# Patient Record
Sex: FEMALE | Race: WHITE | NOT HISPANIC OR LATINO | Employment: OTHER | URBAN - METROPOLITAN AREA
[De-identification: names, ages, dates, MRNs, and addresses within clinical notes are randomized per-mention and may not be internally consistent; named-entity substitution may affect disease eponyms.]

---

## 2017-06-01 ENCOUNTER — ALLSCRIPTS OFFICE VISIT (OUTPATIENT)
Dept: OTHER | Facility: OTHER | Age: 57
End: 2017-06-01

## 2017-07-17 ENCOUNTER — TRANSCRIBE ORDERS (OUTPATIENT)
Dept: ADMINISTRATIVE | Facility: HOSPITAL | Age: 57
End: 2017-07-17

## 2017-07-17 DIAGNOSIS — K11.5 SIALOLITHIASIS: Primary | ICD-10-CM

## 2017-07-21 ENCOUNTER — HOSPITAL ENCOUNTER (OUTPATIENT)
Dept: CT IMAGING | Facility: HOSPITAL | Age: 57
Discharge: HOME | End: 2017-07-21
Attending: DENTIST
Payer: COMMERCIAL

## 2017-07-21 DIAGNOSIS — K11.5 SIALOLITHIASIS: ICD-10-CM

## 2017-07-21 PROCEDURE — 70486 CT MAXILLOFACIAL W/O DYE: CPT

## 2017-08-04 ENCOUNTER — GENERIC CONVERSION - ENCOUNTER (OUTPATIENT)
Dept: FAMILY MEDICINE CLINIC | Facility: CLINIC | Age: 57
End: 2017-08-04

## 2017-08-04 ENCOUNTER — APPOINTMENT (OUTPATIENT)
Dept: LAB | Facility: CLINIC | Age: 57
End: 2017-08-04
Payer: COMMERCIAL

## 2017-08-04 ENCOUNTER — TRANSCRIBE ORDERS (OUTPATIENT)
Dept: LAB | Facility: CLINIC | Age: 57
End: 2017-08-04

## 2017-08-04 ENCOUNTER — GENERIC CONVERSION - ENCOUNTER (OUTPATIENT)
Dept: OTHER | Facility: OTHER | Age: 57
End: 2017-08-04

## 2017-08-04 DIAGNOSIS — R74.8 ABNORMAL LEVELS OF OTHER SERUM ENZYMES: ICD-10-CM

## 2017-08-04 DIAGNOSIS — N95.1 FEMALE CLIMACTERIC STATE: ICD-10-CM

## 2017-08-04 DIAGNOSIS — M25.551 PAIN IN RIGHT HIP: ICD-10-CM

## 2017-08-04 DIAGNOSIS — E55.9 VITAMIN D DEFICIENCY: ICD-10-CM

## 2017-08-04 LAB
ALBUMIN SERPL BCP-MCNC: 3.5 G/DL (ref 3.5–5)
ALP SERPL-CCNC: 169 U/L (ref 46–116)
ALT SERPL W P-5'-P-CCNC: 102 U/L (ref 12–78)
ANION GAP SERPL CALCULATED.3IONS-SCNC: 7 MMOL/L (ref 4–13)
AST SERPL W P-5'-P-CCNC: 55 U/L (ref 5–45)
BILIRUB SERPL-MCNC: 0.39 MG/DL (ref 0.2–1)
BUN SERPL-MCNC: 11 MG/DL (ref 5–25)
CALCIUM SERPL-MCNC: 9 MG/DL (ref 8.3–10.1)
CHLORIDE SERPL-SCNC: 104 MMOL/L (ref 100–108)
CO2 SERPL-SCNC: 27 MMOL/L (ref 21–32)
CREAT SERPL-MCNC: 0.63 MG/DL (ref 0.6–1.3)
FSH SERPL-ACNC: 28.1 MIU/ML
GFR SERPL CREATININE-BSD FRML MDRD: 101 ML/MIN/1.73SQ M
GLUCOSE P FAST SERPL-MCNC: 90 MG/DL (ref 65–99)
POTASSIUM SERPL-SCNC: 4.4 MMOL/L (ref 3.5–5.3)
PROT SERPL-MCNC: 7.5 G/DL (ref 6.4–8.2)
SODIUM SERPL-SCNC: 138 MMOL/L (ref 136–145)
TSH SERPL DL<=0.05 MIU/L-ACNC: 1.92 UIU/ML (ref 0.36–3.74)

## 2017-08-04 PROCEDURE — 80053 COMPREHEN METABOLIC PANEL: CPT

## 2017-08-04 PROCEDURE — 36415 COLL VENOUS BLD VENIPUNCTURE: CPT

## 2017-08-04 PROCEDURE — 84443 ASSAY THYROID STIM HORMONE: CPT

## 2017-08-04 PROCEDURE — 83001 ASSAY OF GONADOTROPIN (FSH): CPT

## 2017-08-09 ENCOUNTER — APPOINTMENT (OUTPATIENT)
Dept: LAB | Facility: CLINIC | Age: 57
End: 2017-08-09
Payer: COMMERCIAL

## 2017-08-09 DIAGNOSIS — R74.8 ABNORMAL LEVELS OF OTHER SERUM ENZYMES: ICD-10-CM

## 2017-08-09 DIAGNOSIS — E55.9 VITAMIN D DEFICIENCY: ICD-10-CM

## 2017-08-09 LAB — 25(OH)D3 SERPL-MCNC: 15 NG/ML (ref 30–100)

## 2017-08-09 PROCEDURE — 82306 VITAMIN D 25 HYDROXY: CPT

## 2017-08-09 PROCEDURE — 36415 COLL VENOUS BLD VENIPUNCTURE: CPT

## 2017-08-09 PROCEDURE — 80074 ACUTE HEPATITIS PANEL: CPT

## 2017-08-10 LAB
HAV IGM SER QL: NORMAL
HBV CORE IGM SER QL: NORMAL
HBV SURFACE AG SER QL: NORMAL
HCV AB SER QL: NORMAL

## 2017-08-16 ENCOUNTER — HOSPITAL ENCOUNTER (OUTPATIENT)
Dept: ULTRASOUND IMAGING | Facility: HOSPITAL | Age: 57
Discharge: HOME | End: 2017-08-16
Payer: COMMERCIAL

## 2017-08-16 DIAGNOSIS — R74.8 ABNORMAL LEVELS OF OTHER SERUM ENZYMES: ICD-10-CM

## 2017-08-16 PROCEDURE — 76700 US EXAM ABDOM COMPLETE: CPT

## 2017-08-21 ENCOUNTER — APPOINTMENT (OUTPATIENT)
Dept: PHYSICAL THERAPY | Facility: MEDICAL CENTER | Age: 57
End: 2017-08-21
Payer: COMMERCIAL

## 2017-08-21 ENCOUNTER — GENERIC CONVERSION - ENCOUNTER (OUTPATIENT)
Dept: OTHER | Facility: OTHER | Age: 57
End: 2017-08-21

## 2017-08-21 DIAGNOSIS — M25.551 PAIN IN RIGHT HIP: ICD-10-CM

## 2017-08-21 PROCEDURE — G8991 OTHER PT/OT GOAL STATUS: HCPCS | Performed by: PHYSICAL THERAPIST

## 2017-08-21 PROCEDURE — 97161 PT EVAL LOW COMPLEX 20 MIN: CPT

## 2017-08-21 PROCEDURE — 97140 MANUAL THERAPY 1/> REGIONS: CPT

## 2017-08-21 PROCEDURE — G8990 OTHER PT/OT CURRENT STATUS: HCPCS | Performed by: PHYSICAL THERAPIST

## 2017-08-24 ENCOUNTER — APPOINTMENT (OUTPATIENT)
Dept: LAB | Facility: CLINIC | Age: 57
End: 2017-08-24
Payer: COMMERCIAL

## 2017-08-24 DIAGNOSIS — R74.8 ABNORMAL LEVELS OF OTHER SERUM ENZYMES: ICD-10-CM

## 2017-08-24 LAB
ALBUMIN SERPL BCP-MCNC: 3.6 G/DL (ref 3.5–5)
ALP SERPL-CCNC: 176 U/L (ref 46–116)
ALT SERPL W P-5'-P-CCNC: 91 U/L (ref 12–78)
AST SERPL W P-5'-P-CCNC: 45 U/L (ref 5–45)
BILIRUB DIRECT SERPL-MCNC: 0.15 MG/DL (ref 0–0.2)
BILIRUB SERPL-MCNC: 0.54 MG/DL (ref 0.2–1)
PROT SERPL-MCNC: 7.7 G/DL (ref 6.4–8.2)

## 2017-08-24 PROCEDURE — 80076 HEPATIC FUNCTION PANEL: CPT

## 2017-08-24 PROCEDURE — 36415 COLL VENOUS BLD VENIPUNCTURE: CPT

## 2017-09-28 ENCOUNTER — TRANSCRIBE ORDERS (OUTPATIENT)
Dept: ADMINISTRATIVE | Facility: HOSPITAL | Age: 57
End: 2017-09-28

## 2017-09-28 ENCOUNTER — APPOINTMENT (OUTPATIENT)
Dept: LAB | Facility: MEDICAL CENTER | Age: 57
End: 2017-09-28
Attending: INTERNAL MEDICINE
Payer: COMMERCIAL

## 2017-09-28 ENCOUNTER — ALLSCRIPTS OFFICE VISIT (OUTPATIENT)
Dept: OTHER | Facility: OTHER | Age: 57
End: 2017-09-28

## 2017-09-28 DIAGNOSIS — R74.8 ABNORMAL LEVELS OF OTHER SERUM ENZYMES: ICD-10-CM

## 2017-09-28 DIAGNOSIS — R74.8 OTHER NONSPECIFIC ABNORMAL SERUM ENZYME LEVELS: Primary | ICD-10-CM

## 2017-09-28 DIAGNOSIS — R19.4 CHANGE IN BOWEL HABITS: ICD-10-CM

## 2017-09-28 DIAGNOSIS — R74.8 OTHER NONSPECIFIC ABNORMAL SERUM ENZYME LEVELS: ICD-10-CM

## 2017-09-28 LAB
ALBUMIN SERPL BCP-MCNC: 3.6 G/DL (ref 3.5–5)
ALP SERPL-CCNC: 177 U/L (ref 46–116)
ALT SERPL W P-5'-P-CCNC: 93 U/L (ref 12–78)
AST SERPL W P-5'-P-CCNC: 55 U/L (ref 5–45)
BILIRUB DIRECT SERPL-MCNC: 0.09 MG/DL (ref 0–0.2)
BILIRUB SERPL-MCNC: 0.39 MG/DL (ref 0.2–1)
CRP SERPL QL: 6.9 MG/L
ERYTHROCYTE [SEDIMENTATION RATE] IN BLOOD: 25 MM/HOUR (ref 0–20)
HAV AB SER QL IA: NORMAL
HBV SURFACE AB SER-ACNC: <3.1 MIU/ML
IGA SERPL-MCNC: 199 MG/DL (ref 70–400)
IGG SERPL-MCNC: 1390 MG/DL (ref 700–1600)
IGM SERPL-MCNC: 95 MG/DL (ref 40–230)
PROT SERPL-MCNC: 8 G/DL (ref 6.4–8.2)

## 2017-09-28 PROCEDURE — 86235 NUCLEAR ANTIGEN ANTIBODY: CPT

## 2017-09-28 PROCEDURE — 83993 ASSAY FOR CALPROTECTIN FECAL: CPT

## 2017-09-28 PROCEDURE — 86038 ANTINUCLEAR ANTIBODIES: CPT

## 2017-09-28 PROCEDURE — 82103 ALPHA-1-ANTITRYPSIN TOTAL: CPT

## 2017-09-28 PROCEDURE — 80076 HEPATIC FUNCTION PANEL: CPT

## 2017-09-28 PROCEDURE — 86140 C-REACTIVE PROTEIN: CPT

## 2017-09-28 PROCEDURE — 82784 ASSAY IGA/IGD/IGG/IGM EACH: CPT

## 2017-09-28 PROCEDURE — 86255 FLUORESCENT ANTIBODY SCREEN: CPT

## 2017-09-28 PROCEDURE — 36415 COLL VENOUS BLD VENIPUNCTURE: CPT

## 2017-09-28 PROCEDURE — 82390 ASSAY OF CERULOPLASMIN: CPT

## 2017-09-28 PROCEDURE — 86706 HEP B SURFACE ANTIBODY: CPT

## 2017-09-28 PROCEDURE — 85652 RBC SED RATE AUTOMATED: CPT

## 2017-09-28 PROCEDURE — 86256 FLUORESCENT ANTIBODY TITER: CPT

## 2017-09-28 PROCEDURE — 83516 IMMUNOASSAY NONANTIBODY: CPT

## 2017-09-28 PROCEDURE — 86708 HEPATITIS A ANTIBODY: CPT

## 2017-09-29 LAB
A1AT SERPL-MCNC: 146 MG/DL (ref 90–200)
ACTIN IGG SERPL-ACNC: 8 UNITS (ref 0–19)
CERULOPLASMIN SERPL-MCNC: 38.2 MG/DL (ref 19–39)
ENDOMYSIUM IGA SER QL: NEGATIVE
GLIADIN PEPTIDE IGA SER-ACNC: 5 UNITS (ref 0–19)
GLIADIN PEPTIDE IGG SER-ACNC: 2 UNITS (ref 0–19)
IGA SERPL-MCNC: 190 MG/DL (ref 87–352)
MITOCHONDRIA M2 IGG SER-ACNC: 51.7 UNITS (ref 0–20)
RYE IGE QN: NEGATIVE
TTG IGA SER-ACNC: <2 U/ML (ref 0–3)
TTG IGG SER-ACNC: <2 U/ML (ref 0–5)

## 2017-10-01 ENCOUNTER — GENERIC CONVERSION - ENCOUNTER (OUTPATIENT)
Dept: OTHER | Facility: OTHER | Age: 57
End: 2017-10-01

## 2017-10-09 ENCOUNTER — GENERIC CONVERSION - ENCOUNTER (OUTPATIENT)
Dept: OTHER | Facility: OTHER | Age: 57
End: 2017-10-09

## 2017-10-09 ENCOUNTER — ANESTHESIA EVENT (OUTPATIENT)
Dept: GASTROENTEROLOGY | Facility: HOSPITAL | Age: 57
End: 2017-10-09
Payer: COMMERCIAL

## 2017-10-09 ENCOUNTER — ANESTHESIA (OUTPATIENT)
Dept: GASTROENTEROLOGY | Facility: HOSPITAL | Age: 57
End: 2017-10-09
Payer: COMMERCIAL

## 2017-10-09 ENCOUNTER — HOSPITAL ENCOUNTER (OUTPATIENT)
Facility: HOSPITAL | Age: 57
Setting detail: OUTPATIENT SURGERY
Discharge: HOME | End: 2017-10-09
Attending: INTERNAL MEDICINE | Admitting: INTERNAL MEDICINE
Payer: COMMERCIAL

## 2017-10-09 VITALS
OXYGEN SATURATION: 100 % | DIASTOLIC BLOOD PRESSURE: 64 MMHG | HEIGHT: 62 IN | HEART RATE: 80 BPM | BODY MASS INDEX: 30.91 KG/M2 | WEIGHT: 168 LBS | TEMPERATURE: 97.5 F | RESPIRATION RATE: 14 BRPM | SYSTOLIC BLOOD PRESSURE: 123 MMHG

## 2017-10-09 DIAGNOSIS — Z12.11 ENCOUNTER FOR SCREENING FOR MALIGNANT NEOPLASM OF COLON: ICD-10-CM

## 2017-10-09 LAB — CALPROTECTIN STL-MCNT: 138 UG/G (ref 0–120)

## 2017-10-09 PROCEDURE — 88305 TISSUE EXAM BY PATHOLOGIST: CPT | Performed by: INTERNAL MEDICINE

## 2017-10-09 RX ORDER — PROPOFOL 10 MG/ML
INJECTION, EMULSION INTRAVENOUS AS NEEDED
Status: DISCONTINUED | OUTPATIENT
Start: 2017-10-09 | End: 2017-10-09 | Stop reason: SURG

## 2017-10-09 RX ORDER — FEXOFENADINE HCL 180 MG/1
180 TABLET ORAL AS NEEDED
Status: ON HOLD | COMMUNITY
End: 2019-10-04 | Stop reason: ALTCHOICE

## 2017-10-09 RX ORDER — SODIUM CHLORIDE 9 MG/ML
50 INJECTION, SOLUTION INTRAVENOUS CONTINUOUS
Status: DISCONTINUED | OUTPATIENT
Start: 2017-10-09 | End: 2017-10-10 | Stop reason: HOSPADM

## 2017-10-09 RX ORDER — LABETALOL HYDROCHLORIDE 5 MG/ML
INJECTION, SOLUTION INTRAVENOUS AS NEEDED
Status: DISCONTINUED | OUTPATIENT
Start: 2017-10-09 | End: 2017-10-09 | Stop reason: SURG

## 2017-10-09 RX ORDER — PROPOFOL 10 MG/ML
INJECTION, EMULSION INTRAVENOUS CONTINUOUS PRN
Status: DISCONTINUED | OUTPATIENT
Start: 2017-10-09 | End: 2017-10-09 | Stop reason: SURG

## 2017-10-09 RX ADMIN — PROPOFOL 100 MCG/KG/MIN: 10 INJECTION, EMULSION INTRAVENOUS at 10:42

## 2017-10-09 RX ADMIN — SODIUM CHLORIDE: 0.9 INJECTION, SOLUTION INTRAVENOUS at 10:42

## 2017-10-09 RX ADMIN — SODIUM CHLORIDE: 0.9 INJECTION, SOLUTION INTRAVENOUS at 11:16

## 2017-10-09 RX ADMIN — LABETALOL HYDROCHLORIDE 5 MG: 5 INJECTION, SOLUTION INTRAVENOUS at 11:01

## 2017-10-09 RX ADMIN — PROPOFOL 50 MG: 10 INJECTION, EMULSION INTRAVENOUS at 10:47

## 2017-10-09 RX ADMIN — PROPOFOL 50 MG: 10 INJECTION, EMULSION INTRAVENOUS at 10:41

## 2017-10-09 RX ADMIN — PROPOFOL 50 MG: 10 INJECTION, EMULSION INTRAVENOUS at 10:42

## 2017-10-09 NOTE — ANESTHESIA POSTPROCEDURE EVALUATION
Post-Op Assessment Note      CV Status:  Stable    Mental Status:  Awake    Hydration Status:  Stable    PONV Controlled:  None    Airway Patency:  Patent    Post Op Vitals Reviewed: Yes          Staff: CRNA, Anesthesiologist           BP      Temp      Pulse    Resp      SpO2

## 2017-10-09 NOTE — DISCHARGE INSTRUCTIONS
Colonoscopy   WHAT YOU NEED TO KNOW:   A colonoscopy is a procedure to examine the inside of your colon (intestine) with a scope  Polyps or tissue growths may have been removed during your colonoscopy  It is normal to feel bloated and to have some abdominal discomfort  You should be passing gas  If you have hemorrhoids or you had polyps removed, you may have a small amount of bleeding  DISCHARGE INSTRUCTIONS:   Seek care immediately if:   · You have a large amount of bright red blood in your bowel movements  · Your abdomen is hard and firm and you have severe pain  · You have sudden trouble breathing  Contact your healthcare provider if:   · You develop a rash or hives  · You have a fever within 24 hours of your procedure       · You have not had a bowel movement for 3 days after your procedure  · You have questions or concerns about your condition or care  Activity:   · Do not lift, strain, or run  for 3 days after your procedure  · Rest after your procedure  You have been given medicine to relax you  Do not  drive or make important decisions until the day after your procedure  Return to your normal activity as directed  · Relieve gas and discomfort from bloating  by lying on your right side with a heating pad on your abdomen  You may need to take short walks to help the gas move out  Eat small meals until bloating is relieved  If you had polyps removed: For 7 days after your procedure:  · Do not  take aspirin  · Do not  go on long car rides  Follow up with your healthcare provider as directed:  Write down your questions so you remember to ask them during your visits  © 2017 [employer] is for End User's use only and may not be sold, redistributed or otherwise used for commercial purposes  All illustrations and images included in CareNotes® are the copyrighted property of [employer]  or Gerard Hanson    The above information is an  only  It is not intended as medical advice for individual conditions or treatments  Talk to your doctor, nurse or pharmacist before following any medical regimen to see if it is safe and effective for you

## 2017-10-09 NOTE — ANESTHESIA PREPROCEDURE EVALUATION
Review of Systems/Medical History  Patient summary reviewed  Chart reviewed  No history of anesthetic complications     Cardiovascular  Exercise tolerance: good,     Pulmonary       GI/Hepatic    No hepatitis ,             Endo/Other     GYN       Hematology   Musculoskeletal       Neurology   Psychology   Anxiety,            Physical Exam    Airway    Mallampati score: III  TM Distance: >3 FB  Neck ROM: full     Dental   No notable dental hx     Cardiovascular      Pulmonary      Other Findings        Anesthesia Plan  ASA Score- 2       Anesthesia Type- IV sedation with anesthesia        Induction- intravenous      Informed Consent  Anesthetic plan and risks discussed with patient

## 2017-10-09 NOTE — OP NOTE
**** GI/ENDOSCOPY REPORT ****     PATIENT NAME: Herb Hand ------ VISIT ID:  Patient ID: [id number]   YOB: 1960     INTRODUCTION: Colonoscopy - A 56 female patient presents for an outpatient   Colonoscopy at [facility]  PREVIOUS COLONOSCOPY: None     INDICATIONS: Change in bowel habits  She has elevated LFTs  Her daughter   has UC and PSC  CONSENT:  The benefits, risks, and alternatives to the procedure were   discussed and informed consent was obtained from the patient  PREPARATION: EKG, pulse, pulse oximetry and blood pressure were monitored   throughout the procedure  The patient was identified by myself both   verbally and by visual inspection of ID band  Airway Assessment   Classification: Airway class 2 - Visualization of the soft palate, fauces   and uvula  ASA Classification: Class 2 - Patient has mild to moderate   systemic disturbance that may or may not be related to the disorder   requiring surgery  MEDICATIONS: Anesthesia-check records     PROCEDURE:  The endoscope was passed without difficulty through the anus   under direct visualization and advanced to the cecum, confirmed by   appendiceal orifice and ileocecal valve  The scope was withdrawn and the   mucosa was carefully examined  The quality of the preparation was good  RECTAL EXAM: Normal rectal exam      FINDINGS:  There was evidence of moderately severe colitis in the cecum,   ascending colon, hepatic flexure, transverse colon, splenic flexure,   descending colon, sigmoid colon, and rectum  The mucosa appeared atrophic,   erosive, and erythematous  There was some loss of mucosal folds  Multiple   biopsies was taken from the ascending colon, hepatic flexure, transverse   colon, sigmoid colon, and rectum  A single flat polyp, measuring less than   5 mm in size, was found in the distal rectum  The polyp was completely   removed by cold snare polypectomy   The polyp was retrieved  Four biopsies   were taken from the area around the polyp     COMPLICATIONS: There were no complications  IMPRESSIONS: Moderately severe pancolitis likely secondary to ulcerative   colitis  Multiple biopsies taken  Normal terminal ileum  A single flat   polyp found in the distal rectum; removed by cold snare polypectomy  Biopsy   taken from area around the polyp  RECOMMENDATIONS: Discharge home when standard parameters are met  Resume   regular diet as tolerated  Follow-up on the results of the biopsy   specimens  Follow up in office  ESTIMATED BLOOD LOSS:     PATHOLOGY SPECIMENS: Multiple biopsies taken from ascending colon, hepatic   flexure, transverse colon, sigmoid colon, and rectum  Associated finding:   Colitis  Completely removed by cold snare polypectomy  PROCEDURE CODES:     ICD-9 Codes: 787 99 Other symptoms involving digestive system 558 9 Other   and unspecified noninfectious gastroenteritides and colitis     ICD-10 Codes: R19 4 Change in bowel habit K52 9 Noninfective   gastroenteritis and colitis, unspecified K63 5 Polyp of colon     PERFORMED BY: LOI Stokes  on 10/09/2017  Version 1, electronically signed by LOI Paige  on 10/09/2017   at 11:31

## 2017-10-22 ENCOUNTER — GENERIC CONVERSION - ENCOUNTER (OUTPATIENT)
Dept: OTHER | Facility: OTHER | Age: 57
End: 2017-10-22

## 2017-10-26 ENCOUNTER — GENERIC CONVERSION - ENCOUNTER (OUTPATIENT)
Dept: OTHER | Facility: OTHER | Age: 57
End: 2017-10-26

## 2017-10-27 NOTE — CONSULTS
Assessment  1  Elevated liver enzymes (790 5) (R74 8)   2  Colon cancer screening (V76 51) (Z12 11)   3  Change in bowel habits (787 99) (R19 4)    Plan  Change in bowel habits    · (1) CALPROTECTIN, FECAL; Status:Active; Requested VLE:86UUG3272;    Perform:[facility]; VSF:48AWG0197; Ordered; For:Change in bowel habits; Ordered By:Cristian Unger;   · (1) C-REACTIVE PROTEIN; Status:Active; Requested BKI:73HZK2257;    Perform:[facility]; ZXF:62PRR3503; Ordered; For:Change in bowel habits; Ordered By:Cristian Unger;   · (1) SED RATE; Status:Active; Requested BWW:[id number];    Perform:[facility]; GIN:81UWL5232; Ordered; For:Change in bowel habits; Ordered By:Cristian Unger;   · COLONOSCOPY (GI, SURG); Status:Active; Requested ZXM:[id number];    Perform:[facility]; Order Comments:shahnaz; HUMA:46GHR2453; Last Updated [facility]; 9/28/2017 9:10:07 AM;Ordered; For:Change in bowel habits; Ordered By:Cristian Unger;  Colon cancer screening    · Suprep Bowel Prep Kit 17 5-3 13-1 6 GM/180ML Oral Solution; DILUTE CONTENTS  AND USE AS DIRECTED FOR BOWEL PREP   Rx By: Zahraa Matthew; Dispense: 0 Days ; #:1 X 177 ML Bottle (2 Bottles); Refill: 0;For: Colon cancer screening; KOURTNEY = N; [facility]; Last Updated By: System, [employer]; 9/28/2017 8:56:35 AM   · Follow-up visit in 2 months Evaluation and Treatment  Follow-up  Status: Complete   Done: 43SSP4986   Ordered; For: Colon cancer screening; Ordered By: Zahraa Matthew Performed:  Due: 49MSV4918; Last Updated By: Aimee Powell; 9/28/2017 9:10:07 AM  Elevated liver enzymes    · (1) ALPHA 1 ANTITRYPSIN; Status:Active; Requested HXR:[id number];    Perform:[facility]; XEX:87ZXJ1575; Ordered;For:Elevated liver enzymes; Ordered By:Cristian Unger;   · (1) TIFFANIE SCREEN W/REFLEX TO TITER/PATTERN; Status:Active; Requested  :88SBD8149;    Perform:[facility]; ZCN:[id number]; Ordered;For:Elevated liver enzymes; Ordered By:Cristian Unger;   · (1) CELIAC DISEASE AB PROFILE; Status:Active; Requested ARSLAN:70KJL2344;    Perform:[facility]; KGJ:79DPM6331; Ordered;For:Elevated liver enzymes; Ordered By:Cristian Unger;   · (1) CERULOPLASMIN; Status:Active; Requested HMX:07LNT1933;    Perform:[facility]; GAP:17SCK3311; Ordered;For:Elevated liver enzymes; Ordered By:Cristian Unger;   · (1) HEPATIC FUNCTION PANEL; Status:Active; Requested YPZ:[id number];    Perform:[facility]; BERNA:10XNV8701; Ordered;For:Elevated liver enzymes; Ordered By:Cristian Unger;   · (1) IgG,IgA,IgM QUANTITATIVE, BLOOD; Status:Active; Requested CBY:[id number];    Perform:[facility]; VMY:71YFL2628; Ordered;For:Elevated liver enzymes; Ordered By:Cristian Unger;   · (1) MITOCHONDRIAL ANTIBODY; Status:Active; Requested BFU:31UQA3907;    Perform:[facility]; XNT:23VZP9332; Ordered;For:Elevated liver enzymes; Ordered By:Cristian Unger;   · (1) SMOOTH MUSCLE ANTIBODY; Status:Active; Requested CBS:22KCX8819;    Perform:[facility]; WAC:95NJB0193; Ordered;For:Elevated liver enzymes; Ordered By:Cristian Unger;   · (Q) HEPATITIS A AB, TOTAL; Status:Active; Requested JYU:[id number];    Perform:[facility]; VJS:[id number]; Ordered;For:Elevated liver enzymes; Ordered By:Cristian Unger;   · (Q) HEPATITIS B SURFACE ANTIBODY QL; Status:Active; Requested YCV:[id number];    Perform:[facility]; KJX:78WJC3413; Ordered;For:Elevated liver enzymes; Ordered By:Cristian Unger;    Discussion/Summary  Discussion Summary:   1  Elevated liver enzymes - She has elevated alkaline phosphatase and transaminases  Her bilirubin and albumin are normal  The pattern is not typical for alcoholic hepatitis with AST greater than ALT but alcoholic hepatitis is a possibility as her numbers are improving with decreasing alcohol intake  Acute hepatitis panel is negative   Other differential diagnosis include primary biliary cholangitis, PSC and other underlying metabolic and autoimmune disorders  We will do complete workup including TIFFANIE, smooth muscle antibody, mitochondrial antibody, quantitative immunoglobulin level, repeat liver function test, ceruloplasmin, celiac and alpha-1 antitrypsin deficiency serology  will check hepatitis B surface antibody and hepatitis a total antibody to confirm immunity  strongly advised to completely abstain from drinking alcohol  to evaluate for ulcerative colitis given her family history of ulcerative colitis with PSC     2  Diarrhea - chronic  Less likely to be infectious  Colonoscopy to assess for ulcerative colitis  Random biopsy will be taken to rule out microscopic colitis  3  Colon cancer screening - she never had colonoscopy before   Will schedule her for colonoscopy  We reviewed risks benefits and alternates of colonoscopy  Risks include but not limited to infection, bleeding, perforation, missed lesion  Bowel prep instructions given  Counseling Documentation With Imm: The patient was counseled regarding prognosis,-- patient and family education,-- impressions  Goals and Barriers: The patient has the current Goals: Abstain from drinking alcohol, colon cancer screening, workup for liver enzymes  The patent has the current Barriers: None  Patient's Capacity to Self-Care: Patient is able to Self-Care  Chief Complaint  Chief Complaint Free Text Note Form: consult for elevated liver enzymes and gallstones ref by Dr Edgar Youngblood      History of Present Illness  HPI: 44-year-old female here for evaluation of abnormal liver function test   On routine labs on August 4, 2017 she was found to have elevated alkaline phosphatase to 169,  and AST 55  Her repeat labs on August 24, 2017 showed alkaline phosphatase of 176, ALT 91, AST 45  Her bilirubin and albumin are completely normal  She had normal liver function test in August 2016  she denies taking any supplements or hepatotoxic medication  drinks 2-3 glasses of wine 4-5 days a week  Since she found out her liver enzymes are elevated she stopped drinking wine but continued to drink light beer few days a week  patient also reports chronic watery diarrhea  She is having bowel movement 4 to 5 times a day  She reports crampy abdominal pain but denies melena, hematochezia, joint pain or weight loss  daughter has ulcerative colitis and primary sclerosing cholangitis  she never had colonoscopy before  Review of Systems  Complete-Female GI Adult:   Constitutional: No fever, no chills, feels well, no tiredness, no recent weight gain or weight loss  Eyes: No complaints of eye pain, no red eyes, no eyesight problems, no discharge, no dry eyes, no itching of eyes  ENT: no complaints of earache, no loss of hearing, no nose bleeds, no nasal discharge, no sore throat, no hoarseness  Cardiovascular: No complaints of slow heart rate, no fast heart rate, no chest pain, no palpitations, no leg claudication, no lower extremity edema  Respiratory: No complaints of shortness of breath, no wheezing, no cough, no SOB on exertion, no orthopnea, no PND  Gastrointestinal: abdominal pain-- and-- loose stool, but-- No complaints of abdominal pain, no constipation, no nausea or vomiting, no diarrhea, no bloody stools  Genitourinary: No complaints of dysuria, no incontinence, no pelvic pain, no dysmenorrhea, no vaginal discharge or bleeding  Musculoskeletal: No complaints of arthralgias, no myalgias, no joint swelling or stiffness, no limb pain or swelling  Integumentary: No complaints of skin rash or lesions, no itching, no skin wounds, no breast pain or lump  Neurological: No complaints of headache, no confusion, no convulsions, no numbness, no dizziness or fainting, no tingling, no limb weakness, no difficulty walking  Psychiatric: Not suicidal, no sleep disturbance, no anxiety or depression, no change in personality, no emotional problems     Endocrine: No complaints of proptosis, no hot flashes, no muscle weakness, no deepening of the voice, no feelings of weakness  Hematologic/Lymphatic: No complaints of swollen glands, no swollen glands in the neck, does not bleed easily, does not bruise easily  ROS Reviewed:   ROS reviewed  Active Problems  1  Abnormal blood chemistry (790 6) (R79 9)   2  Change in bowel habits (787 99) (R19 4)   3  Claustrophobia (300 29) (F40 240)   4  Colon cancer screening (V76 51) (Z12 11)   5  Daytime somnolence (780 54) (R40 0)   6  Elevated liver enzymes (790 5) (R74 8)   7  Encounter for screening mammogram for malignant neoplasm of breast (V76 12)   (Z12 31)   8  Hearing Loss (389 9)   9  Hip pain, right (719 45) (M25 551)   10  Menopausal symptoms (627 2) (N95 1)   11  Obesity (278 00) (E66 9)   12  Pap smear for cervical cancer screening (V76 2) (Z12 4)   13  Snoring (786 09) (R06 83)   14  Vitamin B12 deficiency (266 2) (E53 8)   15  Vitamin D deficiency (268 9) (E55 9)    Past Medical History  1  History of fatigue (V13 89) (Z87 898)   2  History of Palpitations (785 1) (R00 2)   3  History of Tinnitus, unspecified laterality (388 30) (H93 19)  Active Problems And Past Medical History Reviewed: The active problems and past medical history were reviewed and updated today  Surgical History  1  History of Iridectomy Optical  Surgical History Reviewed: The surgical history was reviewed and updated today  Family History  Mother    1  Family history of Breast Cancer (V16 3)   2  Family history of Lung Cancer (V16 1)  Father    3  Family history of atrial fibrillation (V17 49) (Z82 49)   4  Family history of cerebrovascular accident (V17 1) (Z82 3)   5  Family history of diabetes mellitus (V18 0) (Z83 3)   6  Family history of Heart Disease (V17 49)   7  Family history of Stroke Syndrome (V17 1)  Daughter    6  Family history of colitis (V18 59) (Z83 79)  Sister    5   Family history of borderline diabetes mellitus (V18 0) (Z84 89)  Family History    10  Family history of Breast Cancer (V16 3)  Family History Reviewed: The family history was reviewed and updated today  Social History   · Being A Social Drinker   · Never A Smoker   · Pets in the home   · Single   · Three children  Social History Reviewed: The social history was reviewed and updated today  The social history was reviewed and is unchanged  Current Meds   1  Align Oral Capsule; Therapy: 88ASC2884 to Recorded   2  Allegra Allergy 180 MG Oral Tablet; Therapy: (Recorded:01Jun2017) to Recorded   3  ALPRAZolam 0 25 MG Oral Tablet; TAKE 1 TABLET 3 TIMES DAILY AS NEEDED; Therapy: 45Ced0161 to (Evaluate:24Mar2017); Last Rx:17Mar2017 Ordered   4  Vitamin B-12 1000 MCG/15ML Oral Liquid; one P o daily; Therapy: 05XPI4575 to (Last Rx:90Pdi9547) Ordered   5  Vitamin D 2000 UNIT Oral Capsule; take 1 capsule by mouth daily; Therapy: 23DHD1282 to (Last Rx:05Rvf9184) Ordered  Medication List Reviewed: The medication list was reviewed and updated today  Allergies  1  Amoxicillin TABS    Vitals  Vital Signs    Recorded: 87HDD5891 08:20AM   Temperature 98 F, Oral   Heart Rate 86   Systolic 454, LUE, Sitting   Diastolic 80, LUE, Sitting   Height 5 ft 2 in   Weight 173 lb    BMI Calculated 31 64   BSA Calculated 1 8   O2 Saturation 98     Physical Exam    Constitutional   General appearance: No acute distress, well appearing and well nourished  Eyes   Pupils and irises: Equal, round and reactive to light  Ears, Nose, Mouth, and Throat   Oropharynx: Normal with no erythema, edema, exudate or lesions  Pulmonary   Respiratory effort: No increased work of breathing or signs of respiratory distress  Auscultation of lungs: Clear to auscultation  Cardiovascular   Auscultation of heart: Normal rate and rhythm, normal S1 and S2, without murmurs  Abdomen   Abdomen: Non-tender, no masses  Liver and spleen: No hepatomegaly or splenomegaly      Lymphatic Palpation of lymph nodes in neck: No lymphadenopathy  Musculoskeletal   Inspection/palpation of joints, bones, and muscles: Normal     Skin   Skin and subcutaneous tissue: Normal without rashes or lesions      Psychiatric   Orientation to person, place, and time: Normal          Future Appointments    Date/Time Provider Specialty Site   11/20/2017 01:00 PM Twyla Baum MD Gastroenterology Adult ST [address] [place] [address] ENDOSCOPY   12/04/2017 01:20 PM Twyla Baum MD [facility] Adult  [facility]   49/54/8658 76:38 AM Adrian Martin DO Family Medicine [facility]     Signatures   Electronically signed by : Nicole Edwards MD; Sep 28 2017 11:54AM EST                       (Author)

## 2017-11-24 ENCOUNTER — TRANSCRIBE ORDERS (OUTPATIENT)
Dept: ADMINISTRATIVE | Facility: HOSPITAL | Age: 57
End: 2017-11-24

## 2017-11-24 ENCOUNTER — APPOINTMENT (OUTPATIENT)
Dept: LAB | Facility: MEDICAL CENTER | Age: 57
End: 2017-11-24
Attending: INTERNAL MEDICINE
Payer: COMMERCIAL

## 2017-11-24 DIAGNOSIS — K76.9 PLEURAL EFFUSION ASSOCIATED WITH HEPATIC DISORDER: Primary | ICD-10-CM

## 2017-11-24 DIAGNOSIS — R94.5 NONSPECIFIC ABNORMAL RESULTS OF LIVER FUNCTION STUDY: ICD-10-CM

## 2017-11-24 DIAGNOSIS — K51.919 MILD CHRONIC ULCERATIVE COLITIS WITH COMPLICATION (HCC): ICD-10-CM

## 2017-11-24 DIAGNOSIS — J91.8 PLEURAL EFFUSION ASSOCIATED WITH HEPATIC DISORDER: ICD-10-CM

## 2017-11-24 DIAGNOSIS — K76.9 PLEURAL EFFUSION ASSOCIATED WITH HEPATIC DISORDER: ICD-10-CM

## 2017-11-24 DIAGNOSIS — J91.8 PLEURAL EFFUSION ASSOCIATED WITH HEPATIC DISORDER: Primary | ICD-10-CM

## 2017-11-24 LAB
ALBUMIN SERPL BCP-MCNC: 3.4 G/DL (ref 3.5–5)
ALP SERPL-CCNC: 161 U/L (ref 46–116)
ALT SERPL W P-5'-P-CCNC: 80 U/L (ref 12–78)
AST SERPL W P-5'-P-CCNC: 41 U/L (ref 5–45)
BILIRUB DIRECT SERPL-MCNC: 0.12 MG/DL (ref 0–0.2)
BILIRUB SERPL-MCNC: 0.37 MG/DL (ref 0.2–1)
PROT SERPL-MCNC: 7.6 G/DL (ref 6.4–8.2)

## 2017-11-24 PROCEDURE — 83993 ASSAY FOR CALPROTECTIN FECAL: CPT

## 2017-11-24 PROCEDURE — 80076 HEPATIC FUNCTION PANEL: CPT

## 2017-11-24 PROCEDURE — 36415 COLL VENOUS BLD VENIPUNCTURE: CPT

## 2017-11-29 LAB — CALPROTECTIN STL-MCNT: 309 UG/G (ref 0–120)

## 2017-12-01 ENCOUNTER — GENERIC CONVERSION - ENCOUNTER (OUTPATIENT)
Dept: OTHER | Facility: OTHER | Age: 57
End: 2017-12-01

## 2017-12-01 ENCOUNTER — GENERIC CONVERSION - ENCOUNTER (OUTPATIENT)
Dept: FAMILY MEDICINE CLINIC | Facility: CLINIC | Age: 57
End: 2017-12-01

## 2017-12-18 ENCOUNTER — ALLSCRIPTS OFFICE VISIT (OUTPATIENT)
Dept: OTHER | Facility: OTHER | Age: 57
End: 2017-12-18

## 2017-12-19 NOTE — PROGRESS NOTES
Assessment  1  Sinusitis, acute (461 9) (J01 90)   2  Cough (786 2) (R05)    Plan  Cough, Sinusitis, acute    · Follow-up PRN Evaluation and Treatment  Follow-up  Status: Complete  Done:95Qwe2774 11:08AM  Sinusitis, acute    · Azithromycin 250 MG Oral Tablet; TAKE 2 TABLETS ON DAY 1 THEN TAKE 1TABLET A DAY FOR 4 DAYS    Discussion/Summary    Rest and fluids, call if worse  Start abx if not better, try Mucinex D prn cough and sinus congestion  The patient was counseled regarding  Chief Complaint  Pt complains of sinus congestion and now a cough  History of Present Illness  HPI: Pt complains of sinus congestion and now a cough  No other complaints  Review of Systems   Constitutional: No fever, no chills, feels well, no tiredness, no recent weight gain or loss  ENT: as noted in HPI  Cardiovascular: no complaints of slow or fast heart rate, no chest pain, no palpitations, no leg claudication or lower extremity edema  Respiratory: as noted in HPI  Breasts: no complaints of breast pain, breast lump or nipple discharge  Gastrointestinal: no complaints of abdominal pain, no constipation, no nausea or diarrhea, no vomiting, no bloody stools  Genitourinary: no complaints of dysuria, no incontinence, no pelvic pain, no dysmenorrhea, no vaginal discharge or abnormal vaginal bleeding  Musculoskeletal: no complaints of arthralgia, no myalgia, no joint swelling or stiffness, no limb pain or swelling  Integumentary: no complaints of skin rash or lesion, no itching or dry skin, no skin wounds  Neurological: no complaints of headache, no confusion, no numbness or tingling, no dizziness or fainting  Active Problems  1  Abnormal blood chemistry (790 6) (R79 9)   2  Change in bowel habits (787 99) (R19 4)   3  Claustrophobia (300 29) (F40 240)   4  Colon cancer screening (V76 51) (Z12 11)   5  Daytime somnolence (780 54) (R40 0)   6  Elevated liver enzymes (790 5) (R74 8)   7   Encounter for screening mammogram for malignant neoplasm of breast (V76 12) (Z12 31)   8  Hearing Loss (389 9)   9  Hip pain, right (719 45) (M25 551)   10  Menopausal symptoms (627 2) (N95 1)   11  Obesity (278 00) (E66 9)   12  Pap smear for cervical cancer screening (V76 2) (Z12 4)   13  Snoring (786 09) (R06 83)   14  Vitamin B12 deficiency (266 2) (E53 8)   15  Vitamin D deficiency (268 9) (E55 9)    Past Medical History  1  History of fatigue (V13 89) (Z87 898)   2  History of Palpitations (785 1) (R00 2)   3  History of Tinnitus, unspecified laterality (388 30) (H93 19)    Family History  Mother    1  Family history of Breast Cancer (V16 3)   2  Family history of Lung Cancer (V16 1)  Father    3  Family history of atrial fibrillation (V17 49) (Z82 49)   4  Family history of cerebrovascular accident (V17 1) (Z82 3)   5  Family history of diabetes mellitus (V18 0) (Z83 3)   6  Family history of Heart Disease (V17 49)   7  Family history of Stroke Syndrome (V17 1)  Daughter    6  Family history of colitis (V18 59) (Z83 79)  Sister    5  Family history of borderline diabetes mellitus (V18 0) (Z84 89)  Family History    10  Family history of Breast Cancer (V16 3)    Social History   · Being A Social Drinker   · Never A Smoker   · Pets in the home   · has a dog and a cat in the home   · Single   · Three children    Surgical History  1  History of Iridectomy Optical    Current Meds   1  Allegra Allergy 180 MG Oral Tablet; Therapy: (Recorded:01Jun2017) to Recorded   2  ALPRAZolam 0 25 MG Oral Tablet; TAKE 1 TABLET 3 TIMES DAILY AS NEEDED; Therapy: 78Izx0643 to (Evaluate:13Nov2017); Last Rx:06Nov2017 Ordered   3  Lialda TBEC; TAKE 2 TABLETS ONCE DAILY WITH THE EVENING MEAL; Therapy: (Recorded:86Fkh9566) to Recorded   4  Suprep Bowel Prep Kit 17 5-3 13-1 6 GM/180ML Oral Solution; DILUTE CONTENTS AND USE AS DIRECTED FOR BOWEL PREP; Therapy: 85CFA4470 to (Last Jordan Mare)  Requested for: 00PGY4873 Ordered   5   Vitamin B-12 1000 MCG/15ML Oral Liquid; one P o daily; Therapy: 90DPZ8248 to (Last Rx:29Plb9226) Ordered   6  Vitamin D 2000 UNIT Oral Capsule; take 1 capsule by mouth daily; Therapy: 11PVG8094 to (Last Rx:19Ylr7706) Ordered    Allergies  1  Amoxicillin TABS    Vitals   Recorded: 60ERL4061 82:22OW   Systolic 470    Diastolic 84    Patient Refused Height Yes Yes   Patient Refused Weight Yes Yes     Physical Exam   Constitutional  General appearance: No acute distress, well appearing and well nourished  Eyes  Conjunctiva and lids: No swelling, erythema or discharge  Pupils and irises: Equal, round and reactive to light  Ears, Nose, Mouth, and Throat  External inspection of ears and nose: Normal    Otoscopic examination: Tympanic membranes translucent with normal light reflex  Canals patent without erythema  Nasal mucosa, septum, and turbinates: Abnormal  -- sinusitis  Oropharynx: Abnormal  -- pnd  Pulmonary  Respiratory effort: No increased work of breathing or signs of respiratory distress  Auscultation of lungs: Abnormal  -- cough  Cardiovascular  Palpation of heart: Normal PMI, no thrills  Auscultation of heart: Normal rate and rhythm, normal S1 and S2, without murmurs  Examination of extremities for edema and/or varicosities: Normal    Carotid pulses: Normal    Lymphatic  Palpation of lymph nodes in neck: No lymphadenopathy  Musculoskeletal  Gait and station: Normal    Digits and nails: Normal without clubbing or cyanosis  Inspection/palpation of joints, bones, and muscles: Normal    Skin  Skin and subcutaneous tissue: Normal without rashes or lesions  Neurologic  Cranial nerves: Cranial nerves 2-12 intact  Reflexes: 2+ and symmetric  Sensation: No sensory loss     Psychiatric  Orientation to person, place, and time: Normal    Mood and affect: Normal          Future Appointments    Date/Time Provider Specialty Site   87/16/7457 14:14 AM Jag Tidwell DO Family [facility]     Signatures Electronically signed by : Chloé Palmer DO; Dec 18 2017 11:15AM EST                       (Author)

## 2018-01-10 NOTE — RESULT NOTES
Verified Results  (1) HEP B SURFACE ANTIBODY 37GPT4019 11:18AM [facility]     Test Name Result Flag Reference   HEPATITIS B SURFACE ANTIBODY <3 10 mIU/mL     Protective Immunity: Hep B Surface Antibody >= 10 mIu/ml (Traceable to [facility] Reference Preparation)     (1) HEPATIC FUNCTION PANEL 57LEJ3271 10:10AM Yesi Butts Order Number: [id number]     Test Name Result Flag Reference   ALBUMIN 3 6 g/dL  3 5-5 0   ALK PHOSPHATAS 177 U/L H    ALT (SGPT) 93 U/L H 12-78   Specimen collection should occur prior to Sulfasalazine and/or Sulfapyridine administration due to the potential for falsely depressed results  AST(SGOT) 55 U/L H 5-45   Specimen collection should occur prior to Sulfasalazine and/or Sulfapyridine administration due to the potential for falsely depressed results  BILI, DIRECT 0 09 mg/dL  0 00-0 20   BILI, TOTAL 0 39 mg/dL  0 20-1 00   TOTAL PROTEIN 8 0 g/dL  6 4-8 2     (1) TIFFANIE SCREEN W/REFLEX TO TITER/PATTERN 50Jec0503 10:10AM Yesi Butts Order Number: [id number]     Test Name Result Flag Reference   TIFFANIE SCREEN  Negative  Negative     (1) SMOOTH MUSCLE ANTIBODY 98Pqs4551 10:10AM Yesi Butts Order Number: [id number]     Test Name Result Flag Reference   SMOOTH MUS AB 8 Units  0 - 19   Negative                     0 - 19                   Weak positive               20 - 30                   Moderate to strong positive     >30   Actin Antibodies are found in 52-85% of patients with   autoimmune hepatitis or chronic active hepatitis and   in 22% of patients with primary biliary cirrhosis      Performed at:  [address] [place] [address]  [id number]  : Rea Loyd MD, Phone:  [phone]     (1) MITOCHONDRIAL ANTIBODY 61MVR3516 10:10AM Yesi Butts Order Number: [id number]     Test Name Result Flag Reference   MITOCHONDR AB 51 7 Units H 0 0 - 20 0   Negative    0 0 - 20 0 Equivocal  20 1 - 24 9                                  Positive         >24 9  Mitochondrial (M2) Antibodies are found in 90-96% of  patients with primary biliary cirrhosis  Performed at:  [facility] [employer] [address]  [id number]  : Court Guy MD, Phone:  [phone]     (1) IgG,IgA,IgM QUANTITATIVE, BLOOD 10GRG1980 10:10AM Exinda Order Number: [id number]     Test Name Result Flag Reference    0 mg/dL  70 0-400 0   GAMMAGLOBULIN; IGG 1390 0 mg/dL  700 0-1600 0   IGM 95 0 mg/dL  40 0-230 0     (1) CELIAC DISEASE AB PROFILE 55Rml3896 10:10AM Exinda Order Number: [id number]     Test Name Result Flag Reference   tTG IGG <2 U/mL  0 - 5   Negative        0 - 5                                Weak Positive   6 - 9                                Positive           >9   tTG IGA <2 U/mL  0 - 3   Negative        0 -  3                                Weak Positive   4 - 10                                Positive           >10   Tissue Transglutaminase (tTG) has been identified   as the endomysial antigen  Studies have demonstr-   ated that endomysial IgA antibodies have over 99%   specificity for gluten sensitive enteropathy     GLIADA 5 units  0 - 19   Negative                   0 - 19                     Weak Positive             20 - 30                     Moderate to Strong Positive   >30   GLIADG 2 units  0 - 19   Negative                   0 - 19                     Weak Positive             20 - 30                     Moderate to Strong Positive   >30   ENDOMYSIAL AB IGA Negative  Negative   Performed at:  [facility] [employer] [address]  [id number]  : Court Guy MD, Phone:  [phone]    mg/dL  87 - 352     (1) ALPHA 1 ANTITRYPSIN 02MMZ6655 10:10AM Exinda Order Number: [id number]     Test Name Result Flag Reference   ALPHA 1 ANTITRY 146 mg/dL  90 - 200   Performed at:  [address] [place] [address] [employer] [address]  [id number]  : Mikhail Minaya MD, Phone:  [phone]     (63 358 969 10:10AM Aminata Ruiz Order Number: [id number]     Test Name Result Flag Reference   CERULOPLASMIN 38 2 mg/dL  19 0 - 39 0   Performed at:  [address] [place] [address]  [id number]  : Mikhail Minaya MD, Phone:  [phone]     (1) SED RATE 07BNC8522 10:10AM Aminata Ruiz Order Number: [id number]     Test Name Result Flag Reference   SED RATE 25 mm/hour H 0-20     (1) C-REACTIVE PROTEIN 76QPF2306 10:10AM Aminata Ruiz Order Number: [id number]     Test Name Result Flag Reference   C-REACT PROTEIN 6 9 mg/L H <3 0     (1) HEP A AB,TOTAL 64CJK0045 10:10AM Javier Ast     Test Name Result Flag Reference   Hep A Ab,Total Non-reactive  Non-reactive

## 2018-01-12 NOTE — RESULT NOTES
Verified Results  (1) TISSUE EXAM 13OOX0794 10:59AM Migdalia Holden     Test Name Result Flag Reference   LAB AP CASE REPORT (Report)     Surgical Pathology Report             Case: [id number]                   Authorizing Provider: Christos Tay MD      Collected:      10/09/2017 1059        Ordering Location:   [address] [place] [address]   Received:      10/10/2017 [address] End[facility]                               Pathologist:      Aurelia Oates MD                             Specimens:  A) - Large Intestine, Right/Ascending Colon, Cold bx                          B) - Large Intestine, Hepatic Flexure, Cold bx                             C) - Large Intestine, Transverse Colon, Cold bx                            D) - Large Intestine, Sigmoid Colon, Cold bx                              E) - Rectum, Cold bx                                          F) - Rectum, Cold snare, polyp                                     G) - Rectum, Cold bx, rectum area around polyp   LAB AP FINAL DIAGNOSIS (Report)     A-C  Ascending colon, hepatic flexure and transverse colon, biopsies:    - Moderate chronic active colitis  - Negative for granulomas, dysplasia and malignancy  D, E  Sigmoid colon and rectum, biopsies:    - No significant histologic abnormality  F  Rectal polyp:    - Hyperplastic polyp     - Negative for dysplasia and malignancy  G  Rectal biopsy around polyp:    - Colonic mucosa with mild hyperplastic changes  - Negative for dysplasia and malignancy  Electronically signed by Aurelia Oates MD on 10/16/2017 at 11:47 AM   LAB AP NOTE      Interpretation performed at [address] [place] [address]   LAB [address] [place] [address] (Report)     All controls performed with the immunohistochemical stains reported above   reacted appropriately   These tests were developed and their performance   characteristics determined by [facility] or [facility]  They may not be cleared or approved by the [place]  Food and Drug Administration  The FDA has determined that such clearance   or approval is not necessary  These tests are used for clinical purposes  They should not be regarded as investigational or for research  This   laboratory has been approved by [place] 88, designated as a high-complexity   laboratory and is qualified to perform these tests  LAB AP GROSS DESCRIPTION (Report)     A  The specimen is received in formalin, labeled with the patient's name   and hospital number, and is designated ascending colon biopsy  The   specimen consists of 3 tan soft tissue fragments ranging in greatest   dimension from 0 1 to 0 3 centimeters  Due to the size and consistency of   the specimen, it is questionable whether tissue will survive histologic   processing  Entirely submitted  One cassette  B  The specimen is received in formalin, labeled with the patient's name   and hospital number, and is designated Hepatic flexure biopsy  The   specimen consists of 2 tan soft tissue fragments measuring 0 3 and 0 4   centimeters in greatest dimension  Entirely submitted  One cassette  C  The specimen is received in formalin, labeled with the patient's name   and hospital number, and is designated Transverse colon biopsy  The   specimen consists of an aggregate of white to tan soft tissue fragments   measuring 0 8 x 0 6 x 0 1 cm  Due to size and consistency of the specimen,   it is questionable whether tissue will survive histologic processing  Entirely submitted  One cassette  D  The specimen is received in formalin, labeled with the patient's name   and hospital number, and is designated sigmoid colon biopsy  The   specimen consists of 2 white to tan soft tissue fragments measuring 0 4   and 0 6 centimeters in greatest dimension  Entirely submitted  One   cassette      E  The specimen is received in formalin, labeled with the patient's name and hospital number, and is designated Rectum biopsy  The specimen   consists of 2 tan to pink soft tissue fragments measuring 0 3 and 0 4   centimeters in greatest dimension  Entirely submitted  One cassette  F  The specimen is received in formalin, labeled with the patient's name   and hospital number, and is designated Rectum polyp  The specimen   consists of a single white to tan soft tissue fragment measuring 0 6 x 0 2   x 0 2 cm  Entirely submitted  One cassette  G  The specimen is received in formalin, labeled with the patient's name   and hospital number, and is designated Biopsy, rectum area around   polyp  The specimen consists of 5 white to tan soft tissue fragments   ranging in greatest dimension from 0 1 to 0 4 centimeters  Due to the size   and consistency of the specimen, it is questionable whether tissue will   survive histologic processing  Entirely submitted  One cassette  Note: The estimated total formalin fixation time based upon information   provided by the submitting clinician and the standard processing schedule   is less than 72 hours      [facility]

## 2018-01-13 VITALS
TEMPERATURE: 98 F | BODY MASS INDEX: 31.83 KG/M2 | DIASTOLIC BLOOD PRESSURE: 80 MMHG | WEIGHT: 173 LBS | SYSTOLIC BLOOD PRESSURE: 118 MMHG | OXYGEN SATURATION: 98 % | HEIGHT: 62 IN | HEART RATE: 86 BPM

## 2018-01-13 VITALS
WEIGHT: 175.25 LBS | DIASTOLIC BLOOD PRESSURE: 82 MMHG | HEIGHT: 62 IN | BODY MASS INDEX: 32.25 KG/M2 | SYSTOLIC BLOOD PRESSURE: 132 MMHG

## 2018-01-22 VITALS — HEIGHT: 62 IN | BODY MASS INDEX: 32.2 KG/M2 | WEIGHT: 175 LBS

## 2018-01-22 VITALS — DIASTOLIC BLOOD PRESSURE: 78 MMHG | SYSTOLIC BLOOD PRESSURE: 118 MMHG

## 2018-01-23 VITALS — SYSTOLIC BLOOD PRESSURE: 122 MMHG | DIASTOLIC BLOOD PRESSURE: 84 MMHG

## 2018-01-24 ENCOUNTER — LAB (OUTPATIENT)
Dept: LAB | Facility: MEDICAL CENTER | Age: 58
End: 2018-01-24
Payer: COMMERCIAL

## 2018-01-24 ENCOUNTER — TRANSCRIBE ORDERS (OUTPATIENT)
Dept: ADMINISTRATIVE | Facility: HOSPITAL | Age: 58
End: 2018-01-24

## 2018-01-24 DIAGNOSIS — K51.90 MILD CHRONIC ULCERATIVE COLITIS WITHOUT COMPLICATION (HCC): Primary | ICD-10-CM

## 2018-01-24 DIAGNOSIS — K51.90 MILD CHRONIC ULCERATIVE COLITIS WITHOUT COMPLICATION (HCC): ICD-10-CM

## 2018-01-24 LAB
ALBUMIN SERPL BCP-MCNC: 3.6 G/DL (ref 3.5–5)
ALP SERPL-CCNC: 155 U/L (ref 46–116)
ALT SERPL W P-5'-P-CCNC: 62 U/L (ref 12–78)
AST SERPL W P-5'-P-CCNC: 32 U/L (ref 5–45)
BILIRUB DIRECT SERPL-MCNC: 0.09 MG/DL (ref 0–0.2)
BILIRUB SERPL-MCNC: 0.43 MG/DL (ref 0.2–1)
PROT SERPL-MCNC: 8 G/DL (ref 6.4–8.2)

## 2018-01-24 PROCEDURE — 80076 HEPATIC FUNCTION PANEL: CPT

## 2018-01-24 PROCEDURE — 36415 COLL VENOUS BLD VENIPUNCTURE: CPT

## 2018-03-01 DIAGNOSIS — E55.9 VITAMIN D DEFICIENCY: ICD-10-CM

## 2018-03-12 DIAGNOSIS — F40.240 CLAUSTROPHOBIA: Primary | ICD-10-CM

## 2018-03-12 RX ORDER — ALPRAZOLAM 0.25 MG/1
1 TABLET ORAL 3 TIMES DAILY PRN
COMMUNITY
Start: 2013-08-02 | End: 2018-03-12 | Stop reason: SDUPTHER

## 2018-03-12 RX ORDER — ALPRAZOLAM 0.25 MG/1
0.25 TABLET ORAL 3 TIMES DAILY PRN
Qty: 30 TABLET | Refills: 0 | OUTPATIENT
Start: 2018-03-12 | End: 2019-03-05 | Stop reason: SDUPTHER

## 2018-05-03 ENCOUNTER — TELEPHONE (OUTPATIENT)
Dept: FAMILY MEDICINE CLINIC | Facility: CLINIC | Age: 58
End: 2018-05-03

## 2018-05-03 ENCOUNTER — APPOINTMENT (OUTPATIENT)
Dept: LAB | Facility: MEDICAL CENTER | Age: 58
End: 2018-05-03
Payer: COMMERCIAL

## 2018-05-03 DIAGNOSIS — Z20.828 EXPOSURE TO EPSTEIN-BARR VIRUS: ICD-10-CM

## 2018-05-03 DIAGNOSIS — Z20.828 EXPOSURE TO EPSTEIN-BARR VIRUS: Primary | ICD-10-CM

## 2018-05-03 LAB
BASOPHILS # BLD AUTO: 0.03 THOUSANDS/ΜL (ref 0–0.1)
BASOPHILS NFR BLD AUTO: 1 % (ref 0–1)
EOSINOPHIL # BLD AUTO: 0.35 THOUSAND/ΜL (ref 0–0.61)
EOSINOPHIL NFR BLD AUTO: 5 % (ref 0–6)
ERYTHROCYTE [DISTWIDTH] IN BLOOD BY AUTOMATED COUNT: 13 % (ref 11.6–15.1)
HCT VFR BLD AUTO: 39.3 % (ref 34.8–46.1)
HGB BLD-MCNC: 13.5 G/DL (ref 11.5–15.4)
LYMPHOCYTES # BLD AUTO: 1.37 THOUSANDS/ΜL (ref 0.6–4.47)
LYMPHOCYTES NFR BLD AUTO: 21 % (ref 14–44)
MCH RBC QN AUTO: 30.6 PG (ref 26.8–34.3)
MCHC RBC AUTO-ENTMCNC: 34.4 G/DL (ref 31.4–37.4)
MCV RBC AUTO: 89 FL (ref 82–98)
MONOCYTES # BLD AUTO: 0.71 THOUSAND/ΜL (ref 0.17–1.22)
MONOCYTES NFR BLD AUTO: 11 % (ref 4–12)
NEUTROPHILS # BLD AUTO: 4.1 THOUSANDS/ΜL (ref 1.85–7.62)
NEUTS SEG NFR BLD AUTO: 62 % (ref 43–75)
NRBC BLD AUTO-RTO: 0 /100 WBCS
PLATELET # BLD AUTO: 200 THOUSANDS/UL (ref 149–390)
PMV BLD AUTO: 8.9 FL (ref 8.9–12.7)
RBC # BLD AUTO: 4.41 MILLION/UL (ref 3.81–5.12)
WBC # BLD AUTO: 6.57 THOUSAND/UL (ref 4.31–10.16)

## 2018-05-03 PROCEDURE — 86664 EPSTEIN-BARR NUCLEAR ANTIGEN: CPT

## 2018-05-03 PROCEDURE — 36415 COLL VENOUS BLD VENIPUNCTURE: CPT

## 2018-05-03 PROCEDURE — 86663 EPSTEIN-BARR ANTIBODY: CPT

## 2018-05-03 PROCEDURE — 86665 EPSTEIN-BARR CAPSID VCA: CPT

## 2018-05-03 PROCEDURE — 85025 COMPLETE CBC W/AUTO DIFF WBC: CPT

## 2018-05-03 PROCEDURE — 86308 HETEROPHILE ANTIBODY SCREEN: CPT

## 2018-05-03 NOTE — TELEPHONE ENCOUNTER
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

## 2018-05-03 NOTE — TELEPHONE ENCOUNTER
I can order lab work to get completed   We can either mail to her or she can come pick it up or just go to a [facility]

## 2018-05-04 LAB
EBV EA IGG SER-ACNC: >150 U/ML (ref 0–8.9)
EBV NA IGG SER IA-ACNC: 494 U/ML (ref 0–17.9)
EBV PATRN SPEC IB-IMP: ABNORMAL
EBV VCA IGG SER IA-ACNC: 421 U/ML (ref 0–17.9)
EBV VCA IGM SER IA-ACNC: <36 U/ML (ref 0–35.9)
HETEROPH AB SER QL: NEGATIVE

## 2018-06-16 ENCOUNTER — OFFICE VISIT (OUTPATIENT)
Dept: URGENT CARE | Facility: MEDICAL CENTER | Age: 58
End: 2018-06-16
Payer: COMMERCIAL

## 2018-06-16 VITALS
WEIGHT: 170 LBS | BODY MASS INDEX: 31.28 KG/M2 | OXYGEN SATURATION: 98 % | SYSTOLIC BLOOD PRESSURE: 112 MMHG | HEART RATE: 96 BPM | HEIGHT: 62 IN | DIASTOLIC BLOOD PRESSURE: 62 MMHG | RESPIRATION RATE: 16 BRPM | TEMPERATURE: 96.2 F

## 2018-06-16 DIAGNOSIS — J01.90 ACUTE SINUSITIS, RECURRENCE NOT SPECIFIED, UNSPECIFIED LOCATION: ICD-10-CM

## 2018-06-16 DIAGNOSIS — J02.9 SORE THROAT: Primary | ICD-10-CM

## 2018-06-16 LAB — S PYO AG THROAT QL: NEGATIVE

## 2018-06-16 PROCEDURE — 87430 STREP A AG IA: CPT | Performed by: PHYSICIAN ASSISTANT

## 2018-06-16 PROCEDURE — 99203 OFFICE O/P NEW LOW 30 MIN: CPT | Performed by: PHYSICIAN ASSISTANT

## 2018-06-16 RX ORDER — MESALAMINE 1.2 G/1
2 TABLET, DELAYED RELEASE ORAL DAILY
COMMUNITY
End: 2020-03-09 | Stop reason: SDUPTHER

## 2018-06-16 RX ORDER — AZITHROMYCIN 250 MG/1
TABLET, FILM COATED ORAL
Qty: 6 TABLET | Refills: 0 | Status: SHIPPED | OUTPATIENT
Start: 2018-06-16 | End: 2018-06-21

## 2018-06-16 NOTE — PATIENT INSTRUCTIONS
Sinusitis   WHAT YOU NEED TO KNOW:   What is sinusitis? Sinusitis is inflammation or infection of your sinuses  It is most often caused by a virus  Acute sinusitis may last up to 12 weeks  Chronic sinusitis lasts longer than 12 weeks  Recurrent sinusitis means you have 4 or more times in 1 year  What increases my risk for sinusitis? · Medical conditions, such as an upper respiratory infection, allergies, asthma, or cystic fibrosis    · Dental infections or procedures, such as gum infections, tooth decay, or a root canal    · Smoking    · Abnormal sinus structure, such as nasal growths, swollen tonsils, or a deviated septum    · A weak immune system, from diseases such as diabetes or HIV  What are the signs and symptoms of sinusitis? · Fever    · Pain, pressure, redness, or swelling around the forehead, cheeks, or eyes    · Thick yellow or green discharge from your nose    · Tenderness when you touch your face over your sinuses    · Dry cough that happens mostly at night or when you lie down    · Headache and face pain that is worse when you lean forward    · Tooth pain, or pain when you chew  How is sinusitis diagnosed? Your healthcare provider will examine you and ask about your symptoms  He or she will check inside your nose using a nasal speculum  This is a small tool used to open your nostrils  A sample of the mucus from your nose may show what germ is causing your infection  How is sinusitis treated? Your symptoms may go away on their own  Your healthcare provider may recommend watchful waiting for up to 10 days before starting antibiotics  You may  need any of the following:  · Acetaminophen  decreases pain and fever  It is available without a doctor's order  Ask how much to take and how often to take it  Follow directions   Read the labels of all other medicines you are using to see if they also contain acetaminophen, or ask your doctor or pharmacist  Acetaminophen can cause liver damage if not taken correctly  Do not use more than 4 grams (4,000 milligrams) total of acetaminophen in one day  · NSAIDs , such as ibuprofen, help decrease swelling, pain, and fever  This medicine is available with or without a doctor's order  NSAIDs can cause stomach bleeding or kidney problems in certain people  If you take blood thinner medicine, always ask your healthcare provider if NSAIDs are safe for you  Always read the medicine label and follow directions  · Nasal steroid sprays  may help decrease inflammation in your nose and sinuses  · Decongestants  help reduce swelling and drain mucus in the nose and sinuses  They may help you breathe easier  · Antihistamines  help dry mucus in the nose and relieve sneezing  · Antibiotics  help treat or prevent a bacterial infection  How can I manage my symptoms? · Rinse your sinuses  Use a sinus rinse device to rinse your nasal passages with a saline (salt water) solution or distilled water  Do not use tap water  This will help thin the mucus in your nose and rinse away pollen and dirt  It will also help reduce swelling so you can breathe normally  Ask your healthcare provider how often to do this  · Breathe in steam   Heat a bowl of water until you see steam  Lean over the bowl and make a tent over your head with a large towel  Breathe deeply for about 20 minutes  Be careful not to get too close to the steam or burn yourself  Do this 3 times a day  You can also breathe deeply when you take a hot shower  · Sleep with your head elevated  Place an extra pillow under your head before you go to sleep to help your sinuses drain  · Drink liquids as directed  Ask your healthcare provider how much liquid to drink each day and which liquids are best for you  Liquids will thin the mucus in your nose and help it drain  Avoid drinks that contain alcohol or caffeine  · Do not smoke, and avoid secondhand smoke    Nicotine and other chemicals in cigarettes and cigars can make your symptoms worse  Ask your healthcare provider for information if you currently smoke and need help to quit  E-cigarettes or smokeless tobacco still contain nicotine  Talk to your healthcare provider before you use these products  How can I help prevent the spread of germs that cause sinusitis? Wash your hands often with soap and water  Wash your hands after you use the bathroom, change a child's diaper, or sneeze  Wash your hands before you prepare or eat food  When should I seek immediate care? · Your eye and eyelid are red, swollen, and painful  · You cannot open your eye  · You have vision changes, such as double vision  · Your eyeball bulges out or you cannot move your eye  · You are more sleepy than normal, or you notice changes in your ability to think, move, or talk  · You have a stiff neck, a fever, or a bad headache  · You have swelling of your forehead or scalp  When should I contact my healthcare provider? · Your symptoms do not improve after 3 days  · Your symptoms do not go away after 10 days  · You have nausea and are vomiting  · Your nose is bleeding  · You have questions or concerns about your condition or care  CARE AGREEMENT:   You have the right to help plan your care  Learn about your health condition and how it may be treated  Discuss treatment options with your caregivers to decide what care you want to receive  You always have the right to refuse treatment  The above information is an  only  It is not intended as medical advice for individual conditions or treatments  Talk to your doctor, nurse or pharmacist before following any medical regimen to see if it is safe and effective for you  © 2017 [employer] Information is for End User's use only and may not be sold, redistributed or otherwise used for commercial purposes   All illustrations and images included in CareNotes® are the copyrighted property of A [employer] M [employer] Inc  or Gerard Hanson

## 2018-06-16 NOTE — PROGRESS NOTES
[facility]        NAME: Nilda Melo is a 56 y o  female  : 1960    MRN: [id number]  DATE: 2018  TIME: 8:23 AM    Assessment and Plan   Sore throat [J02 9]  1  Sore throat  POCT rapid strepA   2  Acute sinusitis, recurrence not specified, unspecified location  azithromycin (ZITHROMAX) 250 mg tablet         Patient Instructions     Today your symptoms are consistent with sinusitis  -Take the Azithromycin as directed with food  - try taking antihistamine such as Zyrtec or Claritin for symptom support  - Use Nasal spray Flonase as directed  - Increase clear fluids at home and you can alternate Tylenol/Ibuprofen as needed for symptom control  - Warm salt H20 gargles/lozenges as needed for sore throat  - Try using a humidifier in your bedroom    -Follow-up with your primary care physician for re-evaluation within 5-7 days  -If you have worsening symptoms or any signs of distress please go to the nearest ER      Chief Complaint     Chief Complaint   Patient presents with    Fatigue     PT with cough , cough, fatigue, green nasal discharge for 1 week  States concerned she could have strep because co workers recently diagnosed  Denies sore throat   Cough         History of Present Illness   Nilda Melo presents to the clinic c/o    61-year-old female presents for evaluation of productive cough, rhinorrhea, feelings of fatigue over the last week  She states over the last 3 days symptoms worsen, but today she feels better than she did yesterday  She denies any respiratory distress, chest pain, shortness breath or difficulty breathing  She denies any sore throat or any active fevers  Review of Systems   Review of Systems   Constitutional: Negative for fever  HENT: Positive for sinus pressure  Respiratory: Positive for cough  Cardiovascular: Negative            Current Medications     Long-Term Prescriptions   Medication Sig Dispense Refill    ALPRAZolam (XANAX) 0 25 mg tablet Take 1 tablet (0 25 mg total) by mouth 3 (three) times a day as needed (claustrophobia) 30 tablet 0    fexofenadine (ALLEGRA) 180 MG tablet Take 180 mg by mouth as needed      mesalamine (LIALDA) 1 2 g EC tablet Take 2 tablets by mouth Daily         Current Allergies     Allergies as of 06/16/2018 - Reviewed 06/16/2018   Allergen Reaction Noted    Amoxicillin GI Intolerance             The following portions of the patient's history were reviewed and updated as appropriate: allergies, current medications, past family history, past medical history, past social history, past surgical history and problem list     Objective   /62 (BP Location: Left arm, Patient Position: Sitting, Cuff Size: Standard)   Pulse 96   Temp (!) 96 2 °F (35 7 °C) (Tympanic)   Resp 16   Ht 5' 2" (1 575 m)   Wt 77 1 kg (170 lb)   SpO2 98%   BMI 31 09 kg/m²        Physical Exam     Physical Exam   Constitutional: She appears well-developed and well-nourished  No distress  Neck: Normal range of motion  Neck supple  No tracheal deviation present  No thyromegaly present  Cardiovascular: Normal rate, regular rhythm and normal heart sounds  Exam reveals no gallop and no friction rub  No murmur heard  Pulmonary/Chest: Breath sounds normal  No respiratory distress  She has no wheezes  She has no rales  Lymphadenopathy:     She has no cervical adenopathy  Skin: She is not diaphoretic  Nursing note and vitals reviewed

## 2018-07-25 ENCOUNTER — APPOINTMENT (OUTPATIENT)
Dept: LAB | Facility: MEDICAL CENTER | Age: 58
End: 2018-07-25
Attending: INTERNAL MEDICINE
Payer: COMMERCIAL

## 2018-07-25 ENCOUNTER — TRANSCRIBE ORDERS (OUTPATIENT)
Dept: ADMINISTRATIVE | Facility: HOSPITAL | Age: 58
End: 2018-07-25

## 2018-07-25 DIAGNOSIS — R79.89 ABNORMALITY IN OTHER LIVER FUNCTION TEST: Primary | ICD-10-CM

## 2018-07-25 DIAGNOSIS — R79.89 ABNORMALITY IN OTHER LIVER FUNCTION TEST: ICD-10-CM

## 2018-07-25 DIAGNOSIS — K51.919 MILD CHRONIC ULCERATIVE COLITIS WITH COMPLICATION (HCC): ICD-10-CM

## 2018-07-25 LAB
ALBUMIN SERPL BCP-MCNC: 3.5 G/DL (ref 3.5–5)
ALP SERPL-CCNC: 112 U/L (ref 46–116)
ALT SERPL W P-5'-P-CCNC: 39 U/L (ref 12–78)
ANION GAP SERPL CALCULATED.3IONS-SCNC: 4 MMOL/L (ref 4–13)
AST SERPL W P-5'-P-CCNC: 23 U/L (ref 5–45)
BILIRUB DIRECT SERPL-MCNC: 0.14 MG/DL (ref 0–0.2)
BILIRUB SERPL-MCNC: 0.51 MG/DL (ref 0.2–1)
BUN SERPL-MCNC: 14 MG/DL (ref 5–25)
CALCIUM SERPL-MCNC: 9 MG/DL (ref 8.3–10.1)
CHLORIDE SERPL-SCNC: 105 MMOL/L (ref 100–108)
CO2 SERPL-SCNC: 27 MMOL/L (ref 21–32)
CREAT SERPL-MCNC: 0.71 MG/DL (ref 0.6–1.3)
GFR SERPL CREATININE-BSD FRML MDRD: 96 ML/MIN/1.73SQ M
GLUCOSE SERPL-MCNC: 102 MG/DL (ref 65–140)
POTASSIUM SERPL-SCNC: 3.7 MMOL/L (ref 3.5–5.3)
PROT SERPL-MCNC: 7.6 G/DL (ref 6.4–8.2)
SODIUM SERPL-SCNC: 136 MMOL/L (ref 136–145)

## 2018-07-25 PROCEDURE — 36415 COLL VENOUS BLD VENIPUNCTURE: CPT

## 2018-07-25 PROCEDURE — 80053 COMPREHEN METABOLIC PANEL: CPT

## 2018-07-25 PROCEDURE — 82248 BILIRUBIN DIRECT: CPT

## 2018-08-08 ENCOUNTER — OFFICE VISIT (OUTPATIENT)
Dept: FAMILY MEDICINE CLINIC | Facility: CLINIC | Age: 58
End: 2018-08-08
Payer: COMMERCIAL

## 2018-08-08 VITALS
HEIGHT: 62 IN | SYSTOLIC BLOOD PRESSURE: 122 MMHG | WEIGHT: 168.8 LBS | DIASTOLIC BLOOD PRESSURE: 82 MMHG | BODY MASS INDEX: 31.06 KG/M2

## 2018-08-08 DIAGNOSIS — K51.90 ULCERATIVE COLITIS WITHOUT COMPLICATIONS, UNSPECIFIED LOCATION (HCC): ICD-10-CM

## 2018-08-08 DIAGNOSIS — Z00.00 WELL ADULT HEALTH CHECK: Primary | ICD-10-CM

## 2018-08-08 DIAGNOSIS — E55.9 VITAMIN D DEFICIENCY: ICD-10-CM

## 2018-08-08 DIAGNOSIS — G47.31 PRIMARY CENTRAL SLEEP APNEA: ICD-10-CM

## 2018-08-08 PROCEDURE — 99396 PREV VISIT EST AGE 40-64: CPT | Performed by: FAMILY MEDICINE

## 2018-08-08 NOTE — PROGRESS NOTES
Assessment/Plan:     Diagnoses and all orders for this visit:    Well adult health check  -     Comprehensive metabolic panel; Future  -     TSH, 3rd generation with T4 reflex; Future  -     Lipid Panel with Direct LDL reflex; Future    Vitamin D deficiency  -     Vitamin D 25 hydroxy; Future    Ulcerative colitis without complications, unspecified location [facility])  -     Comprehensive metabolic panel; Future    Primary central sleep apnea  -     Home Study; Future        Updated blood work is recommended  Patient is referred for home sleep study  Otherwise follow-up yearly or p r n  time spent greater than 25 minutes with greater than 50 percent counseling provided  Subjective:   Chief Complaint   Patient presents with    Physical Exam     Here for yearly physical, has a few questions she would like addressed  Patient ID: Leslye Christensen is a 66 y o  female  Pleasant 82-year-old female who is here for well adult checkup  Her last year has been fairly uneventful  She is somewhat noncompliant with dietary as well as physical activity  She is up-to-date with GI She has not been doing vitamins on a regular basis  She has a diagnosis of ulcerative colitis and is on meds for same  She is fairly stable with that symptom  She has a younger daughter who also has been diagnosed  She has had quite an extensive lab workup with regards to the colitis  She does do skin checks  She is interested in repeat a home sleep study  We talked about this at her last visit and she had not scheduled  But she has family members that keep on bugging her about getting this done  Mammogram is up-to-date  She is overdue for gyn check  She sees [facility]          The following portions of the patient's history were reviewed and updated as appropriate: allergies, current medications, past family history, past medical history, past social history, past surgical history and problem list       Review of Systems   Constitutional: Positive for unexpected weight change (Due to calories)  Negative for appetite change and fever  HENT: Negative for hearing loss and postnasal drip  Eyes: Negative for visual disturbance  Respiratory: Positive for apnea ( questionable)  Negative for cough  Cardiovascular: Positive for leg swelling ( some ankle puffiness intermittently)  Negative for chest pain  Gastrointestinal:        As noted in HPI   Genitourinary: Negative  Musculoskeletal: Negative for arthralgias  Psychiatric/Behavioral: Negative  Objective:  /82   Ht 5' 2" (1 575 m)   Wt 76 6 kg (168 lb 12 8 oz)   BMI 30 87 kg/m²          Sleep Apnea  She presents for a sleep evaluation  She complains of snoring, falling asleep while watching television, awakening in the middle of the night , increased in weight  , difficulty falling asleep once awakened  Symptoms began 3 years ago, gradually worsening since that time  She goes to sleep at 10pm weekdays and 1100pm  weekends  She awakens 600am  weekdays and 800am weekends  She falls asleep in 60 minutes  Collar size normal  She denies choking, kicking, decreased memory  Previous evaluation and treatment has included none  Physical Exam   Constitutional: She is oriented to person, place, and time  She appears well-developed and well-nourished  Pleasant 57-year-old female who appears her stated age with a BMI of 30 percent   HENT:   Head: Normocephalic and atraumatic  Mouth/Throat: No oropharyngeal exudate  Eyes: EOM are normal  Pupils are equal, round, and reactive to light  Neck: Normal range of motion  No thyromegaly present  Cardiovascular: Normal rate, regular rhythm and intact distal pulses  No abdominal femoral or carotid bruit   Pulmonary/Chest: Effort normal and breath sounds normal    Abdominal: Soft  Bowel sounds are normal  She exhibits no mass  Musculoskeletal: Normal range of motion  Neurological: She is alert and oriented to person, place, and time  Skin:   No suspicious lesions   Psychiatric: She has a normal mood and affect   Her behavior is normal  Judgment and thought content normal

## 2018-08-09 NOTE — PATIENT INSTRUCTIONS

## 2018-08-31 ENCOUNTER — OFFICE VISIT (OUTPATIENT)
Dept: FAMILY MEDICINE CLINIC | Facility: CLINIC | Age: 58
End: 2018-08-31
Payer: COMMERCIAL

## 2018-08-31 VITALS
SYSTOLIC BLOOD PRESSURE: 112 MMHG | HEIGHT: 62 IN | DIASTOLIC BLOOD PRESSURE: 70 MMHG | WEIGHT: 168.8 LBS | BODY MASS INDEX: 31.06 KG/M2

## 2018-08-31 DIAGNOSIS — Z48.02 ENCOUNTER FOR REMOVAL OF SUTURES: Primary | ICD-10-CM

## 2018-08-31 DIAGNOSIS — S69.92XD INJURY OF FINGER OF LEFT HAND, SUBSEQUENT ENCOUNTER: ICD-10-CM

## 2018-08-31 DIAGNOSIS — R20.0 FINGER NUMBNESS: ICD-10-CM

## 2018-08-31 PROCEDURE — 3008F BODY MASS INDEX DOCD: CPT | Performed by: NURSE PRACTITIONER

## 2018-08-31 PROCEDURE — 99213 OFFICE O/P EST LOW 20 MIN: CPT | Performed by: NURSE PRACTITIONER

## 2018-08-31 PROCEDURE — 1036F TOBACCO NON-USER: CPT | Performed by: NURSE PRACTITIONER

## 2018-08-31 NOTE — PATIENT INSTRUCTIONS
Keep scab clean and dry  Cover when out to avoid infection  Call if any signs of infection  Follow up with hand specialist    Call us if you experience any worsening symptoms or no improvement  Acute Wound Care   AMBULATORY CARE:   An acute wound  is an injury that causes a break in the skin  An acute wound can happen suddenly, last a short time, and may heal on its own  Common signs and symptoms of an acute wound:   · A cut, tear, or gash in your skin    · Bleeding, swelling, pain, or trouble moving the affected area    · Dirt or foreign objects inside the wound     · Milky, yellow, green, or brown pus in the wound     · Red, tender, or warm area around the pus    · Fever  Seek care immediately if:   · You have pus or a foul odor coming from the wound  · You have sudden trouble breathing or chest pain  · Blood soaks through your bandage  Contact your healthcare provider if:   · You have muscle, joint, or body aches, sweating, or a fever  · You have more swelling, redness, or bleeding in your wound  · Your skin is itchy, swollen, or you have a rash  · You have questions or concerns about your condition or care  Treatment for an acute wound  may include any of the following:  · Cleansing  is done with soap and water to wash away germs and decrease the risk of infection  Sterile water further cleans the wound  The cleaning is done under high pressure with a catheter tip and large syringe  A solution that kills germs may also be used  · Debridement  is done to clean and remove objects, dirt, or dead tissues from the open wound  Healthcare providers may also drain the wound to clean out pus  · Closure of the wound  is done with stitches, staples, skin adhesive, or other treatments  This may be done if the wound is wide or deep  Stitches may be needed if the wound is in an area that moves a lot, such as the hands, feet, and joints   Stitches may help to keep the wound from getting infected  They may also decrease the amount of scarring you have  Some wounds may heal better without stitches  Wound care:   · If your wound was closed with thin strips of medical tape, keep them clean and dry  The strips of medical tape will fall off on their own  Do not pull them off  · Keep the bandage clean and dry  Do not remove the bandage over your wound unless your healthcare provider says it is okay  · Wash your hands before and after you take care of your wound to prevent infection  · Clean the wound as directed  If you cannot reach the wound, have someone help you  · If you have packing, make sure all the gauze used to pack the wound is taken out and replaced as directed  Keep track of how many gauze dressings are placed inside the wound  Follow up with your healthcare provider as directed:  Write down your questions so you remember to ask them during your visits  © 2016 [employer] is for End User's use only and may not be sold, redistributed or otherwise used for commercial purposes  All illustrations and images included in CareNotes® are the copyrighted property of [employer]  or Gerard Hanson  The above information is an  only  It is not intended as medical advice for individual conditions or treatments  Talk to your doctor, nurse or pharmacist before following any medical regimen to see if it is safe and effective for you

## 2018-08-31 NOTE — PROGRESS NOTES
Assessment/Plan:    Suture Removal   Patient seen for suture removal  4 sutures removed from left index finger without any complications, patient tolerated well  Good wound healing  No signs of infection  Patient educated on wound care  Neosporin and bandaid placed on wound  Call us if you experience any worsening symptoms or no improvement  Diagnoses and all orders for this visit:    Encounter for removal of sutures  -     Suture removal    Finger numbness  -     Ambulatory referral to Orthopedic Surgery; Future    Injury of finger of left hand, subsequent encounter  -     Ambulatory referral to Orthopedic Surgery; Future      Patient verbalizes understand and agrees with treatment plan  Subjective:        Patient ID: Lupillo Velasco is a 66 y o  female  Chief Complaint   Patient presents with    Suture / Staple Removal     pt states the her finger is numb above the sutures       Patient presents to office today for suture removal  She was seen in ER after cutting her left index finger on   She was given 4 sutures  Tdap was updated as well  Stitches were placed 8/23  Patient has no complaints  The following portions of the patient's history were reviewed and updated as appropriate: allergies, current medications, past family history, past social history and problem list     Review of Systems   Constitutional: Negative for chills and fever  HENT: Negative for congestion  Eyes: Negative for pain and visual disturbance  Respiratory: Negative for cough and shortness of breath  Cardiovascular: Negative for chest pain, palpitations and leg swelling  Gastrointestinal: Negative for abdominal pain, diarrhea, nausea and vomiting  Genitourinary: Negative for difficulty urinating and dysuria  Musculoskeletal: Negative for arthralgias and myalgias  Skin: Positive for wound  Negative for color change and rash     Neurological: Negative for dizziness, syncope, numbness and headaches  Hematological: Negative for adenopathy  Psychiatric/Behavioral: Negative for agitation and behavioral problems  The patient is not nervous/anxious  Objective:  /70 (BP Location: Left arm, Patient Position: Sitting, Cuff Size: Standard)   Ht 5' 2" (1 575 m)   Wt 76 6 kg (168 lb 12 8 oz)   BMI 30 87 kg/m²   Suture removal  Date/Time: 8/31/2018 3:21 PM  Performed by: Edu Trujillo  Authorized by: Edu Trujillo     Patient location:  Clinic  Other Assisting Provider: No    Consent:     Consent obtained:  Verbal    Consent given by:  Patient    Risks discussed:  Bleeding, pain and wound separation  Universal protocol:     Procedure explained and questions answered to patient or proxy's satisfaction: yes      Relevant documents present and verified: yes      Patient identity confirmed:  Verbally with patient  Location:     Laterality:  Left    Location:  Upper extremity    Upper extremity location:  Hand    Hand location:  L index finger  Procedure details: Tools used:  Scissors and tweezers    Wound appearance:  No sign(s) of infection, good wound healing and clean    Number of sutures removed:  4  Post-procedure details:     Post-removal:  Antibiotic ointment applied and Band-Aid applied    Patient tolerance of procedure: Tolerated well, no immediate complications           Physical Exam   Constitutional: She is oriented to person, place, and time  She appears well-developed  No distress  obese   HENT:   Head: Normocephalic and atraumatic  Right Ear: External ear normal    Left Ear: External ear normal    Nose: Nose normal    Eyes: Conjunctivae and lids are normal  Right eye exhibits no discharge  Left eye exhibits no discharge  Neck: Normal range of motion  Neck supple  No tracheal deviation present  Cardiovascular: Normal rate and regular rhythm  No murmur heard  Pulmonary/Chest: Effort normal and breath sounds normal  No respiratory distress   She has no wheezes  Abdominal: Soft  Bowel sounds are normal  She exhibits no distension  There is no tenderness  There is no guarding  Musculoskeletal: Normal range of motion  She exhibits no edema, tenderness or deformity  Lymphadenopathy:     She has no cervical adenopathy  Neurological: She is alert and oriented to person, place, and time  Coordination normal    Skin: Skin is warm and dry  No rash noted  She is not diaphoretic  No erythema  Psychiatric: She has a normal mood and affect  Her speech is normal and behavior is normal  Judgment and thought content normal  Cognition and memory are normal    Nursing note and vitals reviewed

## 2018-10-03 ENCOUNTER — HOSPITAL ENCOUNTER (OUTPATIENT)
Dept: SLEEP CENTER | Facility: CLINIC | Age: 58
Discharge: HOME | End: 2018-10-03

## 2018-10-03 DIAGNOSIS — G47.31 PRIMARY CENTRAL SLEEP APNEA: ICD-10-CM

## 2018-10-05 ENCOUNTER — TELEPHONE (OUTPATIENT)
Dept: SLEEP CENTER | Facility: CLINIC | Age: 58
End: 2018-10-05

## 2018-10-05 NOTE — TELEPHONE ENCOUNTER
Pt could not do a Home Sleep Study  She wore it for approximately 5 minutes  Declined to try again  Order canceled

## 2018-12-21 ENCOUNTER — OFFICE VISIT (OUTPATIENT)
Dept: URGENT CARE | Facility: MEDICAL CENTER | Age: 58
End: 2018-12-21
Payer: COMMERCIAL

## 2018-12-21 VITALS
TEMPERATURE: 98.1 F | OXYGEN SATURATION: 98 % | RESPIRATION RATE: 16 BRPM | BODY MASS INDEX: 30.36 KG/M2 | DIASTOLIC BLOOD PRESSURE: 75 MMHG | HEART RATE: 93 BPM | WEIGHT: 165 LBS | SYSTOLIC BLOOD PRESSURE: 125 MMHG | HEIGHT: 62 IN

## 2018-12-21 DIAGNOSIS — J01.90 ACUTE BACTERIAL SINUSITIS: Primary | ICD-10-CM

## 2018-12-21 DIAGNOSIS — B96.89 ACUTE BACTERIAL SINUSITIS: Primary | ICD-10-CM

## 2018-12-21 PROCEDURE — 99214 OFFICE O/P EST MOD 30 MIN: CPT | Performed by: FAMILY MEDICINE

## 2018-12-21 RX ORDER — AZITHROMYCIN 250 MG/1
TABLET, FILM COATED ORAL
Qty: 6 TABLET | Refills: 0 | Status: SHIPPED | OUTPATIENT
Start: 2018-12-21 | End: 2018-12-26

## 2018-12-21 RX ORDER — FLUTICASONE PROPIONATE 50 MCG
1 SPRAY, SUSPENSION (ML) NASAL DAILY
Qty: 1 BOTTLE | Refills: 0 | Status: SHIPPED | OUTPATIENT
Start: 2018-12-21 | End: 2020-09-11

## 2018-12-21 NOTE — PROGRESS NOTES
[facility]        NAME: Izzy Oneal is a 66 y o  female  : 1960    MRN: [id number]  DATE: 2018  TIME: 5:57 PM    Assessment and Plan   Acute bacterial sinusitis [J01 90, B96 89]  1  Acute bacterial sinusitis  fluticasone (FLONASE) 50 mcg/act nasal spray    azithromycin (ZITHROMAX) 250 mg tablet     May alternate Tylenol and Ibuprofen as needed  Encourage fluids and rest    Saline nasal spray as needed  Humidify bedroom  Salt water gargles  Chloraseptic spray and lozenges as needed  Consider adding anti-histamines daily, ie  Claritin or Zyrtec  F/U with PCP if symptoms persist/worsen or go to nearest emergency department if any signs of distress  Patient Instructions       Follow up with PCP in 3-5 days  Proceed to  ER if symptoms worsen  Chief Complaint     Chief Complaint   Patient presents with    Cold Like Symptoms     sinus pressure and congestion mostly on left side         History of Present Illness       68-year-old female complains of upper respiratory infection for the past 2 and half weeks  Her symptoms started off with sore throat  However for the past week she has had sinus congestion  She denies any fevers or chills  Does complain of bilateral ear pain  Denies any nausea vomiting or diarrhea          Review of Systems   Review of Systems  As above    Current Medications       Current Outpatient Prescriptions:     mesalamine (LIALDA) 1 2 g EC tablet, Take 2 tablets by mouth Daily, Disp: , Rfl:     ALPRAZolam (XANAX) 0 25 mg tablet, Take 1 tablet (0 25 mg total) by mouth 3 (three) times a day as needed (claustrophobia) (Patient not taking: Reported on 2018 ), Disp: 30 tablet, Rfl: 0    azithromycin (ZITHROMAX) 250 mg tablet, Take 2 tablets today then 1 tablet daily x 4 days, Disp: 6 tablet, Rfl: 0    fexofenadine (ALLEGRA) 180 MG tablet, Take 180 mg by mouth as needed, Disp: , Rfl:     fluticasone (FLONASE) 50 mcg/act nasal spray, 1 spray into each nostril daily, Disp: 1 Bottle, Rfl: 0    Current Allergies     Allergies as of 12/21/2018 - Reviewed 12/21/2018   Allergen Reaction Noted    Amoxicillin GI Intolerance             The following portions of the patient's history were reviewed and updated as appropriate: allergies, current medications, past family history, past medical history, past social history, past surgical history and problem list      Past Medical History:   Diagnosis Date    Anxiety     Change in bowel habits     Elevated liver enzymes     Gall stones     Ulcerative colitis ([address] )        Past Surgical History:   Procedure Laterality Date    EAR SURGERY      cyst removed from ear drum    EAR SURGERY      IRIDECTOMY      Optical    AL COLONOSCOPY FLX DX W/COLLJ SPEC WHEN PFRMD N/A 10/9/2017    Procedure: COLONOSCOPY;  Surgeon: Sacha Mclaughlin MD;  Location: [facility]; Service: Gastroenterology       Family History   Problem Relation Age of Onset   Phyllis Lambert Breast cancer Mother     Lung cancer Mother     Atrial fibrillation Father     Stroke Father         CVA    Diabetes Father         Mellitus    Heart disease Father     Diabetes Sister         Borderline Mellitus    Other Daughter         Colitis    Breast cancer Family          Medications have been verified  Objective   /75   Pulse 93   Temp 98 1 °F (36 7 °C) (Tympanic)   Resp 16   Ht 5' 2" (1 575 m)   Wt 74 8 kg (165 lb)   SpO2 98%   BMI 30 18 kg/m²        Physical Exam     Physical Exam   Constitutional: She is oriented to person, place, and time  She appears well-developed and well-nourished  HENT:   Head: Normocephalic and atraumatic  Mouth/Throat: Oropharynx is clear and moist    Tympanic membranes without erythema however they are retracted bilaterally   Eyes: Conjunctivae are normal    Neck: Neck supple  Cardiovascular: Normal rate, regular rhythm and normal heart sounds      Pulmonary/Chest: Effort normal and breath sounds normal  No respiratory distress  She has no wheezes  She has no rales  Abdominal: Soft  Musculoskeletal: Normal range of motion  Neurological: She is alert and oriented to person, place, and time  Skin: Skin is warm and dry  Psychiatric: She has a normal mood and affect  Her behavior is normal    Nursing note and vitals reviewed

## 2018-12-26 ENCOUNTER — OFFICE VISIT (OUTPATIENT)
Dept: FAMILY MEDICINE CLINIC | Facility: CLINIC | Age: 58
End: 2018-12-26
Payer: COMMERCIAL

## 2018-12-26 VITALS
WEIGHT: 166.8 LBS | HEIGHT: 63 IN | TEMPERATURE: 98.4 F | DIASTOLIC BLOOD PRESSURE: 70 MMHG | SYSTOLIC BLOOD PRESSURE: 100 MMHG | BODY MASS INDEX: 29.55 KG/M2

## 2018-12-26 DIAGNOSIS — J02.9 SORE THROAT: Primary | ICD-10-CM

## 2018-12-26 LAB — S PYO AG THROAT QL: NEGATIVE

## 2018-12-26 PROCEDURE — 1036F TOBACCO NON-USER: CPT | Performed by: FAMILY MEDICINE

## 2018-12-26 PROCEDURE — 87880 STREP A ASSAY W/OPTIC: CPT | Performed by: FAMILY MEDICINE

## 2018-12-26 PROCEDURE — 99213 OFFICE O/P EST LOW 20 MIN: CPT | Performed by: FAMILY MEDICINE

## 2018-12-27 NOTE — PROGRESS NOTES
Assessment/Plan:    Rapid strep was negative  Recommend supportive therapy and just observe  Patient could have had a mild case of the flu but it is over 48 hr since the symptoms have started therefore Tamiflu would probably not be effective  Diagnoses and all orders for this visit:    Sore throat  -     POCT rapid strepA        1  Sore throat  POCT rapid strepA       Subjective:        Patient ID: Brandan Yarbrough is a 66 y o  female  Chief Complaint   Patient presents with    Fever     fever, chills; body ache on the 22  pt states that she did start to feel better but now having same sx  pt thinks that she may still have a sinus infection    Mass     little lump on R side of neck, pt states it has been there for a couple of weeks  no pain       Patient sore throat symptoms  Had some chills  Questionable flu like symptoms  Fever   Associated symptoms include fatigue and a sore throat  Pertinent negatives include no chills, congestion, coughing or headaches  The following portions of the patient's history were reviewed and updated as appropriate: past medical history, past surgical history and problem list       Review of Systems   Constitutional: Positive for fatigue  Negative for chills  HENT: Positive for sore throat  Negative for congestion, ear pain and sinus pressure  Respiratory: Negative for cough  Neurological: Negative for headaches  Objective:  /70 (BP Location: Left arm, Patient Position: Sitting, Cuff Size: Standard)   Temp 98 4 °F (36 9 °C)   Ht 5' 2 5" (1 588 m)   Wt 75 7 kg (166 lb 12 8 oz)   BMI 30 02 kg/m²        Physical Exam   Constitutional: No distress  HENT:   Right Ear: External ear normal    Left Ear: External ear normal    Nose: Nose normal    Mouth/Throat: Oropharynx is clear and moist  No oropharyngeal exudate  Cardiovascular: Normal rate and regular rhythm  No murmur heard    Pulmonary/Chest: Breath sounds normal    Lymphadenopathy: She has no cervical adenopathy  Neurological: She is alert  Nursing note and vitals reviewed

## 2019-01-04 ENCOUNTER — LAB REQUISITION (OUTPATIENT)
Dept: LAB | Facility: HOSPITAL | Age: 59
End: 2019-01-04
Payer: COMMERCIAL

## 2019-01-04 DIAGNOSIS — J01.00 ACUTE MAXILLARY SINUSITIS: ICD-10-CM

## 2019-01-04 PROCEDURE — 87205 SMEAR GRAM STAIN: CPT | Performed by: OTOLARYNGOLOGY

## 2019-01-04 PROCEDURE — 87070 CULTURE OTHR SPECIMN AEROBIC: CPT | Performed by: OTOLARYNGOLOGY

## 2019-01-07 LAB
BACTERIA WND AEROBE CULT: NO GROWTH
GRAM STN SPEC: NORMAL
GRAM STN SPEC: NORMAL

## 2019-03-05 DIAGNOSIS — F40.240 CLAUSTROPHOBIA: ICD-10-CM

## 2019-03-05 RX ORDER — ALPRAZOLAM 0.25 MG/1
0.25 TABLET ORAL 3 TIMES DAILY PRN
Qty: 30 TABLET | Refills: 0 | Status: SHIPPED | OUTPATIENT
Start: 2019-03-05 | End: 2019-08-14 | Stop reason: SDUPTHER

## 2019-03-25 ENCOUNTER — OFFICE VISIT (OUTPATIENT)
Dept: FAMILY MEDICINE CLINIC | Facility: CLINIC | Age: 59
End: 2019-03-25
Payer: COMMERCIAL

## 2019-03-25 VITALS — OXYGEN SATURATION: 97 % | HEART RATE: 102 BPM | DIASTOLIC BLOOD PRESSURE: 82 MMHG | SYSTOLIC BLOOD PRESSURE: 130 MMHG

## 2019-03-25 DIAGNOSIS — J02.9 PHARYNGITIS, UNSPECIFIED ETIOLOGY: Primary | ICD-10-CM

## 2019-03-25 PROCEDURE — 99213 OFFICE O/P EST LOW 20 MIN: CPT | Performed by: NURSE PRACTITIONER

## 2019-03-25 RX ORDER — PREDNISONE 20 MG/1
40 TABLET ORAL DAILY
Qty: 6 TABLET | Refills: 0 | Status: SHIPPED | OUTPATIENT
Start: 2019-03-25 | End: 2019-03-28

## 2019-03-25 NOTE — PATIENT INSTRUCTIONS
Start prednisone, this is the steroid  40mg daily for 3 days  Take with food  It's better to take it earlier in the day than later as it could keep you up at night  Do not mix with NSAIDs such as aleve, ibuprofen, advil, motrin  Increase fluids  Please call the office if you are experiencing any worsening of symptoms or no symptom improvement  Upper Respiratory Infection   AMBULATORY CARE:   An upper respiratory infection  is also called a common cold  It can affect your nose, throat, ears, and sinuses  Common signs and symptoms include the following:  Cold symptoms are usually worst for the first 3 to 5 days  You may have any of the following:  · Runny or stuffy nose    · Sneezing and coughing    · Sore throat or hoarseness    · Red, watery, and sore eyes    · Fatigue     · Chills and fever    · Headache, body aches, or sore muscles  Seek care immediately if:   · You have chest pain or trouble breathing  Contact your healthcare provider if:   · You have a fever over 102ºF (39°C)  · Your sore throat gets worse or you see white or yellow spots in your throat  · Your symptoms get worse after 3 to 5 days or your cold is not better in 14 days  · You have a rash anywhere on your skin  · You have large, tender lumps in your neck  · You have thick, green or yellow drainage from your nose  · You cough up thick yellow, green, or bloody mucus  · You have vomiting for more than 24 hours and cannot keep fluids down  · You have a bad earache  · You have questions or concerns about your condition or care  Treatment for a cold: There is no cure for the common cold  Colds are caused by viruses and do not get better with antibiotics  Most people get better in 7 to 14 days  You may continue to cough for 2 to 3 weeks  The following may help decrease your symptoms:  · Decongestants  help reduce nasal congestion and help you breathe more easily   If you take decongestant pills, they may make you feel restless or not able to sleep  Do not use decongestant sprays for more than a few days  · Cough suppressants  help reduce coughing  Ask your healthcare provider which type of cough medicine is best for you  · NSAIDs , such as ibuprofen, help decrease swelling, pain, and fever  NSAIDs can cause stomach bleeding or kidney problems in certain people  If you take blood thinner medicine, always ask your healthcare provider if NSAIDs are safe for you  Always read the medicine label and follow directions  · Acetaminophen  decreases pain and fever  It is available without a doctor's order  Ask how much to take and how often to take it  Follow directions  Read the labels of all other medicines you are using to see if they also contain acetaminophen, or ask your doctor or pharmacist  Acetaminophen can cause liver damage if not taken correctly  Do not use more than 4 grams (4,000 milligrams) total of acetaminophen in one day  Manage your cold:   · Rest as much as possible  Slowly start to do more each day  · Drink more liquids as directed  Liquids will help thin and loosen mucus so you can cough it up  Liquids will also help prevent dehydration  Liquids that help prevent dehydration include water, fruit juice, and broth  Do not drink liquids that contain caffeine  Caffeine can increase your risk for dehydration  Ask your healthcare provider how much liquid to drink each day  · Soothe a sore throat  Gargle with warm salt water  This helps your sore throat feel better  Make salt water by dissolving ¼ teaspoon salt in 1 cup warm water  You may also suck on hard candy or throat lozenges  You may use a sore throat spray  · Use a humidifier or vaporizer  Use a cool mist humidifier or a vaporizer to increase air moisture in your home  This may make it easier for you to breathe and help decrease your cough  · Use saline nasal drops as directed  These help relieve congestion       · Apply petroleum-based jelly around the outside of your nostrils  This can decrease irritation from blowing your nose  · Do not smoke  Nicotine and other chemicals in cigarettes and cigars can make your symptoms worse  They can also cause infections such as bronchitis or pneumonia  Ask your healthcare provider for information if you currently smoke and need help to quit  E-cigarettes or smokeless tobacco still contain nicotine  Talk to your healthcare provider before you use these products  Prevent spreading your cold to others:   · Try to stay away from other people during the first 2 to 3 days of your cold when it is more easily spread  · Do not share food or drinks  · Do not share hand towels with household members  · Wash your hands often, especially after you blow your nose  Turn away from other people and cover your mouth and nose with a tissue when you sneeze or cough  Follow up with your healthcare provider as directed:  Write down your questions so you remember to ask them during your visits  © 2017 [employer] [employer]  Information is for End User's use only and may not be sold, redistributed or otherwise used for commercial purposes  All illustrations and images included in CareNotes® are the copyrighted property of [employer] , [employer]  or [employer]on  The above information is an  only  It is not intended as medical advice for individual conditions or treatments  Talk to your doctor, nurse or pharmacist before following any medical regimen to see if it is safe and effective for you

## 2019-03-26 NOTE — PROGRESS NOTES
Assessment/Plan:    Pharyngitis  Rapid strep negative  Will continue with conservative measures for this time as she's feeling better, if no improvement prednisone burst sent to pharmacy  Please call the office if you are experiencing any worsening of symptoms or no symptom improvement  Handout provided  Diagnoses and all orders for this visit:    Pharyngitis, unspecified etiology  -     predniSONE 20 mg tablet; Take 2 tablets (40 mg total) by mouth daily for 3 days      Patient verbalizes understand and agrees with treatment plan  Subjective:        Patient ID: Karin Cardenas is a 66 y o  female  Chief Complaint   Patient presents with    Sore Throat     with nasal congestion and pressure, fatigue; symptoms started Wednesday       Patient presents to the office today for evaluation of cold symptoms  Had fatigue, cough, congestion and sore throat since last Wednesday  State since then she's feeling a lot better but sore throat still lingering and is worried about strep throat  The following portions of the patient's history were reviewed and updated as appropriate: allergies, current medications, past family history, past social history and problem list     Review of Systems   Constitutional: Negative for chills and fever  HENT: Positive for congestion and sore throat  Negative for sinus pressure and sinus pain  Eyes: Negative for pain and visual disturbance  Respiratory: Positive for cough  Negative for shortness of breath  Cardiovascular: Negative for chest pain, palpitations and leg swelling  Gastrointestinal: Negative for abdominal pain, diarrhea, nausea and vomiting  Genitourinary: Negative for difficulty urinating and dysuria  Musculoskeletal: Negative for arthralgias and myalgias  Skin: Negative for color change and rash  Neurological: Negative for dizziness, syncope, numbness and headaches  Hematological: Negative for adenopathy     Psychiatric/Behavioral: Negative for agitation and behavioral problems  The patient is not nervous/anxious  Objective:  /82 (BP Location: Left arm, Patient Position: Sitting, Cuff Size: Standard)   Pulse 102   SpO2 97%      Physical Exam   Constitutional: She is oriented to person, place, and time  She appears well-developed  No distress  HENT:   Head: Normocephalic and atraumatic  Right Ear: External ear normal  No foreign bodies  Tympanic membrane is not injected, not perforated and not erythematous  A middle ear effusion is present  Left Ear: External ear normal  No foreign bodies  Tympanic membrane is not injected, not perforated and not erythematous  No middle ear effusion  Nose: Nose normal    Mouth/Throat: Uvula is midline  Posterior oropharyngeal edema and posterior oropharyngeal erythema present  No oropharyngeal exudate  Eyes: Conjunctivae and lids are normal  Right eye exhibits no discharge  Left eye exhibits no discharge  Neck: Neck supple  No tracheal deviation present  Cardiovascular: Normal rate and regular rhythm  No murmur heard  Pulmonary/Chest: Effort normal and breath sounds normal  No respiratory distress  She has no wheezes  Abdominal: Soft  Bowel sounds are normal  She exhibits no distension  There is no tenderness  There is no guarding  Musculoskeletal: She exhibits no edema or deformity  Lymphadenopathy:     She has no cervical adenopathy  Neurological: She is alert and oriented to person, place, and time  Skin: Skin is warm and dry  No rash noted  She is not diaphoretic  No erythema  Psychiatric: She has a normal mood and affect  Her speech is normal and behavior is normal  Judgment and thought content normal  Cognition and memory are normal    Nursing note and vitals reviewed

## 2019-08-14 ENCOUNTER — OFFICE VISIT (OUTPATIENT)
Dept: FAMILY MEDICINE CLINIC | Facility: CLINIC | Age: 59
End: 2019-08-14
Payer: COMMERCIAL

## 2019-08-14 VITALS
OXYGEN SATURATION: 99 % | BODY MASS INDEX: 29.88 KG/M2 | SYSTOLIC BLOOD PRESSURE: 102 MMHG | HEIGHT: 62 IN | TEMPERATURE: 98.1 F | HEART RATE: 82 BPM | DIASTOLIC BLOOD PRESSURE: 78 MMHG | WEIGHT: 162.4 LBS

## 2019-08-14 DIAGNOSIS — E78.01 FAMILIAL HYPERCHOLESTEROLEMIA: ICD-10-CM

## 2019-08-14 DIAGNOSIS — K51.90 ULCERATIVE COLITIS WITHOUT COMPLICATIONS, UNSPECIFIED LOCATION (HCC): ICD-10-CM

## 2019-08-14 DIAGNOSIS — E55.9 VITAMIN D DEFICIENCY: ICD-10-CM

## 2019-08-14 DIAGNOSIS — E53.8 VITAMIN B12 DEFICIENCY: ICD-10-CM

## 2019-08-14 DIAGNOSIS — F40.240 CLAUSTROPHOBIA: ICD-10-CM

## 2019-08-14 DIAGNOSIS — Z00.00 WELL ADULT HEALTH CHECK: Primary | ICD-10-CM

## 2019-08-14 DIAGNOSIS — D89.89 AUTOIMMUNE DISORDER (HCC): ICD-10-CM

## 2019-08-14 PROBLEM — C44.92 SQUAMOUS CELL SKIN CANCER: Status: ACTIVE | Noted: 2019-08-14

## 2019-08-14 PROCEDURE — 99396 PREV VISIT EST AGE 40-64: CPT | Performed by: FAMILY MEDICINE

## 2019-08-14 RX ORDER — ALPRAZOLAM 0.25 MG/1
0.25 TABLET ORAL 3 TIMES DAILY PRN
Qty: 30 TABLET | Refills: 0 | Status: SHIPPED | OUTPATIENT
Start: 2019-08-14 | End: 2020-03-30 | Stop reason: SDUPTHER

## 2019-08-14 RX ORDER — ALPRAZOLAM 0.25 MG/1
0.25 TABLET ORAL 3 TIMES DAILY PRN
Qty: 30 TABLET | Refills: 0 | Status: CANCELLED | OUTPATIENT
Start: 2019-08-14

## 2019-08-14 NOTE — PROGRESS NOTES
Assessment/Plan:     Diagnoses and all orders for this visit:    Well adult health check    Claustrophobia  -     ALPRAZolam (XANAX) 0 25 mg tablet; Take 1 tablet (0 25 mg total) by mouth 3 (three) times a day as needed (claustrophobia)    Vitamin B12 deficiency  -     Vitamin B12; Future    Vitamin D deficiency  -     Vitamin D 25 hydroxy; Future    Ulcerative colitis without complications, unspecified location ([facility] )    Autoimmune disorder ([place] [address] )  -     Comprehensive metabolic panel; Future  -     T3, free; Future  -     T4, free; Future  -     Thyroid Antibodies Panel; Future  -     TSH, 3rd generation; Future  -     Rheumatoid Arthritis Profile; Future    Familial hypercholesterolemia  -     Lipid Panel with Direct LDL reflex; Future    Other orders  -     Cholecalciferol (VITAMIN D PO); Take by mouth daily  -     Cyanocobalamin (VITAMIN B-12 PO); Take by mouth daily        Update blood work is recommended  Autoimmune titers will be drawn for thyroid and rheumatoid panel due to colitis diagnosis  Also with regard to questionable cognitive issues vitamin levels also will be included  We will follow up pending results  Patient will continue vitamin-D and B12  Work on continued fitness and dietary compliance  Subjective:   Chief Complaint   Patient presents with    Physical Exam     pt would like to discuss memory issues, pt is due for pap and would to discuss      Patient ID: Cynthia Mishra is a 64 y o  female  Patient is a pleasant 60-year-old who is here for yearly checkup  She has history of ulcerative colitis and follows up with GI  She is going to be due for Pap  She is postmenopausal   She is up-to-date on mammogram   She feels that she is having more difficulty with multitasking  Feels that there is some memory decline     Mammogram is up-to-date    The following portions of the patient's history were reviewed and updated as appropriate: allergies, current medications, past family history, past medical history, past social history, past surgical history and problem list     Review of Systems   Constitutional: Negative for fatigue and unexpected weight change  HENT: Negative for hearing loss and tinnitus  Dental problem:  dental visits up-to-date  Eyes: Negative for visual disturbance (eye checkup up-to-date)  Respiratory: Negative for cough and shortness of breath  Cardiovascular: Negative for chest pain, palpitations and leg swelling  Gastrointestinal:        Colitis history as discussed  GI follow-up up-to-date   Genitourinary: Negative  Musculoskeletal: Positive for arthralgias  Neurological: Negative for light-headedness and headaches  Questionable cognitive   Psychiatric/Behavioral: Negative for dysphoric mood  Sleep disturbance:  intermittent  The patient is not nervous/anxious  Objective:  MMSE brief performed  Patient had good recall but knew you're Yogi Rivero ask me that       /78   Pulse 82   Temp 98 1 °F (36 7 °C)   Ht 5' 2" (1 575 m)   Wt 73 7 kg (162 lb 6 4 oz)   SpO2 99%   BMI 29 70 kg/m²     BMI Counseling: Body mass index is 29 7 kg/m²  Discussed the patient's BMI with her  The BMI is above average  BMI counseling and education was provided to the patient  Nutrition recommendations include reducing portion sizes, decreasing overall calorie intake, 3-5 servings of fruits/vegetables daily, consuming healthier snacks, moderation in carbohydrate intake and reducing intake of cholesterol  Exercise recommendations include exercising 3-5 times per week  Physical Exam   Constitutional: She is oriented to person, place, and time  Pleasant 61-year-old female who appears her stated age with a elevated BMI of 29%   HENT:   Head: Normocephalic  Right Ear: External ear normal    Left Ear: External ear normal    Nose: Nose normal    Mouth/Throat: Oropharynx is clear and moist    Eyes: Pupils are equal, round, and reactive to light   EOM are normal  Neck: Normal range of motion  No thyromegaly present  Cardiovascular: Normal rate, regular rhythm, normal heart sounds and intact distal pulses  No carotid, abdominal or femoral bruit   Pulmonary/Chest: Effort normal and breath sounds normal    Abdominal: Soft  Neurological: She is alert and oriented to person, place, and time  Coordination normal    Psychiatric: She has a normal mood and affect  Her behavior is normal  Judgment and thought content normal    Vitals reviewed

## 2019-10-02 NOTE — PRE-PROCEDURE INSTRUCTIONS
Pre-Surgery Instructions:   Medication Instructions    ALPRAZolam (XANAX) 0 25 mg tablet Instructed patient per Anesthesia Guidelines   fexofenadine (ALLEGRA) 180 MG tablet Instructed patient per Anesthesia Guidelines   fluticasone (FLONASE) 50 mcg/act nasal spray Instructed patient per Anesthesia Guidelines  Pre-op Showering Instructions for Surgery Patients    Before your operation, you play an important role in decreasing your risk for infection by washing with special antiseptic soap  This is an effective way to reduce bacteria on the skin which may help to prevent infections at the surgical site  Please read the following directions in advance  1  In the week before your operation, purchase a 4 ounce bottle of antiseptic soap containing chlorhexidine gluconate (CHG)  4%  Some brand names include: Aplicare®, Endure, and Hibiclens®  The cost is usually less than $5 00   For your convenience, the [employer] carries the soap   It may also be available at your doctors office or pre-admission testing center, and at most retail pharmacies   If you are allergic or sensitive to soaps containing CHG, please let your doctor know so another antiseptic can be suggested   CHG antiseptic soap is for external use only  2   The day before your operation, follow these instructions carefully to get ready   Please clean linens (sheets) on your bed; you should sleep on clean sheets after your evening shower   Get clean towels and washcloth ready - you need enough for 2 showers   Set aside clean underwear, pajamas, and clothing to wear after the showers     Reminders:   DO NOT use any other soap or body rinse on your skin during or after the antiseptic showers   DO NOT use lotion, powder, deodorant, or perfume/aftershave of any kind on your skin after your antiseptic shower   DO NOT shave any body parts in the 24 hours/day before your operation   DO NOT get the antiseptic soap in your eyes, ears, nose, mouth, or vaginal area    3  You will need to shower the night before AND the morning of your surgery  Shower 1:   The first evening before the operation, take the first shower   First, shampoo your hair with regular shampoo and rinse it completely before you use the antiseptic soap  After washing and rinsing your hair, rinse your body   Next, use a clean washcloth to apply the antiseptic soap and wash your body from the neck down to your toes using ½ bottle of the antiseptic soap   You will use the other ½ bottle for the second shower   Clean the area where your incision will be; lather this area well for about 2 minutes   If you are having head or neck surgery, wash areas with the antiseptic soap   Rinse yourself completely with running water   Use a clean towel to dry off   Wear clean underwear and clothing/pajamas  Shower 2   The morning of your operation, take the second shower following the same steps as Shower 1 using the second ½ of the bottle of antiseptic soap   Use clean cloths and towels to wash and dry yourself   Wear clean underwear and clothing

## 2019-10-03 RX ORDER — SODIUM CHLORIDE, SODIUM LACTATE, POTASSIUM CHLORIDE, CALCIUM CHLORIDE 600; 310; 30; 20 MG/100ML; MG/100ML; MG/100ML; MG/100ML
50 INJECTION, SOLUTION INTRAVENOUS CONTINUOUS
Status: CANCELLED | OUTPATIENT
Start: 2019-10-03

## 2019-10-03 RX ORDER — CEFAZOLIN SODIUM 1 G/50ML
1000 SOLUTION INTRAVENOUS ONCE
Status: CANCELLED | OUTPATIENT
Start: 2019-10-04

## 2019-10-03 NOTE — PRE-PROCEDURE INSTRUCTIONS
Pre-Surgery Instructions:   Medication Instructions    ALPRAZolam (XANAX) 0 25 mg tablet Instructed patient per Anesthesia Guidelines   fluticasone (FLONASE) 50 mcg/act nasal spray Instructed patient per Anesthesia Guidelines  Pre-op Showering Instructions for Surgery Patients    Before your operation, you play an important role in decreasing your risk for infection by washing with special antiseptic soap  This is an effective way to reduce bacteria on the skin which may help to prevent infections at the surgical site  Please read the following directions in advance  1  In the week before your operation, purchase a 4 ounce bottle of antiseptic soap containing chlorhexidine gluconate (CHG)  4%  Some brand names include: Aplicare®, Endure, and Hibiclens®  The cost is usually less than $5 00   For your convenience, the [employer] carries the soap   It may also be available at your doctors office or pre-admission testing center, and at most retail pharmacies   If you are allergic or sensitive to soaps containing CHG, please let your doctor know so another antiseptic can be suggested   CHG antiseptic soap is for external use only  2  The day before your operation, follow these instructions carefully to get ready   Please clean linens (sheets) on your bed; you should sleep on clean sheets after your evening shower   Get clean towels and washcloth ready - you need enough for 2 showers   Set aside clean underwear, pajamas, and clothing to wear after the showers     Reminders:   DO NOT use any other soap or body rinse on your skin during or after the antiseptic showers   DO NOT use lotion, powder, deodorant, or perfume/aftershave of any kind on your skin after your antiseptic shower   DO NOT shave any body parts in the 24 hours/day before your operation   DO NOT get the antiseptic soap in your eyes, ears, nose, mouth, or vaginal area    3   You will need to shower the night before AND the morning of your surgery  Shower 1:   The first evening before the operation, take the first shower   First, shampoo your hair with regular shampoo and rinse it completely before you use the antiseptic soap  After washing and rinsing your hair, rinse your body   Next, use a clean washcloth to apply the antiseptic soap and wash your body from the neck down to your toes using ½ bottle of the antiseptic soap   You will use the other ½ bottle for the second shower   Clean the area where your incision will be; lather this area well for about 2 minutes   If you are having head or neck surgery, wash areas with the antiseptic soap   Rinse yourself completely with running water   Use a clean towel to dry off   Wear clean underwear and clothing/pajamas  Shower 2   The morning of your operation, take the second shower following the same steps as Shower 1 using the second ½ of the bottle of antiseptic soap   Use clean cloths and towels to wash and dry yourself   Wear clean underwear and clothing

## 2019-10-04 ENCOUNTER — HOSPITAL ENCOUNTER (OUTPATIENT)
Facility: HOSPITAL | Age: 59
Setting detail: OUTPATIENT SURGERY
Discharge: HOME | End: 2019-10-04
Attending: PLASTIC SURGERY | Admitting: PLASTIC SURGERY
Payer: COMMERCIAL

## 2019-10-04 VITALS
TEMPERATURE: 98.1 F | SYSTOLIC BLOOD PRESSURE: 115 MMHG | OXYGEN SATURATION: 100 % | RESPIRATION RATE: 16 BRPM | DIASTOLIC BLOOD PRESSURE: 70 MMHG | HEART RATE: 78 BPM

## 2019-10-04 DIAGNOSIS — C44.529 SQUAMOUS CELL CARCINOMA OF SKIN OF OTHER PART OF TRUNK: ICD-10-CM

## 2019-10-04 PROCEDURE — 88305 TISSUE EXAM BY PATHOLOGIST: CPT | Performed by: PATHOLOGY

## 2019-10-04 RX ORDER — HYDROCODONE BITARTRATE AND ACETAMINOPHEN 5; 325 MG/1; MG/1
2 TABLET ORAL EVERY 4 HOURS PRN
Status: DISCONTINUED | OUTPATIENT
Start: 2019-10-04 | End: 2019-10-04 | Stop reason: HOSPADM

## 2019-10-04 RX ORDER — LIDOCAINE HYDROCHLORIDE AND EPINEPHRINE 10; 10 MG/ML; UG/ML
INJECTION, SOLUTION INFILTRATION; PERINEURAL AS NEEDED
Status: DISCONTINUED | OUTPATIENT
Start: 2019-10-04 | End: 2019-10-04 | Stop reason: HOSPADM

## 2019-10-04 NOTE — OP NOTE
OPERATIVE REPORT  PATIENT NAME: Monica Mcclendon    :  1960  MRN: [id number]  Pt Location:  OR ROOM 12    SURGERY DATE: 10/4/2019    Surgeon(s) and Role:     Missy Cash MD - Primary    Preop Diagnosis:  Squamous cell carcinoma of skin of other part of trunk [C44 529]    Post-Op Diagnosis Codes:     * Squamous cell carcinoma of skin of other part of trunk [C44 529]    Procedure(s) (LRB):  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 (Right)    Specimen(s):  ID Type Source Tests Collected by Time Destination   1 : Right chest lesion short suture superior, long lateral, double inferior Tissue Soft Tissue, Other TISSUE EXAM Pegge Cogan, MD 10/4/2019 0909        Estimated Blood Loss:   Minimal    Drains:  * No LDAs found *    Anesthesia Type:   Local    Operative Indications:  Squamous cell carcinoma of skin of other part of trunk [C44 529]      Operative Findings:      Complications:   None    Procedure and Technique:  The patient was brought to the operating room and placed supine on the operating table  Time-out procedure was performed and the chest and neck were prepped and draped using standard surgical technique  Local field block 1% lidocaine with epinephrine was injected  5 mm margins were designed around the lesion  This pattern was amputated in a subcutaneous plane marked for orientation and sent for pathology  The lesion plus margins was 2 1 cm  A local flap was designed in order to avoid distortion of the neck and to limit the risk of keloid formation over the clavicle  Incision was carried inferiorly and laterally  Dissection was carried inferiorly and the inferiorly based skin was elevated as a rotation flap  This was advanced superiorly and medially for flap plus defect of 6 centimeters squared  The flap was inset using 4-0 PDS suture in interrupted deep dermal technique followed by 4-0 strata fix suture running subcuticular technique  Skin glue was applied       I was present for the entire procedure and A qualified resident physician was not available    Patient Disposition:  hemodynamically stable    SIGNATURE: Harish King MD  DATE: October 4, 2019  TIME: 9:30 AM

## 2019-10-04 NOTE — DISCHARGE INSTRUCTIONS
[address], [address] [facility], [address] [facility], [facility], [address] [facility] /V / [employer]  [facility]       Avoid direct sunlight    No ice or heating pack    Ok to shower, avoid direct water pressure on incision    No exercise or outdoor activities    No strenuous activity    Skin glue was applied     Call [phone] for an appointment in 7-10 days

## 2019-10-04 NOTE — DISCHARGE SUMMARY
Discharge Summary - Hamida Gillespie 64 y o  female MRN: [id number]    [address] [facility] Room / Bed: OR POOL/OR POOL Encounter: [id number]    BRIEF OVERVIEW  Admitting Provider: Elbert Quiroz MD  Discharge Provider: Elbert Quiroz MD  Primary Care Physician at Discharge: Silverio Wyatt    Discharge To: Home      Admission Date: 10/4/2019     Discharge Date: No discharge date for patient encounter  Code Status: No Order  Advance Directive and Living Will: <no information>  Power of :        Primary Discharge Diagnosis  Active Problems:    * No active hospital problems  *  Resolved Problems:    * No resolved hospital problems   *        Discharge Disposition: [address] [place] [address]    Presenting Problem/History of Present Illness  <principal problem not specified>      Discharge Condition: stable    Patient tolerated the procedure well, recovered in PACU and was discharged home in stable condition    Elbert Quiroz MD  10/4/2019  9:34 AM

## 2019-10-14 ENCOUNTER — APPOINTMENT (OUTPATIENT)
Dept: LAB | Facility: MEDICAL CENTER | Age: 59
End: 2019-10-14
Payer: COMMERCIAL

## 2019-10-14 DIAGNOSIS — D89.89 AUTOIMMUNE DISORDER (HCC): ICD-10-CM

## 2019-10-14 DIAGNOSIS — E53.8 VITAMIN B12 DEFICIENCY: ICD-10-CM

## 2019-10-14 DIAGNOSIS — E55.9 VITAMIN D DEFICIENCY: ICD-10-CM

## 2019-10-14 DIAGNOSIS — E78.01 FAMILIAL HYPERCHOLESTEROLEMIA: ICD-10-CM

## 2019-10-14 LAB
25(OH)D3 SERPL-MCNC: 27.6 NG/ML (ref 30–100)
ALBUMIN SERPL BCP-MCNC: 4.3 G/DL (ref 3.5–5)
ALP SERPL-CCNC: 107 U/L (ref 46–116)
ALT SERPL W P-5'-P-CCNC: 42 U/L (ref 12–78)
ANION GAP SERPL CALCULATED.3IONS-SCNC: 7 MMOL/L (ref 4–13)
AST SERPL W P-5'-P-CCNC: 23 U/L (ref 5–45)
BILIRUB SERPL-MCNC: 0.58 MG/DL (ref 0.2–1)
BUN SERPL-MCNC: 14 MG/DL (ref 5–25)
CALCIUM SERPL-MCNC: 9.1 MG/DL (ref 8.3–10.1)
CHLORIDE SERPL-SCNC: 109 MMOL/L (ref 100–108)
CHOLEST SERPL-MCNC: 198 MG/DL (ref 50–200)
CO2 SERPL-SCNC: 27 MMOL/L (ref 21–32)
CREAT SERPL-MCNC: 0.71 MG/DL (ref 0.6–1.3)
GFR SERPL CREATININE-BSD FRML MDRD: 95 ML/MIN/1.73SQ M
GLUCOSE P FAST SERPL-MCNC: 97 MG/DL (ref 65–99)
HDLC SERPL-MCNC: 64 MG/DL (ref 40–60)
LDLC SERPL CALC-MCNC: 120 MG/DL (ref 0–100)
POTASSIUM SERPL-SCNC: 4.2 MMOL/L (ref 3.5–5.3)
PROT SERPL-MCNC: 7.6 G/DL (ref 6.4–8.2)
SODIUM SERPL-SCNC: 143 MMOL/L (ref 136–145)
T3FREE SERPL-MCNC: 2.98 PG/ML (ref 2.3–4.2)
T4 FREE SERPL-MCNC: 1.03 NG/DL (ref 0.76–1.46)
TRIGL SERPL-MCNC: 69 MG/DL
TSH SERPL DL<=0.05 MIU/L-ACNC: 2.25 UIU/ML (ref 0.36–3.74)
VIT B12 SERPL-MCNC: 432 PG/ML (ref 100–900)

## 2019-10-14 PROCEDURE — 82306 VITAMIN D 25 HYDROXY: CPT

## 2019-10-14 PROCEDURE — 84443 ASSAY THYROID STIM HORMONE: CPT

## 2019-10-14 PROCEDURE — 86430 RHEUMATOID FACTOR TEST QUAL: CPT

## 2019-10-14 PROCEDURE — 80053 COMPREHEN METABOLIC PANEL: CPT

## 2019-10-14 PROCEDURE — 84481 FREE ASSAY (FT-3): CPT

## 2019-10-14 PROCEDURE — 80061 LIPID PANEL: CPT

## 2019-10-14 PROCEDURE — 84439 ASSAY OF FREE THYROXINE: CPT

## 2019-10-14 PROCEDURE — 86376 MICROSOMAL ANTIBODY EACH: CPT

## 2019-10-14 PROCEDURE — 86200 CCP ANTIBODY: CPT

## 2019-10-14 PROCEDURE — 86800 THYROGLOBULIN ANTIBODY: CPT

## 2019-10-14 PROCEDURE — 36415 COLL VENOUS BLD VENIPUNCTURE: CPT

## 2019-10-14 PROCEDURE — 82607 VITAMIN B-12: CPT

## 2019-10-15 LAB
RHEUMATOID FACT SER QL LA: NEGATIVE
THYROGLOB AB SERPL-ACNC: 65.9 IU/ML (ref 0–0.9)
THYROPEROXIDASE AB SERPL-ACNC: 122 IU/ML (ref 0–34)

## 2019-10-16 LAB — CCP IGA+IGG SERPL IA-ACNC: 48 UNITS (ref 0–19)

## 2019-10-21 ENCOUNTER — TELEPHONE (OUTPATIENT)
Dept: FAMILY MEDICINE CLINIC | Facility: CLINIC | Age: 59
End: 2019-10-21

## 2019-10-21 NOTE — TELEPHONE ENCOUNTER
Gordon Carpenter called the office requesting her lab results she had done  on 10/14/19 pt stated she saw them via My Chart pt saw a number of her labs were elevated ,Please review pt's number is [phone]

## 2019-10-22 NOTE — TELEPHONE ENCOUNTER
Please send this back to me and let the patient on that I will call her  Nothing to exciting but want to go over some specifics and then recommendations  Let her know I will try to reach her after hours  What is the best number in the evenings?

## 2019-10-22 NOTE — TELEPHONE ENCOUNTER
I called and informed Dominik Grant that you will be calling her after hours informed patient that nothing to exciting but want to go over some specifics and then recommendations  Please call patient at [phone]

## 2019-10-24 DIAGNOSIS — E55.9 VITAMIN D DEFICIENCY: ICD-10-CM

## 2019-10-24 DIAGNOSIS — E03.8 SUBCLINICAL HYPOTHYROIDISM: Primary | ICD-10-CM

## 2019-10-24 DIAGNOSIS — E53.8 VITAMIN B12 DEFICIENCY: ICD-10-CM

## 2019-11-12 ENCOUNTER — TELEPHONE (OUTPATIENT)
Dept: FAMILY MEDICINE CLINIC | Facility: CLINIC | Age: 59
End: 2019-11-12

## 2019-11-12 NOTE — TELEPHONE ENCOUNTER
Claudia Ricky called the office in regards to her most recent lab results  Pt was asking is her cholesterol high? Pt is asking if it is high do you have any recommendation for her  Pt's number is [phone]      Pt informed that you are out of the office for a family emergency and we will place a message out for you to address once you are back in the office pt understood

## 2019-11-12 NOTE — TELEPHONE ENCOUNTER
TOTAL choleterol is GOOD HDL is GREAT , triglycerides are great  LDL is 120 which is satisfactory  So keep fitness and healthy choices going!

## 2019-11-12 NOTE — TELEPHONE ENCOUNTER
I called and gave Ashley Salazar your message I informed pt that ref and range  and her cholesterol is 198 from 10/14/19 so I am not saying it is high since pt stated it is close to the 200  and per Dr Diallo she said pt's total cholesterol was good pt understood

## 2020-03-06 ENCOUNTER — TELEPHONE (OUTPATIENT)
Dept: GASTROENTEROLOGY | Facility: AMBULARY SURGERY CENTER | Age: 60
End: 2020-03-06

## 2020-03-06 NOTE — TELEPHONE ENCOUNTER
Pt would like to schedule an appt with dr Simi Barrios  Her phone number is [phone]  She is expecting your call this morning

## 2020-03-09 ENCOUNTER — OFFICE VISIT (OUTPATIENT)
Dept: GASTROENTEROLOGY | Facility: CLINIC | Age: 60
End: 2020-03-09
Payer: COMMERCIAL

## 2020-03-09 VITALS
BODY MASS INDEX: 30.69 KG/M2 | WEIGHT: 166.8 LBS | DIASTOLIC BLOOD PRESSURE: 91 MMHG | SYSTOLIC BLOOD PRESSURE: 140 MMHG | TEMPERATURE: 97.3 F | HEART RATE: 90 BPM | HEIGHT: 62 IN

## 2020-03-09 DIAGNOSIS — K51.00 ULCERATIVE PANCOLITIS WITHOUT COMPLICATION (HCC): Primary | ICD-10-CM

## 2020-03-09 DIAGNOSIS — M25.50 ARTHRALGIA, UNSPECIFIED JOINT: ICD-10-CM

## 2020-03-09 PROCEDURE — 99214 OFFICE O/P EST MOD 30 MIN: CPT | Performed by: INTERNAL MEDICINE

## 2020-03-09 PROCEDURE — 1036F TOBACCO NON-USER: CPT | Performed by: INTERNAL MEDICINE

## 2020-03-09 RX ORDER — MESALAMINE 1.2 G/1
4.8 TABLET, DELAYED RELEASE ORAL DAILY
Qty: 360 TABLET | Refills: 2 | Status: SHIPPED | OUTPATIENT
Start: 2020-03-09 | End: 2021-04-23 | Stop reason: SDUPTHER

## 2020-03-09 NOTE — PROGRESS NOTES
[facility] - Outpatient Follow-up Note  Ca Mead 64 y o  female MRN: [id number]  Encounter: [id number]          ASSESSMENT AND PLAN:  Ms Albina Shaikh was seen today for UC  Diagnoses and all orders for this visit:     1  Ulcerative colitis: Her UC symptoms were well managed with Lialda 2 4 g per day, but recently she has been having 7-8 bowel movements per day on this dose recently, resolved by taking a third tablet of Lialda (3 6 g total)  She is concerned about orange stool  I explained to her that this could be bile or it could be due to inflammation  Given her generalized joint pain and elevated cyclin citrul peptide antibody, IgG, and UC, I will refer her to Rheumatology  I will order fecal calprotectin and CRP to assess for inflammation  She will continue Lialda at 3-4 tablets daily  2  Elevated liver enzymes: These have resolved  She had been drinking 2-3 servings of alcohol per night when I last saw her in 2017, and her liver enzymes were elevated: AST 55, , alkaline phosphatase 169  She has reduced alcohol consumption to 1-2 times per week, and her liver enzymes are normal as of Oct 2019  3  Health Maintenance: I will send a note to Dr Cassy Herrera recommending hepatitis A and hepatitis B vaccine series  Follow-up in 3 months      ______________________________________________________________________    SUBJECTIVE:  Ca Mead is a 64 y o  female in the office today for management of ulcerative colitis  I performed colonoscopy 10/9/17 and noted moderately severe pancolitis likely secondary to ulcerative colitis  Terminal ileum appeared normal  One hyperplastic rectal polyp was removed  Biopsies of the ascending colon, hepatic flexure and transverse colon showed moderate chronic active colitis  I last saw Ms Albina Shaikh in the office 9/28/17  We started her on Lialda 2 4 g QD   She chose to try another practice due to frustration with the call center at the time  She returns today with symptoms of a flare  She reports frequent bowel movements and orange/red stool  She had been doing well with 2 tablets of Lialda (2 4 g total) but has been having 7-8 bowel movements per day on that dose recently, resolved by taking a third tablet of Lialda (3 6 g total)  She is concerned about orange stool  She only has abdominal pain just prior to a bowel movement  She has some generalized whole body joint pain  REVIEW OF SYSTEMS IS OTHERWISE NEGATIVE  Historical Information   Past Medical History:   Diagnosis Date    Anxiety     Cancer ([facility] [address] )     skin    Change in bowel habits     Elevated liver enzymes     Gall stones     Irritable bowel syndrome     Seasonal allergies     Ulcerative colitis ([facility] [address] )      Past Surgical History:   Procedure Laterality Date    COLONOSCOPY      EAR SURGERY      cyst removed from ear drum    EAR SURGERY      EAR SURGERY      IRIDECTOMY      Optical    SC COLONOSCOPY FLX DX W/COLLJ SPEC WHEN PFRMD N/A 10/9/2017    Procedure: COLONOSCOPY;  Surgeon: Sallye Olszewski, MD;  Location:  GI LAB;   Service: Gastroenterology    SC EXC SKIN MALIG 1 1-2 CM TRUNK,ARM,LEG Right 10/4/2019    Procedure: 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;  Surgeon: Harish King MD;  Location[facility] MAIN OR;  Service: Plastics     Social History   Social History     Substance and Sexual Activity   Alcohol Use Yes    Comment: Social     Social History     Substance and Sexual Activity   Drug Use No     Social History     Tobacco Use   Smoking Status Never Smoker   Smokeless Tobacco Never Used     Family History   Problem Relation Age of Onset    Breast cancer Mother     Lung cancer Mother     Atrial fibrillation Father     Stroke Father         CVA    Diabetes Father         Mellitus    Heart disease Father     Other Daughter         Colitis    Breast cancer Family        Meds/Allergies       Current Outpatient Medications:     ALPRAZolam Hellnevovelasqueze Ala) 0 25 mg tablet    Cholecalciferol (VITAMIN D PO)    Cyanocobalamin (VITAMIN B-12 PO)    fluticasone (FLONASE) 50 mcg/act nasal spray    mesalamine (LIALDA) 1 2 g EC tablet    Allergies   Allergen Reactions    Amoxicillin GI Intolerance           Objective     There were no vitals taken for this visit  There is no height or weight on file to calculate BMI  PHYSICAL EXAM:      General Appearance:   Alert, cooperative, no distress   HEENT:   Normocephalic, atraumatic, anicteric      Neck:  Supple, symmetrical, trachea midline   Lungs:   Clear to auscultation bilaterally; no rales, rhonchi or wheezing; respirations unlabored    Heart[de-identified]   Regular rate and rhythm; no murmur, rub, or gallop  Abdomen:   Soft, non-tender, non-distended; normal bowel sounds; no masses, no organomegaly    Genitalia:   Deferred    Rectal:   Deferred    Extremities:  No cyanosis, clubbing or edema    Pulses:  2+ and symmetric    Skin:  No jaundice, rashes, or lesions    Lymph nodes:  No palpable cervical lymphadenopathy        Lab Results:     Her cyclic citrul peptide antibody, IgG, was elevated at 48 (0-19) on 10/14/19  On 10/14/19, liver enzymes are normal: AST 23, ALT 42, alk phos 107  No visits with results within 1 Day(s) from this visit     Latest known visit with results is:   Appointment on 10/14/2019   Component Date Value    Sodium 10/14/2019 143     Potassium 10/14/2019 4 2     Chloride 10/14/2019 109*    CO2 10/14/2019 27     ANION GAP 10/14/2019 7     BUN 10/14/2019 14     Creatinine 10/14/2019 0 71     Glucose, Fasting 10/14/2019 97     Calcium 10/14/2019 9 1     AST 10/14/2019 23     ALT 10/14/2019 42     Alkaline Phosphatase 10/14/2019 107     Total Protein 10/14/2019 7 6     Albumin 10/14/2019 4 3     Total Bilirubin 10/14/2019 0 58     eGFR 10/14/2019 95     Vitamin B-12 10/14/2019 432     T3, Free 10/14/2019 2 98     Free T4 10/14/2019 1 03     TSH 3RD GENERATON 10/14/2019 2 250     Vit D, 25-Hydroxy 10/14/2019 27 6*    Cholesterol 10/14/2019 198     Triglycerides 10/14/2019 69     HDL, Direct 10/14/2019 64*    LDL Calculated 10/14/2019 120*    Rheumatoid Factor 10/14/2019 Negative     THYROID MICROSOMAL ANTIB* 10/14/2019 122*    Thyroglobulin Ab 85/78/6657 89 5*    Cyclic Citrullin Peptide* 10/14/2019 48*         Radiology Results:   No results found  Attestation:   By signing my name below, Michael Haydee, attest that this documentation has been prepared under the direction and in the presence of LOI Duffy  Electronically Signed: Timothy Goodman  3/9/2020        I, Omaira Tapia, personally performed the services described in this documentation  All medical record entries made by the edithibjaved were at my direction and in my presence  I have reviewed the chart and discharge instructions and agree that the record reflects my personal performance and is accurate and complete  LOI Duffy  3/9/2020

## 2020-03-10 ENCOUNTER — OFFICE VISIT (OUTPATIENT)
Dept: FAMILY MEDICINE CLINIC | Facility: CLINIC | Age: 60
End: 2020-03-10
Payer: COMMERCIAL

## 2020-03-10 VITALS
OXYGEN SATURATION: 98 % | DIASTOLIC BLOOD PRESSURE: 80 MMHG | WEIGHT: 166.4 LBS | TEMPERATURE: 98.7 F | HEIGHT: 62 IN | HEART RATE: 105 BPM | BODY MASS INDEX: 30.62 KG/M2 | SYSTOLIC BLOOD PRESSURE: 132 MMHG

## 2020-03-10 DIAGNOSIS — J06.9 ACUTE URI: Primary | ICD-10-CM

## 2020-03-10 PROCEDURE — 1036F TOBACCO NON-USER: CPT | Performed by: NURSE PRACTITIONER

## 2020-03-10 PROCEDURE — 99213 OFFICE O/P EST LOW 20 MIN: CPT | Performed by: NURSE PRACTITIONER

## 2020-03-10 PROCEDURE — 3008F BODY MASS INDEX DOCD: CPT | Performed by: NURSE PRACTITIONER

## 2020-03-10 RX ORDER — MULTIVIT WITH MINERALS/LUTEIN
250 TABLET ORAL DAILY
COMMUNITY
End: 2020-11-13

## 2020-03-10 NOTE — PROGRESS NOTES
Assessment/Plan:    Acute URI  No signs of acute infection on exam, symptoms improved today, likely viral in etiology  Discussed conservative management, handout provided  Start plain mucinex and drink lots of water with it  Stay well hydrated, try tea with honey/ warm liquids  Please call the office if you are experiencing any worsening of symptoms or no symptom improvement  Patient verbalizes understand and agrees with treatment plan  There are no diagnoses linked to this encounter  Subjective:        Patient ID: Monica Mcclendon is a 64 y o  female  Chief Complaint   Patient presents with    Cold Like Symptoms     cough with production of green mucus, blowing green mucus from her nose, voice hoarseness  She was achy for about 3 days and did have a fever  Symptoms started 3/6/2020  Here for evaluation of cold symptoms  Started as a cold, was achy for 3 days, then cough which is productive started  Has green drainage from nose  Voice loss as well  She does feel better today so far  Did take advil the first few days for aches  The following portions of the patient's history were reviewed and updated as appropriate: allergies, current medications, past family history, past social history and problem list     Review of Systems   Constitutional: Positive for chills and fever (per patient, did not check temperature)  HENT: Positive for congestion, rhinorrhea, sinus pressure and voice change  Negative for ear pain (ears clogged), sinus pain and sore throat  Eyes: Negative for discharge  Respiratory: Positive for cough  Negative for shortness of breath and wheezing  Cardiovascular: Negative for chest pain  Gastrointestinal: Negative for constipation and diarrhea  Genitourinary: Negative for difficulty urinating  Musculoskeletal: Negative for joint swelling  Skin: Negative for rash  Neurological: Negative for headaches  Hematological: Negative for adenopathy  Psychiatric/Behavioral: The patient is not nervous/anxious  Objective:  /80   Pulse 105   Temp 98 7 °F (37 1 °C) (Temporal)   Ht 5' 2" (1 575 m)   Wt 75 5 kg (166 lb 6 4 oz)   SpO2 98%   BMI 30 43 kg/m²      Physical Exam   Constitutional: She is oriented to person, place, and time  She appears well-developed  No distress  obese   HENT:   Head: Normocephalic and atraumatic  Right Ear: External ear normal  Tympanic membrane is not perforated, not erythematous, not retracted and not bulging  No middle ear effusion  Left Ear: External ear normal  Tympanic membrane is not perforated, not erythematous, not retracted and not bulging  No middle ear effusion  Nose: Mucosal edema and rhinorrhea present  Right sinus exhibits no maxillary sinus tenderness and no frontal sinus tenderness  Left sinus exhibits no maxillary sinus tenderness and no frontal sinus tenderness  Mouth/Throat: Uvula is midline and oropharynx is clear and moist  No posterior oropharyngeal edema or posterior oropharyngeal erythema  Eyes: Conjunctivae and lids are normal  Right eye exhibits no discharge  Left eye exhibits no discharge  Neck: Normal range of motion  Neck supple  Cardiovascular: Normal rate and regular rhythm  No murmur heard  Pulmonary/Chest: Effort normal and breath sounds normal  No respiratory distress  She has no wheezes  Musculoskeletal: She exhibits no deformity  Neurological: She is alert and oriented to person, place, and time  Coordination normal    Skin: Skin is warm and dry  She is not diaphoretic  Psychiatric: She has a normal mood and affect  Her speech is normal and behavior is normal  Judgment and thought content normal  Cognition and memory are normal    Nursing note and vitals reviewed               Current Outpatient Medications:     ALPRAZolam (XANAX) 0 25 mg tablet, Take 1 tablet (0 25 mg total) by mouth 3 (three) times a day as needed (claustrophobia), Disp: 30 tablet, Rfl: 0    ascorbic acid (VITAMIN C) 250 mg tablet, Take 250 mg by mouth daily, Disp: , Rfl:     Cholecalciferol (VITAMIN D PO), Take by mouth daily, Disp: , Rfl:     Cyanocobalamin (VITAMIN B-12 PO), Take by mouth daily, Disp: , Rfl:     mesalamine (Lialda) 1 2 g EC tablet, Take 4 tablets (4 8 g total) by mouth daily, Disp: 360 tablet, Rfl: 2    fluticasone (FLONASE) 50 mcg/act nasal spray, 1 spray into each nostril daily (Patient not taking: Reported on 3/10/2020), Disp: 1 Bottle, Rfl: 0  Allergies   Allergen Reactions    Amoxicillin GI Intolerance

## 2020-03-10 NOTE — PATIENT INSTRUCTIONS
Start plain mucinex and drink lots of water with it  Stay well hydrated, try tea with honey/ warm liquids  Please call the office if you are experiencing any worsening of symptoms or no symptom improvement  Cold Symptoms, Ambulatory Care   GENERAL INFORMATION:   Cold symptoms  include sneezing, dry throat, a stuffy nose, headache, watery eyes, and a cough  Your cough may be dry, or you may cough up mucus  You may also have muscle aches, joint pain, and tiredness  Rarely, you may have a fever  Cold symptoms occur from inflammation in your upper respiratory system caused by a virus  Most colds go away without treatment  Seek immediate care for the following symptoms:   · A heartbeat that is much faster than usual for you     · A swollen neck that is sore to the touch     · Increased tiredness and weakness    · Pinpoint or larger reddish-purple dots on your skin     · Poor or no appetite  Treatment for cold symptoms  may include NSAIDS to decrease muscle aches and fever  Do not give NSAID medicines to children under 10months of age without direction from your child's doctor  Cold medicines may also be given to decrease coughing, nasal stuffiness, sneezing, and a runny nose  Do not give cold medicines to children under 11years of age without direction from your child's doctor  Manage your cold symptoms with the following:   · Drink liquids  to help thin and loosen thick mucus so you can cough it up  Liquids will also keep you hydrated  Ask your healthcare provider which liquids are best for you and how much to drink each day  · Do not smoke  because it may worsen your symptoms and increase the length of time you feel sick  Talk with your healthcare provider if you need help to stop smoking  Prevent the spread of germs  by washing your hands often  You can spread your cold germs to others for at least 3 days after your symptoms start  Do not share items, such as eating utensils   Cover your nose and mouth when you cough or sneeze using the crook of your elbow instead of your hands  Throw used tissues in the garbage  Follow up with your healthcare provider as directed:  Write down your questions so you remember to ask them during your visits  CARE AGREEMENT:   You have the right to help plan your care  Learn about your health condition and how it may be treated  Discuss treatment options with your caregivers to decide what care you want to receive  You always have the right to refuse treatment  The above information is an  only  It is not intended as medical advice for individual conditions or treatments  Talk to your doctor, nurse or pharmacist before following any medical regimen to see if it is safe and effective for you  © 2014 [address] is for End User's use only and may not be sold, redistributed or otherwise used for commercial purposes  All illustrations and images included in CareNotes® are the copyrighted property of A D A M , Inc  or Gerard Hanson

## 2020-03-12 ENCOUNTER — TELEPHONE (OUTPATIENT)
Dept: GASTROENTEROLOGY | Facility: CLINIC | Age: 60
End: 2020-03-12

## 2020-03-12 ENCOUNTER — TELEPHONE (OUTPATIENT)
Dept: GASTROENTEROLOGY | Facility: AMBULARY SURGERY CENTER | Age: 60
End: 2020-03-12

## 2020-03-12 NOTE — TELEPHONE ENCOUNTER
Patients GI provider:  Dr Simi Barrios    Number to return call: [phone]    Reason for call: Pt calling because her insurance is charging her 1200  Dollars for her Lialda  Pt states the [facility] which the script was sent to did not have the medication so they sent it to the [facility] pharm store [facility] in [place]  the pharmacist said they needed script clarification    Prior auth may need to be started pt would like a call back at number above    Scheduled procedure/appointment date if applicable: Apt/procedure

## 2020-03-13 NOTE — TELEPHONE ENCOUNTER
Spoke to the pharmacist no prior authorization is needed for the medication Lialda  Patient she is aware

## 2020-03-20 ENCOUNTER — APPOINTMENT (OUTPATIENT)
Dept: LAB | Facility: MEDICAL CENTER | Age: 60
End: 2020-03-20
Attending: INTERNAL MEDICINE
Payer: COMMERCIAL

## 2020-03-20 DIAGNOSIS — E53.8 VITAMIN B12 DEFICIENCY: ICD-10-CM

## 2020-03-20 DIAGNOSIS — K51.00 ULCERATIVE PANCOLITIS WITHOUT COMPLICATION (HCC): ICD-10-CM

## 2020-03-20 DIAGNOSIS — E03.8 SUBCLINICAL HYPOTHYROIDISM: ICD-10-CM

## 2020-03-20 DIAGNOSIS — E55.9 VITAMIN D DEFICIENCY: ICD-10-CM

## 2020-03-20 LAB
25(OH)D3 SERPL-MCNC: 30.5 NG/ML (ref 30–100)
CRP SERPL QL: 3.3 MG/L
T3FREE SERPL-MCNC: 3.06 PG/ML (ref 2.3–4.2)
T4 FREE SERPL-MCNC: 1.37 NG/DL (ref 0.76–1.46)
TSH SERPL DL<=0.05 MIU/L-ACNC: 2.04 UIU/ML (ref 0.36–3.74)
VIT B12 SERPL-MCNC: 874 PG/ML (ref 100–900)

## 2020-03-20 PROCEDURE — 84439 ASSAY OF FREE THYROXINE: CPT

## 2020-03-20 PROCEDURE — 84443 ASSAY THYROID STIM HORMONE: CPT

## 2020-03-20 PROCEDURE — 83993 ASSAY FOR CALPROTECTIN FECAL: CPT

## 2020-03-20 PROCEDURE — 86140 C-REACTIVE PROTEIN: CPT

## 2020-03-20 PROCEDURE — 82607 VITAMIN B-12: CPT

## 2020-03-20 PROCEDURE — 84481 FREE ASSAY (FT-3): CPT

## 2020-03-20 PROCEDURE — 82306 VITAMIN D 25 HYDROXY: CPT

## 2020-03-20 PROCEDURE — 36415 COLL VENOUS BLD VENIPUNCTURE: CPT

## 2020-03-23 LAB — CALPROTECTIN STL-MCNT: 1219 UG/G (ref 0–120)

## 2020-03-24 ENCOUNTER — TELEPHONE (OUTPATIENT)
Dept: GASTROENTEROLOGY | Facility: CLINIC | Age: 60
End: 2020-03-24

## 2020-03-24 NOTE — TELEPHONE ENCOUNTER
I spoke with the patient who states she has itching all over her body  She does not notice a significant rash  She has no symptoms of shortness of breath, throat swelling, lip swelling, trouble swallowing  She thinks that this coincided with increasing the dose of her Lialda, currently taking 3 pills per day  She has not tried anything for her itching  She also notes that she has used new over-the-counter products including detergent  She has been taking hot showers  I told her that it could potentially be secondary to the [address] however given that she has also tried new products would recommend discontinuing them 1st, avoiding hot showers as this could be dehydrating  Also asked her to increase her water intake  She states since increasing the dose of her Lialda her bowels have improved, 4-5 bowel movements per day with less urgency and more formed  She does have a elevated fecal calprotectin greater than 1200, significantly increased compared to 300 in 2017  I did encourage her to continue using the Ziptronix for the time being as well as taking Benadryl and other antihistamines as needed as well as changing her detergent back to her original    She does state that the Ziptronix has worked well for her and does not want to discontinue unless necessary  If she develops have any worsening symptoms, discontinue the Lialda immediately and potentially go to the emergency room if necessary  Do any other recommendations?   Alejo White

## 2020-03-24 NOTE — TELEPHONE ENCOUNTER
Patient called stating she is itching all over  She notice her itching started 3 weeks after increasing the dose on the medication Lialda  She would like to know if we could send a script for the itching to her pharmacy or schedule a visit  She is concern about her liver

## 2020-03-24 NOTE — TELEPHONE ENCOUNTER
I agree  She elevated fecal calprotectin and needs treatment  I will follow up on her tomorrow  Thank you!

## 2020-03-30 DIAGNOSIS — F40.240 CLAUSTROPHOBIA: ICD-10-CM

## 2020-03-30 RX ORDER — ALPRAZOLAM 0.25 MG/1
0.25 TABLET ORAL 3 TIMES DAILY PRN
Qty: 30 TABLET | Refills: 0 | Status: SHIPPED | OUTPATIENT
Start: 2020-03-30 | End: 2020-08-17 | Stop reason: SDUPTHER

## 2020-03-30 NOTE — TELEPHONE ENCOUNTER
Pt left message requesting, ALPRAZolam (XANAX) 0 25 mg tablet  PMED not reviewed, no access      Last visit- 03/10/20  Next visit- 08/17/20  Last refill- 08/14/2019

## 2020-04-03 ENCOUNTER — TELEPHONE (OUTPATIENT)
Dept: GASTROENTEROLOGY | Facility: CLINIC | Age: 60
End: 2020-04-03

## 2020-05-27 ENCOUNTER — TRANSCRIBE ORDERS (OUTPATIENT)
Dept: LAB | Facility: MEDICAL CENTER | Age: 60
End: 2020-05-27

## 2020-05-27 ENCOUNTER — TELEPHONE (OUTPATIENT)
Dept: GASTROENTEROLOGY | Facility: AMBULARY SURGERY CENTER | Age: 60
End: 2020-05-27

## 2020-05-27 DIAGNOSIS — K51.00 ULCERATIVE PANCOLITIS WITHOUT COMPLICATION (HCC): Primary | ICD-10-CM

## 2020-05-30 ENCOUNTER — TRANSCRIBE ORDERS (OUTPATIENT)
Dept: ADMINISTRATIVE | Facility: HOSPITAL | Age: 60
End: 2020-05-30

## 2020-05-31 ENCOUNTER — APPOINTMENT (OUTPATIENT)
Dept: LAB | Facility: HOSPITAL | Age: 60
End: 2020-05-31
Payer: COMMERCIAL

## 2020-05-31 DIAGNOSIS — K51.00 ULCERATIVE PANCOLITIS WITHOUT COMPLICATION (HCC): ICD-10-CM

## 2020-05-31 PROCEDURE — 83993 ASSAY FOR CALPROTECTIN FECAL: CPT

## 2020-06-04 LAB — CALPROTECTIN STL-MCNT: 508 UG/G (ref 0–120)

## 2020-06-08 ENCOUNTER — TELEMEDICINE (OUTPATIENT)
Dept: GASTROENTEROLOGY | Facility: CLINIC | Age: 60
End: 2020-06-08
Payer: COMMERCIAL

## 2020-06-08 ENCOUNTER — TELEPHONE (OUTPATIENT)
Dept: GASTROENTEROLOGY | Facility: CLINIC | Age: 60
End: 2020-06-08

## 2020-06-08 DIAGNOSIS — E73.9 LACTOSE INTOLERANCE: ICD-10-CM

## 2020-06-08 DIAGNOSIS — K51.00 ULCERATIVE PANCOLITIS WITHOUT COMPLICATION (HCC): Primary | ICD-10-CM

## 2020-06-08 PROBLEM — M25.549 ARTHRALGIA OF HAND: Status: ACTIVE | Noted: 2020-06-08

## 2020-06-08 PROCEDURE — 1036F TOBACCO NON-USER: CPT | Performed by: INTERNAL MEDICINE

## 2020-06-08 PROCEDURE — 99214 OFFICE O/P EST MOD 30 MIN: CPT | Performed by: INTERNAL MEDICINE

## 2020-07-07 ENCOUNTER — APPOINTMENT (OUTPATIENT)
Dept: LAB | Facility: MEDICAL CENTER | Age: 60
End: 2020-07-07
Payer: COMMERCIAL

## 2020-07-07 ENCOUNTER — TRANSCRIBE ORDERS (OUTPATIENT)
Dept: ADMINISTRATIVE | Facility: HOSPITAL | Age: 60
End: 2020-07-07

## 2020-07-07 DIAGNOSIS — K51.919 MODERATE CHRONIC ULCERATIVE COLITIS WITH COMPLICATION (HCC): ICD-10-CM

## 2020-07-07 DIAGNOSIS — Z79.899 ENCOUNTER FOR LONG-TERM (CURRENT) USE OF OTHER MEDICATIONS: ICD-10-CM

## 2020-07-07 DIAGNOSIS — M17.0 PRIMARY OSTEOARTHRITIS OF BOTH KNEES: ICD-10-CM

## 2020-07-07 DIAGNOSIS — R53.83 OTHER FATIGUE: ICD-10-CM

## 2020-07-07 DIAGNOSIS — M45.0 ANKYLOSING SPONDYLITIS OF MULTIPLE SITES IN SPINE (HCC): ICD-10-CM

## 2020-07-07 DIAGNOSIS — M06.09 RHEUMATOID ARTHRITIS OF MULTIPLE SITES WITHOUT RHEUMATOID FACTOR (HCC): ICD-10-CM

## 2020-07-07 DIAGNOSIS — K51.919 MODERATE CHRONIC ULCERATIVE COLITIS WITH COMPLICATION (HCC): Primary | ICD-10-CM

## 2020-07-07 LAB
ALBUMIN SERPL BCP-MCNC: 4 G/DL (ref 3.5–5)
ALP SERPL-CCNC: 100 U/L (ref 46–116)
ALT SERPL W P-5'-P-CCNC: 27 U/L (ref 12–78)
ANION GAP SERPL CALCULATED.3IONS-SCNC: 6 MMOL/L (ref 4–13)
AST SERPL W P-5'-P-CCNC: 21 U/L (ref 5–45)
BACTERIA UR QL AUTO: ABNORMAL /HPF
BASOPHILS # BLD AUTO: 0.04 THOUSANDS/ΜL (ref 0–0.1)
BASOPHILS NFR BLD AUTO: 1 % (ref 0–1)
BILIRUB SERPL-MCNC: 0.55 MG/DL (ref 0.2–1)
BILIRUB UR QL STRIP: NEGATIVE
BUN SERPL-MCNC: 17 MG/DL (ref 5–25)
CALCIUM SERPL-MCNC: 9.9 MG/DL (ref 8.3–10.1)
CHLORIDE SERPL-SCNC: 105 MMOL/L (ref 100–108)
CLARITY UR: CLEAR
CO2 SERPL-SCNC: 26 MMOL/L (ref 21–32)
COLOR UR: YELLOW
CREAT SERPL-MCNC: 0.77 MG/DL (ref 0.6–1.3)
CRP SERPL QL: 5.5 MG/L
EOSINOPHIL # BLD AUTO: 0.35 THOUSAND/ΜL (ref 0–0.61)
EOSINOPHIL NFR BLD AUTO: 5 % (ref 0–6)
ERYTHROCYTE [DISTWIDTH] IN BLOOD BY AUTOMATED COUNT: 12.9 % (ref 11.6–15.1)
ERYTHROCYTE [SEDIMENTATION RATE] IN BLOOD: 31 MM/HOUR (ref 0–20)
GFR SERPL CREATININE-BSD FRML MDRD: 85 ML/MIN/1.73SQ M
GLUCOSE SERPL-MCNC: 80 MG/DL (ref 65–140)
GLUCOSE UR STRIP-MCNC: NEGATIVE MG/DL
HAV IGM SER QL: NORMAL
HBV CORE IGM SER QL: NORMAL
HBV SURFACE AG SER QL: NORMAL
HCT VFR BLD AUTO: 43.6 % (ref 34.8–46.1)
HCV AB SER QL: NORMAL
HGB BLD-MCNC: 13.9 G/DL (ref 11.5–15.4)
HGB UR QL STRIP.AUTO: NEGATIVE
HYALINE CASTS #/AREA URNS LPF: ABNORMAL /LPF
IMM GRANULOCYTES # BLD AUTO: 0.02 THOUSAND/UL (ref 0–0.2)
IMM GRANULOCYTES NFR BLD AUTO: 0 % (ref 0–2)
KETONES UR STRIP-MCNC: NEGATIVE MG/DL
LEUKOCYTE ESTERASE UR QL STRIP: ABNORMAL
LYMPHOCYTES # BLD AUTO: 1.06 THOUSANDS/ΜL (ref 0.6–4.47)
LYMPHOCYTES NFR BLD AUTO: 14 % (ref 14–44)
MCH RBC QN AUTO: 29.8 PG (ref 26.8–34.3)
MCHC RBC AUTO-ENTMCNC: 31.9 G/DL (ref 31.4–37.4)
MCV RBC AUTO: 94 FL (ref 82–98)
MONOCYTES # BLD AUTO: 0.66 THOUSAND/ΜL (ref 0.17–1.22)
MONOCYTES NFR BLD AUTO: 9 % (ref 4–12)
NEUTROPHILS # BLD AUTO: 5.31 THOUSANDS/ΜL (ref 1.85–7.62)
NEUTS SEG NFR BLD AUTO: 71 % (ref 43–75)
NITRITE UR QL STRIP: NEGATIVE
NON-SQ EPI CELLS URNS QL MICRO: ABNORMAL /HPF
NRBC BLD AUTO-RTO: 0 /100 WBCS
PH UR STRIP.AUTO: 6 [PH]
PLATELET # BLD AUTO: 225 THOUSANDS/UL (ref 149–390)
PMV BLD AUTO: 9.5 FL (ref 8.9–12.7)
POTASSIUM SERPL-SCNC: 4.4 MMOL/L (ref 3.5–5.3)
PROT SERPL-MCNC: 7.9 G/DL (ref 6.4–8.2)
PROT UR STRIP-MCNC: NEGATIVE MG/DL
RBC # BLD AUTO: 4.66 MILLION/UL (ref 3.81–5.12)
RBC #/AREA URNS AUTO: ABNORMAL /HPF
SODIUM SERPL-SCNC: 137 MMOL/L (ref 136–145)
SP GR UR STRIP.AUTO: 1.01 (ref 1–1.03)
TSH SERPL DL<=0.05 MIU/L-ACNC: 1.28 UIU/ML (ref 0.36–3.74)
UROBILINOGEN UR QL STRIP.AUTO: 0.2 E.U./DL
WBC # BLD AUTO: 7.44 THOUSAND/UL (ref 4.31–10.16)
WBC #/AREA URNS AUTO: ABNORMAL /HPF

## 2020-07-07 PROCEDURE — 86200 CCP ANTIBODY: CPT

## 2020-07-07 PROCEDURE — 86618 LYME DISEASE ANTIBODY: CPT

## 2020-07-07 PROCEDURE — 85652 RBC SED RATE AUTOMATED: CPT

## 2020-07-07 PROCEDURE — 85025 COMPLETE CBC W/AUTO DIFF WBC: CPT

## 2020-07-07 PROCEDURE — 36415 COLL VENOUS BLD VENIPUNCTURE: CPT

## 2020-07-07 PROCEDURE — 80074 ACUTE HEPATITIS PANEL: CPT

## 2020-07-07 PROCEDURE — 84165 PROTEIN E-PHORESIS SERUM: CPT

## 2020-07-07 PROCEDURE — 81001 URINALYSIS AUTO W/SCOPE: CPT | Performed by: INTERNAL MEDICINE

## 2020-07-07 PROCEDURE — 86039 ANTINUCLEAR ANTIBODIES (ANA): CPT

## 2020-07-07 PROCEDURE — 86480 TB TEST CELL IMMUN MEASURE: CPT

## 2020-07-07 PROCEDURE — 86430 RHEUMATOID FACTOR TEST QUAL: CPT

## 2020-07-07 PROCEDURE — 81374 HLA I TYPING 1 ANTIGEN LR: CPT

## 2020-07-07 PROCEDURE — 80053 COMPREHEN METABOLIC PANEL: CPT

## 2020-07-07 PROCEDURE — 84443 ASSAY THYROID STIM HORMONE: CPT

## 2020-07-07 PROCEDURE — 86140 C-REACTIVE PROTEIN: CPT

## 2020-07-07 PROCEDURE — 84165 PROTEIN E-PHORESIS SERUM: CPT | Performed by: PATHOLOGY

## 2020-07-07 PROCEDURE — 86038 ANTINUCLEAR ANTIBODIES: CPT

## 2020-07-08 LAB
ALBUMIN SERPL ELPH-MCNC: 4.14 G/DL (ref 3.5–5)
ALBUMIN SERPL ELPH-MCNC: 56 % (ref 52–65)
ALPHA1 GLOB SERPL ELPH-MCNC: 0.33 G/DL (ref 0.1–0.4)
ALPHA1 GLOB SERPL ELPH-MCNC: 4.5 % (ref 2.5–5)
ALPHA2 GLOB SERPL ELPH-MCNC: 0.73 G/DL (ref 0.4–1.2)
ALPHA2 GLOB SERPL ELPH-MCNC: 9.9 % (ref 7–13)
ANA HOMOGEN SER QL IF: NORMAL
ANA HOMOGEN TITR SER: NORMAL {TITER}
B BURGDOR IGG+IGM SER-ACNC: <0.91 ISR (ref 0–0.9)
BETA GLOB ABNORMAL SERPL ELPH-MCNC: 0.47 G/DL (ref 0.4–0.8)
BETA1 GLOB SERPL ELPH-MCNC: 6.4 % (ref 5–13)
BETA2 GLOB SERPL ELPH-MCNC: 5.2 % (ref 2–8)
BETA2+GAMMA GLOB SERPL ELPH-MCNC: 0.38 G/DL (ref 0.2–0.5)
GAMMA GLOB ABNORMAL SERPL ELPH-MCNC: 1.33 G/DL (ref 0.5–1.6)
GAMMA GLOB SERPL ELPH-MCNC: 18 % (ref 12–22)
GAMMA INTERFERON BACKGROUND BLD IA-ACNC: 0.02 IU/ML
IGG/ALB SER: 1.27 {RATIO} (ref 1.1–1.8)
M TB IFN-G BLD-IMP: NEGATIVE
M TB IFN-G CD4+ BCKGRND COR BLD-ACNC: 0 IU/ML
M TB IFN-G CD4+ BCKGRND COR BLD-ACNC: 0 IU/ML
MITOGEN IGNF BCKGRD COR BLD-ACNC: 9.2 IU/ML
PROT PATTERN SERPL ELPH-IMP: NORMAL
PROT SERPL-MCNC: 7.4 G/DL (ref 6.4–8.2)
RHEUMATOID FACT SER QL LA: NEGATIVE
RYE IGE QN: POSITIVE

## 2020-07-10 LAB — CCP IGA+IGG SERPL IA-ACNC: 11 UNITS (ref 0–19)

## 2020-07-14 LAB — HLA-B27 QL NAA+PROBE: NEGATIVE

## 2020-07-31 ENCOUNTER — TELEPHONE (OUTPATIENT)
Dept: GASTROENTEROLOGY | Facility: CLINIC | Age: 60
End: 2020-07-31

## 2020-07-31 NOTE — TELEPHONE ENCOUNTER
Patient called in wanting to know if she can drop down from taking 4 pills a day for her UC  She has changed her diet and symptoms are a lot better  Please call patient to discuss   Thanks

## 2020-08-03 NOTE — TELEPHONE ENCOUNTER
Her fecal calprotectin was significantly elevated  I would like to repeat fecal calprotectin level prior to decreasing the dose of her Lialda  The order is already placed during her previous visit      Thank you

## 2020-08-03 NOTE — TELEPHONE ENCOUNTER
Spoke with patient  She will complete Fecal calprotectin at a [facility]'s lab  She is aware we will call back with results

## 2020-08-03 NOTE — TELEPHONE ENCOUNTER
Dr Barrett Escobedo patient hx ulcerative pancolitis    Patient is taking four 1 2 g lialda tablets daily  She has been following an AIP (autoimmune protocol) diet  She is having 1-2, rarely 3, formed BMs daily without blood or mucus  Denies fever, chills, N/V, pain  States she is feeling great  She would like to know if she can decrease the lialda as she is feeling much better after changing her diet  Please advise

## 2020-08-06 ENCOUNTER — APPOINTMENT (OUTPATIENT)
Dept: LAB | Facility: MEDICAL CENTER | Age: 60
End: 2020-08-06
Attending: INTERNAL MEDICINE
Payer: COMMERCIAL

## 2020-08-06 DIAGNOSIS — K51.00 ULCERATIVE PANCOLITIS WITHOUT COMPLICATION (HCC): ICD-10-CM

## 2020-08-06 PROCEDURE — 83993 ASSAY FOR CALPROTECTIN FECAL: CPT

## 2020-08-16 LAB — CALPROTECTIN STL-MCNT: 95 UG/G (ref 0–120)

## 2020-08-17 ENCOUNTER — OFFICE VISIT (OUTPATIENT)
Dept: FAMILY MEDICINE CLINIC | Facility: CLINIC | Age: 60
End: 2020-08-17
Payer: COMMERCIAL

## 2020-08-17 VITALS
HEIGHT: 62 IN | RESPIRATION RATE: 18 BRPM | BODY MASS INDEX: 28.52 KG/M2 | WEIGHT: 155 LBS | TEMPERATURE: 96.8 F | SYSTOLIC BLOOD PRESSURE: 100 MMHG | HEART RATE: 82 BPM | DIASTOLIC BLOOD PRESSURE: 60 MMHG | OXYGEN SATURATION: 97 %

## 2020-08-17 DIAGNOSIS — Z13.820 SCREENING FOR OSTEOPOROSIS: ICD-10-CM

## 2020-08-17 DIAGNOSIS — F40.240 CLAUSTROPHOBIA: ICD-10-CM

## 2020-08-17 DIAGNOSIS — Z00.00 WELL ADULT HEALTH CHECK: Primary | ICD-10-CM

## 2020-08-17 PROCEDURE — 3008F BODY MASS INDEX DOCD: CPT | Performed by: FAMILY MEDICINE

## 2020-08-17 PROCEDURE — 99396 PREV VISIT EST AGE 40-64: CPT | Performed by: FAMILY MEDICINE

## 2020-08-17 PROCEDURE — 1036F TOBACCO NON-USER: CPT | Performed by: FAMILY MEDICINE

## 2020-08-17 RX ORDER — ALPRAZOLAM 0.25 MG/1
0.25 TABLET ORAL 3 TIMES DAILY PRN
Qty: 30 TABLET | Refills: 0 | Status: SHIPPED | OUTPATIENT
Start: 2020-08-17 | End: 2021-01-17

## 2020-08-17 NOTE — PROGRESS NOTES
Assessment/Plan:     Diagnoses and all orders for this visit:    Well adult health check    Claustrophobia  -     ALPRAZolam (XANAX) 0 25 mg tablet; Take 1 tablet (0 25 mg total) by mouth 3 (three) times a day as needed (claustrophobia)    Screening for osteoporosis  -     DXA bone density spine hip and pelvis; Future          Patient to initiate fitness regimen daily to help with anxiety and overall improved BMI  Follow-up here in October for gyn exam   Subjective:   Chief Complaint   Patient presents with    Physical Exam     Annual  Pt states she would like to discuss L/foot little numbness for the past weeks  Patient ID: Iron Flores is a 64 y o  female  64year old here for well check  Patient has a history of autoimmune colitis  She also has aches and pains and has seen rheumatology and they feel there is some association with autoimmune and her myalgias and arthralgias  Patient has positive TIFFANIE and elevated sed rate CRP  CCP was negative  She is following autoimmune diet has significant improvement  Also gluten free  She does admit she can not do much better with fitness  She has a lot of anxiety due to COVID  The following portions of the patient's history were reviewed and updated as appropriate: allergies, current medications, past family history, past medical history, past social history, past surgical history and problem list       Review of Systems   HENT: Positive for postnasal drip  Respiratory: Negative for cough and shortness of breath  Gastrointestinal:        Gut better   Musculoskeletal: Positive for arthralgias  Neurological: Negative for headaches  Psychiatric/Behavioral: Negative for sleep disturbance  The patient is nervous/anxious  Objective:      /60   Pulse 82   Temp (!) 96 8 °F (36 °C) (Temporal)   Resp 18   Ht 5' 2" (1 575 m)   Wt 70 3 kg (155 lb)   SpO2 97%   BMI 28 35 kg/m²          Physical Exam  Vitals signs reviewed  Constitutional:       General: She is not in acute distress  Appearance: She is well-developed  HENT:      Head: Normocephalic  Right Ear: Tympanic membrane and ear canal normal       Left Ear: Tympanic membrane and ear canal normal       Mouth/Throat:      Mouth: Mucous membranes are moist    Eyes:      Conjunctiva/sclera: Conjunctivae normal       Pupils: Pupils are equal, round, and reactive to light  Neck:      Musculoskeletal: Normal range of motion and neck supple  Cardiovascular:      Rate and Rhythm: Normal rate and regular rhythm  Pulses: Normal pulses  Heart sounds: Normal heart sounds  No murmur  Pulmonary:      Effort: Pulmonary effort is normal       Breath sounds: Normal breath sounds  Abdominal:      General: Bowel sounds are normal       Palpations: Abdomen is soft  There is no mass  Tenderness: There is no abdominal tenderness  Musculoskeletal: Normal range of motion  Skin:     General: Skin is warm and dry  Neurological:      Mental Status: She is alert and oriented to person, place, and time  Deep Tendon Reflexes: Reflexes are normal and symmetric  Comments: Examination of left toe is unremarkable patient positive monofilament  No tenderness in web  Psychiatric:         Mood and Affect: Mood normal          Behavior: Behavior normal          Thought Content: Thought content normal          Judgment: Judgment normal        BMI Counseling: Body mass index is 28 35 kg/m²  The BMI is above normal  Nutrition recommendations include reducing portion sizes, decreasing overall calorie intake, 3-5 servings of fruits/vegetables daily, reducing fast food intake, consuming healthier snacks, decreasing soda and/or juice intake, moderation in carbohydrate intake, increasing intake of lean protein, reducing intake of saturated fat and trans fat and reducing intake of cholesterol   Exercise recommendations include vigorous aerobic physical activity for 75 minutes/week

## 2020-08-17 NOTE — PROGRESS NOTES
BMI Counseling: Body mass index is 28 35 kg/m²  The BMI is above normal  Nutrition recommendations include reducing portion sizes, decreasing overall calorie intake, 3-5 servings of fruits/vegetables daily, reducing fast food intake, consuming healthier snacks, decreasing soda and/or juice intake, moderation in carbohydrate intake, increasing intake of lean protein, reducing intake of saturated fat and trans fat and reducing intake of cholesterol  Exercise recommendations include vigorous aerobic physical activity for 75 minutes/week

## 2020-08-28 ENCOUNTER — APPOINTMENT (OUTPATIENT)
Dept: RADIOLOGY | Facility: MEDICAL CENTER | Age: 60
End: 2020-08-28
Payer: COMMERCIAL

## 2020-08-28 ENCOUNTER — TRANSCRIBE ORDERS (OUTPATIENT)
Dept: RADIOLOGY | Facility: MEDICAL CENTER | Age: 60
End: 2020-08-28

## 2020-08-28 DIAGNOSIS — M19.90 ARTHRITIS: ICD-10-CM

## 2020-08-28 DIAGNOSIS — M19.90 ARTHRITIS: Primary | ICD-10-CM

## 2020-08-28 PROCEDURE — 73130 X-RAY EXAM OF HAND: CPT

## 2020-08-28 PROCEDURE — 72200 X-RAY EXAM SI JOINTS: CPT

## 2020-08-28 PROCEDURE — 73630 X-RAY EXAM OF FOOT: CPT

## 2020-08-28 PROCEDURE — 73562 X-RAY EXAM OF KNEE 3: CPT

## 2020-08-28 PROCEDURE — 72110 X-RAY EXAM L-2 SPINE 4/>VWS: CPT

## 2020-08-28 PROCEDURE — 73110 X-RAY EXAM OF WRIST: CPT

## 2020-09-11 ENCOUNTER — OFFICE VISIT (OUTPATIENT)
Dept: FAMILY MEDICINE CLINIC | Facility: CLINIC | Age: 60
End: 2020-09-11
Payer: COMMERCIAL

## 2020-09-11 VITALS
WEIGHT: 151.6 LBS | SYSTOLIC BLOOD PRESSURE: 96 MMHG | HEIGHT: 62 IN | BODY MASS INDEX: 27.9 KG/M2 | OXYGEN SATURATION: 96 % | DIASTOLIC BLOOD PRESSURE: 60 MMHG | TEMPERATURE: 97.1 F | HEART RATE: 95 BPM
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
## 2020-09-11 DIAGNOSIS — S10.96XA INSECT BITE OF NECK, INITIAL ENCOUNTER: Primary | ICD-10-CM

## 2020-09-11 PROCEDURE — 99213 OFFICE O/P EST LOW 20 MIN: CPT | Performed by: FAMILY MEDICINE

## 2020-09-11 PROCEDURE — 1036F TOBACCO NON-USER: CPT | Performed by: FAMILY MEDICINE

## 2020-09-11 NOTE — PROGRESS NOTES
Assessment/Plan:     Diagnoses and all orders for this visit:    Insect bite of neck, initial encounter        Patient can use topical Benadryl or steroids  Subjective:   Chief Complaint   Patient presents with    Mass     patient has a lump on the right side of the neck      Patient ID: Dima Winchester is a 64 y o  female  Patient is a 61-year-old female who presents today with what she felt was a lump on the right side of her neck  It now has started age  She is wondering if it possibly is just simply a bug bite  There is no difficulty swallowing or shortness of breath  Patient does not generally have any allergies to any type of bites  The following portions of the patient's history were reviewed and updated as appropriate: allergies, current medications, past family history, past medical history, past social history, past surgical history and problem list       Review of Systems   Skin:        Now if she small lump right-sided neck  Objective:      BP 96/60   Pulse 95   Temp (!) 97 1 °F (36 2 °C) (Temporal)   Ht 5' 1 5" (1 562 m)   Wt 68 8 kg (151 lb 9 6 oz)   SpO2 96%   BMI 28 18 kg/m²          Physical Exam  Constitutional:       Appearance: Normal appearance  Neck:      Musculoskeletal: Normal range of motion  Cardiovascular:      Rate and Rhythm: Normal rate  Pulmonary:      Effort: Pulmonary effort is normal       Breath sounds: Normal breath sounds  Skin:     Comments: Small superficial raise area which is somewhat mobile  Does not feel like lymph node  Too small to be same  To superficial   There is some redness of the area and does look like it could be a area where bug bite may have happened  Neurological:      Mental Status: She is alert

## 2020-11-02 ENCOUNTER — TELEPHONE (OUTPATIENT)
Dept: GASTROENTEROLOGY | Facility: AMBULARY SURGERY CENTER | Age: 60
End: 2020-11-02

## 2020-11-04 ENCOUNTER — IMMUNIZATIONS (OUTPATIENT)
Dept: FAMILY MEDICINE CLINIC | Facility: CLINIC | Age: 60
End: 2020-11-04
Payer: COMMERCIAL

## 2020-11-04 VITALS — TEMPERATURE: 97.1 F

## 2020-11-04 DIAGNOSIS — Z23 NEED FOR IMMUNIZATION AGAINST INFLUENZA: Primary | ICD-10-CM

## 2020-11-04 PROCEDURE — 90682 RIV4 VACC RECOMBINANT DNA IM: CPT

## 2020-11-04 PROCEDURE — 90471 IMMUNIZATION ADMIN: CPT

## 2020-11-13 ENCOUNTER — OFFICE VISIT (OUTPATIENT)
Dept: FAMILY MEDICINE CLINIC | Facility: CLINIC | Age: 60
End: 2020-11-13
Payer: COMMERCIAL

## 2020-11-13 VITALS
SYSTOLIC BLOOD PRESSURE: 112 MMHG | RESPIRATION RATE: 16 BRPM | DIASTOLIC BLOOD PRESSURE: 62 MMHG | WEIGHT: 148.6 LBS | TEMPERATURE: 97.5 F | HEART RATE: 90 BPM | OXYGEN SATURATION: 99 % | BODY MASS INDEX: 27.34 KG/M2 | HEIGHT: 62 IN

## 2020-11-13 DIAGNOSIS — S39.012A ACUTE MYOFASCIAL STRAIN OF LUMBAR REGION, INITIAL ENCOUNTER: Primary | ICD-10-CM

## 2020-11-13 PROCEDURE — 99214 OFFICE O/P EST MOD 30 MIN: CPT | Performed by: FAMILY MEDICINE

## 2020-11-13 PROCEDURE — 1036F TOBACCO NON-USER: CPT | Performed by: FAMILY MEDICINE

## 2020-11-13 PROCEDURE — 3008F BODY MASS INDEX DOCD: CPT | Performed by: FAMILY MEDICINE

## 2020-11-13 PROCEDURE — 3725F SCREEN DEPRESSION PERFORMED: CPT | Performed by: FAMILY MEDICINE

## 2020-11-13 RX ORDER — PREDNISONE 10 MG/1
TABLET ORAL
Qty: 21 EACH | Refills: 0 | Status: SHIPPED | OUTPATIENT
Start: 2020-11-13 | End: 2020-12-01

## 2020-11-13 RX ORDER — METHOCARBAMOL 500 MG/1
500 TABLET, FILM COATED ORAL 4 TIMES DAILY
Qty: 30 TABLET | Refills: 0 | Status: SHIPPED | OUTPATIENT
Start: 2020-11-13 | End: 2021-08-02

## 2020-12-01 ENCOUNTER — OFFICE VISIT (OUTPATIENT)
Dept: FAMILY MEDICINE CLINIC | Facility: CLINIC | Age: 60
End: 2020-12-01
Payer: COMMERCIAL

## 2020-12-01 VITALS
TEMPERATURE: 97.8 F | BODY MASS INDEX: 27.49 KG/M2 | OXYGEN SATURATION: 98 % | HEIGHT: 62 IN | WEIGHT: 149.4 LBS | HEART RATE: 88 BPM | RESPIRATION RATE: 16 BRPM | DIASTOLIC BLOOD PRESSURE: 70 MMHG | SYSTOLIC BLOOD PRESSURE: 104 MMHG

## 2020-12-01 DIAGNOSIS — R07.9 CHEST PAIN, UNSPECIFIED TYPE: Primary | ICD-10-CM

## 2020-12-01 PROCEDURE — 99214 OFFICE O/P EST MOD 30 MIN: CPT | Performed by: FAMILY MEDICINE

## 2020-12-02 ENCOUNTER — APPOINTMENT (OUTPATIENT)
Dept: LAB | Facility: MEDICAL CENTER | Age: 60
End: 2020-12-02
Attending: INTERNAL MEDICINE
Payer: COMMERCIAL

## 2020-12-02 DIAGNOSIS — K51.00 ULCERATIVE PANCOLITIS WITHOUT COMPLICATION (HCC): ICD-10-CM

## 2020-12-02 LAB
ALBUMIN SERPL BCP-MCNC: 3.6 G/DL (ref 3.5–5)
ALP SERPL-CCNC: 105 U/L (ref 46–116)
ALT SERPL W P-5'-P-CCNC: 30 U/L (ref 12–78)
ANION GAP SERPL CALCULATED.3IONS-SCNC: 4 MMOL/L (ref 4–13)
AST SERPL W P-5'-P-CCNC: 19 U/L (ref 5–45)
BILIRUB SERPL-MCNC: 0.39 MG/DL (ref 0.2–1)
BUN SERPL-MCNC: 17 MG/DL (ref 5–25)
CALCIUM SERPL-MCNC: 9.2 MG/DL (ref 8.3–10.1)
CHLORIDE SERPL-SCNC: 108 MMOL/L (ref 100–108)
CO2 SERPL-SCNC: 28 MMOL/L (ref 21–32)
CREAT SERPL-MCNC: 0.69 MG/DL (ref 0.6–1.3)
CRP SERPL QL: 6.4 MG/L
ERYTHROCYTE [DISTWIDTH] IN BLOOD BY AUTOMATED COUNT: 12.9 % (ref 11.6–15.1)
GFR SERPL CREATININE-BSD FRML MDRD: 96 ML/MIN/1.73SQ M
GLUCOSE P FAST SERPL-MCNC: 101 MG/DL (ref 65–99)
HCT VFR BLD AUTO: 42.4 % (ref 34.8–46.1)
HGB BLD-MCNC: 13.3 G/DL (ref 11.5–15.4)
MCH RBC QN AUTO: 29.4 PG (ref 26.8–34.3)
MCHC RBC AUTO-ENTMCNC: 31.4 G/DL (ref 31.4–37.4)
MCV RBC AUTO: 94 FL (ref 82–98)
PLATELET # BLD AUTO: 216 THOUSANDS/UL (ref 149–390)
PMV BLD AUTO: 9.1 FL (ref 8.9–12.7)
POTASSIUM SERPL-SCNC: 3.7 MMOL/L (ref 3.5–5.3)
PROT SERPL-MCNC: 7.6 G/DL (ref 6.4–8.2)
RBC # BLD AUTO: 4.52 MILLION/UL (ref 3.81–5.12)
SODIUM SERPL-SCNC: 140 MMOL/L (ref 136–145)
WBC # BLD AUTO: 7.49 THOUSAND/UL (ref 4.31–10.16)

## 2020-12-02 PROCEDURE — 86140 C-REACTIVE PROTEIN: CPT

## 2020-12-02 PROCEDURE — 80053 COMPREHEN METABOLIC PANEL: CPT

## 2020-12-02 PROCEDURE — 85027 COMPLETE CBC AUTOMATED: CPT

## 2020-12-02 PROCEDURE — 36415 COLL VENOUS BLD VENIPUNCTURE: CPT

## 2020-12-11 ENCOUNTER — OFFICE VISIT (OUTPATIENT)
Dept: GASTROENTEROLOGY | Facility: CLINIC | Age: 60
End: 2020-12-11
Payer: COMMERCIAL

## 2020-12-11 VITALS
WEIGHT: 147.4 LBS | HEIGHT: 62 IN | DIASTOLIC BLOOD PRESSURE: 70 MMHG | BODY MASS INDEX: 27.12 KG/M2 | HEART RATE: 100 BPM | TEMPERATURE: 97.8 F | SYSTOLIC BLOOD PRESSURE: 108 MMHG

## 2020-12-11 DIAGNOSIS — E55.9 VITAMIN D DEFICIENCY: ICD-10-CM

## 2020-12-11 DIAGNOSIS — E53.8 VITAMIN B12 DEFICIENCY: ICD-10-CM

## 2020-12-11 DIAGNOSIS — M25.541 ARTHRALGIA OF BOTH HANDS: ICD-10-CM

## 2020-12-11 DIAGNOSIS — K51.00 ULCERATIVE PANCOLITIS WITHOUT COMPLICATION (HCC): Primary | ICD-10-CM

## 2020-12-11 DIAGNOSIS — M25.542 ARTHRALGIA OF BOTH HANDS: ICD-10-CM

## 2020-12-11 PROCEDURE — 99214 OFFICE O/P EST MOD 30 MIN: CPT | Performed by: INTERNAL MEDICINE

## 2020-12-11 PROCEDURE — 3008F BODY MASS INDEX DOCD: CPT | Performed by: INTERNAL MEDICINE

## 2020-12-11 PROCEDURE — 1036F TOBACCO NON-USER: CPT | Performed by: INTERNAL MEDICINE

## 2020-12-23 ENCOUNTER — TELEPHONE (OUTPATIENT)
Dept: FAMILY MEDICINE CLINIC | Facility: CLINIC | Age: 60
End: 2020-12-23

## 2021-01-16 DIAGNOSIS — F40.240 CLAUSTROPHOBIA: ICD-10-CM

## 2021-01-17 RX ORDER — ALPRAZOLAM 0.25 MG/1
TABLET ORAL
Qty: 30 TABLET | Refills: 0 | Status: SHIPPED | OUTPATIENT
Start: 2021-01-17 | End: 2021-03-05 | Stop reason: SDUPTHER

## 2021-01-21 ENCOUNTER — OFFICE VISIT (OUTPATIENT)
Dept: FAMILY MEDICINE CLINIC | Facility: CLINIC | Age: 61
End: 2021-01-21
Payer: COMMERCIAL

## 2021-01-21 VITALS
TEMPERATURE: 97 F | HEART RATE: 94 BPM | DIASTOLIC BLOOD PRESSURE: 70 MMHG | RESPIRATION RATE: 15 BRPM | OXYGEN SATURATION: 98 % | SYSTOLIC BLOOD PRESSURE: 108 MMHG

## 2021-01-21 DIAGNOSIS — F41.9 ANXIETY: Primary | ICD-10-CM

## 2021-01-21 PROCEDURE — 99214 OFFICE O/P EST MOD 30 MIN: CPT | Performed by: NURSE PRACTITIONER

## 2021-01-21 PROCEDURE — 1036F TOBACCO NON-USER: CPT | Performed by: NURSE PRACTITIONER

## 2021-01-21 RX ORDER — ESCITALOPRAM OXALATE 5 MG/1
5 TABLET ORAL DAILY
Qty: 90 TABLET | Refills: 0 | Status: SHIPPED | OUTPATIENT
Start: 2021-01-21 | End: 2021-08-02

## 2021-01-21 NOTE — PROGRESS NOTES
Assessment/Plan:    Anxiety  Discussed therapy/ medications options with Marshall Thomason  At this time would like to proceed with 5 mg lexapro daily  She will notify me with update in about 1 month  Discussed side effects/ black box warnings  Encouraged therapy/ other coping measures  Handout provided to patient  If no improvement can consider gene sight testing which was discussed  Also discussed possibly changing xanax, she wishes to wait at this time and she will take it with food and call if no improvement, can then try ativan instead  Please call the office if you are experiencing any worsening of symptoms or no symptom improvement  Patient verbalizes understand and agrees with treatment plan  Diagnoses and all orders for this visit:    Anxiety  -     escitalopram (LEXAPRO) 5 mg tablet; Take 1 tablet (5 mg total) by mouth daily                Subjective:        Patient ID: Vane Link is a 64 y o  female  Chief Complaint   Patient presents with    Follow-up     Anxiety       Visit today to discuss anxiety which has been worsening over past 3 months  She has xanax to use PRN  She did have to use this 3 days in a row but it hurt her stomach which hasn't happened before  She states she takes about 1/3 pill at a time so at most 1 pill in a day (0 25mg)  She states she wakes up with anxiety and finds her self isolating due to the anxiety  Anxiety has been worsening  She took xanax three days in a row  This has been worsening over past 3 months  She is retired  She does seclude herself during those times  Was on paxil for a few weeks years ago but didn't have a good experience  It took away her appetite and then had panic attack on the medication  Her sister (who is half sister) is on lexapro and does well on it           The following portions of the patient's history were reviewed and updated as appropriate: allergies, current medications, past family history, past social history and problem list     Review of Systems   Constitutional: Negative for chills and fever  Eyes: Negative for discharge  Respiratory: Negative for shortness of breath  Cardiovascular: Negative for chest pain  Gastrointestinal: Negative for constipation and diarrhea  Genitourinary: Negative for difficulty urinating  Musculoskeletal: Negative for joint swelling  Skin: Negative for rash  Neurological: Negative for headaches  Hematological: Negative for adenopathy  Psychiatric/Behavioral: The patient is nervous/anxious  Objective:  /70 (BP Location: Left arm, Patient Position: Sitting, Cuff Size: Large)   Pulse 94   Temp (!) 97 °F (36 1 °C) (Temporal)   Resp 15   SpO2 98%      Physical Exam  Vitals signs and nursing note reviewed  Constitutional:       General: She is not in acute distress  Appearance: She is well-developed  She is not diaphoretic  HENT:      Head: Normocephalic and atraumatic  Right Ear: External ear normal       Left Ear: External ear normal    Eyes:      General: Lids are normal          Right eye: No discharge  Left eye: No discharge  Conjunctiva/sclera: Conjunctivae normal    Neck:      Musculoskeletal: Neck supple  Cardiovascular:      Rate and Rhythm: Normal rate and regular rhythm  Heart sounds: No murmur  Pulmonary:      Effort: Pulmonary effort is normal  No respiratory distress  Breath sounds: Normal breath sounds  No wheezing  Musculoskeletal:         General: No deformity  Skin:     General: Skin is warm and dry  Neurological:      Mental Status: She is alert and oriented to person, place, and time  Psychiatric:         Mood and Affect: Mood is anxious  Speech: Speech normal          Behavior: Behavior normal          Thought Content:  Thought content normal          Judgment: Judgment normal                 Current Outpatient Medications:     ALPRAZolam (XANAX) 0 25 mg tablet, take 1 tablet by mouth three times a day if needed for CLAUSTROPHOBIA, Disp: 30 tablet, Rfl: 0    Cholecalciferol (VITAMIN D PO), Take by mouth daily, Disp: , Rfl:     Cyanocobalamin (VITAMIN B-12 PO), Take by mouth daily, Disp: , Rfl:     mesalamine (Lialda) 1 2 g EC tablet, Take 4 tablets (4 8 g total) by mouth daily (Patient taking differently: Take 2 4 g by mouth daily ), Disp: 360 tablet, Rfl: 2    escitalopram (LEXAPRO) 5 mg tablet, Take 1 tablet (5 mg total) by mouth daily, Disp: 90 tablet, Rfl: 0    methocarbamol (ROBAXIN) 500 mg tablet, Take 1 tablet (500 mg total) by mouth 4 (four) times a day (Patient not taking: Reported on 12/11/2020), Disp: 30 tablet, Rfl: 0  Allergies   Allergen Reactions    Amoxicillin GI Intolerance

## 2021-01-21 NOTE — PATIENT INSTRUCTIONS
Start Lexapro, this is once a day  Continue with xanax as needed  Call with update in about 1 month  Consider gene sight testing  Please call the office if you are experiencing any worsening of symptoms or no symptom improvement  Escitalopram (By mouth)   Escitalopram ([id number]-CBD-oh-pram)  Treats depression and generalized anxiety disorder (RAHUL)  Brand Name(s): Lexapro   There may be other brand names for this medicine  When This Medicine Should Not Be Used: This medicine is not right for everyone  Do not use it if you had an allergic reaction to escitalopram or citalopram   How to Use This Medicine:   Liquid, Tablet  · Take this medicine as directed  You may need to take it for a month or more before you feel better  Your dose may need to be changed to find out what works best for you  · Measure the oral liquid medicine with a marked measuring spoon, oral syringe, or medicine cup  · This medicine should come with a Medication Guide  Ask your pharmacist for a copy if you do not have one  · Missed dose: Take a dose as soon as you remember  If it is almost time for your next dose, wait until then and take a regular dose  Do not take extra medicine to make up for a missed dose  · Store the medicine in a closed container at room temperature, away from heat, moisture, and direct light  Drugs and Foods to Avoid:   Ask your doctor or pharmacist before using any other medicine, including over-the-counter medicines, vitamins, and herbal products  · Do not use this medicine together with pimozide  Do not use this medicine and an MAO inhibitor (MAOI) within 14 days of each other  · Some medicines can affect how escitalopram works  Tell your doctor if you are using the following:   ? Buspirone, carbamazepine, fentanyl, lithium, Keren's wort, tramadol, or tryptophan supplements  ? Amphetamines  ? Blood thinner (including warfarin)  ? Diuretic (water pill)  ?  NSAID pain or arthritis medicine (including aspirin, celecoxib, diclofenac, ibuprofen, naproxen)  ? Triptan medicine to treat migraine headaches (including sumatriptan)  · Tell your doctor if you use anything else that makes you sleepy  Some examples are allergy medicine, narcotic pain medicine, and alcohol  · Do not drink alcohol while you are using this medicine  Warnings While Using This Medicine:   · Tell your doctor if you are pregnant or breastfeeding, or if you have kidney disease, liver disease, bleeding problems, glaucoma, heart disease, or a seizure disorder  · For some children, teenagers, and young adults, this medicine may increase mental or emotional problems  This may lead to thoughts of suicide and violence  Talk with your doctor right away if you have any thoughts or behavior changes that concern you  Tell your doctor if you or anyone in your family has a history of bipolar disorder or suicide attempts  · This medicine may cause the following problems:   ? Serotonin syndrome (more likely when taken with certain medicines)  ? Low sodium levels  ? Increased risk of bleeding problems  · This medicine may make you dizzy or drowsy  Do not drive or do anything that could be dangerous until you know how this medicine affects you  · Your doctor may want to monitor your child's weight and height, because this medicine may cause decreased appetite and weight loss in children  · Do not stop using this medicine suddenly  Your doctor will need to slowly decrease your dose before you stop it completely  · Your doctor will check your progress and the effects of this medicine at regular visits  Keep all appointments  · Keep all medicine out of the reach of children  Never share your medicine with anyone    Possible Side Effects While Using This Medicine:   Call your doctor right away if you notice any of these side effects:  · Allergic reaction: Itching or hives, swelling in your face or hands, swelling or tingling in your mouth or throat, chest tightness, trouble breathing  · Anxiety, restlessness, fever, sweating, muscle spasms, nausea, vomiting, diarrhea, seeing or hearing things that are not there  · Confusion, weakness, and muscle twitching  · Eye pain, vision changes, seeing halos around lights  · Fast, pounding, or uneven heartbeat  · Feeling more excited or energetic than usual, racing thoughts, trouble sleeping  · Seizures  · Thoughts of hurting yourself or others, unusual behavior  · Unusual bleeding or bruising  If you notice these less serious side effects, talk with your doctor:   · Dizziness, drowsiness, or sleepiness  · Dry mouth  · Headache  · Nausea, constipation, diarrhea  · Sexual problems  If you notice other side effects that you think are caused by this medicine, tell your doctor  Call your doctor for medical advice about side effects  You may report side effects to FDA at 4-381-FDA-4903  © Copyright [address] Information is for End User's use only and may not be sold, redistributed or otherwise used for commercial purposes  The above information is an  only  It is not intended as medical advice for individual conditions or treatments  Talk to your doctor, nurse or pharmacist before following any medical regimen to see if it is safe and effective for you  Anxiety   AMBULATORY CARE:   Anxiety  is a condition that causes you to feel extremely worried or nervous  The feelings are so strong that they can cause problems with your daily activities or sleep  Anxiety may be triggered by something you fear, or it may happen without a cause  Family or work stress, smoking, caffeine, and alcohol can increase your risk for anxiety  Certain medicines or health conditions can also increase your risk  Anxiety can become a long-term condition if it is not managed or treated    Common signs and symptoms that may occur with anxiety:   · Fatigue or muscle tightness    · Shaking, restlessness, or irritability    · Problems focusing    · Trouble sleeping    · Feeling jumpy, easily startled, or dizzy    · Rapid heartbeat or shortness of breath    Call your local emergency number (911 in the [address]) if:   · You have chest pain, tightness, or heaviness that may spread to your shoulders, arms, jaw, neck, or back  · You feel like hurting yourself or someone else  Call your doctor if:   · Your symptoms get worse or do not get better with treatment  · Your anxiety keeps you from doing your regular daily activities  · You have new symptoms since your last visit  · You have questions or concerns about your condition or care  Treatment for anxiety  may include medicines to help you feel calm and relaxed, and decrease your symptoms  Medicines are usually given together with therapy or other treatments  Manage anxiety:   · Talk to someone about your anxiety  Your healthcare provider may suggest counseling  Cognitive behavioral therapy can help you understand and change how you react to events that trigger your symptoms  You might feel more comfortable talking with a friend or family member about your anxiety  Choose someone you know will be supportive and encouraging  · Find ways to relax  Activities such as exercise, meditation, or listening to music can help you relax  Spend time with friends, or do things you enjoy  · Practice deep breathing  Deep breathing can help you relax when you feel anxious  Focus on taking slow, deep breaths several times a day, or during an anxiety attack  Breathe in through your nose and out through your mouth  · Create a regular sleep routine  Regular sleep can help you feel calmer during the day  Go to sleep and wake up at the same times every day  Do not watch television or use the computer right before bed  Your room should be comfortable, dark, and quiet  · Eat a variety of healthy foods    Healthy foods include fruits, vegetables, low-fat dairy products, lean meats, fish, whole-grain breads, and cooked beans  Healthy foods can help you feel less anxious and have more energy  · Exercise regularly  Exercise can increase your energy level  Exercise may also lift your mood and help you sleep better  Your healthcare provider can help you create an exercise plan  · Do not smoke  Nicotine and other chemicals in cigarettes and cigars can increase anxiety  Ask your healthcare provider for information if you currently smoke and need help to quit  E-cigarettes or smokeless tobacco still contain nicotine  Talk to your healthcare provider before you use these products  · Do not have caffeine  Caffeine can make your symptoms worse  Do not have foods or drinks that are meant to increase your energy level  · Limit or do not drink alcohol  Ask your healthcare provider if alcohol is safe for you  You may not be able to drink alcohol if you take certain anxiety or depression medicines  Limit alcohol to 1 drink per day if you are a woman  Limit alcohol to 2 drinks per day if you are a man  A drink of alcohol is 12 ounces of beer, 5 ounces of wine, or 1½ ounces of liquor  · Do not use drugs  Drugs can make your anxiety worse  It can also make anxiety hard to manage  Talk to your healthcare provider if you use drugs and want help to quit  Follow up with your doctor within 2 weeks or as directed:  Write down your questions so you remember to ask them during your visits  © Copyright [address] Information is for End User's use only and may not be sold, redistributed or otherwise used for commercial purposes  All illustrations and images included in CareNotes® are the copyrighted property of A D A M , Inc  or [address] [place], [place] [address]adonis   The above information is an  only  It is not intended as medical advice for individual conditions or treatments   Talk to your doctor, nurse or pharmacist before following any medical regimen to see if it is safe and effective for you

## 2021-01-21 NOTE — ASSESSMENT & PLAN NOTE
Discussed therapy/ medications options with Merna Gilbert  At this time would like to proceed with 5 mg lexapro daily  She will notify me with update in about 1 month  Discussed side effects/ black box warnings  Encouraged therapy/ other coping measures  Handout provided to patient  If no improvement can consider gene sight testing which was discussed  Also discussed possibly changing xanax, she wishes to wait at this time and she will take it with food and call if no improvement, can then try ativan instead  Please call the office if you are experiencing any worsening of symptoms or no symptom improvement

## 2021-03-05 DIAGNOSIS — F40.240 CLAUSTROPHOBIA: ICD-10-CM

## 2021-03-05 RX ORDER — ALPRAZOLAM 0.25 MG/1
0.25 TABLET ORAL
Qty: 30 TABLET | Refills: 0 | Status: SHIPPED | OUTPATIENT
Start: 2021-03-05 | End: 2021-03-24 | Stop reason: SDUPTHER

## 2021-03-24 DIAGNOSIS — F40.240 CLAUSTROPHOBIA: ICD-10-CM

## 2021-03-24 RX ORDER — ALPRAZOLAM 0.25 MG/1
0.25 TABLET ORAL
Qty: 90 TABLET | Refills: 0 | Status: SHIPPED | OUTPATIENT
Start: 2021-03-24 | End: 2021-08-23

## 2021-03-24 RX ORDER — ALPRAZOLAM 0.25 MG/1
0.25 TABLET ORAL
Qty: 30 TABLET | Refills: 0 | Status: CANCELLED | OUTPATIENT
Start: 2021-03-24

## 2021-03-24 NOTE — TELEPHONE ENCOUNTER
Pt called the office requesting a refill please of Alprazolam 0 25 mg [facility]  Pt has never used [facility] before  Pt is to please call them  and set up a pt account first then call us back    PDMP checked:

## 2021-03-27 ENCOUNTER — IMMUNIZATIONS (OUTPATIENT)
Dept: FAMILY MEDICINE CLINIC | Facility: HOSPITAL | Age: 61
End: 2021-03-27

## 2021-03-27 DIAGNOSIS — Z23 ENCOUNTER FOR IMMUNIZATION: Primary | ICD-10-CM

## 2021-03-27 PROCEDURE — 91300 SARS-COV-2 / COVID-19 MRNA VACCINE (PFIZER-BIONTECH) 30 MCG: CPT

## 2021-03-27 PROCEDURE — 0001A SARS-COV-2 / COVID-19 MRNA VACCINE (PFIZER-BIONTECH) 30 MCG: CPT

## 2021-04-01 ENCOUNTER — TELEPHONE (OUTPATIENT)
Dept: FAMILY MEDICINE CLINIC | Facility: CLINIC | Age: 61
End: 2021-04-01

## 2021-04-01 NOTE — TELEPHONE ENCOUNTER
Received first [employer] vaccine on 3/26/21, 8 hours later she started feeling nauseous, achy for a few days, denies fever  She is now terrified of the second shot and wants to know if this is normal, she is even considering not getting the second vaccine

## 2021-04-01 NOTE — TELEPHONE ENCOUNTER
Reactions to any other vaccines are very variable from aged age gender to gender and just person to person  It seems that if the 1st vaccine goes okay the 2nd 1 is symptomatic and if the 1st 1 is symptomatic it seems that the 2nd dose goes better  But I can not guarantee really anything  I would encourage her to consider getting the 2nd vaccine

## 2021-04-09 ENCOUNTER — TELEPHONE (OUTPATIENT)
Dept: FAMILY MEDICINE CLINIC | Facility: CLINIC | Age: 61
End: 2021-04-09
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
## 2021-04-09 RX ORDER — DOXYCYCLINE HYCLATE 100 MG/1
200 CAPSULE ORAL ONCE
Qty: 2 CAPSULE | Refills: 0 | Status: SHIPPED | OUTPATIENT
Start: 2021-04-09 | End: 2021-04-09

## 2021-04-09 NOTE — TELEPHONE ENCOUNTER
If there is any suspicion that it could be a tick bite them what we do is 1 dose of doxycycline 200 mg  That is the protocol now or "prophylaxis "  Do not know if she is on "my chart "and could send a picture of the "bite area "for me to look at

## 2021-04-09 NOTE — TELEPHONE ENCOUNTER
Spoke w/ pt and gave message  Pt states she does not want to take the Doxycycline because she is getting her Covid Shot and does not want to be on the medication   Pt states she will keep an eye on it and call if it worsens

## 2021-04-09 NOTE — TELEPHONE ENCOUNTER
I would still think that the doxycycline is a good idea because it would treat the inflammation of just a regular "bug bite"  Usually the tick bite leaves a "bull's-eye type of rash "  Locally patient can use heat on the bite marilu area as well as taking Benadryl for it is antihistamine effect    ( of course only in the evening or at night with the Benadryl as it will make sleepy)

## 2021-04-09 NOTE — TELEPHONE ENCOUNTER
Patient called today she has a bite right above her buttock  She said [place] and is worried about it being a tick bite  She said it is red, hard, and hurts to touch  She is wondering what she should be looking for if it is a tick bite and how to take care of it if it is or is not  Please advise on what I should tell patient  Thank you

## 2021-04-09 NOTE — TELEPHONE ENCOUNTER
Pt is unsure if it is a tick bite  Pt is unable to take a picture of the " bite area"  She doesn't know what to look for as to it being  A tick bite  Pt is unsure what to do  Please advise   Thanks

## 2021-04-18 ENCOUNTER — IMMUNIZATIONS (OUTPATIENT)
Dept: FAMILY MEDICINE CLINIC | Facility: HOSPITAL | Age: 61
End: 2021-04-18

## 2021-04-18 DIAGNOSIS — Z23 ENCOUNTER FOR IMMUNIZATION: Primary | ICD-10-CM

## 2021-04-18 PROCEDURE — 0002A SARS-COV-2 / COVID-19 MRNA VACCINE (PFIZER-BIONTECH) 30 MCG: CPT

## 2021-04-18 PROCEDURE — 91300 SARS-COV-2 / COVID-19 MRNA VACCINE (PFIZER-BIONTECH) 30 MCG: CPT

## 2021-04-23 DIAGNOSIS — K51.00 ULCERATIVE PANCOLITIS WITHOUT COMPLICATION (HCC): ICD-10-CM

## 2021-04-23 RX ORDER — MESALAMINE 1.2 G/1
2.4 TABLET, DELAYED RELEASE ORAL DAILY
Qty: 180 TABLET | Refills: 1 | Status: SHIPPED | OUTPATIENT
Start: 2021-04-23 | End: 2021-08-02

## 2021-04-23 NOTE — TELEPHONE ENCOUNTER
Patients GI provider:  Dr Luis Eduardo Holloway    Number to return call: (   [phone]    Reason for call: Pt calling to ask if [facility]ialda [facility]ill [facility] and it may require an auth    Scheduled procedure/appointment date if applicable: Apt/procedure 6-9-21

## 2021-05-19 ENCOUNTER — APPOINTMENT (OUTPATIENT)
Dept: LAB | Facility: MEDICAL CENTER | Age: 61
End: 2021-05-19
Attending: INTERNAL MEDICINE
Payer: COMMERCIAL

## 2021-05-19 ENCOUNTER — TELEPHONE (OUTPATIENT)
Dept: GASTROENTEROLOGY | Facility: CLINIC | Age: 61
End: 2021-05-19

## 2021-05-19 DIAGNOSIS — K51.00 ULCERATIVE PANCOLITIS WITHOUT COMPLICATION (HCC): Primary | ICD-10-CM

## 2021-05-19 DIAGNOSIS — K51.00 ULCERATIVE PANCOLITIS WITHOUT COMPLICATION (HCC): ICD-10-CM

## 2021-05-19 DIAGNOSIS — R19.7 DIARRHEA, UNSPECIFIED TYPE: ICD-10-CM

## 2021-05-19 LAB
ALBUMIN SERPL BCP-MCNC: 3.3 G/DL (ref 3.5–5)
ALP SERPL-CCNC: 109 U/L (ref 46–116)
ALT SERPL W P-5'-P-CCNC: 28 U/L (ref 12–78)
ANION GAP SERPL CALCULATED.3IONS-SCNC: 7 MMOL/L (ref 4–13)
AST SERPL W P-5'-P-CCNC: 16 U/L (ref 5–45)
BILIRUB SERPL-MCNC: 0.32 MG/DL (ref 0.2–1)
BUN SERPL-MCNC: 17 MG/DL (ref 5–25)
CALCIUM ALBUM COR SERPL-MCNC: 9.6 MG/DL (ref 8.3–10.1)
CALCIUM SERPL-MCNC: 9 MG/DL (ref 8.3–10.1)
CHLORIDE SERPL-SCNC: 108 MMOL/L (ref 100–108)
CO2 SERPL-SCNC: 26 MMOL/L (ref 21–32)
CREAT SERPL-MCNC: 0.76 MG/DL (ref 0.6–1.3)
ERYTHROCYTE [DISTWIDTH] IN BLOOD BY AUTOMATED COUNT: 12.8 % (ref 11.6–15.1)
GFR SERPL CREATININE-BSD FRML MDRD: 87 ML/MIN/1.73SQ M
GLUCOSE SERPL-MCNC: 107 MG/DL (ref 65–140)
HCT VFR BLD AUTO: 35.4 % (ref 34.8–46.1)
HGB BLD-MCNC: 11.3 G/DL (ref 11.5–15.4)
MCH RBC QN AUTO: 28.9 PG (ref 26.8–34.3)
MCHC RBC AUTO-ENTMCNC: 31.9 G/DL (ref 31.4–37.4)
MCV RBC AUTO: 91 FL (ref 82–98)
PLATELET # BLD AUTO: 230 THOUSANDS/UL (ref 149–390)
PMV BLD AUTO: 8.8 FL (ref 8.9–12.7)
POTASSIUM SERPL-SCNC: 3.8 MMOL/L (ref 3.5–5.3)
PROT SERPL-MCNC: 7 G/DL (ref 6.4–8.2)
RBC # BLD AUTO: 3.91 MILLION/UL (ref 3.81–5.12)
SODIUM SERPL-SCNC: 141 MMOL/L (ref 136–145)
WBC # BLD AUTO: 6.43 THOUSAND/UL (ref 4.31–10.16)

## 2021-05-19 PROCEDURE — 85027 COMPLETE CBC AUTOMATED: CPT

## 2021-05-19 PROCEDURE — 80053 COMPREHEN METABOLIC PANEL: CPT

## 2021-05-19 PROCEDURE — 36415 COLL VENOUS BLD VENIPUNCTURE: CPT

## 2021-05-19 NOTE — TELEPHONE ENCOUNTER
Called pt and reviewed provider recommendations  Pt stated she will get lab work done today  Will await results

## 2021-05-19 NOTE — TELEPHONE ENCOUNTER
Office visit 12/11/20 Dr Ruben Biswas future 6/8/21  Hx: Ulcerative pancolitis, arthralgia, epigastric pain, biliary colic, cholelithiasis   Colon: 2017    Pt called concerned about stool change  Over the last month and a half started having 4-5 episodes daily of red/orange diarrhea  Also notes deep red color mixed in stool  Reports minimal gas and cramping with BM  Pt stated "I feel fine otherwise it is just the diarrhea"  Denies N/V/fevers/dizziness/change in color of toilet water/appetite/weight loss  Since onset of symptoms pt is taking 4 liadala tablets daily but has not been helping  She was going to get ordered CBC, CMP, and fecal calprotectin done today  However probably should add stool studies to this? Pt also questioning if she can get liver panel done due to prior history of biliary/gallbladder issues  Advised to report to ER if blood in toilet, worsening of symptoms, dizziness, cannot keep food/drink down  Pt verbalized understanding   Please advise

## 2021-05-20 ENCOUNTER — LAB (OUTPATIENT)
Dept: LAB | Facility: MEDICAL CENTER | Age: 61
End: 2021-05-20
Attending: INTERNAL MEDICINE
Payer: COMMERCIAL

## 2021-05-20 DIAGNOSIS — K51.00 ULCERATIVE PANCOLITIS WITHOUT COMPLICATION (HCC): ICD-10-CM

## 2021-05-20 DIAGNOSIS — R19.7 DIARRHEA, UNSPECIFIED TYPE: ICD-10-CM

## 2021-05-20 PROCEDURE — 87505 NFCT AGENT DETECTION GI: CPT

## 2021-05-20 PROCEDURE — 87209 SMEAR COMPLEX STAIN: CPT

## 2021-05-20 PROCEDURE — 87329 GIARDIA AG IA: CPT

## 2021-05-20 PROCEDURE — 87177 OVA AND PARASITES SMEARS: CPT

## 2021-05-21 ENCOUNTER — APPOINTMENT (OUTPATIENT)
Dept: LAB | Facility: MEDICAL CENTER | Age: 61
End: 2021-05-21
Attending: INTERNAL MEDICINE
Payer: COMMERCIAL

## 2021-05-21 LAB
CAMPYLOBACTER DNA SPEC NAA+PROBE: NORMAL
G LAMBLIA AG STL QL IA: NEGATIVE
SALMONELLA DNA SPEC QL NAA+PROBE: NORMAL
SHIGA TOXIN STX GENE SPEC NAA+PROBE: NORMAL
SHIGELLA DNA SPEC QL NAA+PROBE: NORMAL

## 2021-05-21 PROCEDURE — 83993 ASSAY FOR CALPROTECTIN FECAL: CPT

## 2021-05-21 PROCEDURE — 87493 C DIFF AMPLIFIED PROBE: CPT

## 2021-05-22 LAB — C DIFF TOX B TCDB STL QL NAA+PROBE: NEGATIVE

## 2021-05-24 LAB — O+P STL CONC: NORMAL

## 2021-05-25 LAB — CALPROTECTIN STL-MCNT: 137 UG/G (ref 0–120)

## 2021-05-26 NOTE — RESULT ENCOUNTER NOTE
I discussed the result with Tarun Aguillon over the phone  She has mildly elevated calprotectin, anemia and mild hypoalbuminemia  She continues to have abdominal pain and diarrhea  She has mild-to-moderate flare  I recommend colonoscopy as soon as possible for endoscopic evaluation and decide on the treatment regimen including short course of steroid versus biologic  Tarun Aguillon is agreeable with the plan      Dr Matthew Pablo

## 2021-05-27 ENCOUNTER — PREP FOR PROCEDURE (OUTPATIENT)
Dept: GASTROENTEROLOGY | Facility: CLINIC | Age: 61
End: 2021-05-27

## 2021-05-27 ENCOUNTER — TELEPHONE (OUTPATIENT)
Dept: GASTROENTEROLOGY | Facility: CLINIC | Age: 61
End: 2021-05-27

## 2021-05-27 DIAGNOSIS — K51.00 ULCERATIVE PANCOLITIS WITHOUT COMPLICATION (HCC): Primary | ICD-10-CM

## 2021-05-27 NOTE — TELEPHONE ENCOUNTER
----- Message from Germaine Dolan MD sent at 5/26/2021  5:33 PM EDT -----  Regarding: Please schedule her for colonoscopy at [facility]  Please schedule her for colonoscopy as soon as possible  I can start at 7:30 a m  If necessary    Thank you  Melvina Capps

## 2021-05-27 NOTE — TELEPHONE ENCOUNTER
[facility]30 [facility]

## 2021-05-27 NOTE — TELEPHONE ENCOUNTER
LVM to contact the office regarding colonoscopy scheduled on 6/3/21 with Dr Ute Barbour ok'ed to schedule in RM 1 at 9:45am

## 2021-05-27 NOTE — TELEPHONE ENCOUNTER
Pt unable to keep appt on 6/3/21  Procedure rescheduled on 6/9/21 with Dr Unger at [place]  I gave pt verbal instructions/emailed to [email or web address]  Prep-Suprep

## 2021-06-03 ENCOUNTER — ANESTHESIA EVENT (OUTPATIENT)
Dept: ANESTHESIOLOGY | Facility: HOSPITAL | Age: 61
End: 2021-06-03

## 2021-06-03 ENCOUNTER — ANESTHESIA (OUTPATIENT)
Dept: ANESTHESIOLOGY | Facility: HOSPITAL | Age: 61
End: 2021-06-03

## 2021-06-04 ENCOUNTER — PREP FOR PROCEDURE (OUTPATIENT)
Dept: GASTROENTEROLOGY | Facility: CLINIC | Age: 61
End: 2021-06-04

## 2021-06-04 ENCOUNTER — TELEPHONE (OUTPATIENT)
Dept: GASTROENTEROLOGY | Facility: AMBULARY SURGERY CENTER | Age: 61
End: 2021-06-04

## 2021-06-04 DIAGNOSIS — R09.89 GLOBUS SENSATION: Primary | ICD-10-CM
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
## 2021-06-04 NOTE — TELEPHONE ENCOUNTER
Patient is aware EGD added to scheduled 6/10/21 colonoscopy  She will continue to use pepcid as needed  ** - patient requested information and directions to [facility] location for procedure

## 2021-06-04 NOTE — TELEPHONE ENCOUNTER
Hx ulcerative pancolitis    Patient is scheduled for colonoscopy 6/10  She is calling to ask if EGD should be added due to her symptoms  Reports "feeling on lump on tongue" since February  She was seen by ENT yesterday and had flixible laryngoscopy done  ENT referred to GI for globus sensation  Denies reflux symptoms on daily basis but reports 5 episodes in last 3 years of severe reflux that wakes her at night -pepcid is effective for relief  Add EGD?

## 2021-06-04 NOTE — TELEPHONE ENCOUNTER
If the patient is having globus sensation s/p a negative ENT workup we can add EGD, I will place orders and forward message to schedulers  She should continue Pepcid in the meantime

## 2021-06-08 NOTE — TELEPHONE ENCOUNTER
Patients GI provider:  Dr Filemon Bennett     Number to return call: [phone]     Reason for call: Pt calling asking if ok to take xanax night before procedure       Scheduled procedure/appointment date if applicable: Apt/procedure 6/10/21

## 2021-06-08 NOTE — TELEPHONE ENCOUNTER
Called pt and advised of provider recommendations with "okay to take xanax day before procedure"   No further questions at this time

## 2021-06-09 ENCOUNTER — TELEPHONE (OUTPATIENT)
Dept: GASTROENTEROLOGY | Facility: AMBULARY SURGERY CENTER | Age: 61
End: 2021-06-09

## 2021-06-10 ENCOUNTER — ANESTHESIA EVENT (OUTPATIENT)
Dept: GASTROENTEROLOGY | Facility: AMBULARY SURGERY CENTER | Age: 61
End: 2021-06-10

## 2021-06-10 ENCOUNTER — HOSPITAL ENCOUNTER (OUTPATIENT)
Dept: GASTROENTEROLOGY | Facility: AMBULARY SURGERY CENTER | Age: 61
Setting detail: OUTPATIENT SURGERY
Discharge: HOME | End: 2021-06-10
Attending: INTERNAL MEDICINE | Admitting: INTERNAL MEDICINE
Payer: COMMERCIAL

## 2021-06-10 ENCOUNTER — ANESTHESIA (OUTPATIENT)
Dept: GASTROENTEROLOGY | Facility: AMBULARY SURGERY CENTER | Age: 61
End: 2021-06-10

## 2021-06-10 VITALS
TEMPERATURE: 97 F | HEART RATE: 81 BPM | DIASTOLIC BLOOD PRESSURE: 77 MMHG | HEIGHT: 61 IN | SYSTOLIC BLOOD PRESSURE: 115 MMHG | WEIGHT: 148 LBS | OXYGEN SATURATION: 99 % | BODY MASS INDEX: 27.94 KG/M2 | RESPIRATION RATE: 18 BRPM

## 2021-06-10 DIAGNOSIS — D50.0 IRON DEFICIENCY ANEMIA DUE TO CHRONIC BLOOD LOSS: Primary | ICD-10-CM

## 2021-06-10 DIAGNOSIS — R09.89 GLOBUS SENSATION: ICD-10-CM

## 2021-06-10 DIAGNOSIS — K51.00 ULCERATIVE PANCOLITIS WITHOUT COMPLICATION (HCC): ICD-10-CM

## 2021-06-10 PROBLEM — J30.2 SEASONAL ALLERGIES: Status: ACTIVE | Noted: 2021-06-10

## 2021-06-10 PROCEDURE — 88342 IMHCHEM/IMCYTCHM 1ST ANTB: CPT | Performed by: PATHOLOGY

## 2021-06-10 PROCEDURE — 88305 TISSUE EXAM BY PATHOLOGIST: CPT | Performed by: PATHOLOGY

## 2021-06-10 PROCEDURE — 88313 SPECIAL STAINS GROUP 2: CPT | Performed by: PATHOLOGY

## 2021-06-10 PROCEDURE — 45380 COLONOSCOPY AND BIOPSY: CPT | Performed by: INTERNAL MEDICINE

## 2021-06-10 PROCEDURE — 43239 EGD BIOPSY SINGLE/MULTIPLE: CPT | Performed by: INTERNAL MEDICINE

## 2021-06-10 RX ORDER — SODIUM CHLORIDE, SODIUM LACTATE, POTASSIUM CHLORIDE, CALCIUM CHLORIDE 600; 310; 30; 20 MG/100ML; MG/100ML; MG/100ML; MG/100ML
125 INJECTION, SOLUTION INTRAVENOUS CONTINUOUS
Status: DISCONTINUED | OUTPATIENT
Start: 2021-06-10 | End: 2021-06-14 | Stop reason: HOSPADM

## 2021-06-10 RX ORDER — ONDANSETRON 2 MG/ML
4 INJECTION INTRAMUSCULAR; INTRAVENOUS ONCE AS NEEDED
Status: CANCELLED | OUTPATIENT
Start: 2021-06-10

## 2021-06-10 RX ORDER — SODIUM CHLORIDE, SODIUM LACTATE, POTASSIUM CHLORIDE, CALCIUM CHLORIDE 600; 310; 30; 20 MG/100ML; MG/100ML; MG/100ML; MG/100ML
20 INJECTION, SOLUTION INTRAVENOUS CONTINUOUS
Status: CANCELLED | OUTPATIENT
Start: 2021-06-10

## 2021-06-10 RX ORDER — PROPOFOL 10 MG/ML
INJECTION, EMULSION INTRAVENOUS AS NEEDED
Status: DISCONTINUED | OUTPATIENT
Start: 2021-06-10 | End: 2021-06-10

## 2021-06-10 RX ORDER — LIDOCAINE HYDROCHLORIDE 10 MG/ML
INJECTION, SOLUTION EPIDURAL; INFILTRATION; INTRACAUDAL; PERINEURAL AS NEEDED
Status: DISCONTINUED | OUTPATIENT
Start: 2021-06-10 | End: 2021-06-10

## 2021-06-10 RX ORDER — PROPOFOL 10 MG/ML
INJECTION, EMULSION INTRAVENOUS CONTINUOUS PRN
Status: DISCONTINUED | OUTPATIENT
Start: 2021-06-10 | End: 2021-06-10

## 2021-06-10 RX ADMIN — PROPOFOL 50 MG: 10 INJECTION, EMULSION INTRAVENOUS at 13:38

## 2021-06-10 RX ADMIN — PROPOFOL 50 MG: 10 INJECTION, EMULSION INTRAVENOUS at 13:45

## 2021-06-10 RX ADMIN — PROPOFOL 50 MG: 10 INJECTION, EMULSION INTRAVENOUS at 13:40

## 2021-06-10 RX ADMIN — PROPOFOL 50 MG: 10 INJECTION, EMULSION INTRAVENOUS at 13:42

## 2021-06-10 RX ADMIN — LIDOCAINE HYDROCHLORIDE 50 MG: 10 INJECTION, SOLUTION EPIDURAL; INFILTRATION; INTRACAUDAL at 13:36

## 2021-06-10 RX ADMIN — PROPOFOL 50 MG: 10 INJECTION, EMULSION INTRAVENOUS at 13:51

## 2021-06-10 RX ADMIN — PROPOFOL 130 MCG/KG/MIN: 10 INJECTION, EMULSION INTRAVENOUS at 13:48

## 2021-06-10 RX ADMIN — SODIUM CHLORIDE, SODIUM LACTATE, POTASSIUM CHLORIDE, AND CALCIUM CHLORIDE: .6; .31; .03; .02 INJECTION, SOLUTION INTRAVENOUS at 13:00

## 2021-06-10 RX ADMIN — PROPOFOL 100 MG: 10 INJECTION, EMULSION INTRAVENOUS at 13:36

## 2021-06-10 NOTE — ANESTHESIA POSTPROCEDURE EVALUATION
Post-Op Assessment Note    CV Status:  Stable    Pain management: adequate     Mental Status:  Alert and awake   Hydration Status:  Euvolemic   PONV Controlled:  Controlled   Airway Patency:  Patent      Post Op Vitals Reviewed: Yes      Staff: CRNA         No complications documented      BP   117/70   Temp   97 0   Pulse  88   Resp   16   SpO2   98%

## 2021-06-10 NOTE — ANESTHESIA PREPROCEDURE EVALUATION
Procedure:  COLONOSCOPY  EGD    Relevant Problems   NEURO/PSYCH   (+) Anxiety      Other   (+) Seasonal allergies   (+) Ulcerative colitis (HCC)        Physical Exam    Airway    Mallampati score: III  TM Distance: >3 FB  Neck ROM: full     Dental   No notable dental hx     Cardiovascular      Pulmonary      Other Findings        Anesthesia Plan  ASA Score- 2     Anesthesia Type- IV sedation with anesthesia with ASA Monitors  Additional Monitors:   Airway Plan:           Plan Factors-    Chart reviewed  Existing labs reviewed  Patient summary reviewed  Induction- intravenous  Postoperative Plan-     Informed Consent- Anesthetic plan and risks discussed with patient  I personally reviewed this patient with the CRNA  Discussed and agreed on the Anesthesia Plan with the CRNA  Arnold Katz

## 2021-06-10 NOTE — H&P
History and Physical -  [facility]  Chata Lopez 64 y o  female MRN: [id number]    HPI: Chata Lopez is a 64y o  year old female presents for evaluation of UC flare and evaluation of epigastric pain      Review of Systems    Historical Information   Past Medical History:   Diagnosis Date    Anxiety     [facility]     skin    Change in bowel habits     Elevated liver enzymes     Gall stones     Irritable bowel syndrome     Seasonal allergies     [facility]      Past Surgical History:   Procedure Laterality Date    COLONOSCOPY      EAR SURGERY      cyst removed from ear drum    EAR SURGERY      EAR SURGERY      IRIDECTOMY      Optical    CT COLONOSCOPY FLX DX W/COLLJ SPEC WHEN PFRMD N/A 10/9/2017    Procedure: COLONOSCOPY;  Surgeon: Marelyn Hammans, MD;  Location: [facility]; Service: Gastroenterology    CT EXC SKIN MALIG 1 1-2 CM TRUNK,ARM,LEG Right 10/4/2019    Procedure: 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;  Surgeon: Rhiannon Fernandez MD;  Location: [address] [place] [address];  Service: Plastics     Social History   Social History     Substance and Sexual Activity   Alcohol Use Yes    Comment: Social     Social History     Substance and Sexual Activity   Drug Use No     Social History     Tobacco Use   Smoking Status Never Smoker   Smokeless Tobacco Never Used     Family History   Problem Relation Age of Onset    Breast cancer Mother     Lung cancer Mother     Atrial fibrillation Father     Stroke Father         CVA    Diabetes Father         Mellitus    Heart disease Father     Other Daughter         Colitis    Breast cancer Family        Meds/Allergies     (Not in a hospital admission)      Allergies   Allergen Reactions    Amoxicillin GI Intolerance       Objective     There were no vitals taken for this visit        PHYSICAL EXAM    Gen: NAD  CV: RRR  CHEST: Clear  ABD: soft, NT/ND  EXT: no edema  Neuro: AAO      ASSESSMENT/PLAN:  This is a 64y o  year old female here for evaluation of ulcerative colitis flare and epigastric pain    PLAN:   Procedure:  EGD and colonoscopy with biopsy and possible polypectomy

## 2021-06-11 ENCOUNTER — TELEPHONE (OUTPATIENT)
Dept: GASTROENTEROLOGY | Facility: CLINIC | Age: 61
End: 2021-06-11

## 2021-06-11 ENCOUNTER — TELEPHONE (OUTPATIENT)
Dept: OTHER | Facility: OTHER | Age: 61
End: 2021-06-11

## 2021-06-11 NOTE — TELEPHONE ENCOUNTER
Patient calling because she just had a colonoscopy and she stated she just used the bathroom and the toilet was full of blood and she is in mild pain

## 2021-06-11 NOTE — TELEPHONE ENCOUNTER
GI On-Call Note:    Message received from call center  Patient requesting call from on-call GI physician for blood in the toilet bowl after using the bathroom  Patient recently had colonoscopy  Chart reviewed  Patient follows with Dr Barrett Escobedo in the office and recently underwent bidirectional endoscopy for evaluation of abdominal pain and history of ulcerative pan colitis  Last office visit note reviewed and patient noted to be in clinical remission on Lialda 2 4 g daily  Patient underwent EGD and colonoscopy yesterday  EGD showed LA grade a esophagitis and mild gastritis  Biopsies were obtained  Colonoscopy showed moderately severe colitis with rectal sparing  Segmental biopsies were obtained throughout the colon  Also noted to have few small ulcers in the ascending colon  The rectum was noted to appear normal     Called patient to discuss concerns  Patient reports that she had bowel movement this afternoon that was significantly bloody  Described as bright red blood in the toilet bowl and upon wiping  She does not recall seeing any visible blood clots  Only had a single episode  She reports otherwise feeling okay and denies any pain  She denies any symptoms of anemia including chest pain, shortness of breath, dizziness, lightheadedness, palpitations, fatigue  Possibly from residual blood from biopsies obtained during colonoscopy yesterday or related to her ulcerative colitis  Since the patient appears to be largely otherwise asymptomatic at this time, I recommended that she continue to monitor herself for now at home  She has blood work ordered by Dr Barrett Escobedo yesterday including a CBC and I advised the patient to get the blood work done early tomorrow morning when the lab opens and can follow-up on her blood count results    Advised her to continue to monitor her stool output and recommended that if bleeding gets worse or if she were to develop symptoms of anemia, to go directly to the nearest ED for evaluation  The patient verbalized understanding and agreement with my recommendations  All questions and concerns addressed to her satisfaction  ADDENDUM on 6/12/21:    Patient called in the morning to inform me that she did still have a little bit of blood with BM this morning but looked better compared to previously  And patient was still asymptomatic in regards to anemia  Advised patient to get the blood work completed which I reviewed this afternoon with her  Hb is improved compared to previous so do not suspect significant active bleeding  Was likely residual content which is clearing  Patient noted to have low ferritin on iron panel  Iron saturation is borderline normal at 20  Not with any significant microcytic anemia based on MCV and Hb  However, would benefit from iron supplementation  Patient reported having intolerance to oral iron in the past  Could consider IV Venofer on non-urgent basis  Advised patient to follow-up with Dr Dale Ortiz at scheduled appointment on 6/24  All questions and concerns otherwise addressed to patient's satisfaction  BELKIS Calabrese  Gastroenterology Fellow  [facility]  Available on [email or web address]

## 2021-06-12 ENCOUNTER — TELEPHONE (OUTPATIENT)
Dept: OTHER | Facility: OTHER | Age: 61
End: 2021-06-12

## 2021-06-12 ENCOUNTER — APPOINTMENT (OUTPATIENT)
Dept: LAB | Facility: MEDICAL CENTER | Age: 61
End: 2021-06-12
Attending: INTERNAL MEDICINE
Payer: COMMERCIAL

## 2021-06-12 DIAGNOSIS — D50.0 IRON DEFICIENCY ANEMIA DUE TO CHRONIC BLOOD LOSS: ICD-10-CM

## 2021-06-12 DIAGNOSIS — K51.00 ULCERATIVE PANCOLITIS WITHOUT COMPLICATION (HCC): ICD-10-CM

## 2021-06-12 LAB
BASOPHILS # BLD AUTO: 0.02 THOUSANDS/ΜL (ref 0–0.1)
BASOPHILS NFR BLD AUTO: 0 % (ref 0–1)
EOSINOPHIL # BLD AUTO: 0.37 THOUSAND/ΜL (ref 0–0.61)
EOSINOPHIL NFR BLD AUTO: 7 % (ref 0–6)
ERYTHROCYTE [DISTWIDTH] IN BLOOD BY AUTOMATED COUNT: 12.8 % (ref 11.6–15.1)
FERRITIN SERPL-MCNC: 6 NG/ML (ref 8–388)
HBV CORE AB SER QL: NORMAL
HBV CORE IGM SER QL: NORMAL
HBV SURFACE AG SER QL: NORMAL
HCT VFR BLD AUTO: 38.3 % (ref 34.8–46.1)
HCV AB SER QL: NORMAL
HGB BLD-MCNC: 12.1 G/DL (ref 11.5–15.4)
IMM GRANULOCYTES # BLD AUTO: 0.02 THOUSAND/UL (ref 0–0.2)
IMM GRANULOCYTES NFR BLD AUTO: 0 % (ref 0–2)
IRON SATN MFR SERPL: 20 %
IRON SERPL-MCNC: 81 UG/DL (ref 50–170)
LYMPHOCYTES # BLD AUTO: 0.93 THOUSANDS/ΜL (ref 0.6–4.47)
LYMPHOCYTES NFR BLD AUTO: 16 % (ref 14–44)
MCH RBC QN AUTO: 28.5 PG (ref 26.8–34.3)
MCHC RBC AUTO-ENTMCNC: 31.6 G/DL (ref 31.4–37.4)
MCV RBC AUTO: 90 FL (ref 82–98)
MONOCYTES # BLD AUTO: 0.77 THOUSAND/ΜL (ref 0.17–1.22)
MONOCYTES NFR BLD AUTO: 14 % (ref 4–12)
NEUTROPHILS # BLD AUTO: 3.57 THOUSANDS/ΜL (ref 1.85–7.62)
NEUTS SEG NFR BLD AUTO: 63 % (ref 43–75)
NRBC BLD AUTO-RTO: 0 /100 WBCS
PLATELET # BLD AUTO: 238 THOUSANDS/UL (ref 149–390)
PMV BLD AUTO: 9 FL (ref 8.9–12.7)
RBC # BLD AUTO: 4.24 MILLION/UL (ref 3.81–5.12)
TIBC SERPL-MCNC: 409 UG/DL (ref 250–450)
WBC # BLD AUTO: 5.68 THOUSAND/UL (ref 4.31–10.16)

## 2021-06-12 PROCEDURE — 36415 COLL VENOUS BLD VENIPUNCTURE: CPT

## 2021-06-12 PROCEDURE — 86704 HEP B CORE ANTIBODY TOTAL: CPT

## 2021-06-12 PROCEDURE — 86803 HEPATITIS C AB TEST: CPT

## 2021-06-12 PROCEDURE — 87340 HEPATITIS B SURFACE AG IA: CPT

## 2021-06-12 PROCEDURE — 83540 ASSAY OF IRON: CPT

## 2021-06-12 PROCEDURE — 83550 IRON BINDING TEST: CPT

## 2021-06-12 PROCEDURE — 86480 TB TEST CELL IMMUN MEASURE: CPT

## 2021-06-12 PROCEDURE — 82728 ASSAY OF FERRITIN: CPT

## 2021-06-12 PROCEDURE — 86705 HEP B CORE ANTIBODY IGM: CPT

## 2021-06-12 PROCEDURE — 85025 COMPLETE CBC W/AUTO DIFF WBC: CPT

## 2021-06-12 NOTE — TELEPHONE ENCOUNTER
[address] [facility] Sophie 1960/ Pt is still having issues with blood in bowels after colonoscopy

## 2021-06-17 LAB
GAMMA INTERFERON BACKGROUND BLD IA-ACNC: 0.02 IU/ML
M TB IFN-G BLD-IMP: NEGATIVE
M TB IFN-G CD4+ BCKGRND COR BLD-ACNC: 0.01 IU/ML
M TB IFN-G CD4+ BCKGRND COR BLD-ACNC: 0.02 IU/ML
MITOGEN IGNF BCKGRD COR BLD-ACNC: 6.49 IU/ML

## 2021-06-24 ENCOUNTER — OFFICE VISIT (OUTPATIENT)
Dept: GASTROENTEROLOGY | Facility: CLINIC | Age: 61
End: 2021-06-24
Payer: COMMERCIAL

## 2021-06-24 VITALS
WEIGHT: 150 LBS | HEIGHT: 61 IN | TEMPERATURE: 98.6 F | SYSTOLIC BLOOD PRESSURE: 112 MMHG | BODY MASS INDEX: 28.32 KG/M2 | DIASTOLIC BLOOD PRESSURE: 78 MMHG

## 2021-06-24 DIAGNOSIS — K21.00 GASTROESOPHAGEAL REFLUX DISEASE WITH ESOPHAGITIS WITHOUT HEMORRHAGE: ICD-10-CM

## 2021-06-24 DIAGNOSIS — K51.00 ULCERATIVE PANCOLITIS WITHOUT COMPLICATION (HCC): Primary | ICD-10-CM

## 2021-06-24 PROCEDURE — 3008F BODY MASS INDEX DOCD: CPT | Performed by: INTERNAL MEDICINE

## 2021-06-24 PROCEDURE — 99214 OFFICE O/P EST MOD 30 MIN: CPT | Performed by: INTERNAL MEDICINE

## 2021-06-24 PROCEDURE — 1036F TOBACCO NON-USER: CPT | Performed by: INTERNAL MEDICINE

## 2021-06-24 RX ORDER — SODIUM CHLORIDE 9 MG/ML
20 INJECTION, SOLUTION INTRAVENOUS ONCE
Status: CANCELLED | OUTPATIENT
Start: 2021-06-30

## 2021-06-24 RX ORDER — MESALAMINE 1.2 G/1
1200 TABLET, DELAYED RELEASE ORAL DAILY
Qty: 120 TABLET | Refills: 5 | Status: SHIPPED | OUTPATIENT
Start: 2021-06-24 | End: 2021-08-02

## 2021-06-24 RX ORDER — OMEPRAZOLE 20 MG/1
20 CAPSULE, DELAYED RELEASE ORAL DAILY
Qty: 30 CAPSULE | Refills: 0 | Status: SHIPPED | OUTPATIENT
Start: 2021-06-24 | End: 2021-08-02

## 2021-06-24 NOTE — PROGRESS NOTES
[facility] - Outpatient Follow-up Note  Iron Flores 63 y o  female MRN: [id number]  Encounter: [id number]          ASSESSMENT AND PLAN:      1  Ulcerative pancolitis without complication ([address] )  She has a flare up of her symptoms with diarrhea and blood in the stool  Mild hypoalbuminemia and iron deficiency anemia  Hb 12 1   Elevated calprotectin to 137  Recent colonoscopy showed moderately severe colitis with relative rectal sparing  Biopsy showed chronic active colitis throughout the colon  currently she is on Lialda 4 8 g daily  Over the last 5 days she reports some improvement in bowel movement  Given significant endoscopic and biopsy result I recommend escalating her treatment to biologics  We discussed options including Remicade, Humira, Stelara and Entyvio  Patient is very hesitant regarding long-term side effect and immune suppression  We discussed potential side effects including but not limited to infection, hepatic toxicity, hypersensitivity reaction, failure of treatment and malignancy  After extensive discussion patient is willing to try Entyvio infusion  2  Gastroesophageal reflux disease with esophagitis without hemorrhage  -Mild esophagitis and gastritis on EGD  Biopsy negative for H pylori infection and celiac disease   - omeprazole (PriLOSEC) 20 mg delayed release capsule; Take 1 capsule (20 mg total) by mouth daily  Dispense: 30 capsule; Refill: 0    ______________________________________________________________________    SUBJECTIVE:   65-year-old female with ulcerative pancolitis here for follow-up visit  She is here to discuss her recent endoscopy and colonoscopy results  Pt states having 2-3 BMs a day compared to 6-7 BMs she was having in the past   Denies any abdominal or joint pain  Voices concern about starting biologicals due to being immunocompromised   Wants to wait one month to see if things will get better on its own    She has mild iron deficiency anemia   fecal calprotectin is elevated   chronic hepatitis-B and QuantiFERON test negative      REVIEW OF SYSTEMS IS OTHERWISE NEGATIVE  Historical Information   Past Medical History:   Diagnosis Date    Anxiety     [facility]     skin    Change in bowel habits     Elevated liver enzymes     Gall stones     Irritable bowel syndrome     Seasonal allergies     Ulcerative colitis ([facility] )      Past Surgical History:   Procedure Laterality Date    COLONOSCOPY      EAR SURGERY      cyst removed from ear drum    EAR SURGERY      EAR SURGERY      IRIDECTOMY      Optical    CT COLONOSCOPY FLX DX W/COLLJ SPEC WHEN PFRMD N/A 10/9/2017    Procedure: COLONOSCOPY;  Surgeon: Salvatore Starr MD;  Location:  GI LAB;   Service: Gastroenterology    CT EXC SKIN MALIG 1 1-2 CM TRUNK,ARM,LEG Right 10/4/2019    Procedure: 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;  Surgeon: Addie Skaggs MD;  Location: [address] [place] [address];  Service: Plastics     Social History   Social History     Substance and Sexual Activity   Alcohol Use Yes    Comment: Social     Social History     Substance and Sexual Activity   Drug Use No     Social History     Tobacco Use   Smoking Status Never Smoker   Smokeless Tobacco Never Used     Family History   Problem Relation Age of Onset    Breast cancer Mother     Lung cancer Mother     Atrial fibrillation Father     Stroke Father         CVA    Diabetes Father         Mellitus    Heart disease Father     Other Daughter         Colitis    Breast cancer Family        Meds/Allergies       Current Outpatient Medications:     ALPRAZolam (XANAX) 0 25 mg tablet    Cholecalciferol (VITAMIN D PO)    Cyanocobalamin (VITAMIN B-12 PO)    escitalopram (LEXAPRO) 5 mg tablet    mesalamine (Lialda) 1 2 g EC tablet    methocarbamol (ROBAXIN) 500 mg tablet    Na Sulfate-K Sulfate-Mg Sulf 17 5-3 13-1 6 GM/177ML SOLN    Allergies   Allergen Reactions    Amoxicillin GI Intolerance Objective     Blood pressure 112/78, temperature 98 6 °F (37 °C), temperature source Tympanic, height 5' 1" (1 549 m), weight 68 kg (150 lb)  Body mass index is 28 34 kg/m²  PHYSICAL EXAM:      General Appearance:   Alert, cooperative, no distress   HEENT:   Normocephalic, atraumatic, anicteric      Neck:  Supple, symmetrical, trachea midline   Lungs:   Clear to auscultation bilaterally; no rales, rhonchi or wheezing; respirations unlabored    Heart[de-identified]   Regular rate and rhythm; no murmur, rub, or gallop  Abdomen:   Soft, non-tender, non-distended; normal bowel sounds; no masses, no organomegaly    Genitalia:   Deferred    Rectal:   Deferred    Extremities:  No cyanosis, clubbing or edema    Pulses:  2+ and symmetric    Skin:  No jaundice, rashes, or lesions    Lymph nodes:  No palpable cervical lymphadenopathy        Lab Results:   No visits with results within 1 Day(s) from this visit     Latest known visit with results is:   Appointment on 06/12/2021   Component Date Value    WBC 06/12/2021 5 68     RBC 06/12/2021 4 24     Hemoglobin 06/12/2021 12 1     Hematocrit 06/12/2021 38 3     MCV 06/12/2021 90     MCH 06/12/2021 28 5     MCHC 06/12/2021 31 6     RDW 06/12/2021 12 8     MPV 06/12/2021 9 0     Platelets 60/20/2860 238     nRBC 06/12/2021 0     Neutrophils Relative 06/12/2021 63     Immat GRANS % 06/12/2021 0     Lymphocytes Relative 06/12/2021 16     Monocytes Relative 06/12/2021 14*    Eosinophils Relative 06/12/2021 7*    Basophils Relative 06/12/2021 0     Neutrophils Absolute 06/12/2021 3 57     Immature Grans Absolute 06/12/2021 0 02     Lymphocytes Absolute 06/12/2021 0 93     Monocytes Absolute 06/12/2021 0 77     Eosinophils Absolute 06/12/2021 0 37     Basophils Absolute 06/12/2021 0 02     QFT Nil 06/12/2021 0 02     QFT TB1-NIL 06/12/2021 0 02     QFT TB2-NIL 06/12/2021 0 01     QFT Mitogen-NIL 06/12/2021 6 49     QFT Final Interpretation 06/12/2021 Negative     Hepatitis B Surface Ag 06/12/2021 Non-reactive     Hepatitis C Ab 06/12/2021 Non-reactive     Hep B C IgM 06/12/2021 Non-reactive     Hep B Core Total Ab 06/12/2021 Non-reactive     Iron Saturation 06/12/2021 20     TIBC 06/12/2021 409     Iron 06/12/2021 81     Ferritin 06/12/2021 6*         Radiology Results:   EGD    Result Date: 6/10/2021  Narrative: [facility] [id number] [phone] DATE OF SERVICE: 6/10/21 PHYSICIAN(S): Bry Cordova MD - Attending Physician INDICATION: Globus sensation POST-OP DIAGNOSIS: See the impression below  PREPROCEDURE: Informed consent was obtained for the procedure, including sedation  Risks of perforation, hemorrhage, adverse drug reaction and aspiration were discussed  The patient was placed in the left lateral decubitus position  Patient was explained about the risks and benefits of the procedure  Risks including but not limited to bleeding, infection, and perforation were explained in detail  Also explained about less than 100% sensitivity with the exam and other alternatives  DETAILS OF PROCEDURE: Patient was taken to the procedure room where a time out was performed to confirm correct patient and correct procedure  The patient underwent monitored anesthesia care, which was administered by an anesthesia professional  The patient's blood pressure, heart rate, level of consciousness, respirations and oxygen were monitored throughout the procedure  The scope was advanced to the second part of the duodenum  Retroflexion was performed in the fundus  The patient experienced no blood loss  The procedure was not difficult  The patient tolerated the procedure well  There were no apparent complications   ANESTHESIA INFORMATION: ASA: II Anesthesia Type: IV Sedation with Anesthesia FINDINGS: Mild erosion in the GE junction Mild erythematous mucosa in the stomach; performed 5 cold forceps biopsies The duodenal bulb and 2nd part of the duodenum appeared normal  Performed 4 random biopsies  Performed biopsies in the upper third of the esophagus and lower third of the esophagus SPECIMENS: ID Type Source Tests Collected by Time Destination 1 : duodenal biopsy r/o celiac cold forceps Tissue Duodenum TISSUE EXAM Bry Cordova MD 6/10/2021  1:40 PM  2 : gastric biopsy gastritis r/o h pylori cold forceps Tissue Stomach TISSUE EXAM Bry Cordova MD 6/10/2021  1:42 PM  3 : distal esophagus r/o EOE cold forceps Tissue Esophagus TISSUE EXAM Bry Cordova MD 6/10/2021  1:44 PM  4 : proximal esophagus r/o EOE cold forceps Tissue Esophagus TISSUE EXAM Bry Cordova MD 6/10/2021  1:45 PM       Impression: LA grade A esophagitis  Mild gastritis  Biopsy taken RECOMMENDATION: Follow up biopsy  Take omeprazole 20 mg daily for 2 weeks if your symptoms recur  Bry Cordova MD    Colonoscopy    Result Date: 6/10/2021  Narrative: [address] [address] [phone] DATE OF SERVICE: 6/10/21 PHYSICIAN(S): Bry Cordova MD - Attending Physician INDICATION: Ulcerative pancolitis without complication [facility] Colonoscopy performed for a diagnostic indication  POST-OP DIAGNOSIS: See the impression below  HISTORY: Prior colonoscopy: Less than 3 years ago  It is being repeated at an interval of less than 3 years because: This colonoscopy is being performed for a diagnostic indication BOWEL PREPARATION: Magnesium/Sodium Sulfate (Miralax, Suprep) PREPROCEDURE: Informed consent was obtained for the procedure, including sedation  Risks including but not limited to bleeding, infection, perforation, adverse drug reaction and aspiration were explained in detail  Also explained about less than 100% sensitivity with the exam and other alternatives  The patient was placed in the left lateral decubitus position   DETAILS OF PROCEDURE: Patient was taken to the procedure room where a time out was performed to confirm correct patient and correct procedure  The patient underwent monitored anesthesia care, which was administered by an anesthesia professional  The patient's blood pressure, heart rate, level of consciousness, oxygen and respirations were monitored throughout the procedure  A digital rectal exam was performed  The scope was introduced through the anus and advanced to the terminal ileum  Photodocumentation was obtained at the ileocecal valve, appendiceal orifice and retroflexed view of the rectum  Retroflexion was performed in the rectum  The quality of bowel preparation was evaluated using the [facility] Bowel Preparation Scale with scores of: right colon = 2, transverse colon = 2, left colon = 2  The total BBPS score was 6  Bowel prep was adequate  The patient experienced no blood loss  The procedure was not difficult  The patient tolerated the procedure well  There were no apparent complications  ANESTHESIA INFORMATION: ASA: II Anesthesia Type: IV Sedation with Anesthesia FINDINGS: Edematous, erythematous, friable and granular mucosa with erosion in the cecum, ascending colon, hepatic flexure, transverse colon, splenic flexure, descending colon, sigmoid colon and rectosigmoid  Few small ulcers in the ascending colon  Multiple cold biopsies were taken from cecum, ascending colon, transverse, sigmoid and rectum   The rectum appeared normal  EVENTS: Procedure Events Event Event Time ENDO CECUM REACHED 6/10/2021  1:52 PM ENDO SCOPE OUT TIME 6/10/2021  2:04 PM SPECIMENS: ID Type Source Tests Collected by Time Destination 1 : duodenal biopsy r/o celiac cold forceps Tissue Duodenum TISSUE EXAM Aggie Suresh MD 6/10/2021  1:40 PM  2 : gastric biopsy gastritis r/o h pylori cold forceps Tissue Stomach TISSUE EXAM Aggie Suresh MD 6/10/2021  1:42 PM  3 : distal esophagus r/o EOE cold forceps Tissue Esophagus TISSUE EXAM Aggie Suresh MD 6/10/2021  1:44 PM  4 : proximal esophagus r/o EOE cold forceps Tissue Esophagus TISSUE Kenzie Downs MD 6/10/2021  1:45 PM  5 : cecal biopsy UC cold forceps Tissue Appendiceal orifice TISSUE EXAM Mer Washburn MD 6/10/2021  1:55 PM  6 : ascending colon biopsy h/oUC r/o CMV cold forceps Tissue Colon TISSUE EXAM Mer Washburn MD 6/10/2021  1:57 PM  7 : transverse colon biopsy h/oUC r/o CMV cold forceps Tissue Colon TISSUE EXAM Mer Washburn MD 6/10/2021  1:58 PM  8 : sigmoid colon biopsy h/oUC r/o CMV cold forceps Tissue Colon TISSUE EXAM Mer Washburn MD 6/10/2021  2:00 PM  9 : rectal biopsy h/oUC r/o CMV cold forceps Tissue Colon TISSUE EXAM Mer Washburn MD 6/10/2021  2:02 PM  EQUIPMENT: [id number]     Impression: Moderately severe colitis with rectal sparing  Multiple biopsies taken  RECOMMENDATION: Repeat in 1 year due to active colitis  Check Hepatitis B and Quantiferon gold for TB   Initiation of biologics - Rashaad Gómez MD

## 2021-06-25 ENCOUNTER — TELEPHONE (OUTPATIENT)
Dept: GASTROENTEROLOGY | Facility: CLINIC | Age: 61
End: 2021-06-25

## 2021-06-25 NOTE — TELEPHONE ENCOUNTER
I called the patient lmom in regards to information needed to fill out the site where the patient is going to be getting her infusion for Entyvio and for the patient support program enrollment form to be sign

## 2021-07-08 ENCOUNTER — TELEPHONE (OUTPATIENT)
Dept: GASTROENTEROLOGY | Facility: CLINIC | Age: 61
End: 2021-07-08

## 2021-07-08 NOTE — TELEPHONE ENCOUNTER
Patient called she has questions in regards to the medication Lialda  She would like to know if she should continue taking the medication Lialda once she start Entyvio infusions? She would like to speak with someone

## 2021-07-09 ENCOUNTER — TELEPHONE (OUTPATIENT)
Dept: GASTROENTEROLOGY | Facility: CLINIC | Age: 61
End: 2021-07-09

## 2021-07-09 NOTE — TELEPHONE ENCOUNTER
Prior authorization for Remigio Urias is approved by her insurance  Approval letter is on Media  Patient initial loading dose is scheduled Monday July 26, 2021 at 11am  Patient is aware

## 2021-07-12 NOTE — TELEPHONE ENCOUNTER
Called and left pt msg stating to stop lialda once pt starts entyvio infusions  Gave number to call back with any further questions

## 2021-07-20 ENCOUNTER — TELEPHONE (OUTPATIENT)
Dept: GASTROENTEROLOGY | Facility: CLINIC | Age: 61
End: 2021-07-20

## 2021-07-20 NOTE — TELEPHONE ENCOUNTER
Pt called personal line to ask some questions regarding infusion  Had questions about lialda and entyvio being taken at same time and I explained that dr Franklyn Christy wants you to stop lialda  Pt stated other doctors admin both at same time and I explained each person is different as well as it is doctors discretion  Asked about insurance covering and I instructed to call clinical team  Asked if it was okay to schedule infusions almost 3 weeks apart due to lack of availability of infusion appts   I notified this was okay

## 2021-07-21 ENCOUNTER — TELEPHONE (OUTPATIENT)
Dept: GASTROENTEROLOGY | Facility: CLINIC | Age: 61
End: 2021-07-21

## 2021-07-29 ENCOUNTER — TELEPHONE (OUTPATIENT)
Dept: GASTROENTEROLOGY | Facility: CLINIC | Age: 61
End: 2021-07-29

## 2021-07-29 NOTE — TELEPHONE ENCOUNTER
Patients GI provider:  Dr Brianna Kumar    Number to return call: [phone]    Reason for call: Pt calling stating she has a couple of questions she wanted to ask before her infusion tomorrow  Pt also wanted to know if she can take her xanax before her infusion       Scheduled procedure/appointment date if applicable: Appt - 86/65/87

## 2021-07-29 NOTE — TELEPHONE ENCOUNTER
Okay to take xanxax     Pt called into office with multiple questions regarding infusion  Pt asked where/when she is to get Shingles/Pneumonia vaccines recommneded by Dr Diop Prom  Advised pt to talk to PCP about vaccines as that's where they are available and will talk with provider about recommended vaccines as lives vaccines are contraindicated  Pt also wanted to know if she can take xanaxax before or after infusion, advised pt to take medication if experiencing anxiety  Lastly, pt wanted to know how she will know if therapy is working  Explained to pt she will notice through symptom improvement and if she is not experiencing any change to call office  Pt verbalized understanding no further questions at this time

## 2021-07-29 NOTE — TELEPHONE ENCOUNTER
Jesse Brown PA-C  You 11 minutes ago (12:11 PM)   RH  I agree with your recommendations  She should have the flu, pneumonia, covid 19 shot  She should also have the hep B series if she has never had this done  All can be done at her pcp  She should also have yearly skin checks with dermatology and yearly evaluation by ophthalmologist while on biologic therapy  Message text      Called pt with provider recommendations   Pt verbalized understanding no further questions at this time and will follow up with PCP

## 2021-07-30 ENCOUNTER — HOSPITAL ENCOUNTER (OUTPATIENT)
Dept: INFUSION CENTER | Facility: CLINIC | Age: 61
Discharge: HOME | End: 2021-07-30
Payer: COMMERCIAL

## 2021-07-30 VITALS
TEMPERATURE: 98.5 F | BODY MASS INDEX: 28.74 KG/M2 | WEIGHT: 152.12 LBS | HEART RATE: 74 BPM | DIASTOLIC BLOOD PRESSURE: 75 MMHG | SYSTOLIC BLOOD PRESSURE: 124 MMHG | RESPIRATION RATE: 18 BRPM

## 2021-07-30 DIAGNOSIS — K51.00 ULCERATIVE PANCOLITIS WITHOUT COMPLICATION (HCC): Primary | ICD-10-CM

## 2021-07-30 PROCEDURE — 96365 THER/PROPH/DIAG IV INF INIT: CPT

## 2021-07-30 RX ORDER — SODIUM CHLORIDE 9 MG/ML
20 INJECTION, SOLUTION INTRAVENOUS ONCE
Status: CANCELLED | OUTPATIENT
Start: 2021-08-13

## 2021-07-30 RX ORDER — SODIUM CHLORIDE 9 MG/ML
20 INJECTION, SOLUTION INTRAVENOUS ONCE
Status: COMPLETED | OUTPATIENT
Start: 2021-07-30 | End: 2021-07-30

## 2021-07-30 RX ADMIN — VEDOLIZUMAB 300 MG: 300 INJECTION, POWDER, LYOPHILIZED, FOR SOLUTION INTRAVENOUS at 11:48

## 2021-07-30 RX ADMIN — SODIUM CHLORIDE 20 ML/HR: 0.9 INJECTION, SOLUTION INTRAVENOUS at 11:28

## 2021-07-30 NOTE — PROGRESS NOTES
Pt arrived to unit without complaint, here for first infusion Entyvio  Hepatitis panel negative, TB test negative  Pt denies recent or current infection  Entyvio administered as ordered and tolerated well  Pt left unit in stable condition without question or concern, AVS provided with upcoming appt

## 2021-08-02 ENCOUNTER — TELEPHONE (OUTPATIENT)
Dept: FAMILY MEDICINE CLINIC | Facility: CLINIC | Age: 61
End: 2021-08-02

## 2021-08-02 NOTE — TELEPHONE ENCOUNTER
[facility] called asking for a script to be faxed to them for a L Breast Diagnostic Mammogram w/ CAD ultrasound if needed  Diagnosis code R92    Please fax the script to [phone]

## 2021-08-13 ENCOUNTER — HOSPITAL ENCOUNTER (OUTPATIENT)
Dept: INFUSION CENTER | Facility: CLINIC | Age: 61
Discharge: HOME | End: 2021-08-13
Payer: COMMERCIAL

## 2021-08-13 VITALS
RESPIRATION RATE: 18 BRPM | SYSTOLIC BLOOD PRESSURE: 137 MMHG | DIASTOLIC BLOOD PRESSURE: 80 MMHG | HEART RATE: 83 BPM | TEMPERATURE: 98.4 F

## 2021-08-13 DIAGNOSIS — K51.00 ULCERATIVE PANCOLITIS WITHOUT COMPLICATION (HCC): Primary | ICD-10-CM

## 2021-08-13 PROCEDURE — 96365 THER/PROPH/DIAG IV INF INIT: CPT

## 2021-08-13 RX ORDER — SODIUM CHLORIDE 9 MG/ML
20 INJECTION, SOLUTION INTRAVENOUS ONCE
Status: CANCELLED | OUTPATIENT
Start: 2021-09-10

## 2021-08-13 RX ORDER — SODIUM CHLORIDE 9 MG/ML
20 INJECTION, SOLUTION INTRAVENOUS ONCE
Status: COMPLETED | OUTPATIENT
Start: 2021-08-13 | End: 2021-08-13

## 2021-08-13 RX ADMIN — VEDOLIZUMAB 300 MG: 300 INJECTION, POWDER, LYOPHILIZED, FOR SOLUTION INTRAVENOUS at 12:09

## 2021-08-13 RX ADMIN — SODIUM CHLORIDE 20 ML/HR: 0.9 INJECTION, SOLUTION INTRAVENOUS at 11:56

## 2021-08-13 NOTE — PROGRESS NOTES
Patient tolerated Entyvio infusion well with no complications  Pt states all future infusions will be done at home   Declined AVS

## 2021-09-01 ENCOUNTER — RA CDI HCC (OUTPATIENT)
Dept: OTHER | Facility: HOSPITAL | Age: 61
End: 2021-09-01

## 2021-09-02 NOTE — PROGRESS NOTES
[facility]  coding opportunities       Chart reviewed, no opportunity found: CHART REVIEWED, NO OPPORTUNITY FOUND                        Patients insurance company:  [employer] [facility] (Medicare Advantage and Commercial)

## 2021-09-10 ENCOUNTER — TELEMEDICINE (OUTPATIENT)
Dept: FAMILY MEDICINE CLINIC | Facility: CLINIC | Age: 61
End: 2021-09-10

## 2021-09-10 ENCOUNTER — TELEPHONE (OUTPATIENT)
Dept: GASTROENTEROLOGY | Facility: CLINIC | Age: 61
End: 2021-09-10

## 2021-09-10 DIAGNOSIS — U07.1 COVID-19: Primary | ICD-10-CM

## 2021-09-10 DIAGNOSIS — S39.012A ACUTE MYOFASCIAL STRAIN OF LUMBAR REGION, INITIAL ENCOUNTER: ICD-10-CM

## 2021-09-10 PROCEDURE — U0005 INFEC AGEN DETEC AMPLI PROBE: HCPCS | Performed by: FAMILY MEDICINE

## 2021-09-10 PROCEDURE — 1036F TOBACCO NON-USER: CPT | Performed by: FAMILY MEDICINE

## 2021-09-10 PROCEDURE — U0003 INFECTIOUS AGENT DETECTION BY NUCLEIC ACID (DNA OR RNA); SEVERE ACUTE RESPIRATORY SYNDROME CORONAVIRUS 2 (SARS-COV-2) (CORONAVIRUS DISEASE [COVID-19]), AMPLIFIED PROBE TECHNIQUE, MAKING USE OF HIGH THROUGHPUT TECHNOLOGIES AS DESCRIBED BY CMS-2020-01-R: HCPCS | Performed by: FAMILY MEDICINE

## 2021-09-10 PROCEDURE — 99213 OFFICE O/P EST LOW 20 MIN: CPT | Performed by: FAMILY MEDICINE

## 2021-09-10 RX ORDER — METHOCARBAMOL 500 MG/1
500 TABLET, FILM COATED ORAL 3 TIMES DAILY
Qty: 90 TABLET | Refills: 0 | Status: SHIPPED | OUTPATIENT
Start: 2021-09-10 | End: 2021-10-21

## 2021-09-10 NOTE — PROGRESS NOTES
Virtual Regular Visit    Verification of patient location:    Patient is located in the following state in which I hold an active license [place]    Return if symptoms worsen or fail to improve, for Per COVID screen  Assessment/Plan:    Problem List Items Addressed This Visit     None      Visit Diagnoses     COVID-19    -  Primary    Relevant Orders    Novel Coronavirus (Covid-19),PCR [facility] - Collected in Office (Completed)    Acute myofascial strain of lumbar region, initial encounter        Relevant Medications    methocarbamol (ROBAXIN) 500 mg tablet           Reason for visit is   Chief Complaint   Patient presents with    Virtual Regular Visit        Encounter provider Ashvin Quesada DO    Provider located at [address] [place] [address] [address]  [facility] [id number] [phone]      Recent Visits  No visits were found meeting these conditions  Showing recent visits within past 7 days and meeting all other requirements  Future Appointments  No visits were found meeting these conditions  Showing future appointments within next 150 days and meeting all other requirements       The patient was identified by name and date of birth  César Bliss was informed that this is a telemedicine visit and that the visit is being conducted through [address] [place] [address] Now and patient was informed that this is a secure, HIPAA-compliant platform  She agrees to proceed     My office door was closed  No one else was in the room  She acknowledged consent and understanding of privacy and security of the video platform  The patient has agreed to participate and understands they can discontinue the visit at any time  Patient is aware this is a billable service  Subjective  César Bliss is a 63 y o  female [facility]   Patient is a 33-year-old female who is presenting today virtually with upper respiratory tract symptoms         Past Medical History:   Diagnosis Date  Anxiety     Cancer ([facility] [address] [facility] )     skin    Change in bowel habits     Elevated liver enzymes     Gall stones     Hashimoto's thyroiditis     Irritable bowel syndrome     Lumbar spondylosis     Osteoarthritis     Seasonal allergies     Ulcerative colitis ([facility] [address] )        Past Surgical History:   Procedure Laterality Date    COLONOSCOPY      EAR SURGERY      cyst removed from ear drum    EAR SURGERY      EAR SURGERY      IRIDECTOMY      Optical    WI COLONOSCOPY FLX DX W/COLLJ SPEC WHEN PFRMD N/A 10/9/2017    Procedure: COLONOSCOPY;  Surgeon: Myra Hooker MD;  Location:  GI LAB; Service: Gastroenterology    WI EXC SKIN MALIG 1 1-2 CM TRUNK,ARM,LEG Right 10/4/2019    Procedure: 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;  Surgeon: Finn Potter MD;  Location: [address] [place] [address];  Service: Plastics       Current Outpatient Medications   Medication Sig Dispense Refill    ALPRAZolam (XANAX) 0 25 mg tablet TAKE 1 TABLET DAILY AT BEDTIME AS NEEDED FOR ANXIETY 90 tablet 0    ascorbic acid (VITAMIN C) 250 mg tablet Take 250 mg by mouth daily      Cholecalciferol (VITAMIN D PO) Take by mouth daily      Cyanocobalamin (VITAMIN B-12 PO) Take by mouth daily      ergocalciferol (ERGOCALCIFEROL) 1 25 MG (77658 UT) capsule Take 1 capsule weekly for 8 weeks  8 capsule 0    methocarbamol (ROBAXIN) 500 mg tablet Take 1 tablet (500 mg total) by mouth 3 (three) times a day (Patient not taking: Reported on 9/20/2021) 90 tablet 0    Vedolizumab (ENTYVIO IV) Infuse into a venous catheter every 56 days       No current facility-administered medications for this visit  Allergies   Allergen Reactions    Amoxicillin GI Intolerance       Review of Systems   Constitutional: Negative for chills and fever  HENT: Positive for sore throat  Respiratory: Positive for cough (dry)  Musculoskeletal: Positive for myalgias (Not related to her upper respiratory tract symptoms, but requesting refill on her Robaxin  )  Neurological: Positive for headaches  Patient is fully COVID vaccinated has no known exposure  Patient continues to follow with GI and Rheumatology  Video Exam    There were no vitals filed for this visit  Physical Exam  Constitutional:       General: She is not in acute distress  Appearance: Normal appearance  She is not ill-appearing  Pulmonary:      Effort: Pulmonary effort is normal    Neurological:      Mental Status: She is alert and oriented to person, place, and time  I spent 15 minutes with patient today in which greater than 50% of the time was spent in counseling/coordination of care regarding [address] verbally agrees to participate in [employer]  Pt is aware that [employer] could be limited without vital signs or the ability to perform a full hands-on physical exam  Leigh Ann Sarabia understands she or the provider may request at any time to terminate the video visit and request the patient to seek care or treatment in person

## 2021-09-10 NOTE — TELEPHONE ENCOUNTER
Spoke with pt who is feeling under the weather  Although she does not think COVID, she got tested in case  She inquired if she should reschedule infusion set to run 9/13/2021  I stated yes to reschedule  She asked until when can her infusion be rescheduled? I explained that after discussing with Dr Blade Carmona, no more than a week   She was agreeable and is calling to reschedule

## 2021-09-10 NOTE — TELEPHONE ENCOUNTER
Patients GI provider:  Dr Benjamin Prime    Number to return call: [phone]    Reason for call: Pt calling wanting to know if she can still have her infusion on Monday being that she is currently sick?     Scheduled procedure/appointment date if applicable: Appt 3/54/97

## 2021-09-11 LAB — SARS-COV-2 RNA RESP QL NAA+PROBE: NEGATIVE

## 2021-09-16 ENCOUNTER — DOCUMENTATION (OUTPATIENT)
Dept: SOCIAL WORK | Facility: HOSPITAL | Age: 61
End: 2021-09-16

## 2021-09-16 NOTE — PROGRESS NOTES
Admission Report at [facility]  [facility] has admitted your patient to [address] [place] [address] service with the following disciplines:      SN  This report is informational only, no responses is needed  Primary focus of home health care: GI   Patient stated goals of care: lose weight  Anticipated visit pattern: 1 every 8 weeks and next visit date: 11 11 21 Entyvio infusion  Significant clinical findings: Stopped lexapro mesalamine bowel prep and omeprazole  Thank you for allowing us to participate in the care of your patient        Clint Bliss RN

## 2021-09-20 PROBLEM — E06.3 THYROIDITIS, AUTOIMMUNE: Status: ACTIVE | Noted: 2021-09-20

## 2021-09-20 PROBLEM — M47.816 LUMBAR SPONDYLOSIS: Status: ACTIVE | Noted: 2021-09-20

## 2021-09-20 PROBLEM — T78.1XXA FOOD SENSITIVITY WITH GASTROINTESTINAL SYMPTOMS: Status: ACTIVE | Noted: 2021-09-20

## 2021-09-20 PROBLEM — M15.0 PRIMARY GENERALIZED (OSTEO)ARTHRITIS: Status: ACTIVE | Noted: 2021-09-20

## 2021-09-20 PROBLEM — M35.9 UNDIFFERENTIATED CONNECTIVE TISSUE DISEASE (HCC): Status: ACTIVE | Noted: 2021-09-20

## 2021-09-20 PROBLEM — M06.09 SERONEGATIVE ARTHROPATHY OF MULTIPLE SITES (HCC): Status: ACTIVE | Noted: 2021-09-20

## 2021-09-21 ENCOUNTER — APPOINTMENT (OUTPATIENT)
Dept: LAB | Facility: MEDICAL CENTER | Age: 61
End: 2021-09-21
Payer: COMMERCIAL

## 2021-09-21 DIAGNOSIS — E55.9 VITAMIN D DEFICIENCY: ICD-10-CM

## 2021-09-21 DIAGNOSIS — E06.3 THYROIDITIS, AUTOIMMUNE: ICD-10-CM

## 2021-09-21 LAB — TSH SERPL DL<=0.05 MIU/L-ACNC: 1.12 UIU/ML (ref 0.36–3.74)

## 2021-09-21 PROCEDURE — 82306 VITAMIN D 25 HYDROXY: CPT

## 2021-09-21 PROCEDURE — 36415 COLL VENOUS BLD VENIPUNCTURE: CPT

## 2021-09-21 PROCEDURE — 84443 ASSAY THYROID STIM HORMONE: CPT

## 2021-09-22 LAB — 25(OH)D3 SERPL-MCNC: 26.7 NG/ML (ref 30–100)

## 2021-09-28 ENCOUNTER — OFFICE VISIT (OUTPATIENT)
Dept: GASTROENTEROLOGY | Facility: CLINIC | Age: 61
End: 2021-09-28
Payer: COMMERCIAL

## 2021-09-28 VITALS
HEIGHT: 62 IN | SYSTOLIC BLOOD PRESSURE: 120 MMHG | TEMPERATURE: 97.8 F | OXYGEN SATURATION: 98 % | HEART RATE: 94 BPM | DIASTOLIC BLOOD PRESSURE: 80 MMHG | WEIGHT: 153 LBS | BODY MASS INDEX: 28.16 KG/M2

## 2021-09-28 DIAGNOSIS — K51.00 ULCERATIVE PANCOLITIS WITHOUT COMPLICATION (HCC): Primary | ICD-10-CM

## 2021-09-28 DIAGNOSIS — E53.8 VITAMIN B12 DEFICIENCY: ICD-10-CM

## 2021-09-28 PROCEDURE — 3008F BODY MASS INDEX DOCD: CPT | Performed by: FAMILY MEDICINE

## 2021-09-28 PROCEDURE — 99213 OFFICE O/P EST LOW 20 MIN: CPT | Performed by: INTERNAL MEDICINE

## 2021-09-28 NOTE — PROGRESS NOTES
[facility] - Outpatient Follow-up Note  Kiran Reina 63 y o  female MRN: [id number]  Encounter: [id number]          ASSESSMENT AND PLAN:      1  Ulcerative pancolitis without complication ([facility] )  She is overall doing well  Reports 2 episodes of blood streaking the stool otherwise no abdominal pain, nausea, vomiting or diarrhea  She is having 2-4 bowel movements a day  She completed 3 induction doses of Entyvio  She is getting infusion at home   -check CBC and CMP  -CRP  -fecal calprotectin  -avoid taking NSAIDs  -recommend COVID-19 vaccine booster in October-this will be 6 months from her last vaccine  -yearly flu vaccine  -shingles vaccine in the future      2  Vitamin B12 deficiency  Vitamin B12 is normalized    Follow up in 3 months  ______________________________________________________________________    SUBJECTIVE:   61-year-old female with ulcerative colitis here for follow-up visit  She failed treatment with mesalamine  Now on Entyvio  She completed 3 induction doses of Entyvio  No abdominal pain or diarrhea  2-4 formed bowel movement per day  She reports few episodes of bloody bowel movement but no diarrhea  She hurt her back and was taking NSAIDs for 2 weeks  After Entyvio infusion she noted nasal congestion and headache after infusion  REVIEW OF SYSTEMS IS OTHERWISE NEGATIVE        Historical Information   Past Medical History:   Diagnosis Date    Anxiety     Cancer ([facility] )     skin    Change in bowel habits     Elevated liver enzymes     Gall stones     Hashimoto's thyroiditis     Irritable bowel syndrome     Lumbar spondylosis     Osteoarthritis     Seasonal allergies     Ulcerative colitis ([facility] )      Past Surgical History:   Procedure Laterality Date    COLONOSCOPY      EAR SURGERY      cyst removed from ear drum    EAR SURGERY      EAR SURGERY      IRIDECTOMY      Optical    MS COLONOSCOPY FLX DX W/COLLJ SPEC WHEN PFRMD N/A 10/9/2017 Procedure: COLONOSCOPY;  Surgeon: Lida Valerio MD;  [facility]; Service: Gastroenterology    NC EXC SKIN MALIG 1 1-2 CM TRUNK,ARM,LEG Right 10/4/2019    Procedure: 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;  Surgeon: Beth Simmons MD;  Location: [address] [place] [address];  Service: Plastics     Social History   Social History     Substance and Sexual Activity   Alcohol Use Yes    Comment: Social     Social History     Substance and Sexual Activity   Drug Use No     Social History     Tobacco Use   Smoking Status Never Smoker   Smokeless Tobacco Never Used     Family History   Problem Relation Age of Onset    Breast cancer Mother     Lung cancer Mother     Atrial fibrillation Father     Stroke Father         CVA    Diabetes Father         Mellitus    Heart disease Father     Other Daughter         Colitis    Breast cancer Family        Meds/Allergies       Current Outpatient Medications:     ALPRAZolam (XANAX) 0 25 mg tablet    ascorbic acid (VITAMIN C) 250 mg tablet    Cholecalciferol (VITAMIN D PO)    Cyanocobalamin (VITAMIN B-12 PO)    ergocalciferol (ERGOCALCIFEROL) 1 25 MG (08241 UT) capsule    methocarbamol (ROBAXIN) 500 mg tablet    Vedolizumab (ENTYVIO IV)    Allergies   Allergen Reactions    Amoxicillin GI Intolerance           Objective     Blood pressure 120/80, pulse 94, temperature 97 8 °F (36 6 °C), temperature source Tympanic, height 5' 1 5" (1 562 m), weight 69 4 kg (153 lb), SpO2 98 %  Body mass index is 28 44 kg/m²  PHYSICAL EXAM:      General Appearance:   Alert, cooperative, no distress   HEENT:   Normocephalic, atraumatic, anicteric      Neck:  Supple, symmetrical, trachea midline   Lungs:   Clear to auscultation bilaterally; no rales, rhonchi or wheezing; respirations unlabored    Heart[de-identified]   Regular rate and rhythm; no murmur, rub, or gallop     Abdomen:   Soft, non-tender, non-distended; normal bowel sounds; no masses, no organomegaly    Genitalia:   Deferred    Rectal:   Deferred    Extremities:  No cyanosis, clubbing or edema    Pulses:  2+ and symmetric    Skin:  No jaundice, rashes, or lesions    Lymph nodes:  No palpable cervical lymphadenopathy        Lab Results:   No visits with results within 1 Day(s) from this visit  Latest known visit with results is:   Appointment on 09/21/2021   Component Date Value    TSH 3RD GENERATON 09/21/2021 1 120     Vit D, 25-Hydroxy 09/21/2021 26 7*         Radiology Results:   No results found

## 2021-09-30 ENCOUNTER — APPOINTMENT (OUTPATIENT)
Dept: LAB | Facility: MEDICAL CENTER | Age: 61
End: 2021-09-30
Attending: INTERNAL MEDICINE
Payer: COMMERCIAL

## 2021-09-30 DIAGNOSIS — K51.00 ULCERATIVE PANCOLITIS WITHOUT COMPLICATION (HCC): ICD-10-CM

## 2021-09-30 LAB
ALBUMIN SERPL BCP-MCNC: 3.6 G/DL (ref 3.5–5)
ALP SERPL-CCNC: 118 U/L (ref 46–116)
ALT SERPL W P-5'-P-CCNC: 37 U/L (ref 12–78)
ANION GAP SERPL CALCULATED.3IONS-SCNC: 2 MMOL/L (ref 4–13)
AST SERPL W P-5'-P-CCNC: 21 U/L (ref 5–45)
BILIRUB SERPL-MCNC: 0.47 MG/DL (ref 0.2–1)
BUN SERPL-MCNC: 15 MG/DL (ref 5–25)
CALCIUM SERPL-MCNC: 9.6 MG/DL (ref 8.3–10.1)
CHLORIDE SERPL-SCNC: 107 MMOL/L (ref 100–108)
CO2 SERPL-SCNC: 26 MMOL/L (ref 21–32)
CREAT SERPL-MCNC: 0.66 MG/DL (ref 0.6–1.3)
CRP SERPL QL: 4.2 MG/L
ERYTHROCYTE [DISTWIDTH] IN BLOOD BY AUTOMATED COUNT: 15.3 % (ref 11.6–15.1)
FERRITIN SERPL-MCNC: 6 NG/ML (ref 8–388)
GFR SERPL CREATININE-BSD FRML MDRD: 97 ML/MIN/1.73SQ M
GLUCOSE P FAST SERPL-MCNC: 85 MG/DL (ref 65–99)
HCT VFR BLD AUTO: 39.7 % (ref 34.8–46.1)
HGB BLD-MCNC: 12.4 G/DL (ref 11.5–15.4)
IRON SATN MFR SERPL: 13 % (ref 15–50)
IRON SERPL-MCNC: 55 UG/DL (ref 50–170)
MCH RBC QN AUTO: 27.3 PG (ref 26.8–34.3)
MCHC RBC AUTO-ENTMCNC: 31.2 G/DL (ref 31.4–37.4)
MCV RBC AUTO: 87 FL (ref 82–98)
PLATELET # BLD AUTO: 268 THOUSANDS/UL (ref 149–390)
PMV BLD AUTO: 8.8 FL (ref 8.9–12.7)
POTASSIUM SERPL-SCNC: 4.2 MMOL/L (ref 3.5–5.3)
PROT SERPL-MCNC: 7.7 G/DL (ref 6.4–8.2)
RBC # BLD AUTO: 4.55 MILLION/UL (ref 3.81–5.12)
SODIUM SERPL-SCNC: 135 MMOL/L (ref 136–145)
TIBC SERPL-MCNC: 432 UG/DL (ref 250–450)
WBC # BLD AUTO: 8.48 THOUSAND/UL (ref 4.31–10.16)

## 2021-09-30 PROCEDURE — 36415 COLL VENOUS BLD VENIPUNCTURE: CPT

## 2021-09-30 PROCEDURE — 82728 ASSAY OF FERRITIN: CPT

## 2021-09-30 PROCEDURE — 86140 C-REACTIVE PROTEIN: CPT

## 2021-09-30 PROCEDURE — 80053 COMPREHEN METABOLIC PANEL: CPT

## 2021-09-30 PROCEDURE — 85027 COMPLETE CBC AUTOMATED: CPT

## 2021-09-30 PROCEDURE — 83550 IRON BINDING TEST: CPT

## 2021-09-30 PROCEDURE — 83540 ASSAY OF IRON: CPT

## 2021-10-11 ENCOUNTER — APPOINTMENT (OUTPATIENT)
Dept: LAB | Facility: MEDICAL CENTER | Age: 61
End: 2021-10-11
Attending: INTERNAL MEDICINE
Payer: COMMERCIAL

## 2021-10-11 DIAGNOSIS — K51.00 ULCERATIVE PANCOLITIS WITHOUT COMPLICATION (HCC): ICD-10-CM

## 2021-10-11 PROCEDURE — 83993 ASSAY FOR CALPROTECTIN FECAL: CPT

## 2021-10-13 LAB — CALPROTECTIN STL-MCNT: 104 UG/G (ref 0–120)

## 2021-10-21 ENCOUNTER — OFFICE VISIT (OUTPATIENT)
Dept: FAMILY MEDICINE CLINIC | Facility: CLINIC | Age: 61
End: 2021-10-21
Payer: COMMERCIAL

## 2021-10-21 VITALS
HEART RATE: 98 BPM | HEIGHT: 62 IN | TEMPERATURE: 96 F | BODY MASS INDEX: 28.52 KG/M2 | OXYGEN SATURATION: 97 % | WEIGHT: 155 LBS | SYSTOLIC BLOOD PRESSURE: 122 MMHG | DIASTOLIC BLOOD PRESSURE: 72 MMHG

## 2021-10-21 DIAGNOSIS — M06.09 SERONEGATIVE ARTHROPATHY OF MULTIPLE SITES (HCC): ICD-10-CM

## 2021-10-21 DIAGNOSIS — Z00.00 ANNUAL VISIT FOR GENERAL ADULT MEDICAL EXAMINATION WITHOUT ABNORMAL FINDINGS: Primary | ICD-10-CM

## 2021-10-21 DIAGNOSIS — K51.00 ULCERATIVE PANCOLITIS WITHOUT COMPLICATION (HCC): ICD-10-CM

## 2021-10-21 DIAGNOSIS — E03.8 SUBCLINICAL HYPOTHYROIDISM: ICD-10-CM

## 2021-10-21 PROCEDURE — 99396 PREV VISIT EST AGE 40-64: CPT | Performed by: FAMILY MEDICINE

## 2021-10-21 PROCEDURE — 3008F BODY MASS INDEX DOCD: CPT | Performed by: INTERNAL MEDICINE

## 2021-10-23 ENCOUNTER — IMMUNIZATIONS (OUTPATIENT)
Dept: FAMILY MEDICINE CLINIC | Facility: MEDICAL CENTER | Age: 61
End: 2021-10-23

## 2021-10-23 DIAGNOSIS — Z23 ENCOUNTER FOR IMMUNIZATION: Primary | ICD-10-CM

## 2021-10-23 PROCEDURE — 91300 SARSCOV2 VAC 30MCG/0.3ML IM: CPT

## 2021-11-10 ENCOUNTER — OFFICE VISIT (OUTPATIENT)
Dept: URGENT CARE | Facility: MEDICAL CENTER | Age: 61
End: 2021-11-10
Payer: COMMERCIAL

## 2021-11-10 VITALS
HEART RATE: 66 BPM | SYSTOLIC BLOOD PRESSURE: 133 MMHG | RESPIRATION RATE: 18 BRPM | DIASTOLIC BLOOD PRESSURE: 89 MMHG | TEMPERATURE: 98.1 F | OXYGEN SATURATION: 99 %

## 2021-11-10 DIAGNOSIS — R42 DIZZINESS AND GIDDINESS: ICD-10-CM

## 2021-11-10 DIAGNOSIS — R00.2 PALPITATIONS: Primary | ICD-10-CM

## 2021-11-10 LAB
ATRIAL RATE: 99 BPM
P AXIS: 16 DEGREES
PR INTERVAL: 156 MS
QRS AXIS: 10 DEGREES
QRSD INTERVAL: 72 MS
QT INTERVAL: 348 MS
QTC INTERVAL: 446 MS
T WAVE AXIS: 32 DEGREES
VENTRICULAR RATE: 99 BPM

## 2021-11-10 PROCEDURE — 93010 ELECTROCARDIOGRAM REPORT: CPT | Performed by: PHYSICIAN ASSISTANT

## 2021-11-10 PROCEDURE — 99213 OFFICE O/P EST LOW 20 MIN: CPT | Performed by: PHYSICIAN ASSISTANT

## 2021-11-10 PROCEDURE — 93005 ELECTROCARDIOGRAM TRACING: CPT | Performed by: PHYSICIAN ASSISTANT

## 2021-11-11 ENCOUNTER — TELEPHONE (OUTPATIENT)
Dept: GASTROENTEROLOGY | Facility: CLINIC | Age: 61
End: 2021-11-11

## 2021-11-24 ENCOUNTER — OFFICE VISIT (OUTPATIENT)
Dept: FAMILY MEDICINE CLINIC | Facility: CLINIC | Age: 61
End: 2021-11-24
Payer: COMMERCIAL

## 2021-11-24 VITALS
DIASTOLIC BLOOD PRESSURE: 80 MMHG | SYSTOLIC BLOOD PRESSURE: 138 MMHG | OXYGEN SATURATION: 96 % | RESPIRATION RATE: 16 BRPM | HEART RATE: 84 BPM | TEMPERATURE: 97.6 F

## 2021-11-24 DIAGNOSIS — Z82.49 FAMILY HISTORY OF ATRIAL FIBRILLATION: ICD-10-CM

## 2021-11-24 DIAGNOSIS — R00.2 PALPITATION: ICD-10-CM

## 2021-11-24 DIAGNOSIS — R00.0 TACHYCARDIA: Primary | ICD-10-CM

## 2021-11-24 DIAGNOSIS — Z23 ENCOUNTER FOR IMMUNIZATION: ICD-10-CM

## 2021-11-24 PROCEDURE — 1036F TOBACCO NON-USER: CPT | Performed by: FAMILY MEDICINE

## 2021-11-24 PROCEDURE — 99214 OFFICE O/P EST MOD 30 MIN: CPT | Performed by: FAMILY MEDICINE

## 2021-11-24 PROCEDURE — 3725F SCREEN DEPRESSION PERFORMED: CPT | Performed by: FAMILY MEDICINE

## 2021-11-26 ENCOUNTER — TELEPHONE (OUTPATIENT)
Dept: ADMINISTRATIVE | Facility: OTHER | Age: 61
End: 2021-11-26

## 2021-12-08 ENCOUNTER — TELEPHONE (OUTPATIENT)
Dept: FAMILY MEDICINE CLINIC | Facility: CLINIC | Age: 61
End: 2021-12-08

## 2021-12-09 DIAGNOSIS — K51.00 ULCERATIVE PANCOLITIS WITHOUT COMPLICATION (HCC): Primary | ICD-10-CM

## 2021-12-10 DIAGNOSIS — K51.00 ULCERATIVE PANCOLITIS WITHOUT COMPLICATION (HCC): Primary | ICD-10-CM

## 2021-12-21 ENCOUNTER — VBI (OUTPATIENT)
Dept: ADMINISTRATIVE | Facility: OTHER | Age: 61
End: 2021-12-21

## 2021-12-22 DIAGNOSIS — K51.00 ULCERATIVE PANCOLITIS WITHOUT COMPLICATION (HCC): Primary | ICD-10-CM

## 2021-12-27 ENCOUNTER — TELEPHONE (OUTPATIENT)
Dept: FAMILY MEDICINE CLINIC | Facility: CLINIC | Age: 61
End: 2021-12-27

## 2021-12-28 PROCEDURE — U0005 INFEC AGEN DETEC AMPLI PROBE: HCPCS | Performed by: FAMILY MEDICINE

## 2021-12-28 PROCEDURE — U0003 INFECTIOUS AGENT DETECTION BY NUCLEIC ACID (DNA OR RNA); SEVERE ACUTE RESPIRATORY SYNDROME CORONAVIRUS 2 (SARS-COV-2) (CORONAVIRUS DISEASE [COVID-19]), AMPLIFIED PROBE TECHNIQUE, MAKING USE OF HIGH THROUGHPUT TECHNOLOGIES AS DESCRIBED BY CMS-2020-01-R: HCPCS | Performed by: FAMILY MEDICINE

## 2022-01-10 ENCOUNTER — TELEPHONE (OUTPATIENT)
Dept: GASTROENTEROLOGY | Facility: CLINIC | Age: 62
End: 2022-01-10

## 2022-01-10 NOTE — TELEPHONE ENCOUNTER
Patients GI provider:  Dr Shanika Mckeon to return call: [phone]    Reason for call: Pt calling requesting to speak to a nurse  Pt wanted to know what medication can she take for inflammation with her colitis?     Scheduled procedure/appointment date if applicable: NA

## 2022-01-10 NOTE — TELEPHONE ENCOUNTER
Patient of Dr Fabi Millard, last seen 9/28/21    History of UC    Called and spoke with patient  She states she has hip and back problems, and when they really bother her, tylenol does not help  She states she needed to take ibuprofen over the weekend, but she knows that she should not take NSAIDs with her UC  She is asking if there are any medications safer for her besides tylenol, or if it is okay for her to use ibuprofen   Please advise

## 2022-01-18 ENCOUNTER — TELEPHONE (OUTPATIENT)
Dept: GASTROENTEROLOGY | Facility: CLINIC | Age: 62
End: 2022-01-18

## 2022-01-18 NOTE — TELEPHONE ENCOUNTER
Patient of Dr Oscar Carvalho, last seen 9/28/21    History of UC, on entyvio    Called and spoke with patient  She had entyvio infusion on Friday 1/14  On Saturday 1/15 she started experiencing "pins and needles" on the left half of her face  By 1/16 she stated it also felt like "crawling under the skin", again on left half of face only  These feelings of pins, needles, and crawling are intermittent, not constant Denies any other symptoms, pt notes she has equal strength on both sides and has no facial asymmetry or other neurological symptoms   I advised I am not sure if this could be from entyvio and will reach out to provider and get back to her

## 2022-01-18 NOTE — TELEPHONE ENCOUNTER
Patients GI provider:  Dr Georgina Pizarro    Number to return call: [phone]    Reason for call: Pt calling stating the next morning after her infusion on 1/14 she has been experiencing pins and needles on the left side of her face       Scheduled procedure/appointment date if applicable: N/A

## 2022-01-19 NOTE — TELEPHONE ENCOUNTER
Her symptoms are improving  We discussed further management options close observation versus referral to Neurology  Since she is improving she will like to hold off further evaluation  I provided her with my cell phone and she will call if any questions

## 2022-01-21 ENCOUNTER — CONSULT (OUTPATIENT)
Dept: CARDIOLOGY CLINIC | Facility: CLINIC | Age: 62
End: 2022-01-21
Payer: COMMERCIAL

## 2022-01-21 VITALS
WEIGHT: 150 LBS | BODY MASS INDEX: 27.6 KG/M2 | HEART RATE: 77 BPM | OXYGEN SATURATION: 100 % | HEIGHT: 62 IN | SYSTOLIC BLOOD PRESSURE: 128 MMHG | DIASTOLIC BLOOD PRESSURE: 80 MMHG

## 2022-01-21 DIAGNOSIS — R00.2 PALPITATION: ICD-10-CM

## 2022-01-21 DIAGNOSIS — R00.0 TACHYCARDIA: Primary | ICD-10-CM

## 2022-01-21 PROCEDURE — 93000 ELECTROCARDIOGRAM COMPLETE: CPT

## 2022-01-21 PROCEDURE — 1036F TOBACCO NON-USER: CPT

## 2022-01-21 PROCEDURE — 3008F BODY MASS INDEX DOCD: CPT

## 2022-01-21 PROCEDURE — 99204 OFFICE O/P NEW MOD 45 MIN: CPT

## 2022-01-21 NOTE — PROGRESS NOTES
Cardiology Consultation   MD Jennifer Portillo MD Marvel Riis, DO, MD Tracy Veras DO, Rosi Patel DO, [facility] [place]  -------------------------------------------------------------------  Formerly [facility]  [address] [place], [place] [address]  [phone]  [phone]  [phone]  01/21/22  Odilon Vyas  YOB: 1960   MRN: [id number]      Referring Physician: Devorah Plummer DO  [address]  [address],  [address]     HPI:  I am seeing this patient in cardiology consultation for:  Palpitations    Odilon Vyas is a 63 y o  female with:    Ulcerative colitis 2017   Autoimmune thyroiditis   Undifferentiated connective tissue disease   History of palpitations    Tobacco:  Denies  Alcohol:  Occasional  Drugs:  Denies    She presents today for initial evaluation with Cardiology for her palpitations  She states that about 2 months ago she noted increasing frequency of her symptoms  She states that these have been going on for several years however she noted in early November more frequent and longer lasting episodes of palpitations  She denies any syncope chest pain or significant shortness of breath  She did present to urgent care for her palpitations on November 10th however she states that her symptoms stopped just before the ECG was performed  ECG showed normal sinus rhythm  She states that her symptoms started rather suddenly and terminate rather suddenly  She does note that it seems since she had her COVID-19 booster vaccine her symptoms have increased in frequency  Review of Systems   Constitutional: Negative for chills and fever  HENT: Negative for facial swelling and sore throat  Eyes: Negative for visual disturbance  Respiratory: Negative for cough, chest tightness, shortness of breath and wheezing  Cardiovascular: Positive for palpitations  Negative for chest pain and leg swelling  Gastrointestinal: Negative for abdominal pain, blood in stool, constipation, diarrhea, nausea and vomiting  Endocrine: Negative for cold intolerance and heat intolerance  Genitourinary: Negative for decreased urine volume, difficulty urinating, dysuria and hematuria  Musculoskeletal: Negative for arthralgias, back pain and myalgias  Skin: Negative for rash  Neurological: Negative for dizziness, syncope, weakness and numbness  Psychiatric/Behavioral: Negative for agitation, behavioral problems and confusion  The patient is not nervous/anxious  OBJECTIVE  Vitals:    01/21/22 1453   BP: 128/80   Pulse: 77   SpO2: 100%       Physical Exam   Constitutional: awake, alert and oriented, in no acute distress, no obvious deformities  Head: Normocephalic, without obvious abnormality, atraumatic  Eyes: conjunctivae clear and moist  Sclera anicteric  No xanthelasmas  Pupils equal bilaterally  Extraocular motions are full  Ear nose mouth and throat: ears are symmetrical bilaterally, hearing appears to be equal bilaterally, no nasal discharge or epistaxis, oropharynx is clear with moist mucous membranes  Neck:  Trachea is midline, neck is supple, no thyromegaly or significant lymphadenopathy, there is full range of motion  Lungs: clear to auscultation bilaterally, no wheezes, no rales, no rhonchi, no accessory muscle use, breathing is nonlabored  Heart: regular rate and rhythm, S1, S2 normal, no murmur, no click, no rub and no gallop, no lower extremity edema  Abdomen: soft, non-tender; bowel sounds normal; no masses,  no organomegaly  Psychiatric:  Patient is oriented to time, place, person, mood/affect is negative for depression, anxiety, agitation, appears to have appropriate insight  Skin: Skin is warm, dry, intact  No obvious rashes or lesions on exposed extremities  Nail beds are pink with no cyanosis or clubbing  EKG:  No results found for this visit on 01/21/22       The 10-year ASCVD risk score (ryley Dorman , 2013) is: 2 6%    Values used to calculate the score:      Age: 63 years      Sex: Female      Is Non- : No      Diabetic: No      Tobacco smoker: No      Systolic Blood Pressure: 374 mmHg      Is BP treated: No      HDL Cholesterol: 64 mg/dL      Total Cholesterol: 198 mg/dL    IMPRESSION:   Palpitations   Ulcerative colitis    Hashimoto's thyroiditis   Undifferentiated connective tissue disease    DISCUSSION/RECOMMENDATIONS:   At this time rhythm is stable, she is in normal sinus rhythm and normal heart rate today    Her cardiac physical exam does not suggest underlying structural heart disease, she had echo several years ago which showed noted normal appearing heart   She has no episodes of syncope    Her thyroid function is normal   Her ECG is negative for pre-excitation or Brugada pattern   At this time would check a 2 week Zio patch for further rhythm evaluation    Spoke to her today about the Boontya mobile device as well   Her symptoms seem to have improved now that she is about 2 months out from her posterior vaccine, it is not out of the realm of possibility that these may be related   Would continue with her current medications and plan for follow-up after Zio patch    Lauren Romero DO, FACC  --------------------------------------------------------------------------------  TREADMILL STRESS  No results found for this or any previous visit      ----------------------------------------------------------------------------------------------  NUCLEAR STRESS TEST: No results found for this or any previous visit      No results found for this or any previous visit       --------------------------------------------------------------------------------  CATH:  No results found for this or any previous visit     --------------------------------------------------------------------------------  ECHO:   No results found for this or any previous visit  No results found for this or any previous visit     --------------------------------------------------------------------------------  HOLTER  No results found for this or any previous visit     --------------------------------------------------------------------------------  CAROTIDS  No results found for this or any previous visit  Diagnoses and all orders for this visit:    Tachycardia  -     Ambulatory referral to Cardiology  -     POCT ECG  -     AMB extended holter monitor; Future    Palpitation  -     Ambulatory referral to Cardiology  -     POCT ECG  -     AMB extended holter monitor; Future       ======================================================    Past Medical History:   Diagnosis Date    Anxiety     Cancer ([facility] )     skin    Change in bowel habits     Elevated liver enzymes     Gall stones     Hashimoto's thyroiditis     Irritable bowel syndrome     Lumbar spondylosis     Osteoarthritis     Seasonal allergies     Ulcerative colitis ([facility] )      Past Surgical History:   Procedure Laterality Date    COLONOSCOPY      EAR SURGERY      cyst removed from ear drum    EAR SURGERY      EAR SURGERY      IRIDECTOMY      Optical    MT COLONOSCOPY FLX DX W/COLLJ SPEC WHEN PFRMD N/A 10/9/2017    Procedure: COLONOSCOPY;  Surgeon: Gil Palumbo MD;  Location: [facility];   Service: Gastroenterology    MT EXC SKIN MALIG 1 1-2 CM TRUNK,ARM,LEG Right 10/4/2019    Procedure: 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;  Surgeon: Willie Magallanes MD;  Location: [address] [place] [address];  Service: Plastics         Medications  Current Outpatient Medications   Medication Sig Dispense Refill    ALPRAZolam (XANAX) 0 25 mg tablet TAKE 1 TABLET DAILY AT BEDTIME AS NEEDED FOR ANXIETY 90 tablet 0    ascorbic acid (VITAMIN C) 250 mg tablet Take 250 mg by mouth daily        Cholecalciferol (VITAMIN D PO) Take by mouth daily      Cyanocobalamin (VITAMIN B-12 PO) Take by mouth daily      Vedolizumab (ENTYVIO IV) Infuse into a venous catheter every 56 days      ergocalciferol (ERGOCALCIFEROL) 1 25 MG (44590 UT) capsule Take 1 capsule weekly for 8 weeks  (Patient not taking: Reported on 1/21/2022 ) 8 capsule 0     No current facility-administered medications for this visit          Allergies   Allergen Reactions    Amoxicillin GI Intolerance       Social History     Socioeconomic History    Marital status: /Civil Union     Spouse name: Not on file    Number of children: 3    Years of education: Not on file    Highest education level: Not on file   Occupational History    Not on file   Tobacco Use    Smoking status: Never Smoker    Smokeless tobacco: Never Used   Vaping Use    Vaping Use: Never used   Substance and Sexual Activity    Alcohol use: Yes     Comment: Social    Drug use: No    Sexual activity: Not Currently     Partners: Male   Other Topics Concern    Not on file   Social History Narrative    Per [employer]: Single    Pets in the home     Social Determinants of Health     Financial Resource Strain: Not on file   Food Insecurity: Not on file   Transportation Needs: Not on file   Physical Activity: Not on file   Stress: Not on file   Social Connections: Not on file   Intimate Partner Violence: Not on file   Housing Stability: Not on file        Family History   Problem Relation Age of Onset    Breast cancer Mother     Lung cancer Mother     Atrial fibrillation Father     Stroke Father         CVA    Diabetes Father         Mellitus    Heart disease Father     Other Daughter         Colitis    Breast cancer Family        Lab Results   Component Value Date    WBC 8 48 09/30/2021    HGB 12 4 09/30/2021    HCT 39 7 09/30/2021    MCV 87 09/30/2021     09/30/2021      Lab Results   Component Value Date    SODIUM 135 (L) 09/30/2021    K 4 2 09/30/2021     09/30/2021    CO2 26 09/30/2021    BUN 15 09/30/2021    CREATININE 0 66 09/30/2021    GLUC 107 05/19/2021    CALCIUM 9 6 09/30/2021      No results found for: HGBA1C   No results found for: CHOL  Lab Results   Component Value Date    HDL 64 (H) 10/14/2019    HDL 62 (H) 08/16/2016     Lab Results   Component Value Date    LDLCALC 120 (H) 10/14/2019    LDLCALC 108 (H) 08/16/2016     Lab Results   Component Value Date    TRIG 69 10/14/2019    TRIG 120 08/16/2016     No results found for: CHOLHDL   No results found for: INR, PROTIME       Patient Active Problem List    Diagnosis Date Noted    Seronegative arthropathy of multiple sites ([facility] ) 09/20/2021    Thyroiditis, autoimmune 09/20/2021    Lumbar spondylosis 09/20/2021    Primary generalized (osteo)arthritis 09/20/2021    Undifferentiated connective tissue disease ([facility] ) 09/20/2021    Food sensitivity with gastrointestinal symptoms 09/20/2021    Seasonal allergies 06/10/2021    Anxiety 01/21/2021    Lactose intolerance 06/08/2020    Arthralgia of hand 06/08/2020    Squamous cell skin cancer 08/14/2019    Ulcerative colitis ([facility] ) 08/08/2018    Vitamin B12 deficiency 07/03/2013    Vitamin D deficiency 07/03/2013       Portions of the record may have been created with voice recognition software  Occasional wrong word or "sound a like" substitutions may have occurred due to the inherent limitations of voice recognition software  Read the chart carefully and recognize, using context, where substitutions have occurred      Agnes Herron DO, [facility] [place]  1/21/2022 3:20 PM

## 2022-02-03 ENCOUNTER — TELEPHONE (OUTPATIENT)
Dept: GASTROENTEROLOGY | Facility: MEDICAL CENTER | Age: 62
End: 2022-02-03

## 2022-02-03 NOTE — TELEPHONE ENCOUNTER
----- Message from Aziza Huggins MD sent at 2/2/2022  2:49 PM EST -----  Regarding: Ladarius Noel question  Hi Jaimie Franklin is on entyvio and recently had reaction after infusion  She is feeling better now    This was original message taken by our RN  She had entyvio infusion on Friday 1/14  On Saturday 1/15 she started experiencing "pins and needles" on the left half of her face  By 1/16 she stated it also felt like "crawling under the skin", again on left half of face only  These feelings of pins, needles, and crawling are intermittent, not constant Denies any other symptoms, pt notes she has equal strength on both sides and has no facial asymmetry or other neurological symptoms  I advised I am not sure if this could be from entyvio       Could reach our to her more information and call entyvio rep to see if they had similar reports in the past?    Thank you      Sincerely,    Ty Nguyen normal

## 2022-02-04 NOTE — TELEPHONE ENCOUNTER
Spoke to Chinyere Connell  Since rollout with Entyvio she has never heard of this side effect of Entyvio  She will report it to her team and if they have any additional questions they will reach out to us

## 2022-02-04 NOTE — TELEPHONE ENCOUNTER
Spoke to patient via phone  Per patient she sat for last Entyvio infusion on Friday 1/28  On Saturday when she woke she had the feeling of pins and needles on the left side of her face  This feeling would come and go  On Sunday she had an intense feeling of something crawling under her skin again on the left side of the face and at times migrating to the right side  This continued to Monday and then slowly faded as the week progressed  Patient also describes that after each Entyvio infusion that she has fatigue and congestion  I said that I will reach out to our IBD sub specialist and our drug rep then come up with a plan with Dr Cuevas Hidden about going forward  Patient is nervous to continue on Entyvio but finds it very helpful with her IBD  Patient has been on Entyvio since 7/2021 for UC Pancolitis

## 2022-02-04 NOTE — TELEPHONE ENCOUNTER
I relayed the below information and patient's history to Dr June Ramos  Per Dr June Ramos: He does not believe the symptoms that the patient is describing in relation to her face are due to [facility]  If the patient would feel more comfortable she may take Benedryl and Tylenol before next infusion

## 2022-02-04 NOTE — TELEPHONE ENCOUNTER
Spoke with Dr Robin Patel and relayed my communication with Dr Kelley Pedersen and Everett maher  Ultimately, he would like to see the patient in the office but thinks that she should continue with Entyvio and take Benedryl and Tylenol 30 minutes prior to the next infusion since she is doing well with her IBD  Relayed Dr Janeth Oneal communication to the pt via phone

## 2022-02-09 ENCOUNTER — TELEPHONE (OUTPATIENT)
Dept: GASTROENTEROLOGY | Facility: CLINIC | Age: 62
End: 2022-02-09

## 2022-02-10 ENCOUNTER — OFFICE VISIT (OUTPATIENT)
Dept: GASTROENTEROLOGY | Facility: CLINIC | Age: 62
End: 2022-02-10
Payer: COMMERCIAL

## 2022-02-10 VITALS
TEMPERATURE: 97.4 F | DIASTOLIC BLOOD PRESSURE: 75 MMHG | BODY MASS INDEX: 27.44 KG/M2 | SYSTOLIC BLOOD PRESSURE: 105 MMHG | HEIGHT: 62 IN

## 2022-02-10 DIAGNOSIS — K51.00 ULCERATIVE PANCOLITIS WITHOUT COMPLICATION (HCC): ICD-10-CM

## 2022-02-10 DIAGNOSIS — K51.20 ULCERATIVE PROCTITIS WITHOUT COMPLICATION (HCC): Primary | ICD-10-CM

## 2022-02-10 PROCEDURE — 1036F TOBACCO NON-USER: CPT | Performed by: INTERNAL MEDICINE

## 2022-02-10 PROCEDURE — 99214 OFFICE O/P EST MOD 30 MIN: CPT | Performed by: INTERNAL MEDICINE

## 2022-02-10 NOTE — PROGRESS NOTES
Progress Note- Macdonald Sicard 63 y o  female MRN: [id number]    Unit/Bed#:  Encounter: [id number]      Assessment and Plan:    82-year-old female with ulcerative pancolitis  here for follow-up visit  She is currently receiving Entyvio infusion  She is in clinical remission  Denies abdominal pain or diarrhea  Her fecal calprotectin is 104 in CRP 4 2 from October 2021  She is here to discuss her symptoms after recent Entyvio infusion  Her symptoms include pins and needles, Crowly filling on the left side of her face  The symptoms are progressively improving but not completely resolved  She is extremely concerned about getting another infusion  I did a literature search on neurology complications of Entyvio including PML  I also reached out to Heron maher and sent email to get recommendation from field medical educator  Based on my literature search there was only 1 reported case as outlined below    [email or web address]  1 case of PML in an Entyvio-treated patient with multiple contributory factors has been reported in the postmarketing setting (e g , human immunodeficiency virus [HIV] infection with a CD4 count of 300 cells/mm3 and prior and concomitant immunosuppression    We discussed about further management options including switching to different medication versus continuing Entyvio  Since she is doing very well on Entyvio we agreed to continue Entyvio infusion  I will give her premedication with Tylenol and Benadryl  I also offered her evaluation by neurologist but patient will like to hold off that for now  I will keep her updated once I hear back from Heron siu medical educator   ______________________________________________________________________    Subjective:  82-year-old female with ulcerative pancolitis here for follow-up visit  Ulcerative diagnosed over 5 years ago  Previous treatment include mesalamine  Currently doing well on Entyvio infusion      Last infusion was on Friday 1/14/2022 woke up Saturday 1/15/2022 morning with intermittent abnormal sensations on the left side of face ( pins and needles, crawling), however every day thereafter there was some improvement over a time span of several weeks  Denied weakness, facial asymmetry or other neurological symptoms  She also reports Crawly feeling in her face which is now improving  Several times a day  Pt expressing concerns regarding continued Entyvio infusions     Headache, brain fog, fatigue, congestion are normal symptoms following infusions  1-2x day formed BM  Every now again pt has bright red blood on toilet paper  No abdominal pain  Objective:     Vitals: Blood pressure 105/75, temperature (!) 97 4 °F (36 3 °C), temperature source Tympanic, height 5' 2" (1 575 m)  ,Body mass index is 27 44 kg/m²  [unfilled]    Physical Exam:   General Appearance: Awake and alert, in no acute distress  Abdomen: Soft, non-tender, non-distended; bowel sounds normal; no masses or no organomegaly    Invasive Devices  Report    None                 Lab Results:  No visits with results within 1 Day(s) from this visit  Latest known visit with results is:   Orders Only on 12/28/2021   Component Date Value    SARS-CoV-2 12/28/2021 Negative        Imaging Studies: I have personally reviewed pertinent imaging studies

## 2022-02-15 ENCOUNTER — TELEPHONE (OUTPATIENT)
Dept: FAMILY MEDICINE CLINIC | Facility: CLINIC | Age: 62
End: 2022-02-15

## 2022-02-15 NOTE — TELEPHONE ENCOUNTER
Patient called stating she had a reaction to her infusion on last Friday, she woke up Saturday with the feeling of pins/needs on the side of her face  Then it turned into it felt like something was under her skin  This is maybe the sixth time that she had the infusion, now she is scared to get her next scheduled infusion  Martha Fisher has never heard of this  She wants to know if you have ever heard of something like this from the medication or if you have any advice  Please advise patient at [phone]

## 2022-02-16 ENCOUNTER — TELEPHONE (OUTPATIENT)
Dept: FAMILY MEDICINE CLINIC | Facility: CLINIC | Age: 62
End: 2022-02-16

## 2022-02-16 NOTE — TELEPHONE ENCOUNTER
Pt called back and stated she wanted to make us aware that GI wants her to try the infusion again because it works well for her Colitis  She states she is inquiring if you have ever heard of such a thing happening

## 2022-02-16 NOTE — TELEPHONE ENCOUNTER
Pt called the office back and she stated the Pins and needles was the first symptom she felt and it went away and then she started having a crawling sensation on the Left side of her face, she stated it would happen in different areas of the face but only on the Left side  She said she counted the one day and it happened approximately 23 times  She stated that it has not resolved, it is about 95% better but it still happens daily  She states the episodes are much lighter and quicker but it does still happen  She stated her next infusion is in a month and she is unsure what to do after this as this has never happened before

## 2022-02-16 NOTE — TELEPHONE ENCOUNTER
So the pins and needles is the only side effect that she was experiencing? And it is now completely resolved?

## 2022-02-16 NOTE — TELEPHONE ENCOUNTER
I have done a little bit of literature search and not coming up with any common factor or cases of this symptom with the infusion ENtyvio  I see that GI  gave you information about PML  (Visual issues, mental impairment, dementia, confusion, personality changes and motor weakness ) which is a rare condition  But has been associated with some of the agents that are used for Crohn's  However PML and patient's current  symptoms are definitely not similar in my opinion  I did find only one article that mentioned something about facial paresthesia but it was not with ENYVIO  In fact the articles talked about peripheral neuropathy symptoms related to the disease of Crohn's itself and not necessarily the medication used to treat Crohn's  None the less, I can certainly understand the patient's  nervousness with regards to this symptoms/side effect  I feel confident that the gastroenterologist will lead her in the right direction  If it is a quirky side effect, I would expect that it will completely dissipate  It ultimately is the patient's decision whether she wants to go with the next infusion  Regardless, she will need to consider all treatment options especially if the infusion is helping her Crohn's  If the side effect happens again certainly would seem to be a cause and effect  At that point maybe we could talk to Neurology about data that they might have?

## 2022-02-24 ENCOUNTER — CLINICAL SUPPORT (OUTPATIENT)
Dept: CARDIOLOGY CLINIC | Facility: CLINIC | Age: 62
End: 2022-02-24
Payer: COMMERCIAL

## 2022-02-24 DIAGNOSIS — R00.0 TACHYCARDIA: ICD-10-CM

## 2022-02-24 DIAGNOSIS — R00.2 PALPITATION: ICD-10-CM

## 2022-02-24 PROCEDURE — 93248 EXT ECG>7D<15D REV&INTERPJ: CPT

## 2022-03-01 ENCOUNTER — TELEPHONE (OUTPATIENT)
Dept: CARDIOLOGY CLINIC | Facility: CLINIC | Age: 62
End: 2022-03-01

## 2022-03-01 NOTE — TELEPHONE ENCOUNTER
I called and spoke with the Pt and gave her your recommendations, she verbalized understanding  She stated she does have the next infusion scheduled and she will give it a try and see how it goes

## 2022-03-01 NOTE — TELEPHONE ENCOUNTER
Patient is inquiring in regards to the message below  She had issues/side effects with previous infusion  Wondering if this is normal and if she should continue with the infusions for her colitis   Looks like this was included in a message thread from 02/15/2022

## 2022-03-01 NOTE — TELEPHONE ENCOUNTER
I believe that I did send something back to the patient while ago about my opinion with regards to the questionable reaction    I think it is reasonable to try the infusion again and obviously if everything goes well great, and if she has any particular type of reaction again it obviously would prove the point

## 2022-03-01 NOTE — TELEPHONE ENCOUNTER
----- Message from Gerhardt Matt, DO sent at 3/1/2022  8:39 AM EST -----  Please call the patient regarding their result  No concerning sustained arrhythmias, had rare PVCs and PACs  When I saw her there was a question of if these were related to her COVID booster vaccine   If symptoms are improving, would just monitor for now, if not, we can discuss further approaches over the phone vs at her follow up appointment where I can go over these results with her in depth

## 2022-03-31 ENCOUNTER — OFFICE VISIT (OUTPATIENT)
Dept: CARDIOLOGY CLINIC | Facility: CLINIC | Age: 62
End: 2022-03-31
Payer: COMMERCIAL

## 2022-03-31 VITALS
DIASTOLIC BLOOD PRESSURE: 60 MMHG | HEART RATE: 96 BPM | SYSTOLIC BLOOD PRESSURE: 124 MMHG | OXYGEN SATURATION: 99 % | BODY MASS INDEX: 27.49 KG/M2 | WEIGHT: 149.4 LBS | HEIGHT: 62 IN

## 2022-03-31 DIAGNOSIS — R00.2 PALPITATION: Primary | ICD-10-CM

## 2022-03-31 PROCEDURE — 1036F TOBACCO NON-USER: CPT

## 2022-03-31 PROCEDURE — 99214 OFFICE O/P EST MOD 30 MIN: CPT

## 2022-03-31 PROCEDURE — 3008F BODY MASS INDEX DOCD: CPT

## 2022-03-31 NOTE — PROGRESS NOTES
Cardiology   MD Paulo Myers MD Enrico Lango, DO, MD Jagdish Jasso DO, Sasha Couch DO, [facility] [place]  -------------------------------------------------------------------  Formerly [facility]  [address], [place] [address]  [phone]  [phone]  [phone]  03/31/22  Dima Winchester  YOB: 1960   MRN: [id number]      Referring Physician: Jacqueline Chan DO  [address]  [address] [facility],  [address]     HPI: Dima Winchester is a 63 y o  female with:   · Ulcerative colitis 2017  · Autoimmune thyroiditis  · Undifferentiated connective tissue disease  · History of palpitations    I had seen her in January of 2022 for palpitations  She states that she has been feeling these for several years but after receiving her COVID 19 booster vaccine her symptoms had increased in frequency  This seemed to be transient because now her symptoms have for the most part resolved  She wore a Zio patch which showed PVCs which were rare however she was symptomatic from these  No syncope  No presyncope  No dizziness lightheadedness chest pain or shortness of breath  She is anxious about the need for another booster vaccine however because she correlates this with her symptoms  Review of Systems   Constitutional: Negative for chills and fever  HENT: Negative for facial swelling and sore throat  Eyes: Negative for visual disturbance  Respiratory: Negative for cough, chest tightness, shortness of breath and wheezing  Cardiovascular: Positive for palpitations  Negative for chest pain and leg swelling  Gastrointestinal: Negative for abdominal pain, blood in stool, constipation, diarrhea, nausea and vomiting  Endocrine: Negative for cold intolerance and heat intolerance  Genitourinary: Negative for decreased urine volume, difficulty urinating, dysuria and hematuria     Musculoskeletal: Negative for arthralgias, back pain and myalgias  Skin: Negative for rash  Neurological: Negative for dizziness, syncope, weakness and numbness  Psychiatric/Behavioral: Negative for agitation, behavioral problems and confusion  The patient is not nervous/anxious  OBJECTIVE  Vitals:    03/31/22 0755   BP: 124/60   Pulse: 96   SpO2: 99%       Physical Exam  Constitutional: awake, alert and oriented, in no acute distress, no obvious deformities  Head: Normocephalic, without obvious abnormality, atraumatic  Eyes: conjunctivae clear and moist  Sclera anicteric  No xanthelasmas  Pupils equal bilaterally  Extraocular motions are full  Ear nose mouth and throat: ears are symmetrical bilaterally, hearing appears to be equal bilaterally, no nasal discharge or epistaxis, oropharynx is clear with moist mucous membranes  Neck:  Trachea is midline, neck is supple, no thyromegaly or significant lymphadenopathy, there is full range of motion  Lungs: clear to auscultation bilaterally, no wheezes, no rales, no rhonchi, no accessory muscle use, breathing is nonlabored  Heart: regular rate and rhythm, S1, S2 normal, no murmur, no click, no rub and no gallop, no lower extremity edema  Abdomen: soft, non-tender; bowel sounds normal; no masses,  no organomegaly  Psychiatric:  Patient is oriented to time, place, person, mood/affect is negative for depression, anxiety, agitation, appears to have appropriate insight  Skin: Skin is warm, dry, intact  No obvious rashes or lesions on exposed extremities  Nail beds are pink with no cyanosis or clubbing  EKG:  No results found for this visit on 03/31/22  IMPRESSION:   Palpitations, rare PVCs on Zio patch    Ulcerative colitis   Hashimoto thyroiditis    Undifferentiated connective tissue disease    DISCUSSION/RECOMMENDATIONS:   The most part her symptoms have improved/resolved and gone back to baseline level    She was symptomatic from PVCs on her Zio patch however the overall frequency was relatively rare   She has no episodes of syncope    Her thyroid function is normal    Would continue with observation for now however I did give her a prescription for metoprolol to have on hand and take as needed if her symptoms do return   Would plan to observe for now and follow up in 6 months    Isela Gresham DO, [facility] [place]  --------------------------------------------------------------------------------  TREADMILL STRESS  No results found for this or any previous visit      ----------------------------------------------------------------------------------------------  NUCLEAR STRESS TEST: No results found for this or any previous visit  No results found for this or any previous visit       --------------------------------------------------------------------------------  CATH:  No results found for this or any previous visit     --------------------------------------------------------------------------------  ECHO:   No results found for this or any previous visit  No results found for this or any previous visit     --------------------------------------------------------------------------------  HOLTER  No results found for this or any previous visit  No results found for this or any previous visit     --------------------------------------------------------------------------------  CAROTIDS  No results found for this or any previous visit      --------------------------------------------------------------------------------  Diagnoses and all orders for this visit:    Palpitation  -     metoprolol tartrate (LOPRESSOR) 25 mg tablet;  Take 1 tablet (25 mg total) by mouth every 12 (twelve) hours as needed (palpitations)       ======================================================    Past Medical History:   Diagnosis Date    Anxiety     Cancer ([facility] [address] )     skin    Change in bowel habits     Elevated liver enzymes     Gall stones     Hashimoto's thyroiditis     Irritable bowel syndrome     Lumbar spondylosis     Osteoarthritis     Seasonal allergies     Ulcerative colitis ([facility] [address] )      Past Surgical History:   Procedure Laterality Date    COLONOSCOPY      EAR SURGERY      cyst removed from ear drum    EAR SURGERY      EAR SURGERY      IRIDECTOMY      Optical    ND COLONOSCOPY FLX DX W/COLLJ SPEC WHEN PFRMD N/A 10/9/2017    Procedure: COLONOSCOPY;  Surgeon: Shelbie Navarrete MD;  Location:  GI LAB; Service: Gastroenterology    ND EXC SKIN MALIG 1 1-2 CM TRUNK,ARM,LEG Right 10/4/2019    Procedure: 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;  Surgeon: Allison Granados MD;  Location: [address] [place] [address];  Service: Plastics    UPPER GASTROINTESTINAL ENDOSCOPY           Medications  Current Outpatient Medications   Medication Sig Dispense Refill    ALPRAZolam (XANAX) 0 25 mg tablet TAKE 1 TABLET DAILY AT BEDTIME AS NEEDED FOR ANXIETY 90 tablet 0    ascorbic acid (VITAMIN C) 250 mg tablet Take 250 mg by mouth daily        Cholecalciferol (VITAMIN D PO) Take 4,000 Units by mouth daily        Cyanocobalamin (VITAMIN B-12 PO) Take by mouth daily      Vedolizumab (ENTYVIO IV) Infuse into a venous catheter every 56 days      ergocalciferol (ERGOCALCIFEROL) 1 25 MG (49144 UT) capsule Take 1 capsule weekly for 8 weeks  (Patient not taking: Reported on 1/21/2022 ) 8 capsule 0    metoprolol tartrate (LOPRESSOR) 25 mg tablet Take 1 tablet (25 mg total) by mouth every 12 (twelve) hours as needed (palpitations) 60 tablet 0     No current facility-administered medications for this visit          Allergies   Allergen Reactions    Amoxicillin GI Intolerance       Social History     Socioeconomic History    Marital status: /Civil Union     Spouse name: Not on file    Number of children: 3    Years of education: Not on file    Highest education level: Not on file   Occupational History    Not on file   Tobacco Use    Smoking status: Never Smoker    Smokeless tobacco: Never Used   Vaping Use    Vaping Use: Never used Substance and Sexual Activity    Alcohol use: Yes     Comment: Social    Drug use: No    Sexual activity: Not Currently     Partners: Male   Other Topics Concern    Not on file   Social History Narrative    Per [employer]: Single    Pets in the home     Social Determinants of Health     Financial Resource Strain: Not on file   Food Insecurity: Not on file   Transportation Needs: Not on file   Physical Activity: Not on file   Stress: Not on file   Social Connections: Not on file   Intimate Partner Violence: Not on file   Housing Stability: Not on file        Family History   Problem Relation Age of Onset    Breast cancer Mother     Lung cancer Mother     Atrial fibrillation Father     Stroke Father         CVA    Diabetes Father         Mellitus    Heart disease Father     Other Daughter         Colitis    Breast cancer Family        Lab Results   Component Value Date    WBC 8 48 09/30/2021    HGB 12 4 09/30/2021    HCT 39 7 09/30/2021    MCV 87 09/30/2021     09/30/2021      Lab Results   Component Value Date    SODIUM 135 (L) 09/30/2021    K 4 2 09/30/2021     09/30/2021    CO2 26 09/30/2021    BUN 15 09/30/2021    CREATININE 0 66 09/30/2021    GLUC 107 05/19/2021    CALCIUM 9 6 09/30/2021      No results found for: HGBA1C   No results found for: CHOL  Lab Results   Component Value Date    HDL 64 (H) 10/14/2019    HDL 62 (H) 08/16/2016     Lab Results   Component Value Date    LDLCALC 120 (H) 10/14/2019    LDLCALC 108 (H) 08/16/2016     Lab Results   Component Value Date    TRIG 69 10/14/2019    TRIG 120 08/16/2016     No results found for: CHOLHDL   No results found for: INR, PROTIME       Patient Active Problem List    Diagnosis Date Noted    [facility]oneg[facility] 09/20/2021    Thyroiditis, autoimmune 09/20/2021    Lumbar spondylosis 09/20/2021    Primary generalized (osteo)arthritis 09/20/2021    Undifferentiated connective tissue disease ([facility] [address] ) 09/20/2021    Food sensitivity with gastrointestinal symptoms 09/20/2021    Seasonal allergies 06/10/2021    Anxiety 01/21/2021    Lactose intolerance 06/08/2020    Arthralgia of hand 06/08/2020    Squamous cell skin cancer 08/14/2019    Ulcerative colitis ([facility] [address] ) 08/08/2018    Vitamin B12 deficiency 07/03/2013    Vitamin D deficiency 07/03/2013       Portions of the record may have been created with voice recognition software  Occasional wrong word or "sound a like" substitutions may have occurred due to the inherent limitations of voice recognition software  Read the chart carefully and recognize, using context, where substitutions have occurred      Ariana Swift DO, [facility] [place]  3/31/2022 9:20 AM

## 2022-04-20 ENCOUNTER — TRANSCRIBE ORDERS (OUTPATIENT)
Dept: HOME HEALTH SERVICES | Facility: HOME HEALTHCARE | Age: 62
End: 2022-04-20

## 2022-04-20 ENCOUNTER — HOME HEALTH ADMISSION (OUTPATIENT)
Dept: HOME HEALTH SERVICES | Facility: HOME HEALTHCARE | Age: 62
End: 2022-04-20

## 2022-04-20 DIAGNOSIS — Z45.2 ENCOUNTER FOR ADJUSTMENT AND MANAGEMENT OF VASCULAR ACCESS DEVICE: Primary | ICD-10-CM

## 2022-05-09 ENCOUNTER — HOME CARE VISIT (OUTPATIENT)
Dept: HOME HEALTH SERVICES | Facility: HOME HEALTHCARE | Age: 62
End: 2022-05-09

## 2022-05-10 VITALS
OXYGEN SATURATION: 98 % | DIASTOLIC BLOOD PRESSURE: 68 MMHG | RESPIRATION RATE: 18 BRPM | SYSTOLIC BLOOD PRESSURE: 108 MMHG | TEMPERATURE: 97.5 F | HEART RATE: 80 BPM

## 2022-05-11 ENCOUNTER — TELEPHONE (OUTPATIENT)
Dept: OTHER | Facility: OTHER | Age: 62
End: 2022-05-11

## 2022-05-11 NOTE — TELEPHONE ENCOUNTER
I returned call to A advised to send orders delta direct e-mail and we will have Dr Unger sign once he is back in the office

## 2022-05-11 NOTE — TELEPHONE ENCOUNTER
Santa Grayson called in stating she faxed over orders and hasn't received a response  The orders are 50-60 days overdue

## 2022-05-13 ENCOUNTER — OFFICE VISIT (OUTPATIENT)
Dept: FAMILY MEDICINE CLINIC | Facility: CLINIC | Age: 62
End: 2022-05-13
Payer: COMMERCIAL

## 2022-05-13 VITALS
HEIGHT: 62 IN | SYSTOLIC BLOOD PRESSURE: 112 MMHG | OXYGEN SATURATION: 97 % | TEMPERATURE: 97.5 F | WEIGHT: 145 LBS | BODY MASS INDEX: 26.68 KG/M2 | HEART RATE: 92 BPM | DIASTOLIC BLOOD PRESSURE: 78 MMHG

## 2022-05-13 DIAGNOSIS — K12.0 APHTHOUS ULCER: ICD-10-CM

## 2022-05-13 DIAGNOSIS — S93.401A SPRAIN OF RIGHT ANKLE, UNSPECIFIED LIGAMENT, INITIAL ENCOUNTER: Primary | ICD-10-CM

## 2022-05-13 PROCEDURE — 1036F TOBACCO NON-USER: CPT | Performed by: NURSE PRACTITIONER

## 2022-05-13 PROCEDURE — 99213 OFFICE O/P EST LOW 20 MIN: CPT | Performed by: NURSE PRACTITIONER

## 2022-05-13 PROCEDURE — 3725F SCREEN DEPRESSION PERFORMED: CPT | Performed by: NURSE PRACTITIONER

## 2022-05-13 PROCEDURE — 3008F BODY MASS INDEX DOCD: CPT | Performed by: NURSE PRACTITIONER

## 2022-05-13 NOTE — PROGRESS NOTES
Assessment/Plan:   Diagnosis ICD-10-CM Associated Orders   1  Sprain of right ankle, unspecified ligament, initial encounter  S93 401A    2  Aphthous ulcer  K12 0    Ankle sprain healing well  Continue with rest until fully resolved  Mild B/L lower extremity non pitting edema, recommend compression stockings during the day, call if no improvement  Mouth ulcer healed and lymphadenopathy resolved per patient  Continue to monitor  Call if this re-occurs  Advised to call the office for any worsening of symptoms or no symptom improvement  Patient verbalizes understand and agrees with treatment plan  Diagnoses and all orders for this visit:    Sprain of right ankle, unspecified ligament, initial encounter    Aphthous ulcer              Subjective:        Patient ID: Alissa Frederick is a 63 y o  female  Chief Complaint   Patient presents with    Follow-up     Pt having pain in her mouth under her tongue towards the back of her mouth and jaw line states it felt like ulcers, Pt also rolled R ankle she would like it checked Pt has mammo scheduled and will schedule colonoscopy  Not due for Tdap        Patient presents with: Follow-up: Pt having pain in her mouth under her tongue towards the back of her mouth and jaw line states it felt like ulcers, Pt also rolled R ankle she would like it checked Pt has mammo scheduled and will schedule colonoscopy  Not due for Tdap   Throat and mouth feeling much better  Rolled ankle Wednesday while playing pickle ball  This is also healing/resolving  Bruising is improving  She has some questions regarding covid vaccination         The following portions of the patient's history were reviewed and updated as appropriate: allergies, current medications, past family history, past social history and problem list     Review of Systems      Objective:  /78 (BP Location: Left arm, Patient Position: Sitting, Cuff Size: Adult)   Pulse 92   Temp 97 5 °F (36 4 °C) (Temporal)   Ht 5' 1 5" (1 562 m)   Wt 65 8 kg (145 lb)   SpO2 97%   BMI 26 95 kg/m²      Physical Exam  Vitals and nursing note reviewed  Constitutional:       General: She is not in acute distress  Appearance: She is well-developed  She is not diaphoretic  HENT:      Head: Normocephalic and atraumatic  Right Ear: External ear normal       Left Ear: External ear normal       Mouth/Throat:      Tongue: No lesions  Pharynx: No pharyngeal swelling, oropharyngeal exudate, posterior oropharyngeal erythema or uvula swelling  Tonsils: No tonsillar exudate or tonsillar abscesses  Comments: Small left tonsil stone   Eyes:      General: Lids are normal          Right eye: No discharge  Left eye: No discharge  Conjunctiva/sclera: Conjunctivae normal    Cardiovascular:      Pulses:           Posterior tibial pulses are 2+ on the right side and 2+ on the left side  Pulmonary:      Effort: Pulmonary effort is normal  No respiratory distress  Musculoskeletal:         General: No deformity  Cervical back: Neck supple  Right lower le+ Edema present  Left lower le+ Edema present  Right ankle: Swelling (mild) and ecchymosis (healing) present  Normal range of motion  Normal pulse  Left ankle: No swelling, deformity or ecchymosis  Normal range of motion  Normal pulse  Skin:     General: Skin is warm and dry  Neurological:      Mental Status: She is alert and oriented to person, place, and time  Psychiatric:         Speech: Speech normal          Behavior: Behavior normal          Thought Content: Thought content normal          Judgment: Judgment normal            BMI Counseling: Body mass index is 26 95 kg/m²  The BMI is above normal  Nutrition recommendations include limiting drinks that contain sugar and reducing intake of cholesterol  Exercise recommendations include exercising 3-5 times per week and strength training exercises   Rationale for BMI follow-up plan is due to patient being overweight or obese  Depression Screening and Follow-up Plan: Patient was screened for depression during today's encounter  They screened negative with a PHQ-2 score of 0  Current Outpatient Medications:     ALPRAZolam (XANAX) 0 25 mg tablet, TAKE 1 TABLET DAILY AT BEDTIME AS NEEDED FOR ANXIETY, Disp: 90 tablet, Rfl: 0    ascorbic acid (VITAMIN C) 250 mg tablet, Take 250 mg by mouth daily  , Disp: , Rfl:     Cholecalciferol (VITAMIN D PO), Take 4,000 Units by mouth daily  , Disp: , Rfl:     Cyanocobalamin (VITAMIN B-12 PO), Take by mouth daily, Disp: , Rfl:     ergocalciferol (ERGOCALCIFEROL) 1 25 MG (45856 UT) capsule, Take 1 capsule weekly for 8 weeks  , Disp: 8 capsule, Rfl: 0    metoprolol tartrate (LOPRESSOR) 25 mg tablet, Take 1 tablet (25 mg total) by mouth every 12 (twelve) hours as needed (palpitations), Disp: 60 tablet, Rfl: 0    Vedolizumab (ENTYVIO IV), Infuse into a venous catheter every 56 days, Disp: , Rfl:   Allergies   Allergen Reactions    Amoxicillin GI Intolerance

## 2022-05-13 NOTE — PATIENT INSTRUCTIONS
Continue to monitor ankle and mouth  Please call the office if you are experiencing any worsening of symptoms or no symptom improvement

## 2022-05-18 ENCOUNTER — TELEPHONE (OUTPATIENT)
Dept: GASTROENTEROLOGY | Facility: CLINIC | Age: 62
End: 2022-05-18

## 2022-05-18 DIAGNOSIS — K51.00 ULCERATIVE PANCOLITIS WITHOUT COMPLICATION (HCC): Primary | ICD-10-CM

## 2022-05-18 NOTE — TELEPHONE ENCOUNTER
Gino Valentine PA-C  You 32 minutes ago (11:37 AM)     I agree  I will order inflammatory markers and drug levels and antibody levels that she should have drawn a few days prior to her next infusion      Reviewed provider recs with pt as well as to call office if symptoms worsen/new symptoms develop  Pt verbalized understanding of all instructions and had no further questions at this time

## 2022-05-18 NOTE — TELEPHONE ENCOUNTER
OV 2/10/22 Dr Katie Byrd  Hx: UC   EGD/Colonoscopy: 6/10/21  Entyvio q8 weeks     Please Advise  Pt explained for the past month has noticed "flare symptoms" which have slightly improved with last entyvio infusion 1 week ago  However still having BRBPR with wiping, pt could not clarify how often, how much, or if with every BM as looking in toilet/stool gives her "anxiety"  Stools range from semi formed to loose, BM 5x daily when baseline is 2-3x daily, and occasional abdominal cramping  Denies N/V/D, fevers, joint pain, or any other appreciating symptoms  Recs:  1   Consider ordering CRP, fecal calprotectin, entyvio antibody levels   Advised to wait for call back regarding recs

## 2022-05-19 ENCOUNTER — APPOINTMENT (OUTPATIENT)
Dept: LAB | Facility: MEDICAL CENTER | Age: 62
End: 2022-05-19
Payer: COMMERCIAL

## 2022-05-19 DIAGNOSIS — K51.00 ULCERATIVE PANCOLITIS WITHOUT COMPLICATION (HCC): Primary | ICD-10-CM

## 2022-05-19 DIAGNOSIS — K51.00 ULCERATIVE PANCOLITIS WITHOUT COMPLICATION (HCC): ICD-10-CM

## 2022-05-19 LAB
ALBUMIN SERPL BCP-MCNC: 3.4 G/DL (ref 3.5–5)
ALP SERPL-CCNC: 227 U/L (ref 46–116)
ALT SERPL W P-5'-P-CCNC: 54 U/L (ref 12–78)
ANION GAP SERPL CALCULATED.3IONS-SCNC: 6 MMOL/L (ref 4–13)
AST SERPL W P-5'-P-CCNC: 34 U/L (ref 5–45)
BILIRUB SERPL-MCNC: 0.53 MG/DL (ref 0.2–1)
BUN SERPL-MCNC: 11 MG/DL (ref 5–25)
CALCIUM ALBUM COR SERPL-MCNC: 9.8 MG/DL (ref 8.3–10.1)
CALCIUM SERPL-MCNC: 9.3 MG/DL (ref 8.3–10.1)
CHLORIDE SERPL-SCNC: 108 MMOL/L (ref 100–108)
CO2 SERPL-SCNC: 26 MMOL/L (ref 21–32)
CREAT SERPL-MCNC: 0.9 MG/DL (ref 0.6–1.3)
CRP SERPL QL: 3.2 MG/L
GFR SERPL CREATININE-BSD FRML MDRD: 70 ML/MIN/1.73SQ M
GLUCOSE SERPL-MCNC: 92 MG/DL (ref 65–140)
POTASSIUM SERPL-SCNC: 4.2 MMOL/L (ref 3.5–5.3)
PROT SERPL-MCNC: 7.8 G/DL (ref 6.4–8.2)
SODIUM SERPL-SCNC: 140 MMOL/L (ref 136–145)

## 2022-05-19 PROCEDURE — 80053 COMPREHEN METABOLIC PANEL: CPT

## 2022-05-19 PROCEDURE — 86140 C-REACTIVE PROTEIN: CPT

## 2022-05-19 PROCEDURE — 36415 COLL VENOUS BLD VENIPUNCTURE: CPT

## 2022-05-20 ENCOUNTER — TELEPHONE (OUTPATIENT)
Dept: HOME HEALTH SERVICES | Facility: HOME HEALTHCARE | Age: 62
End: 2022-05-20

## 2022-05-20 NOTE — TELEPHONE ENCOUNTER
Communication with Ezella Barthel, RN to obtain symptom control ratings for  diagnoses on [address] [facility] encounter date 5 9 22  Per BRYCE Cortez, have entered the following ratings:  K51 00=2  K51 20=2  K58 9=1  K21 9=1    Above ratings obtained from BRYCE Cortez but entered by this author due to a transition in the software system

## 2022-05-25 PROCEDURE — G0179 MD RECERTIFICATION HHA PT: HCPCS | Performed by: INTERNAL MEDICINE

## 2022-05-27 ENCOUNTER — APPOINTMENT (OUTPATIENT)
Dept: LAB | Facility: MEDICAL CENTER | Age: 62
End: 2022-05-27
Payer: COMMERCIAL

## 2022-05-27 PROCEDURE — 83993 ASSAY FOR CALPROTECTIN FECAL: CPT | Performed by: PHYSICIAN ASSISTANT

## 2022-05-31 LAB — CALPROTECTIN STL-MCNT: 713 UG/G (ref 0–120)

## 2022-06-06 ENCOUNTER — TELEPHONE (OUTPATIENT)
Dept: GASTROENTEROLOGY | Facility: CLINIC | Age: 62
End: 2022-06-06

## 2022-06-06 NOTE — TELEPHONE ENCOUNTER
Megan Comment, MD  You 4 days ago     I recommend office visit - It could be virtual visit - ok to overbook next week     Thank you      Called pt and LVM advising for a call back

## 2022-06-07 ENCOUNTER — OFFICE VISIT (OUTPATIENT)
Dept: GASTROENTEROLOGY | Facility: CLINIC | Age: 62
End: 2022-06-07
Payer: COMMERCIAL

## 2022-06-07 VITALS
DIASTOLIC BLOOD PRESSURE: 78 MMHG | HEIGHT: 61 IN | WEIGHT: 147 LBS | SYSTOLIC BLOOD PRESSURE: 116 MMHG | BODY MASS INDEX: 27.75 KG/M2 | HEART RATE: 95 BPM | TEMPERATURE: 98.3 F | RESPIRATION RATE: 18 BRPM | OXYGEN SATURATION: 97 %

## 2022-06-07 DIAGNOSIS — K51.00 ULCERATIVE PANCOLITIS WITHOUT COMPLICATION (HCC): Primary | ICD-10-CM

## 2022-06-07 DIAGNOSIS — R74.8 ELEVATED ALKALINE PHOSPHATASE LEVEL: ICD-10-CM

## 2022-06-07 PROCEDURE — 99214 OFFICE O/P EST MOD 30 MIN: CPT | Performed by: INTERNAL MEDICINE

## 2022-06-07 NOTE — PROGRESS NOTES
[facility] - Outpatient Follow-up Note  Eufemia Hernandez 63 y o  female MRN: [id number]  Encounter: [id number]          ASSESSMENT AND PLAN:    37-year-old female with ulcerative colitis seen for follow-up visit  She was experiencing frequent bowel movement and blood in the stool prior to recent Entyvio infusion  She was having 5-7 bowel movement  Her Entyvio infusion was on May 9, 2022  Since Entyvio infusion stool frequency decreased to 3 to 4 times a day  Her CRP is 3 2  Fecal calprotectin increased to 713 from 104  She has no abdominal pain  She also has mild elevation of alkaline phosphatase  The rest of liver enzymes are normal     Increased frequency of bowel movement and elevated fecal calprotectin concerning for UC flare up  Fortunately her symptoms are improving after Entyvio infusion but I am concerned that Entyvio may not be lasting for a total of 8 weeks  We will check the level and antibody prior to next infusion  I will also check fecal calprotectin again prior to next infusion  Will consider shortening the interval to 6 weeks if the level is low or she continues to have symptom  Discussed about avoiding NSAIDs  Elevated alkaline phosphatase - her daughter has ulcerative colitis and PSC  I recommend MRI and MRCP for further evaluation of PSC  Repeat CMP  Follow-up in 3 month  ______________________________________________________________________    SUBJECTIVE:  72-year-old female with ulcerative colitis here for follow-up visit  She has been experiencing increased frequency of bowel movement prior to Entyvio infusion  She was having 5-7 bloody bowel movement prior to Entyvio infusion which was on May 9, 2022  Since then frequency of bowel movements has decreased  She usually does not have any abdominal pain with a flare up    She is on gluten free diet  Her alkaline phosphatase is elevated    I was 118 now up to 5  Family history of ulcerative colitis and [facility] -her daughter  Her joint pain has improved with gluten free diet      REVIEW OF SYSTEMS IS OTHERWISE NEGATIVE  Historical Information   Past Medical History:   Diagnosis Date    Anxiety     Cancer ([facility] [address] )     skin    Change in bowel habits     Elevated liver enzymes     Gall stones     Hashimoto's thyroiditis     Irritable bowel syndrome     Lumbar spondylosis     Osteoarthritis     Seasonal allergies     Ulcerative colitis ([facility] [address] )      Past Surgical History:   Procedure Laterality Date    COLONOSCOPY      EAR SURGERY      cyst removed from ear drum    EAR SURGERY      EAR SURGERY      IRIDECTOMY      Optical    UT COLONOSCOPY FLX DX W/COLLJ SPEC WHEN PFRMD N/A 10/9/2017    Procedure: COLONOSCOPY;  Surgeon: Sallye Olszewski, MD;  Location: [facility];   Service: Gastroenterology    UT EXC SKIN MALIG 1 1-2 CM TRUNK,ARM,LEG Right 10/4/2019    Procedure: 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;  Surgeon: Harish King MD;  Location: [address] [place] [address] OR;  Service: Plastics    UPPER GASTROINTESTINAL ENDOSCOPY       Social History   Social History     Substance and Sexual Activity   Alcohol Use Yes    Comment: Social     Social History     Substance and Sexual Activity   Drug Use No     Social History     Tobacco Use   Smoking Status Never Smoker   Smokeless Tobacco Never Used     Family History   Problem Relation Age of Onset    Breast cancer Mother     Lung cancer Mother     Atrial fibrillation Father     Stroke Father         CVA    Diabetes Father         Mellitus    Heart disease Father     Other Daughter         Colitis    Breast cancer Family        Meds/Allergies       Current Outpatient Medications:     ALPRAZolam (XANAX) 0 25 mg tablet    ascorbic acid (VITAMIN C) 250 mg tablet    Cholecalciferol (VITAMIN D PO)    Cyanocobalamin (VITAMIN B-12 PO)    ergocalciferol (ERGOCALCIFEROL) 1 25 MG (03716 UT) capsule    metoprolol tartrate (LOPRESSOR) 25 mg tablet    Vedolizumab (ENTYVIO IV)    Allergies   Allergen Reactions    Amoxicillin GI Intolerance           Objective     Blood pressure 116/78, pulse 95, temperature 98 3 °F (36 8 °C), temperature source Tympanic, resp  rate 18, height 5' 1" (1 549 m), weight 66 7 kg (147 lb), SpO2 97 %  Body mass index is 27 78 kg/m²  PHYSICAL EXAM:      General Appearance:   Alert, cooperative, no distress   HEENT:   Normocephalic, atraumatic, anicteric      Neck:  Supple, symmetrical, trachea midline   Lungs:   Clear to auscultation bilaterally; no rales, rhonchi or wheezing; respirations unlabored    Heart[de-identified]   Regular rate and rhythm; no murmur, rub, or gallop  Abdomen:   Soft, non-tender, non-distended; normal bowel sounds; no masses, no organomegaly    Genitalia:   Deferred    Rectal:   Deferred    Extremities:  No cyanosis, clubbing or edema    Pulses:  2+ and symmetric    Skin:  No jaundice, rashes, or lesions    Lymph nodes:  No palpable cervical lymphadenopathy        Lab Results:   No visits with results within 1 Day(s) from this visit  Latest known visit with results is:   Appointment on 05/19/2022   Component Date Value    CRP 05/19/2022 3 2 (A)    Sodium 05/19/2022 140     Potassium 05/19/2022 4 2     Chloride 05/19/2022 108     CO2 05/19/2022 26     ANION GAP 05/19/2022 6     BUN 05/19/2022 11     Creatinine 05/19/2022 0 90     Glucose 05/19/2022 92     Calcium 05/19/2022 9 3     Corrected Calcium 05/19/2022 9 8     AST 05/19/2022 34     ALT 05/19/2022 54     Alkaline Phosphatase 05/19/2022 227 (A)    Total Protein 05/19/2022 7 8     Albumin 05/19/2022 3 4 (A)    Total Bilirubin 05/19/2022 0 53     eGFR 05/19/2022 70          Radiology Results:   No results found

## 2022-06-08 DIAGNOSIS — K51.00 ULCERATIVE PANCOLITIS WITHOUT COMPLICATION (HCC): Primary | ICD-10-CM

## 2022-06-10 ENCOUNTER — APPOINTMENT (OUTPATIENT)
Dept: LAB | Facility: MEDICAL CENTER | Age: 62
End: 2022-06-10
Attending: INTERNAL MEDICINE
Payer: COMMERCIAL

## 2022-06-10 DIAGNOSIS — K51.00 ULCERATIVE PANCOLITIS WITHOUT COMPLICATION (HCC): ICD-10-CM

## 2022-06-10 DIAGNOSIS — K51.00 ULCERATIVE PANCOLITIS WITHOUT COMPLICATION (HCC): Primary | ICD-10-CM

## 2022-06-10 LAB
FERRITIN SERPL-MCNC: 9 NG/ML (ref 8–388)
IRON SATN MFR SERPL: 14 % (ref 15–50)
IRON SERPL-MCNC: 55 UG/DL (ref 50–170)
TIBC SERPL-MCNC: 403 UG/DL (ref 250–450)

## 2022-06-10 PROCEDURE — 83550 IRON BINDING TEST: CPT

## 2022-06-10 PROCEDURE — 83540 ASSAY OF IRON: CPT

## 2022-06-10 PROCEDURE — 36415 COLL VENOUS BLD VENIPUNCTURE: CPT

## 2022-06-10 PROCEDURE — 82728 ASSAY OF FERRITIN: CPT

## 2022-06-25 ENCOUNTER — NURSE TRIAGE (OUTPATIENT)
Dept: OTHER | Facility: OTHER | Age: 62
End: 2022-06-25

## 2022-06-25 DIAGNOSIS — K51.011 ULCERATIVE PANCOLITIS WITH RECTAL BLEEDING (HCC): Primary | ICD-10-CM

## 2022-06-25 DIAGNOSIS — R10.84 GENERALIZED ABDOMINAL PAIN: ICD-10-CM

## 2022-06-25 RX ORDER — DICYCLOMINE HCL 20 MG
20 TABLET ORAL 4 TIMES DAILY PRN
Qty: 30 TABLET | Refills: 0 | Status: SHIPPED | OUTPATIENT
Start: 2022-06-25

## 2022-06-25 RX ORDER — PREDNISONE 10 MG/1
TABLET ORAL
Qty: 70 TABLET | Refills: 0 | Status: SHIPPED | OUTPATIENT
Start: 2022-06-25 | End: 2022-07-23

## 2022-06-25 NOTE — TELEPHONE ENCOUNTER
Regarding: Abdominal pain  ----- Message from Jannette Ding RN sent at 6/25/2022 12:09 PM EDT -----  "I have a hx of Ulcerative Colitus  I've been in a flare for months and my GI doc said to call if my symptoms got worse   Im having abdominal pain and I've lost some weight "

## 2022-06-25 NOTE — TELEPHONE ENCOUNTER
Paged on call provider for patient's symptoms, provider stated will contact patient directly   Reason for Disposition   [1] Constant abdominal pain AND [2] present > 2 hours    Answer Assessment - Initial Assessment Questions  1  DIARRHEA SEVERITY: "How bad is the diarrhea?" "How many extra stools have you had in the past 24 hours than normal?"     - NO DIARRHEA (SCALE 0)    - MILD (SCALE 1-3): Few loose or mushy BMs; increase of 1-3 stools over normal daily number of stools; mild increase in ostomy output  -  MODERATE (SCALE 4-7): Increase of 4-6 stools daily over normal; moderate increase in ostomy output  * SEVERE (SCALE 8-10; OR 'WORST POSSIBLE'): Increase of 7 or more stools daily over normal; moderate increase in ostomy output; incontinence  Moderate   2  ONSET: "When did the diarrhea begin?"       reocurrent  3  BM CONSISTENCY: "How loose or watery is the diarrhea?"       diarrhea 5 times a day   4  VOMITING: "Are you also vomiting?" If Yes, ask: "How many times in the past 24 hours?"      no  5  ABDOMINAL PAIN: "Are you having any abdominal pain?" If Yes, ask: "What does it feel like?" (e g , crampy, dull, intermittent, constant)       Dull pain 2-4/10  6  ABDOMINAL PAIN SEVERITY: If present, ask: "How bad is the pain?"  (e g , Scale 1-10; mild, moderate, or severe)    - MILD (1-3): doesn't interfere with normal activities, abdomen soft and not tender to touch     - MODERATE (4-7): interferes with normal activities or awakens from sleep, tender to touch     - SEVERE (8-10): excruciating pain, doubled over, unable to do any normal activities        Mild   7  ORAL INTAKE: If vomiting, "Have you been able to drink liquids?" "How much fluids have you had in the past 24 hours?"      Eating and drinking   8  HYDRATION: "Any signs of dehydration?" (e g , dry mouth [not just dry lips], too weak to stand, dizziness, new weight loss) "When did you last urinate?"        9   EXPOSURE: "Have you traveled to a foreign country recently?" "Have you been exposed to anyone with diarrhea?" "Could you have eaten any food that was spoiled?"      no  10  ANTIBIOTIC USE: "Are you taking antibiotics now or have you taken antibiotics in the past 2 months?"        no  11  OTHER SYMPTOMS: "Do you have any other symptoms?" (e g , fever, blood in stool)        Blood in stool that already is known but not more than usual  12   PREGNANCY: "Is there any chance you are pregnant?" "When was your last menstrual period?"       no    Protocols used: DIARRHEA-ADULT-AH

## 2022-06-27 ENCOUNTER — HOSPITAL ENCOUNTER (EMERGENCY)
Facility: HOSPITAL | Age: 62
Discharge: HOME | End: 2022-06-27
Attending: EMERGENCY MEDICINE | Admitting: EMERGENCY MEDICINE
Payer: COMMERCIAL

## 2022-06-27 ENCOUNTER — OFFICE VISIT (OUTPATIENT)
Dept: GASTROENTEROLOGY | Facility: CLINIC | Age: 62
End: 2022-06-27
Payer: COMMERCIAL

## 2022-06-27 ENCOUNTER — APPOINTMENT (OUTPATIENT)
Dept: LAB | Facility: MEDICAL CENTER | Age: 62
End: 2022-06-27
Payer: COMMERCIAL

## 2022-06-27 ENCOUNTER — APPOINTMENT (EMERGENCY)
Dept: CT IMAGING | Facility: HOSPITAL | Age: 62
End: 2022-06-27
Payer: COMMERCIAL

## 2022-06-27 VITALS
DIASTOLIC BLOOD PRESSURE: 65 MMHG | SYSTOLIC BLOOD PRESSURE: 119 MMHG | BODY MASS INDEX: 27.28 KG/M2 | RESPIRATION RATE: 16 BRPM | TEMPERATURE: 98 F | WEIGHT: 144.4 LBS | HEART RATE: 88 BPM | OXYGEN SATURATION: 98 %

## 2022-06-27 VITALS
DIASTOLIC BLOOD PRESSURE: 80 MMHG | SYSTOLIC BLOOD PRESSURE: 120 MMHG | BODY MASS INDEX: 26.81 KG/M2 | HEART RATE: 96 BPM | WEIGHT: 142 LBS | RESPIRATION RATE: 18 BRPM | OXYGEN SATURATION: 97 % | TEMPERATURE: 97.1 F | HEIGHT: 61 IN

## 2022-06-27 DIAGNOSIS — K51.00 ULCERATIVE PANCOLITIS WITHOUT COMPLICATION (HCC): Primary | ICD-10-CM

## 2022-06-27 DIAGNOSIS — K51.011 ULCERATIVE PANCOLITIS WITH RECTAL BLEEDING (HCC): ICD-10-CM

## 2022-06-27 DIAGNOSIS — K51.90 ULCERATIVE COLITIS WITHOUT COMPLICATIONS, UNSPECIFIED LOCATION (HCC): Primary | ICD-10-CM

## 2022-06-27 DIAGNOSIS — K92.1 HEMATOCHEZIA: ICD-10-CM

## 2022-06-27 DIAGNOSIS — R10.30 LOWER ABDOMINAL PAIN: Primary | ICD-10-CM

## 2022-06-27 LAB
ALBUMIN SERPL BCP-MCNC: 3.3 G/DL (ref 3.5–5)
ALP SERPL-CCNC: 218 U/L (ref 46–116)
ALT SERPL W P-5'-P-CCNC: 49 U/L (ref 12–78)
ANION GAP SERPL CALCULATED.3IONS-SCNC: 6 MMOL/L (ref 4–13)
APTT PPP: 26 SECONDS (ref 23–37)
AST SERPL W P-5'-P-CCNC: 30 U/L (ref 5–45)
BACTERIA UR QL AUTO: NORMAL /HPF
BASOPHILS # BLD AUTO: 0.05 THOUSANDS/ΜL (ref 0–0.1)
BASOPHILS NFR BLD AUTO: 1 % (ref 0–1)
BILIRUB SERPL-MCNC: 0.34 MG/DL (ref 0.2–1)
BILIRUB UR QL STRIP: NEGATIVE
BUN SERPL-MCNC: 16 MG/DL (ref 5–25)
CALCIUM ALBUM COR SERPL-MCNC: 9.7 MG/DL (ref 8.3–10.1)
CALCIUM SERPL-MCNC: 9.1 MG/DL (ref 8.3–10.1)
CHLORIDE SERPL-SCNC: 101 MMOL/L (ref 100–108)
CLARITY UR: CLEAR
CO2 SERPL-SCNC: 29 MMOL/L (ref 21–32)
COLOR UR: YELLOW
CREAT SERPL-MCNC: 1.11 MG/DL (ref 0.6–1.3)
EOSINOPHIL # BLD AUTO: 0.32 THOUSAND/ΜL (ref 0–0.61)
EOSINOPHIL NFR BLD AUTO: 5 % (ref 0–6)
ERYTHROCYTE [DISTWIDTH] IN BLOOD BY AUTOMATED COUNT: 14.2 % (ref 11.6–15.1)
FERRITIN SERPL-MCNC: 11 NG/ML (ref 8–388)
GFR SERPL CREATININE-BSD FRML MDRD: 54 ML/MIN/1.73SQ M
GLUCOSE SERPL-MCNC: 89 MG/DL (ref 65–140)
GLUCOSE UR STRIP-MCNC: NEGATIVE MG/DL
HCT VFR BLD AUTO: 37.5 % (ref 34.8–46.1)
HGB BLD-MCNC: 12.5 G/DL (ref 11.5–15.4)
HGB UR QL STRIP.AUTO: ABNORMAL
IMM GRANULOCYTES # BLD AUTO: 0.02 THOUSAND/UL (ref 0–0.2)
IMM GRANULOCYTES NFR BLD AUTO: 0 % (ref 0–2)
INR PPP: 1.04 (ref 0.84–1.19)
IRON SATN MFR SERPL: 10 % (ref 15–50)
IRON SERPL-MCNC: 40 UG/DL (ref 50–170)
KETONES UR STRIP-MCNC: ABNORMAL MG/DL
LEUKOCYTE ESTERASE UR QL STRIP: ABNORMAL
LIPASE SERPL-CCNC: 134 U/L (ref 73–393)
LYMPHOCYTES # BLD AUTO: 1.45 THOUSANDS/ΜL (ref 0.6–4.47)
LYMPHOCYTES NFR BLD AUTO: 21 % (ref 14–44)
MAGNESIUM SERPL-MCNC: 1.7 MG/DL (ref 1.6–2.6)
MCH RBC QN AUTO: 29.5 PG (ref 26.8–34.3)
MCHC RBC AUTO-ENTMCNC: 33.3 G/DL (ref 31.4–37.4)
MCV RBC AUTO: 88 FL (ref 82–98)
MONOCYTES # BLD AUTO: 0.67 THOUSAND/ΜL (ref 0.17–1.22)
MONOCYTES NFR BLD AUTO: 10 % (ref 4–12)
NEUTROPHILS # BLD AUTO: 4.43 THOUSANDS/ΜL (ref 1.85–7.62)
NEUTS SEG NFR BLD AUTO: 63 % (ref 43–75)
NITRITE UR QL STRIP: NEGATIVE
NON-SQ EPI CELLS URNS QL MICRO: NORMAL /HPF
NRBC BLD AUTO-RTO: 0 /100 WBCS
PH UR STRIP.AUTO: 5.5 [PH] (ref 4.5–8)
PLATELET # BLD AUTO: 225 THOUSANDS/UL (ref 149–390)
PMV BLD AUTO: 8.5 FL (ref 8.9–12.7)
POTASSIUM SERPL-SCNC: 4.1 MMOL/L (ref 3.5–5.3)
PROT SERPL-MCNC: 7.7 G/DL (ref 6.4–8.2)
PROT UR STRIP-MCNC: NEGATIVE MG/DL
PROTHROMBIN TIME: 13.3 SECONDS (ref 11.6–14.5)
RBC # BLD AUTO: 4.24 MILLION/UL (ref 3.81–5.12)
RBC #/AREA URNS AUTO: NORMAL /HPF
SODIUM SERPL-SCNC: 136 MMOL/L (ref 136–145)
SP GR UR STRIP.AUTO: <=1.005 (ref 1–1.03)
TIBC SERPL-MCNC: 415 UG/DL (ref 250–450)
TSH SERPL DL<=0.05 MIU/L-ACNC: 1.33 UIU/ML (ref 0.45–4.5)
UROBILINOGEN UR QL STRIP.AUTO: 0.2 E.U./DL
WBC # BLD AUTO: 6.94 THOUSAND/UL (ref 4.31–10.16)
WBC #/AREA URNS AUTO: NORMAL /HPF

## 2022-06-27 PROCEDURE — 84443 ASSAY THYROID STIM HORMONE: CPT | Performed by: PHYSICIAN ASSISTANT

## 2022-06-27 PROCEDURE — 83540 ASSAY OF IRON: CPT | Performed by: PHYSICIAN ASSISTANT

## 2022-06-27 PROCEDURE — 82728 ASSAY OF FERRITIN: CPT | Performed by: PHYSICIAN ASSISTANT

## 2022-06-27 PROCEDURE — 83550 IRON BINDING TEST: CPT | Performed by: PHYSICIAN ASSISTANT

## 2022-06-27 PROCEDURE — 96361 HYDRATE IV INFUSION ADD-ON: CPT

## 2022-06-27 PROCEDURE — 85610 PROTHROMBIN TIME: CPT | Performed by: PHYSICIAN ASSISTANT

## 2022-06-27 PROCEDURE — 74176 CT ABD & PELVIS W/O CONTRAST: CPT

## 2022-06-27 PROCEDURE — 99285 EMERGENCY DEPT VISIT HI MDM: CPT

## 2022-06-27 PROCEDURE — 80053 COMPREHEN METABOLIC PANEL: CPT | Performed by: PHYSICIAN ASSISTANT

## 2022-06-27 PROCEDURE — 1036F TOBACCO NON-USER: CPT | Performed by: PHYSICIAN ASSISTANT

## 2022-06-27 PROCEDURE — 87505 NFCT AGENT DETECTION GI: CPT

## 2022-06-27 PROCEDURE — 36415 COLL VENOUS BLD VENIPUNCTURE: CPT | Performed by: PHYSICIAN ASSISTANT

## 2022-06-27 PROCEDURE — 99214 OFFICE O/P EST MOD 30 MIN: CPT | Performed by: PHYSICIAN ASSISTANT

## 2022-06-27 PROCEDURE — 85730 THROMBOPLASTIN TIME PARTIAL: CPT | Performed by: PHYSICIAN ASSISTANT

## 2022-06-27 PROCEDURE — 96360 HYDRATION IV INFUSION INIT: CPT

## 2022-06-27 PROCEDURE — 3008F BODY MASS INDEX DOCD: CPT | Performed by: PHYSICIAN ASSISTANT

## 2022-06-27 PROCEDURE — 99284 EMERGENCY DEPT VISIT MOD MDM: CPT | Performed by: PHYSICIAN ASSISTANT

## 2022-06-27 PROCEDURE — 85025 COMPLETE CBC W/AUTO DIFF WBC: CPT | Performed by: PHYSICIAN ASSISTANT

## 2022-06-27 PROCEDURE — 83735 ASSAY OF MAGNESIUM: CPT | Performed by: PHYSICIAN ASSISTANT

## 2022-06-27 PROCEDURE — 83690 ASSAY OF LIPASE: CPT | Performed by: PHYSICIAN ASSISTANT

## 2022-06-27 PROCEDURE — 81001 URINALYSIS AUTO W/SCOPE: CPT

## 2022-06-27 RX ADMIN — SODIUM CHLORIDE 1000 ML: 0.9 INJECTION, SOLUTION INTRAVENOUS at 13:12

## 2022-06-27 NOTE — PROGRESS NOTES
[facility] - Outpatient Follow-up Note  Deo Vela 63 y o  female MRN: [id number]  Encounter: [id number]      Assessment and Plan    1  Ulcerative colitis  The patient presents today stating she has continued rectal bleeding since March, 4-6 episodes of diarrhea daily, some new onset abdominal pain, and some new onset dizziness/lightheadedness  The patient states that while driving here she became dizzy/lightheaded and felt as though she was going to pass out  She had a recent fecal calprotectin that had gone from from 104-713  She was given prednisone by her PCP but she is not taking this  Given her labs and symptoms this is concerning for a UC flare with blood loss anemia  - encouraged the patient to proceed to the emergency department so necessary workup can be obtained including CBC, CMP, CRP, fecal calprotectin, iron panel, as well as likely CT scan  - encouraged the patient to take an ambulance as she states that she was having difficulty driving secondary to her symptoms, the patient refuses    ______________________________________________________________________    History of Present Illness  Deo Vela is a 63 y o  female here for follow up evaluation of ulcerative colitis  The patient presents today as she has been having some worsening symptoms  She states that she has been having rectal bleeding since March but more recently she developed some abdominal pain and for the past few days has been experiencing dizziness and lightheadedness  She states that while driving to the office visit today this occurred and she felt as though she was going to pass out  In addition to this she is having about 4-6 loose bowel movements daily and her most recent fecal calprotectin increased from 104-713 indicating a likely UC flare    She was recently given prednisone by her primary care physician however she has yet to take this as she wanted to make sure we agreed with her started this  She also has a C diff and bacterial panel stool study pending  UC history:    The patient presented in 2017 with chronic diarrhea  She underwent colonoscopy which diagnosed her ulcerative colitis  Her symptoms were initially well controlled on Lialda 2 4 g per day  Her most colonoscopy in 2021 revealed moderately severe colitis and she was started on Entyvio  She had done well on this until March  In March she began to have some rectal bleeding and some increased frequency of bowel movements prior to her next due Entyvio infusion  Now symptoms have progressed and as stated she has rectal bleeding and about 4-6 loose bowel movements daily with an elevated fecal calprotectin of 713  Review of Systems   Constitutional: Negative for activity change, appetite change, chills, fatigue, fever and unexpected weight change  HENT: Negative for sore throat and trouble swallowing  Respiratory: Negative for cough and choking  Gastrointestinal: Positive for anal bleeding, blood in stool and diarrhea  Negative for abdominal distention, abdominal pain, constipation, nausea, rectal pain and vomiting  Musculoskeletal: Negative for back pain and gait problem  Neurological: Positive for dizziness and light-headedness  Psychiatric/Behavioral: Negative for confusion         Past Medical History  Past Medical History:   Diagnosis Date    Anxiety     Cancer ([facility] [address] )     skin    Change in bowel habits     Elevated liver enzymes     Gall stones     Hashimoto's thyroiditis     Irritable bowel syndrome     Lumbar spondylosis     Osteoarthritis     Seasonal allergies     [facility]        Past Social history  Past Surgical History:   Procedure Laterality Date    COLONOSCOPY      EAR SURGERY      cyst removed from ear drum    EAR SURGERY      EAR SURGERY      IRIDECTOMY      Optical    CT COLONOSCOPY FLX DX W/COLLJ SPEC WHEN PFRMD N/A 10/9/2017    Procedure: COLONOSCOPY; Surgeon: Jeanne Pizarro MD;  Location:  GI LAB;   Service: Gastroenterology    AZ EXC SKIN MALIG 1 1-2 CM TRUNK,ARM,LEG Right 10/4/2019    Procedure: 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;  Surgeon: Rosalie Hernandez MD;  Location: [facility] MAIN OR;  Service: Plastics    UPPER GASTROINTESTINAL ENDOSCOPY       Social History     Socioeconomic History    Marital status: /Civil Union     Spouse name: Not on file    Number of children: 3    Years of education: Not on file    Highest education level: Not on file   Occupational History    Not on file   Tobacco Use    Smoking status: Never Smoker    Smokeless tobacco: Never Used   Vaping Use    Vaping Use: Never used   Substance and Sexual Activity    Alcohol use: Yes     Comment: Social    Drug use: No    Sexual activity: Not Currently     Partners: Male   Other Topics Concern    Not on file   Social History Narrative    Per [employer]: Single    Pets in the home     Social Determinants of Health     Financial Resource Strain: Not on file   Food Insecurity: Not on file   Transportation Needs: Not on file   Physical Activity: Not on file   Stress: Not on file   Social Connections: Not on file   Intimate Partner Violence: Not on file   Housing Stability: Not on file     Social History     Substance and Sexual Activity   Alcohol Use Yes    Comment: Social     Social History     Substance and Sexual Activity   Drug Use No     Social History     Tobacco Use   Smoking Status Never Smoker   Smokeless Tobacco Never Used       Past Family History  Family History   Problem Relation Age of Onset    Breast cancer Mother     Lung cancer Mother     Atrial fibrillation Father     Stroke Father         CVA    Diabetes Father         Mellitus    Heart disease Father     Other Daughter         Colitis    Breast cancer Family        Current Medications  Current Outpatient Medications   Medication Sig Dispense Refill    ALPRAZolam (XANAX) 0 25 mg tablet TAKE 1 TABLET DAILY AT BEDTIME AS NEEDED FOR ANXIETY 90 tablet 0    ascorbic acid (VITAMIN C) 250 mg tablet Take 250 mg by mouth daily        Cholecalciferol (VITAMIN D PO) Take 4,000 Units by mouth daily        Cyanocobalamin (VITAMIN B-12 PO) Take by mouth daily      dicyclomine (BENTYL) 20 mg tablet Take 1 tablet (20 mg total) by mouth 4 (four) times a day as needed (Abdominal pain) 30 tablet 0    ergocalciferol (ERGOCALCIFEROL) 1 25 MG (69920 UT) capsule Take 1 capsule weekly for 8 weeks  8 capsule 0    metoprolol tartrate (LOPRESSOR) 25 mg tablet Take 1 tablet (25 mg total) by mouth every 12 (twelve) hours as needed (palpitations) 60 tablet 0    Vedolizumab (ENTYVIO IV) Infuse into a venous catheter every 56 days      predniSONE 10 mg tablet Take 4 tablets (40 mg total) by mouth daily for 7 days, THEN 3 tablets (30 mg total) daily for 7 days, THEN 2 tablets (20 mg total) daily for 7 days, THEN 1 tablet (10 mg total) daily for 7 days  (Patient not taking: Reported on 6/27/2022) 70 tablet 0     No current facility-administered medications for this visit  Allergies  Allergies   Allergen Reactions    Amoxicillin GI Intolerance         The following portions of the patient's history were reviewed and updated as appropriate: allergies, current medications, past medical history, past social history, past surgical history and problem list       Vitals  There were no vitals filed for this visit  Physical Exam  Constitutional   General appearance: Patient is seated and in no acute distress, well appearing and well nourished  Head and Face   Head and face: Normal     Eyes   Conjunctiva and lids: No erythema, swelling or discharge  Anicteric  Ears, Nose, Mouth, and Throat   Hearing: Normal     Neck: Supple, trachea midline  Pulmonary   Respiratory effort: No increased work of breathing or signs of respiratory distress      Cardiovascular   Examination of extremities for edema and/or varicosities: Normal  Musculoskeletal   Gait and station: Normal     Skin   Skin and subcutaneous tissue: Warm, dry, and intact  No visible jaundice, lesions or rashes  Psychiatric   Judgment and insight: Normal  Recent and remote memory:  Normal  Mood and affect: Normal      Results  No visits with results within 1 Day(s) from this visit  Latest known visit with results is:   Appointment on 06/10/2022   Component Date Value    Iron Saturation 06/10/2022 14 (A)    TIBC 06/10/2022 403     Iron 06/10/2022 55     Ferritin 06/10/2022 9        Radiology Results  No results found  Orders  No orders of the defined types were placed in this encounter

## 2022-06-27 NOTE — Clinical Note
Hi,  I wanted to give you a heads up in regards to your patient as I sent her to the ER  Thanks!  Minerva

## 2022-06-27 NOTE — DISCHARGE INSTRUCTIONS
Please refer to the attached information for strict return instructions  If symptoms worsen or new symptoms develop please return to the ER  Please follow up with your gastroenterologist for re-evaluation of symptoms

## 2022-06-28 ENCOUNTER — TELEPHONE (OUTPATIENT)
Dept: GASTROENTEROLOGY | Facility: CLINIC | Age: 62
End: 2022-06-28

## 2022-06-28 LAB
C DIFF TOX GENS STL QL NAA+PROBE: NEGATIVE
CAMPYLOBACTER DNA SPEC NAA+PROBE: NORMAL
SALMONELLA DNA SPEC QL NAA+PROBE: NORMAL
SHIGA TOXIN STX GENE SPEC NAA+PROBE: NORMAL
SHIGELLA DNA SPEC QL NAA+PROBE: NORMAL

## 2022-06-28 NOTE — ED PROVIDER NOTES
History  Chief Complaint   Patient presents with    Rectal Bleeding     Pt reports history of colitis flare for the past couple of months; patient reports blood in stool, dizziness and abdominal cramping; states that all her symptoms have progressively gotten worse      Wilder Garcia is a 62 yo F, history of ulcerative colitis, IBS, Hashimoto's, presenting with ongoing diarrhea, blood in stool for the past several months as well as lower  generalized fatigue and lightheadedness with standing over the past month  She also notes bilateral lower abdominal pain which began last week but seemed to improve two days ago  She reports approx 5-7 daily episodes of diarrhea in which she has bright red blood  She denies significant bleeding between bowel movements  She notes only very mild, b/l lower abdominal cramping at this time  No fevers  No pus in stool  She was seen by GI this am and referred to the ED for further evaluation to r/o blood loss anemia given fatigue/lightheadedness  History provided by:  Patient   used: No        Prior to Admission Medications   Prescriptions Last Dose Informant Patient Reported? Taking?    ALPRAZolam (XANAX) 0 25 mg tablet Past Week at Unknown time Self No Yes   Sig: TAKE 1 TABLET DAILY AT BEDTIME AS NEEDED FOR ANXIETY   Cholecalciferol (VITAMIN D PO) 6/26/2022 at Unknown time Self Yes Yes   Sig: Take 4,000 Units by mouth daily     Cyanocobalamin (VITAMIN B-12 PO) 6/26/2022 at Unknown time Self Yes Yes   Sig: Take by mouth daily   Vedolizumab (ENTYVIO IV) More than a month at Unknown time Self Yes No   Sig: Infuse into a venous catheter every 56 days   ascorbic acid (VITAMIN C) 250 mg tablet 6/26/2022 at Unknown time Self Yes Yes   Sig: Take 250 mg by mouth daily     dicyclomine (BENTYL) 20 mg tablet  Self No Yes   Sig: Take 1 tablet (20 mg total) by mouth 4 (four) times a day as needed (Abdominal pain)   ergocalciferol (ERGOCALCIFEROL) 1 25 MG (22598 UT) capsule 6/26/2022 at Unknown time Self No Yes   Sig: Take 1 capsule weekly for 8 weeks  metoprolol tartrate (LOPRESSOR) 25 mg tablet  Self No Yes   Sig: Take 1 tablet (25 mg total) by mouth every 12 (twelve) hours as needed (palpitations)   predniSONE 10 mg tablet Not Taking at Unknown time Self No No   Sig: Take 4 tablets (40 mg total) by mouth daily for 7 days, THEN 3 tablets (30 mg total) daily for 7 days, THEN 2 tablets (20 mg total) daily for 7 days, THEN 1 tablet (10 mg total) daily for 7 days  Patient not taking: Reported on 6/27/2022      Facility-Administered Medications: None       Past Medical History:   Diagnosis Date    Anxiety     Cancer ([facility] )     skin    Change in bowel habits     Elevated liver enzymes     Gall stones     Hashimoto's thyroiditis     Irritable bowel syndrome     Lumbar spondylosis     Osteoarthritis     Seasonal allergies     Ulcerative colitis ([facility] )        Past Surgical History:   Procedure Laterality Date    COLONOSCOPY      EAR SURGERY      cyst removed from ear drum    EAR SURGERY      EAR SURGERY      IRIDECTOMY      Optical    UT COLONOSCOPY FLX DX W/COLLJ SPEC WHEN PFRMD N/A 10/9/2017    Procedure: COLONOSCOPY;  Surgeon: Guanakito Shaikh MD;  Location: [facility]; Service: Gastroenterology    UT EXC SKIN MALIG 1 1-2 CM TRUNK,ARM,LEG Right 10/4/2019    Procedure: 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;  Surgeon: Renetta Ayala MD;  Location: [address] [place] [address];  Service: Plastics    UPPER GASTROINTESTINAL ENDOSCOPY         Family History   Problem Relation Age of Onset   Aquino Breast cancer Mother     Lung cancer Mother     Atrial fibrillation Father     Stroke Father         CVA    Diabetes Father         Mellitus    Heart disease Father     Other Daughter         Colitis    Breast cancer Family      I have reviewed and agree with the history as documented      E-Cigarette/Vaping    E-Cigarette Use Never User      E-Cigarette/Vaping Substances    Nicotine No  THC No     CBD No      Social History     Tobacco Use    Smoking status: Never Smoker    Smokeless tobacco: Never Used   Vaping Use    Vaping Use: Never used   Substance Use Topics    Alcohol use: Yes     Comment: Social    Drug use: No       Review of Systems   Constitutional: Positive for fatigue  Negative for chills and fever  HENT: Negative for congestion, rhinorrhea and sore throat  Eyes: Negative for pain and visual disturbance  Respiratory: Negative for cough, shortness of breath and wheezing  Cardiovascular: Negative for chest pain and palpitations  Gastrointestinal: Positive for abdominal pain, blood in stool and diarrhea  Negative for constipation, nausea and vomiting  Genitourinary: Negative for dysuria, frequency and urgency  Musculoskeletal: Negative for back pain, neck pain and neck stiffness  Skin: Negative for rash and wound  Neurological: Positive for light-headedness  Negative for dizziness, weakness and numbness  Physical Exam  Physical Exam  Constitutional:       General: She is not in acute distress  Appearance: She is well-developed  She is not toxic-appearing or diaphoretic  HENT:      Head: Normocephalic and atraumatic  Right Ear: External ear normal       Left Ear: External ear normal    Eyes:      Conjunctiva/sclera: Conjunctivae normal       Pupils: Pupils are equal, round, and reactive to light  Cardiovascular:      Rate and Rhythm: Normal rate and regular rhythm  Heart sounds: Normal heart sounds  No murmur heard  No friction rub  No gallop  Pulmonary:      Effort: Pulmonary effort is normal  No respiratory distress  Breath sounds: Normal breath sounds  No wheezing, rhonchi or rales  Abdominal:      General: There is no distension  Palpations: Abdomen is soft  Tenderness: There is no abdominal tenderness  There is no right CVA tenderness, left CVA tenderness or guarding     Musculoskeletal:      Cervical back: Normal range of motion and neck supple  Lymphadenopathy:      Cervical: No cervical adenopathy  Skin:     General: Skin is warm and dry  Capillary Refill: Capillary refill takes less than 2 seconds  Findings: No erythema or rash  Neurological:      Mental Status: She is alert and oriented to person, place, and time  Motor: No abnormal muscle tone  Coordination: Coordination normal    Psychiatric:         Behavior: Behavior normal          Thought Content:  Thought content normal          Judgment: Judgment normal          Vital Signs  ED Triage Vitals   Temperature Pulse Respirations Blood Pressure SpO2   06/27/22 1055 06/27/22 1055 06/27/22 1055 06/27/22 1055 06/27/22 1056   98 °F (36 7 °C) 95 20 103/73 99 %      Temp Source Heart Rate Source Patient Position - Orthostatic VS BP Location FiO2 (%)   06/27/22 1055 06/27/22 1315 06/27/22 1055 06/27/22 1315 --   Oral Monitor Sitting Right arm       Pain Score       06/27/22 1656       No Pain           Vitals:    06/27/22 1315 06/27/22 1536 06/27/22 1545 06/27/22 1656   BP: 115/67 120/63 125/67 119/65   Pulse: 88 80 80 88   Patient Position - Orthostatic VS: Lying Lying Lying Lying         Visual Acuity      ED Medications  Medications   sodium chloride 0 9 % bolus 1,000 mL (0 mL Intravenous Stopped 6/27/22 1532)       Diagnostic Studies  Results Reviewed     Procedure Component Value Units Date/Time    Lipase [[id number]]  (Normal) Collected: 06/27/22 1312    Lab Status: Final result Specimen: Blood from Arm, Right Updated: 06/27/22 1953     Lipase 134 u/L     Iron Saturation % [[id number]]  (Abnormal) Collected: 06/27/22 1312    Lab Status: Final result Specimen: Blood from Arm, Right Updated: 06/27/22 1620     Iron Saturation 10 %      TIBC 415 ug/dL      Iron 40 ug/dL     Ferritin [[id number]]  (Normal) Collected: 06/27/22 1312    Lab Status: Final result Specimen: Blood from Arm, Right Updated: 06/27/22 1620     Ferritin 11 ng/mL     TSH, 3rd generation with Free T4 reflex [id number]  (Normal) Collected: 06/27/22 1535    Lab Status: Final result Specimen: Blood from Arm, Right Updated: 06/27/22 1608     TSH 3RD GENERATON 1 330 uIU/mL     Narrative:      Patients undergoing fluorescein dye angiography may retain small amounts of fluorescein in the body for 48-72 hours post procedure  Samples containing fluorescein can produce falsely depressed TSH values  If the patient had this procedure,a specimen should be resubmitted post fluorescein clearance        Magnesium [[id number]]  (Normal) Collected: 06/27/22 1535    Lab Status: Final result Specimen: Blood from Arm, Right Updated: 06/27/22 1608     Magnesium 1 7 mg/dL     Urine Microscopic [[id number]]  (Normal) Collected: 06/27/22 1345    Lab Status: Final result Specimen: Urine, Clean Catch Updated: 06/27/22 1440     RBC, UA None Seen /hpf      WBC, UA 2-4 /hpf      Epithelial Cells Occasional /hpf      Bacteria, UA Occasional /hpf     Comprehensive metabolic panel [[id number]]  (Abnormal) Collected: 06/27/22 1312    Lab Status: Final result Specimen: Blood from Arm, Right Updated: 06/27/22 1348     Sodium 136 mmol/L      Potassium 4 1 mmol/L      Chloride 101 mmol/L      CO2 29 mmol/L      ANION GAP 6 mmol/L      BUN 16 mg/dL      Creatinine 1 11 mg/dL      Glucose 89 mg/dL      Calcium 9 1 mg/dL      Corrected Calcium 9 7 mg/dL      AST 30 U/L      ALT 49 U/L      Alkaline Phosphatase 218 U/L      Total Protein 7 7 g/dL      Albumin 3 3 g/dL      Total Bilirubin 0 34 mg/dL      eGFR 54 ml/min/1 73sq m     Narrative:      Mark guidelines for Chronic Kidney Disease (CKD):     Stage 1 with normal or high GFR (GFR > 90 mL/min/1 73 square meters)    Stage 2 Mild CKD (GFR = 60-89 mL/min/1 73 square meters)    Stage 3A Moderate CKD (GFR = 45-59 mL/min/1 73 square meters)    Stage 3B Moderate CKD (GFR = 30-44 mL/min/1 73 square meters)    Stage 4 Severe CKD (GFR = 15-29 mL/min/1 73 square meters)    Stage 5 End Stage CKD (GFR <15 mL/min/1 73 square meters)  Note: GFR calculation is accurate only with a steady state creatinine    Urine Macroscopic, POC [[id number]]  (Abnormal) Collected: 06/27/22 1345    Lab Status: Final result Specimen: Urine Updated: 06/27/22 1347     Color, UA Yellow     Clarity, UA Clear     pH, UA 5 5     Leukocytes, UA Moderate     Nitrite, UA Negative     Protein, UA Negative mg/dl      Glucose, UA Negative mg/dl      Ketones, UA Trace mg/dl      Urobilinogen, UA 0 2 E U /dl      Bilirubin, UA Negative     Occult Blood, UA Trace     Specific Gravity, UA <=1 005    Narrative:      CLINITEK RESULT    APTT [[id number]]  (Normal) Collected: 06/27/22 1312    Lab Status: Final result Specimen: Blood from Arm, Right Updated: 06/27/22 1337     PTT 26 seconds     Protime-INR [[id number]]  (Normal) Collected: 06/27/22 1312    Lab Status: Final result Specimen: Blood from Arm, Right Updated: 06/27/22 1337     Protime 13 3 seconds      INR 1 04    CBC and differential [[id number]]  (Abnormal) Collected: 06/27/22 1312    Lab Status: Final result Specimen: Blood from Arm, Right Updated: 06/27/22 1321     WBC 6 94 Thousand/uL      RBC 4 24 Million/uL      Hemoglobin 12 5 g/dL      Hematocrit 37 5 %      MCV 88 fL      MCH 29 5 pg      MCHC 33 3 g/dL      RDW 14 2 %      MPV 8 5 fL      Platelets 703 Thousands/uL      nRBC 0 /100 WBCs      Neutrophils Relative 63 %      Immat GRANS % 0 %      Lymphocytes Relative 21 %      Monocytes Relative 10 %      Eosinophils Relative 5 %      Basophils Relative 1 %      Neutrophils Absolute 4 43 Thousands/µL      Immature Grans Absolute 0 02 Thousand/uL      Lymphocytes Absolute 1 45 Thousands/µL      Monocytes Absolute 0 67 Thousand/µL      Eosinophils Absolute 0 32 Thousand/µL      Basophils Absolute 0 05 Thousands/µL                  CT abdomen pelvis wo contrast   Final Result by Brianna Vaca MD (06/27 0934)      1    No acute abnormality within the abdomen or pelvis  2   Numerous prominent pericolonic lymph nodes  No appreciable bowel wall thickening within study limitations  Findings may represent chronic reactive changes related to patient's underlying ulcerative colitis  Workstation performed: [id number]                    Procedures  Procedures         ED Course  ED Course as of 06/28/22 1144   Mon Jun 27, 2022   1656 Pt discharged by myself, IV removed  SBIRT 20yo+    Flowsheet Row Most Recent Value   SBIRT (25 yo +)    In order to provide better care to our patients, we are screening all of our patients for alcohol and drug use  Would it be okay to ask you these screening questions? No Filed at: 06/27/2022 1205                    MDM  Number of Diagnoses or Management Options  Hematochezia  Lower abdominal pain  Diagnosis management comments: Ongoing blood in stool, diarrhea over the past few months along with increased fatigue over the past month  Noted to have low iron saturation on outpatient labs on 6/10  Abdominal pain last week however improved at this time  Abdominal exam is benign  Vitals WNL  Labs reveal normal HgB and MCV, normal WBC count, mild elevation of AP  Coags WNL  CT abd/pelvis reveals pericolonic lymph node prominence c/w chronic changes without acute pathology  Case discussed with Dr Gurvinder Butt, patient's GI  Recommends initiation of solumedrol and continuation of outpatient prednisone - patient would prefer to discuss with GI outpatient prior to initiating steroids given her previous side effects of insomnia, tremors/anxiety on steroids  Strict follow up instructions and return to ED indications discussed          Amount and/or Complexity of Data Reviewed  Clinical lab tests: ordered and reviewed  Tests in the radiology section of CPT®: ordered and reviewed    Patient Progress  Patient progress: stable      Disposition  Final diagnoses:   Lower abdominal pain   Hematochezia Time reflects when diagnosis was documented in both MDM as applicable and the Disposition within this note     Time User Action Codes Description Comment    6/27/2022  4:41 PM Gasper Marie Add [R10 30] Lower abdominal pain     6/27/2022  4:41 PM Gasper Marie Add [K92 1] Hematochezia       ED Disposition     ED Disposition   Discharge    Condition   Stable    Date/Time   Mon Jun 27, 2022  4:41 PM    Comment   [facility] discharge to home/self care  Follow-up Information     Follow up With Specialties Details Why Contact Info Additional Information    Germaine Dolan MD Gastroenterology Schedule an appointment as soon as possible for a visit   [address]  [address] [facility]       [address] [place] [address] Emergency Department Emergency Medicine  If symptoms worsen [address] [facility] [id number]  [address] [facility] Emergency Department, [address] [place], [place], [address]          Discharge Medication List as of 6/27/2022  4:42 PM      CONTINUE these medications which have NOT CHANGED    Details   ALPRAZolam (XANAX) 0 25 mg tablet TAKE 1 TABLET DAILY AT BEDTIME AS NEEDED FOR ANXIETY, Normal      ascorbic acid (VITAMIN C) 250 mg tablet Take 250 mg by mouth daily  , Historical Med      Cholecalciferol (VITAMIN D PO) Take 4,000 Units by mouth daily  , Historical Med      Cyanocobalamin (VITAMIN B-12 PO) Take by mouth daily, Historical Med      dicyclomine (BENTYL) 20 mg tablet Take 1 tablet (20 mg total) by mouth 4 (four) times a day as needed (Abdominal pain), Starting Sat 6/25/2022, Normal      ergocalciferol (ERGOCALCIFEROL) 1 25 MG (39337 UT) capsule Take 1 capsule weekly for 8 weeks  , Normal      metoprolol tartrate (LOPRESSOR) 25 mg tablet Take 1 tablet (25 mg total) by mouth every 12 (twelve) hours as needed (palpitations), Starting Thu 3/31/2022, Normal      predniSONE 10 mg tablet Multiple Dosages:Starting Sat 6/25/2022, Until Fri 7/1/2022 at 2359, THEN Starting Sat 7/2/2022, Until Fri 7/8/2022 at 2359, THEN Starting Sat 7/9/2022, Until Fri 7/15/2022 at 2359, THEN Starting Sat 7/16/2022, Until Fri 7/22/2022 at 2359Take 4 tabl ets (40 mg total) by mouth daily for 7 days, THEN 3 tablets (30 mg total) daily for 7 days, THEN 2 tablets (20 mg total) daily for 7 days, THEN 1 tablet (10 mg total) daily for 7 days  , Normal      Vedolizumab (ENTYVIO IV) Infuse into a venous catheter every 56 days, Historical Med             No discharge procedures on file      PDMP Review       Value Time User    PDMP Reviewed  Yes 2/4/2022  6:71 PM Nicola Herrera DO          ED Provider  Electronically Signed by           Keith Stewart PA-C  06/28/22 8672

## 2022-06-28 NOTE — TELEPHONE ENCOUNTER
Patients GI provider:  Dr Cristin Gillespie    Number to return call: [phone]    Reason for call: Pt states she was in the ER yesterday and spoke with Dr Cristin Gillespie who advised her he would call her this morning  She said she was put on steroids which she does not want to start until she speaks with him       Scheduled procedure/appointment date if applicable: apt 9/9

## 2022-07-06 ENCOUNTER — APPOINTMENT (OUTPATIENT)
Dept: LAB | Facility: MEDICAL CENTER | Age: 62
End: 2022-07-06
Payer: COMMERCIAL

## 2022-07-06 DIAGNOSIS — K51.00 ULCERATIVE PANCOLITIS WITHOUT COMPLICATION (HCC): ICD-10-CM

## 2022-07-06 PROCEDURE — 80280 DRUG ASSAY VEDOLIZUMAB: CPT

## 2022-07-06 PROCEDURE — 83993 ASSAY FOR CALPROTECTIN FECAL: CPT

## 2022-07-06 PROCEDURE — 82397 CHEMILUMINESCENT ASSAY: CPT

## 2022-07-06 PROCEDURE — 36415 COLL VENOUS BLD VENIPUNCTURE: CPT

## 2022-07-07 ENCOUNTER — HOME CARE VISIT (OUTPATIENT)
Dept: HOME HEALTH SERVICES | Facility: HOME HEALTHCARE | Age: 62
End: 2022-07-07
Payer: COMMERCIAL

## 2022-07-07 PROCEDURE — G0299 HHS/HOSPICE OF RN EA 15 MIN: HCPCS

## 2022-07-07 PROCEDURE — 400014 VN F/U

## 2022-07-08 LAB — CALPROTECTIN STL-MCNT: 98 UG/G (ref 0–120)

## 2022-07-12 VITALS
RESPIRATION RATE: 18 BRPM | OXYGEN SATURATION: 97 % | HEART RATE: 72 BPM | TEMPERATURE: 97.1 F | DIASTOLIC BLOOD PRESSURE: 68 MMHG | SYSTOLIC BLOOD PRESSURE: 116 MMHG

## 2022-07-13 ENCOUNTER — TELEPHONE (OUTPATIENT)
Dept: GASTROENTEROLOGY | Facility: CLINIC | Age: 62
End: 2022-07-13

## 2022-07-13 NOTE — TELEPHONE ENCOUNTER
Pt called in inquiring about her lab results and if any other blood work needs to be completed prior to her MRI  I explained all blood work has been completed  Results of entyvio level pending,  Pt explained that she is appreciative of Dr Rai Ochoa and thinks he is a wonderful doctor but she was hoping to possibly see doctor chaput for a 'whole body approach' on her care  She wants to discuss diet and how that may help her  Pt explained that Dr Henry Lu was recommended highly to her  She wants it to be known that she thinks Dr Rai Ochoa is great and has no complaints but simply wants to approach her disease process from a 'whole body' standpoint  Pt brought up diet multiple times  I explained nutrition services and a referral to dietician may be prompted as Dr Henry Lu will touch on diet but may not be able to delve into diet as much as a dietician would  Pt was adamant about wanting to see Dr Rory Peres  Pt also stated she may want to return to Dr Rai Ochoa based on consult with Dr Rory Peres  I informed pt I would work on transference of care  Pt has OV with Dr Robin Patel 9/9

## 2022-07-14 LAB — MISCELLANEOUS LAB TEST RESULT: NORMAL

## 2022-07-14 PROCEDURE — 400014 VN F/U

## 2022-07-14 NOTE — TELEPHONE ENCOUNTER
Called patient in regards to scheduling her follow up appt with Dr Moore  Left message for pt  to call back and schedule accordingly

## 2022-07-15 PROCEDURE — G0179 MD RECERTIFICATION HHA PT: HCPCS | Performed by: INTERNAL MEDICINE

## 2022-07-18 ENCOUNTER — TELEPHONE (OUTPATIENT)
Dept: GASTROENTEROLOGY | Facility: CLINIC | Age: 62
End: 2022-07-18

## 2022-07-18 ENCOUNTER — TELEPHONE (OUTPATIENT)
Dept: GASTROENTEROLOGY | Facility: MEDICAL CENTER | Age: 62
End: 2022-07-18

## 2022-07-18 DIAGNOSIS — K51.00 ULCERATIVE PANCOLITIS WITHOUT COMPLICATION (HCC): Primary | ICD-10-CM

## 2022-07-18 RX ORDER — SODIUM CHLORIDE 9 MG/ML
20 INJECTION, SOLUTION INTRAVENOUS ONCE
Status: CANCELLED | OUTPATIENT
Start: 2022-08-17

## 2022-07-18 NOTE — TELEPHONE ENCOUNTER
----- Message from Kp Garcia MD sent at 7/18/2022  3:47 PM EDT -----  Regarding: Aaliyah Nicolas - switching interval to Q 6 weeks  Leigh Ann Lucio has  Ulcerative colitis  She was doing well on Entyvio but had recent flare  She is on steroids now    Vedolizumab antibodies negative but her trough level is undetectable  I recommend switching the interval to every 6 weeks  Order placed  Please keep her updated once her insurance approves change in the interval     Last infusion was on July 6, 2022 -    Thank you    Tomer Duncan

## 2022-07-18 NOTE — TELEPHONE ENCOUNTER
----- Message from Bry Cordova MD sent at 7/18/2022 10:50 AM EDT -----  Please let her know her fecal calprotectin is significantly improved  Her vedolizumab antibodies negative but her level is undetectable  I recommend changing the interval of vedolizumab infusion to  every 6 weeks  She should be seen in the next few weeks by Dr Charlotte Vila to discuss this   Thank you

## 2022-07-18 NOTE — TELEPHONE ENCOUNTER
Spoke with pt and relayed results  Pt stated she is doing well on steroid but has concern about reoccurring symptoms if she awaits OV with Dr Elly Portillo to discuss changing entyvio interval  She inquired if this is something that can be done now

## 2022-07-19 ENCOUNTER — TELEPHONE (OUTPATIENT)
Dept: GASTROENTEROLOGY | Facility: CLINIC | Age: 62
End: 2022-07-19

## 2022-07-19 DIAGNOSIS — K51.00 ULCERATIVE PANCOLITIS WITHOUT COMPLICATION (HCC): Primary | ICD-10-CM

## 2022-07-19 NOTE — TELEPHONE ENCOUNTER
I am out of the office this week but am managing my epic inbox  I agree with Dr Rosibel Dooley recommendations and agree with changing the interval to every six weeks  Is she currently taking prednisone? Her most recent office visit by the PA states she is not but I'm not sure if this has changed  It's probably best that Dr Laury Hooker manages this until she is seen in the office with me  Office staff- ok to overbook her at my next office apt  I am off this week        Thanks,  Martha Garcia

## 2022-07-19 NOTE — TELEPHONE ENCOUNTER
Can you please place script for Entyvio every 6 weeks, sign and email to me? I will send this over to Home infusion department

## 2022-07-20 ENCOUNTER — HOSPITAL ENCOUNTER (OUTPATIENT)
Dept: RADIOLOGY | Facility: HOSPITAL | Age: 62
Discharge: HOME | End: 2022-07-20
Attending: INTERNAL MEDICINE
Payer: COMMERCIAL

## 2022-07-20 DIAGNOSIS — K51.00 ULCERATIVE PANCOLITIS WITHOUT COMPLICATION (HCC): ICD-10-CM

## 2022-07-20 PROCEDURE — A9585 GADOBUTROL INJECTION: HCPCS | Performed by: INTERNAL MEDICINE

## 2022-07-20 PROCEDURE — 74183 MRI ABD W/O CNTR FLWD CNTR: CPT

## 2022-07-20 RX ADMIN — GADOBUTROL 7 ML: 604.72 INJECTION INTRAVENOUS at 07:48

## 2022-07-21 ENCOUNTER — TELEPHONE (OUTPATIENT)
Dept: GASTROENTEROLOGY | Facility: AMBULARY SURGERY CENTER | Age: 62
End: 2022-07-21

## 2022-07-21 NOTE — TELEPHONE ENCOUNTER
If the pt is "feeling well" without any other symptoms, please have her complete her steroid course however if bleeding persists, please have her call us back as she may warrant to be put back on higher dose for a longer period of time until we can get hr started on the entyvio   Thanks

## 2022-07-21 NOTE — TELEPHONE ENCOUNTER
Patients GI provider:  Dr Mary Aranda    Number to return call: ( [phone]    Reason for call: Pt calling with rectal bleeding, currently taking a steroid    Scheduled procedure/appointment date if applicable: Appt 1-31-68

## 2022-07-21 NOTE — TELEPHONE ENCOUNTER
Spoke with patient  History of UC    Patient c/o small amount 1 occurrence of bright red blood with her stool today, and yesterday  Passing 1-2 soft formed BM a day  Taking prednisone 10mg, 5 days left  Fatou is working on patient Theresa Blankenship for 6 weeks  Patient feels well  Denies n/v, fever, chills, fatigue, SOB, weakness, black stools  Patient will let us know if bright red blood persists  Any suggestions?

## 2022-07-22 ENCOUNTER — TELEPHONE (OUTPATIENT)
Dept: GASTROENTEROLOGY | Facility: CLINIC | Age: 62
End: 2022-07-22

## 2022-07-22 DIAGNOSIS — K51.00 ULCERATIVE PANCOLITIS WITHOUT COMPLICATION (HCC): ICD-10-CM

## 2022-07-25 ENCOUNTER — TELEPHONE (OUTPATIENT)
Dept: GASTROENTEROLOGY | Facility: CLINIC | Age: 62
End: 2022-07-25

## 2022-07-25 ENCOUNTER — TELEPHONE (OUTPATIENT)
Dept: FAMILY MEDICINE CLINIC | Facility: CLINIC | Age: 62
End: 2022-07-25

## 2022-07-25 ENCOUNTER — CLINICAL SUPPORT (OUTPATIENT)
Dept: FAMILY MEDICINE CLINIC | Facility: CLINIC | Age: 62
End: 2022-07-25

## 2022-07-25 DIAGNOSIS — Z11.52 ENCOUNTER FOR SCREENING FOR COVID-19: Primary | ICD-10-CM

## 2022-07-25 PROCEDURE — U0005 INFEC AGEN DETEC AMPLI PROBE: HCPCS | Performed by: FAMILY MEDICINE

## 2022-07-25 PROCEDURE — U0003 INFECTIOUS AGENT DETECTION BY NUCLEIC ACID (DNA OR RNA); SEVERE ACUTE RESPIRATORY SYNDROME CORONAVIRUS 2 (SARS-COV-2) (CORONAVIRUS DISEASE [COVID-19]), AMPLIFIED PROBE TECHNIQUE, MAKING USE OF HIGH THROUGHPUT TECHNOLOGIES AS DESCRIBED BY CMS-2020-01-R: HCPCS | Performed by: FAMILY MEDICINE

## 2022-07-25 NOTE — TELEPHONE ENCOUNTER
Patients GI provider:  Dr Laury Hooker    Number to return call: ([phone])    Reason for call: Pt calling because you were tested for Covid, will not know results until tomorrow  Has two steroid pills left, asking if she should take them  Please call      Scheduled procedure/appointment date if applicable: Apt/procedure 8/31/22

## 2022-07-25 NOTE — TELEPHONE ENCOUNTER
Patient called asking for a covid test, this am she had SOB, dizzy and nausea  She did a home covid test and the lines were faint she couldn't read it  She is worried due to her auto immune disease  Can you please put an order in the system and she can come to our parking lot for swabbing  Please advise patient at [phone]

## 2022-07-25 NOTE — TELEPHONE ENCOUNTER
Relayed to pt that she should finish last two steroid pills  Pt has not tested positive for COVID yet   Awaiting results of PCR

## 2022-07-26 LAB — SARS-COV-2 RNA RESP QL NAA+PROBE: NEGATIVE

## 2022-08-02 ENCOUNTER — TELEPHONE (OUTPATIENT)
Dept: GASTROENTEROLOGY | Facility: CLINIC | Age: 62
End: 2022-08-02

## 2022-08-02 NOTE — TELEPHONE ENCOUNTER
Please call patient back  She had blood and mucous come out in her underwear and it's never happened before  Thank you!

## 2022-08-02 NOTE — TELEPHONE ENCOUNTER
Spoke with pt at length who stated she was on steroids for one month and recently finished her treatment and feels symptoms have returned  She admits symptoms are intermittent and some days she feels fine but others she feels 'weak'  She is having 4-6 BMs daily with mild red blood in the stool intermittently  The BMs are loose  She feels as though she is losing weight but is also utilizing a specific diet which may contribute to weight loss  Pt also mentioned intermittent mild nausea  Before I could advise pt, she asked to be seen in office by either Dr Stacia Cameron or Dr Barbee Meckel  Pt is already scheduled for 8/31 with Dr Marquis Gibson but wanted sooner OV  I advised both providers were either booked or out of office doing procedures  I recommended being seen by our PA's for advice in the interim  Pt voiced concern about not being able to see the doctors she wants and questioned what she would do in an emergency  I explained if pt is having an emergency, that would prompt ED visit  I explained seeing our PA's would provide her with some advice until she is able to see the providers  PT expressed understanding but continued to question if seeing our PA's was the right decision  I reassured pt that our PA's are competent and capable but that they also work closely with our doctors  She verbalized understanding   I set her up with OV tomorrow with SAVANNAH Razo at 930am

## 2022-08-02 NOTE — TELEPHONE ENCOUNTER
Patients GI provider:  Dr Alverto Arreola    Number to return call: [phone]    Reason for call: Pt called stated stating there is blood in  her stool, she feels weak, she requested a call back from a nurse please reach put to patient      Scheduled procedure/appointment date if applicable: 67/73/8414

## 2022-08-02 NOTE — TELEPHONE ENCOUNTER
Spoke with pt who was very concerned with symptom of mucous and blood in her underwear  She believes she was passing gas and went to the bathroom and saw it on her underwear  Pt confirms she does not feel worse but was really worried when seeing this on her underwear  I did explained that this symptom is a common symptom of UC although it is the first time she is experiencing it  Pt wanted to ensure it was OK to await OV tomorrow  I reassured pt it would be OK to wait especially given her appt time is early in the AM  I recommended pt go to ED if she develops dizziness or extreme fatigue   She verbalized understanding and thanked me for my reassurance

## 2022-08-03 ENCOUNTER — APPOINTMENT (OUTPATIENT)
Dept: LAB | Facility: MEDICAL CENTER | Age: 62
End: 2022-08-03
Payer: COMMERCIAL

## 2022-08-03 ENCOUNTER — OFFICE VISIT (OUTPATIENT)
Dept: GASTROENTEROLOGY | Facility: CLINIC | Age: 62
End: 2022-08-03
Payer: COMMERCIAL

## 2022-08-03 VITALS
DIASTOLIC BLOOD PRESSURE: 70 MMHG | HEIGHT: 61 IN | TEMPERATURE: 98 F | WEIGHT: 138.8 LBS | BODY MASS INDEX: 26.21 KG/M2 | SYSTOLIC BLOOD PRESSURE: 122 MMHG

## 2022-08-03 DIAGNOSIS — E61.1 IRON DEFICIENCY: ICD-10-CM

## 2022-08-03 DIAGNOSIS — K51.011 ULCERATIVE PANCOLITIS WITH RECTAL BLEEDING (HCC): Primary | ICD-10-CM

## 2022-08-03 DIAGNOSIS — K51.011 ULCERATIVE PANCOLITIS WITH RECTAL BLEEDING (HCC): ICD-10-CM

## 2022-08-03 LAB
BASOPHILS # BLD AUTO: 0.04 THOUSANDS/ΜL (ref 0–0.1)
BASOPHILS NFR BLD AUTO: 1 % (ref 0–1)
EOSINOPHIL # BLD AUTO: 0.16 THOUSAND/ΜL (ref 0–0.61)
EOSINOPHIL NFR BLD AUTO: 3 % (ref 0–6)
ERYTHROCYTE [DISTWIDTH] IN BLOOD BY AUTOMATED COUNT: 13.5 % (ref 11.6–15.1)
HCT VFR BLD AUTO: 38.5 % (ref 34.8–46.1)
HGB BLD-MCNC: 12.3 G/DL (ref 11.5–15.4)
IMM GRANULOCYTES # BLD AUTO: 0.04 THOUSAND/UL (ref 0–0.2)
IMM GRANULOCYTES NFR BLD AUTO: 1 % (ref 0–2)
LYMPHOCYTES # BLD AUTO: 1.13 THOUSANDS/ΜL (ref 0.6–4.47)
LYMPHOCYTES NFR BLD AUTO: 19 % (ref 14–44)
MCH RBC QN AUTO: 28.4 PG (ref 26.8–34.3)
MCHC RBC AUTO-ENTMCNC: 31.9 G/DL (ref 31.4–37.4)
MCV RBC AUTO: 89 FL (ref 82–98)
MONOCYTES # BLD AUTO: 0.79 THOUSAND/ΜL (ref 0.17–1.22)
MONOCYTES NFR BLD AUTO: 14 % (ref 4–12)
NEUTROPHILS # BLD AUTO: 3.7 THOUSANDS/ΜL (ref 1.85–7.62)
NEUTS SEG NFR BLD AUTO: 62 % (ref 43–75)
NRBC BLD AUTO-RTO: 0 /100 WBCS
PLATELET # BLD AUTO: 230 THOUSANDS/UL (ref 149–390)
PMV BLD AUTO: 8.7 FL (ref 8.9–12.7)
RBC # BLD AUTO: 4.33 MILLION/UL (ref 3.81–5.12)
WBC # BLD AUTO: 5.86 THOUSAND/UL (ref 4.31–10.16)

## 2022-08-03 PROCEDURE — 36415 COLL VENOUS BLD VENIPUNCTURE: CPT

## 2022-08-03 PROCEDURE — 99213 OFFICE O/P EST LOW 20 MIN: CPT | Performed by: PHYSICIAN ASSISTANT

## 2022-08-03 PROCEDURE — 85025 COMPLETE CBC W/AUTO DIFF WBC: CPT

## 2022-08-03 RX ORDER — PREDNISONE 10 MG/1
TABLET ORAL
Qty: 100 TABLET | Refills: 0 | Status: SHIPPED | OUTPATIENT
Start: 2022-08-03

## 2022-08-03 NOTE — PROGRESS NOTES
[facility] - Outpatient Follow-up Note  Ayden Kumar 63 y o  female MRN: [id number]  Encounter: [id number]          ASSESSMENT AND PLAN:      1  Ulcerative pancolitis with rectal bleeding ([address] )  2  Iron deficiency  She has been maintained on Entyvio every 8 weeks for the past year  Unfortunately, recently symptoms began flaring with increased frequency of diarrhea, bleeding, lower abdominal pain  Stool studies negative for C diff and other enteric pathogens  She was given 1 month prednisone taper due to high fecal calprotectin  Unfortunately since stopping steroids, her symptoms are flaring again  We will recheck blood work including CBC and fecal calprotectin  She prefers to avoid steroids, but I will give her a prescription for prednisone in case symptoms worsen  We recently received approval for [facility] every 6 weeks due to low drug levels  She is due for her next dose on 8/18  She will call Home Health to arrange her next infusion  She will continue anti-inflammatory diet which is similar to Mediterranean diet  She will start ferrous sulfate 325 mg daily, can increase to twice daily after 2 weeks if tolerating  She will let us know if her symptoms worsen  I asked her to take precautions against COVID-19 infection such as wearing a mask and frequently washing hands    - predniSONE 10 mg tablet; Take 40 mg daily for 1 week, then decrease by 10 mg weekly until complete  Dispense: 100 tablet; Refill: 0  - Calprotectin,Fecal  - CBC and differential; Future    Follow-up as scheduled with Dr Barbee Meckel later this month  ______________________________________________________________________    SUBJECTIVE:  29-year-old female with ulcerative pancolitis presenting for follow-up  She was seen 1 month ago due to diarrhea and rectal bleeding  Blood work at that time was stable, hemoglobin normal and stool studies negative for infection   She had Entyvio drug and antibody levels done which showed low drug level and no antibody  It was decided to increase Entyvio frequency to every 6 weeks  We just received approval for this last week  She was given 1 month of steroids which she recently completed  She felt much better on the steroids and her fecal calprotectin improved from 713 -> 98  Now that she has been off the steroids, her symptoms are returning  She has frequent loose stools at least 5 daily mixed with blood  She is feeling weak and low energy  She has some intermittent lower abdominal pain  She denies nausea and vomiting  She has been eating well  She is following an autoimmune diet, anti inflammatory diet very similar to [address]  REVIEW OF SYSTEMS IS OTHERWISE NEGATIVE  Historical Information   Past Medical History:   Diagnosis Date    Anxiety     Cancer ([facility] [address] )     skin    Change in bowel habits     Elevated liver enzymes     Gall stones     Hashimoto's thyroiditis     Irritable bowel syndrome     Lumbar spondylosis     Osteoarthritis     Seasonal allergies     Ulcerative colitis ([facility] [address] )      Past Surgical History:   Procedure Laterality Date    COLONOSCOPY      EAR SURGERY      cyst removed from ear drum    EAR SURGERY      EAR SURGERY      IRIDECTOMY      Optical    DE COLONOSCOPY FLX DX W/COLLJ SPEC WHEN PFRMD N/A 10/9/2017    Procedure: COLONOSCOPY;  Surgeon: Bry Cordova MD;  [facility]: [facility];   Service: Gastroenterology    DE EXC SKIN MALIG 1 1-2 CM TRUNK,ARM,LEG Right 10/4/2019    Procedure: 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;  Surgeon: Earline Deleon MD;  Location: [address] [place] [address];  Service: Plastics    UPPER GASTROINTESTINAL ENDOSCOPY       Social History   Social History     Substance and Sexual Activity   Alcohol Use Yes    Comment: Social     Social History     Substance and Sexual Activity   Drug Use No     Social History     Tobacco Use   Smoking Status Never Smoker   Smokeless Tobacco Never Used     Family History   Problem Relation Age of Onset   [place] Breast cancer Mother     Lung cancer Mother     Atrial fibrillation Father     Stroke Father         CVA    Diabetes Father         Mellitus    Heart disease Father     Other Daughter         Colitis    Breast cancer Family        Meds/Allergies       Current Outpatient Medications:     ALPRAZolam (XANAX) 0 25 mg tablet    ascorbic acid (VITAMIN C) 250 mg tablet    Cholecalciferol (VITAMIN D PO)    Cyanocobalamin (VITAMIN B-12 PO)    dicyclomine (BENTYL) 20 mg tablet    ergocalciferol (ERGOCALCIFEROL) 1 25 MG (69651 UT) capsule    metoprolol tartrate (LOPRESSOR) 25 mg tablet    predniSONE 10 mg tablet    vedolizumab (ENTYVIO) SOLR    Allergies   Allergen Reactions    Amoxicillin GI Intolerance           Objective     Blood pressure 122/70, temperature 98 °F (36 7 °C), temperature source Tympanic, height 5' 1" (1 549 m), weight 63 kg (138 lb 12 8 oz)  Body mass index is 26 23 kg/m²  PHYSICAL EXAM:      General Appearance:   Alert, cooperative, no distress   HEENT:   Normocephalic, atraumatic, anicteric      Neck:  Supple, symmetrical, trachea midline   Lungs:   Clear to auscultation bilaterally; no rales, rhonchi or wheezing; respirations unlabored    Heart[de-identified]   Regular rate and rhythm; no murmur, rub, or gallop  Abdomen:   Soft, non-tender, non-distended; normal bowel sounds; no masses, no organomegaly    Genitalia:   Deferred    Rectal:   Deferred    Extremities:  No cyanosis, clubbing or edema    Pulses:  2+ and symmetric    Skin:  No jaundice, rashes, or lesions    Lymph nodes:  No palpable cervical lymphadenopathy        Lab Results:   No visits with results within 1 Day(s) from this visit     Latest known visit with results is:   Orders Only on 07/25/2022   Component Date Value    SARS-CoV-2 07/25/2022 Negative          Radiology Results:   MRI abdomen w wo contrast and mrcp    Result Date: 7/25/2022  Narrative: MRI OF THE ABDOMEN WITH AND WITHOUT CONTRAST WITH MRCP INDICATION: 63 years / Female  K51 00: Ulcerative (chronic) pancolitis without complications  COMPARISON: 6/27/2022 TECHNIQUE:  The following pulse sequences were obtained:  Coronal and axial T2 with TE of 90 and 180 respectively, axial T2 with fat saturation, axial FIESTA fat-sat, axial T1-weighted in-and-out-of phase, axial DWI/ADC, pre-contrast axial T1 with fat saturation, post-contrast dynamic axial T1 with fat saturation at 20, 70, and 180 seconds, followed by coronal and 7 minute delayed axial T1 with fat saturation  3D MRCP images were obtained with radial thick slabs and projections  3D rendering was performed from the acquisition scanner  Imaging performed on 1 5T MRI IV Contrast:  <See Chart>  of Gadobutrol injection (SINGLE-DOSE) FINDINGS: LOWER CHEST:   Unremarkable  LIVER: Normal in size and configuration  No suspicious mass  The hepatic veins and portal veins are patent  BILE DUCTS:  There is slight intrahepatic ductal dilatation centrally and in the left lobe  The common bile duct is top normal in caliber measuring 6 mm  There is no evidence of beading/stricture though there appears to be mild narrowing of the distal left hepatic duct at the confluence with the right  No choledocholithiasis or suspicious mass  GALLBLADDER:  There is a small gallstone  PANCREAS:  Unremarkable  ADRENAL GLANDS:  Normal  SPLEEN:  Normal  KIDNEYS/PROXIMAL URETERS:  No hydroureteronephrosis  No suspicious renal mass  There are small renal cysts  BOWEL:   No dilated loops of bowel  PERITONEUM/RETROPERITONEUM:  No ascites  LYMPH NODES:  No abdominal lymphadenopathy  VASCULAR STRUCTURES:  No aneurysm  ABDOMINAL WALL:  Unremarkable  OSSEOUS STRUCTURES:  No suspicious osseous lesion  Impression: 1  Mild intrahepatic biliary ductal dilatation with no evidence of beading to confirm PSC though the distal left hepatic duct appears somewhat narrowed at the confluence with the right    Early PSC not excluded and continued surveillance is recommended  2   Gallstone   Workstation performed: [id number]

## 2022-08-03 NOTE — LETTER
August 3, 9253     Amor Whitmore DO  [address] [place] [facility]  [address]  [address] [facility]    Patient: Irvine Phalen   YOB: 1960   Date of Visit: 8/3/2022       Dear Dr Tobi Jones: Thank you for referring Lilly Yen to me for evaluation  Below are my notes for this consultation  If you have questions, please do not hesitate to call me  I look forward to following your patient along with you  Sincerely,        Michele Young PA-C        CC: No Recipients  Michele Young PA-C  8/3/2022  1:00 PM  Sign when Signing Visit  [facility] - Outpatient Follow-up Note  Irvine Phalen 63 y o  female MRN: [id number]  Encounter: [id number]          ASSESSMENT AND PLAN:      1  Ulcerative pancolitis with rectal bleeding ([facility] [address] )  2  Iron deficiency  She has been maintained on Entyvio every 8 weeks for the past year  Unfortunately, recently symptoms began flaring with increased frequency of diarrhea, bleeding, lower abdominal pain  Stool studies negative for C diff and other enteric pathogens  She was given 1 month prednisone taper due to high fecal calprotectin  Unfortunately since stopping steroids, her symptoms are flaring again  We will recheck blood work including CBC and fecal calprotectin  She prefers to avoid steroids, but I will give her a prescription for prednisone in case symptoms worsen  We recently received approval for [facility] every 6 weeks due to low drug levels  She is due for her next dose on 8/18  She will call Home Health to arrange her next infusion  She will continue anti-inflammatory diet which is similar to Mediterranean diet  She will start ferrous sulfate 325 mg daily, can increase to twice daily after 2 weeks if tolerating  She will let us know if her symptoms worsen  I asked her to take precautions against COVID-19 infection such as wearing a mask and frequently washing hands    - predniSONE 10 mg tablet;  Take 40 mg daily for 1 week, then decrease by 10 mg weekly until complete  Dispense: 100 tablet; Refill: 0  - Calprotectin,Fecal  - CBC and differential; Future    Follow-up as scheduled with Dr John Murphy later this month  ______________________________________________________________________    SUBJECTIVE:  72-year-old female with ulcerative pancolitis presenting for follow-up  She was seen 1 month ago due to diarrhea and rectal bleeding  Blood work at that time was stable, hemoglobin normal and stool studies negative for infection  She had Entyvio drug and antibody levels done which showed low drug level and no antibody  It was decided to increase Entyvio frequency to every 6 weeks  We just received approval for this last week  She was given 1 month of steroids which she recently completed  She felt much better on the steroids and her fecal calprotectin improved from 713 -> 98  Now that she has been off the steroids, her symptoms are returning  She has frequent loose stools at least 5 daily mixed with blood  She is feeling weak and low energy  She has some intermittent lower abdominal pain  She denies nausea and vomiting  She has been eating well  She is following an autoimmune diet, anti inflammatory diet very similar to [address]  REVIEW OF SYSTEMS IS OTHERWISE NEGATIVE  Historical Information   Past Medical History:   Diagnosis Date    Anxiety     Cancer ([address] )     skin    Change in bowel habits     Elevated liver enzymes     Gall stones     Hashimoto's thyroiditis     Irritable bowel syndrome     Lumbar spondylosis     Osteoarthritis     Seasonal allergies     Ulcerative colitis ([address] )      Past Surgical History:   Procedure Laterality Date    COLONOSCOPY      EAR SURGERY      cyst removed from ear drum    EAR SURGERY      EAR SURGERY      IRIDECTOMY      Optical    [place] COLONOSCOPY FLX DX W/COLLJ SPEC WHEN PFRMD N/A 10/9/2017    Procedure: COLONOSCOPY;  Surgeon: Gil Palumbo MD;  [facility];   Service: Gastroenterology    MN EXC SKIN MALIG 1 1-2 CM TRUNK,ARM,LEG Right 10/4/2019    Procedure: 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;  Surgeon: Christianne Howell MD;  Location: [address] [place] [address];  Service: Plastics    UPPER GASTROINTESTINAL ENDOSCOPY       Social History   Social History     Substance and Sexual Activity   Alcohol Use Yes    Comment: Social     Social History     Substance and Sexual Activity   Drug Use No     Social History     Tobacco Use   Smoking Status Never Smoker   Smokeless Tobacco Never Used     Family History   Problem Relation Age of Onset    Breast cancer Mother     Lung cancer Mother     Atrial fibrillation Father     Stroke Father         CVA    Diabetes Father         Mellitus    Heart disease Father     Other Daughter         Colitis    Breast cancer Family        Meds/Allergies       Current Outpatient Medications:     ALPRAZolam (XANAX) 0 25 mg tablet    ascorbic acid (VITAMIN C) 250 mg tablet    Cholecalciferol (VITAMIN D PO)    Cyanocobalamin (VITAMIN B-12 PO)    dicyclomine (BENTYL) 20 mg tablet    ergocalciferol (ERGOCALCIFEROL) 1 25 MG (79809 UT) capsule    metoprolol tartrate (LOPRESSOR) 25 mg tablet    predniSONE 10 mg tablet    vedolizumab (ENTYVIO) SOLR    Allergies   Allergen Reactions    Amoxicillin GI Intolerance           Objective     Blood pressure 122/70, temperature 98 °F (36 7 °C), temperature source Tympanic, height 5' 1" (1 549 m), weight 63 kg (138 lb 12 8 oz)  Body mass index is 26 23 kg/m²  PHYSICAL EXAM:      General Appearance:   Alert, cooperative, no distress   HEENT:   Normocephalic, atraumatic, anicteric      Neck:  Supple, symmetrical, trachea midline   Lungs:   Clear to auscultation bilaterally; no rales, rhonchi or wheezing; respirations unlabored    Heart[de-identified]   Regular rate and rhythm; no murmur, rub, or gallop     Abdomen:   Soft, non-tender, non-distended; normal bowel sounds; no masses, no organomegaly    Genitalia:   Deferred  Rectal:   Deferred    Extremities:  No cyanosis, clubbing or edema    Pulses:  2+ and symmetric    Skin:  No jaundice, rashes, or lesions    Lymph nodes:  No palpable cervical lymphadenopathy        Lab Results:   No visits with results within 1 Day(s) from this visit  Latest known visit with results is:   Orders Only on 07/25/2022   Component Date Value    SARS-CoV-2 07/25/2022 Negative          Radiology Results:   MRI abdomen w wo contrast and mrcp    Result Date: 7/25/2022  Narrative: MRI OF THE ABDOMEN WITH AND WITHOUT CONTRAST WITH MRCP INDICATION: 63 years / Female  K51 00: Ulcerative (chronic) pancolitis without complications  COMPARISON: 6/27/2022 TECHNIQUE:  The following pulse sequences were obtained:  Coronal and axial T2 with TE of 90 and 180 respectively, axial T2 with fat saturation, axial FIESTA fat-sat, axial T1-weighted in-and-out-of phase, axial DWI/ADC, pre-contrast axial T1 with fat saturation, post-contrast dynamic axial T1 with fat saturation at 20, 70, and 180 seconds, followed by coronal and 7 minute delayed axial T1 with fat saturation  3D MRCP images were obtained with radial thick slabs and projections  3D rendering was performed from the acquisition scanner  Imaging performed on 1 5T MRI IV Contrast:  <See Chart>  of Gadobutrol injection (SINGLE-DOSE) FINDINGS: LOWER CHEST:   Unremarkable  LIVER: Normal in size and configuration  No suspicious mass  The hepatic veins and portal veins are patent  BILE DUCTS:  There is slight intrahepatic ductal dilatation centrally and in the left lobe  The common bile duct is top normal in caliber measuring 6 mm  There is no evidence of beading/stricture though there appears to be mild narrowing of the distal left hepatic duct at the confluence with the right  No choledocholithiasis or suspicious mass  GALLBLADDER:  There is a small gallstone  PANCREAS:  Unremarkable   ADRENAL GLANDS:  Normal  SPLEEN:  Normal  KIDNEYS/PROXIMAL URETERS:  No hydroureteronephrosis  No suspicious renal mass  There are small renal cysts  BOWEL:   No dilated loops of bowel  PERITONEUM/RETROPERITONEUM:  No ascites  LYMPH NODES:  No abdominal lymphadenopathy  VASCULAR STRUCTURES:  No aneurysm  ABDOMINAL WALL:  Unremarkable  OSSEOUS STRUCTURES:  No suspicious osseous lesion  Impression: 1  Mild intrahepatic biliary ductal dilatation with no evidence of beading to confirm PSC though the distal left hepatic duct appears somewhat narrowed at the confluence with the right  Early PSC not excluded and continued surveillance is recommended  2   Gallstone   Workstation performed: [id number]

## 2022-08-06 ENCOUNTER — APPOINTMENT (OUTPATIENT)
Dept: LAB | Facility: MEDICAL CENTER | Age: 62
End: 2022-08-06
Payer: COMMERCIAL

## 2022-08-06 PROCEDURE — 83993 ASSAY FOR CALPROTECTIN FECAL: CPT | Performed by: PHYSICIAN ASSISTANT

## 2022-08-09 LAB — CALPROTECTIN STL-MCNT: 1474 UG/G (ref 0–120)

## 2022-08-10 ENCOUNTER — TELEPHONE (OUTPATIENT)
Dept: GASTROENTEROLOGY | Facility: CLINIC | Age: 62
End: 2022-08-10

## 2022-08-10 ENCOUNTER — TELEPHONE (OUTPATIENT)
Dept: ADMINISTRATIVE | Facility: OTHER | Age: 62
End: 2022-08-10

## 2022-08-10 DIAGNOSIS — K51.011 ULCERATIVE PANCOLITIS WITH RECTAL BLEEDING (HCC): Primary | ICD-10-CM

## 2022-08-10 NOTE — TELEPHONE ENCOUNTER
Upon review of the In Basket request we were able to locate, review, and update the patient chart as requested for Mammogram     Any additional questions or concerns should be emailed to the Practice Liaisons via [email or web address] email, please do not reply via In Basket      Thank you  Sraah Patterson

## 2022-08-10 NOTE — TELEPHONE ENCOUNTER
----- Message from [facility] sent at 8/10/2022  7:53 AM EDT -----  Regarding: [facility]  08/10/22 7:53 AM    Hello, our patient Osmany Vera has had Mammogram completed/performed  Please assist in updating the patient chart by pulling the Care Everywhere (CE) document   The date of service is 08/05/2022    Thank you,  [address] [place] [address]

## 2022-08-10 NOTE — TELEPHONE ENCOUNTER
IMTIAZ    Spoke with pt and she had not started prednisone yet but did today after seeing her stool results   She is agreeable to repeating stool test prior to visit with Dr Blanca Simpson, at which point pt would have been on prednisone for 2 weeks as she plans on going on the 24th of the month to ensure enough time of resulting prior to visit with Dr Blanca Simpson on the 31st 
Patients GI provider:  Dr Felix Phan /     Number to return call: [phone]    Reason for call: Pt calling to discuss results, abnormally high Calpropectin    Scheduled procedure/appointment date if applicable: Apt/procedure 8/6/22
Plan: Location: face\\nPrescribe: Doryx po qd x 30 days\\n                  Soolantra cream top qd x 30 days\\n                  AVAR LS Cleanser top (3-5 minutes) qd x 30 days\\n\\nPt presents with erythema. Discussed with him that it is an immune mediated condition that irritates the blood vessels and oil glands\\nEducated him on trigger factors such as stress, spicy foods, alcohol, sunlight, etc.\\nRecommend using sunscreen with zinc oxide to help prevent trigger factors from activating\\nWill prescribe Soolantra cream to help eliminate mites that are associated with rosacea.\\nWill also prescribe AVAR LS Cleanser (3-5 minutes) to help calm down inflammation.\\n\\nWE HAD A LONG DISCUSSION ABOUT THE BEST WAY TO USE DORYX---PT IS GOING TO START BY TAKING 2 X 50MG DAILY AND IF AFTER 4 DAYS PT HAS NOT SEEN MUCH IMPROVEMENT, WILL TITRATE UP---WE WILL FIND THE DOSING SCHEDULE THAT WORKS BEST FOR PT.\\nF/u as needed
fecal calprotectin is high but pt is already on steroids  How is she feeling? She was started on prednisone 40 mg about one week ago so if she does not feel any better + elevated fecal ke, I would advise increasing her dose to 60 mg with slow taper  I would also want her to repeat fecal ke prior to her visit with Dr Eddie Acosta in 2 weeks  Thanks!
Detail Level: Zone
Plan: Location: face\\nDuration: enrolling in iPledge\\nMethod of Contraception:\\nPrescribed:\\nPharmacy: [facility]\\niPledge:\\n\\nDiscussed with patient that this is an immune mediated condition triggered with stress, lack of sleep, and also has a major genetic component\\nEducated patient on Accutane 6 month treatment course, side effects, and iPledge program/requirements (2 negative pregnancy tests 30 days apart required)\\nCommon side effects from therapy: dryness, joint pain, mood changes, headaches, nose bleeds\\nDiscussed with patient that Accutane is a Vitamin A derivative\\nDiscussed with patient that Accutane obliterates oil glands and a cure for acne vs. antibiotics which is a temporary treatment\\nDiscussed with patient that medication is processed by the liver and requires blood work to monitor liver functions\\nPt discussed that she is tired of acne and wants it gone.\\nWill enroll pt into ipledge today, discussed with pt that she has to wait 30 days until we can send in prescription.\\nWill prescribe [enter medication] today to help relieve inflammation until f/u.\\nLab slips given.\\n\\nIPLEDGE program and consent form discussed with patient. The side effects and warnings for the use of Accutane were discussed with the patient. She understands she can not donate blood for 1 month post tx and can not have laser tx, cosmetic surgery, waxing or peels for 6 months post treatment.\\n\\nDiscussed musculoskeletal SE’s in detail. Discussed HA’s, dry skin, and depression in detail. Patient denies depression. Denies personal or family H/O Crohns, IBS, IBD, or bloody stools.\\n\\nUnderstand the need to fill Rx in 6 days and have monthly labs and OV.\\nF/u in 1 month

## 2022-08-18 ENCOUNTER — HOME CARE VISIT (OUTPATIENT)
Dept: HOME HEALTH SERVICES | Facility: HOME HEALTHCARE | Age: 62
End: 2022-08-18
Payer: COMMERCIAL

## 2022-08-18 PROCEDURE — G0299 HHS/HOSPICE OF RN EA 15 MIN: HCPCS

## 2022-08-18 PROCEDURE — 400014 VN F/U

## 2022-08-19 VITALS
SYSTOLIC BLOOD PRESSURE: 120 MMHG | HEART RATE: 83 BPM | RESPIRATION RATE: 18 BRPM | DIASTOLIC BLOOD PRESSURE: 60 MMHG | OXYGEN SATURATION: 98 % | TEMPERATURE: 97.8 F

## 2022-08-19 DIAGNOSIS — F40.240 CLAUSTROPHOBIA: ICD-10-CM

## 2022-08-19 RX ORDER — ALPRAZOLAM 0.25 MG/1
0.25 TABLET ORAL
Qty: 90 TABLET | Refills: 0 | Status: SHIPPED | OUTPATIENT
Start: 2022-08-19

## 2022-08-25 ENCOUNTER — APPOINTMENT (OUTPATIENT)
Dept: LAB | Facility: MEDICAL CENTER | Age: 62
End: 2022-08-25
Payer: COMMERCIAL

## 2022-08-25 DIAGNOSIS — K51.011 ULCERATIVE PANCOLITIS WITH RECTAL BLEEDING (HCC): ICD-10-CM

## 2022-08-25 PROCEDURE — 83993 ASSAY FOR CALPROTECTIN FECAL: CPT

## 2022-08-28 PROBLEM — M75.81 TENDINITIS OF RIGHT ROTATOR CUFF: Status: ACTIVE | Noted: 2022-08-28

## 2022-08-28 PROBLEM — Z79.52 LONG-TERM CORTICOSTEROID USE: Status: ACTIVE | Noted: 2022-08-28

## 2022-08-31 ENCOUNTER — OFFICE VISIT (OUTPATIENT)
Dept: GASTROENTEROLOGY | Facility: CLINIC | Age: 62
End: 2022-08-31
Payer: COMMERCIAL

## 2022-08-31 VITALS
SYSTOLIC BLOOD PRESSURE: 118 MMHG | DIASTOLIC BLOOD PRESSURE: 70 MMHG | WEIGHT: 139 LBS | BODY MASS INDEX: 26.24 KG/M2 | TEMPERATURE: 98.2 F | HEIGHT: 61 IN

## 2022-08-31 DIAGNOSIS — K51.911 ULCERATIVE COLITIS WITH RECTAL BLEEDING, UNSPECIFIED LOCATION (HCC): Primary | ICD-10-CM

## 2022-08-31 LAB — CALPROTECTIN STL-MCNT: 499 UG/G (ref 0–120)

## 2022-08-31 PROCEDURE — 99214 OFFICE O/P EST MOD 30 MIN: CPT | Performed by: INTERNAL MEDICINE

## 2022-08-31 NOTE — PROGRESS NOTES
Gastroenterology Outpatient Follow-up - Crohn's Disease  Chata Lopez 63 y o  female MRN: [id number]  Encounter: [id number]    Chata Lopez is a 63 y o  female with Ulcerative colitis  Symptom onset:  2017  Diagnosis:  ulcerative colitis  Year of diagnosis:  2017    IBD Summary: First treated with Nelly Ping for about 2 years      First biologic was entyvio which started in October of 2021 but in February of 2022 symptoms worsened, and was treated with prednisone with symptoms abating but as she was titrating down her prednisone symptoms worsened  She is currently on the autoimmune protocol diet since July of 2022  In the past week and a half- reporting normal BM's; formed, 1-2 times daily, without bleeding     Ulcerative Colitis Summary  Macroscopic extent of diseases: pancolitis  Microscopic extent of diseases:  pancolitis  Has the patient ever been hospitalized for severe disease: No      Medications     Year last used Reason for discontinuation   Corticosteroids             Thiopurines             Methotrexate             Infliximab             Adalimumab             Certolizumab             Golimumab             Natalizumab             Mesalamine             Sulfasalzine             Vedolizumab             Ustekinumab             Tofacitinib             Other biologic                 Extraintestinal Manifestations    IBD-associated arthropathy      Uveitis      Oral aphthous ulcers     Erythema nodosum      Pyoderma gangrenosum      Primary sclerosing cholangitis      Thrombotic complications          Cancer / Dysplasia History  History of IBD-associated dysplasia:      Date of diagnosis (Year):     History of colorectal cancer:     History of cervical dysplasia:     History of skin cancer:       Laboratory Data   Most recent (date) Result   PPD       Quanitferon gold       TPMT       Hepatitis A       HBsAb       HBcAb       HBsAg       HCV Ab         Imaging / Diagnostic Procedures   Most recent (date) Findings   Colonoscopy or sigmoidoscopy #1              Ulcers/Erosions              Strictures              Stricture Severity              Endoscopic Score Fregoso Score:    Rutgeert's:               Findings              Pathology      Colonoscopy or sigmoidoscopy #2              Ulcers/Erosions                 Strictures                 Stricture Severity              Endoscopic Score Fregoso Score:    Rugeert's:              Findings              Pathology      EGD       Small bowel follow-through       CT enterography       CT without enterography       MRI enterography       Capsule study       DEXA scan   Lowest Z-score:      CXR (for TB)         HPI:   Interval History:  Feeling better currently  Anxiety improving    Jensen Vale reports the following symptoms over the last 7 days:    Stool Frequency No Value exists for the : [id number]   Average stools per day: 1   Average liquid stools per day: 1   Consistency of bowel: formed   Awakening from sleep to move bowels? Yes   Urgency no fecal urgency   Visible blood in stool? none   Abdominal pain? none   General Wellbeing? generally well     Jensen Vale also reports the following symptoms in the last month:    Leakage of stool while sleeping? No   Leakage of stool while awake?  No       REVIEW OF SYSTEMS:  During the last 7 days, Jensen Vale experienced the following symptoms:  Unintentional Weight Loss (in the last month) Yes   Fever No   Eye irritation No   Mouth sores No   Sore throat No   Chest pain No   Shortness of breath No   Numbness or tingling in hands or feet No   Skin rash No   Pain or swelling in joints No   Bruising or bleeding No   Felt depressed or blue No   Fatigue No   Dysuria No   Please see HPI for additional pertinent review of systems; otherwise remainder of ROS was unremarkable    MEDICATIONS:    Current Outpatient Medications:     ALPRAZolam (XANAX) 0 25 mg tablet    ascorbic acid (VITAMIN C) 250 mg tablet    bisacodyl (DULCOLAX) 5 mg EC tablet   Cholecalciferol (VITAMIN D PO)    Cyanocobalamin (VITAMIN B-12 PO)    dicyclomine (BENTYL) 20 mg tablet    ergocalciferol (ERGOCALCIFEROL) 1 25 MG (39749 UT) capsule    metoprolol tartrate (LOPRESSOR) 25 mg tablet    polyethylene glycol (GOLYTELY) 4000 mL solution    predniSONE 10 mg tablet    vedolizumab (ENTYVIO) SOLR    ALLERGIES:  Allergies   Allergen Reactions    Amoxicillin GI Intolerance       OBJECTIVE:  /70 (BP Location: Right arm, Patient Position: Sitting, Cuff Size: Standard)   Temp 98 2 °F (36 8 °C) (Tympanic)   Ht 5' 1" (1 549 m)   Wt 63 kg (139 lb)   BMI 26 26 kg/m²      PHYSICAL EXAM:    General Appearance:   Alert, cooperative, no distress   HEENT:   Normocephalic, atraumatic, anicteric  Neck:  Supple, symmetrical, trachea midline   Lungs:   Clear to auscultation bilaterally; no rales, rhonchi or wheezing; respirations unlabored    Heart[de-identified]   Regular rate and rhythm; no murmur, rub, or gallop  Abdomen:   Soft, non-distended; normal bowel sounds    Abdominal exam was notable for no tenderness with no mass       Genitalia:   Deferred    Rectal:   Perirectal assessment                       Extremities:  No cyanosis, clubbing or edema    Pulses:  2+ and symmetric    Skin:  No jaundice, rashes, or lesions    Lymph nodes:  No palpable cervical lymphadenopathy        Lab Results   Component Value Date    WBC 5 86 08/03/2022    HGB 12 3 08/03/2022    HCT 38 5 08/03/2022    MCV 89 08/03/2022     08/03/2022     Lab Results   Component Value Date     06/10/2014    SODIUM 136 06/27/2022    K 4 1 06/27/2022     06/27/2022    CO2 29 06/27/2022    ANIONGAP 8 06/10/2014    AGAP 6 06/27/2022    BUN 16 06/27/2022    CREATININE 1 11 06/27/2022    GLUC 89 06/27/2022    GLUF 85 09/30/2021    CALCIUM 9 1 06/27/2022    AST 30 06/27/2022    ALT 49 06/27/2022    ALKPHOS 218 (H) 06/27/2022    PROT 7 0 06/10/2014    TP 7 7 06/27/2022    BILITOT 0 4 06/10/2014    TBILI 0 34 06/27/2022 EGFR 54 06/27/2022     Lab Results   Component Value Date    CRP 3 2 (H) 05/19/2022     Lab Results   Component Value Date    GLJILNNM37 047 03/20/2020     Lab Results   Component Value Date    FERRITIN 11 06/27/2022       ASSESSMENT AND PLAN:    Anderson has a history of macroscopic pancolitis and microscopic pancolitis  ulcerative colitis diagnosed in 2017  Current medical therapy is with entyvio every six months*  My global assessment is that the clinical disease is currently mildy active  Health Maintenance Recommendations:  Vaccines & Infections  · COVID-19 vaccination is recommended when eligible  There is no evidence that the COVID-19 vaccine would cause an IBD flare  · Avoid live vaccines if on immunosuppressive therapy  '  · Yearly influenza vaccine (flu shot)  · Pneumonia vaccines  These include Prevnar 13, followed by Pneumovax at least 8 weeks later  A Pneumovax booster should be given 5 years after  · Shingrix vaccine - series of 2 injections  · If not immune to measles mumps or rubella, MMR vaccine is recommended  However, this is a live vaccine and should be given prior to immunosuppressive therapy  · HPV vaccination as per national guidelines  · Hepatitis A and B vaccinations if not previously vaccinated  · Testing for tuberculosis with QuantiFERON Gold blood test and/or chest xray prior to starting immunosuppressive medications, and then annually    Cancer screening  · Dysplasia surveillance for colorectal cancer  Colonoscopy in all patients with extensive colitis (more than 1/3 of the colon involved) who had disease for at least 8 or more years  Repeat colonoscopy approximately every 12-24 months  In patients with concurrent primary sclerosing cholangitis, history of dysplasia, or family history of colon cancer, repeat colonoscopy annually  · Females: Pap smear annually, especially for woman on immunosuppression    · Annual dermatologic/skin exam in all patients with IBD, especially those on immunosuppression including anti-TNF therapy and/or thiopurines  Miscellaneous  · DEXA scan, once off steroids for 3 months  · Depression screening recommended annually  · Routine dental and ophthalmology examinations    Problem List Items Addressed This Visit        Digestive    Ulcerative colitis ([address] ) - Primary    Relevant Medications    bisacodyl (DULCOLAX) 5 mg EC tablet    polyethylene glycol (GOLYTELY) 4000 mL solution    Other Relevant Orders    Quantiferon TB Gold Plus    Hepatitis B surface antigen    Hepatitis B surface antibody    Hepatitis B core antibody, total    C-reactive protein    CBC and differential    Comprehensive metabolic panel    Iron Panel (Includes Ferritin, Iron Sat%, Iron, and TIBC)    Vitamin D 25 hydroxy    Comprehensive metabolic panel    Colonoscopy      Her MRI/MRCP is abnormal, concern for PSC  Her daughter has UC and PSC  Will repeat MRI/MRCP in six months and consider liver biopsy if non diagnostic      Donald Company, DO

## 2022-08-31 NOTE — PATIENT INSTRUCTIONS
Scheduled date of colonoscopy (as of today):12 01 22  Physician performing colonoscopy:DR AHMADI  Location of colonoscopy:[facility]  Bowel prep reviewed with patient:HARRISON  Instructions reviewed with patient by:RIANNA  Clearances:  N/A

## 2022-09-09 ENCOUNTER — HOME CARE VISIT (OUTPATIENT)
Dept: HOME HEALTH SERVICES | Facility: HOME HEALTHCARE | Age: 62
End: 2022-09-09
Payer: COMMERCIAL

## 2022-09-09 PROCEDURE — G0299 HHS/HOSPICE OF RN EA 15 MIN: HCPCS

## 2022-09-12 ENCOUNTER — APPOINTMENT (OUTPATIENT)
Dept: LAB | Facility: MEDICAL CENTER | Age: 62
End: 2022-09-12
Attending: INTERNAL MEDICINE
Payer: COMMERCIAL

## 2022-09-12 VITALS
HEART RATE: 95 BPM | DIASTOLIC BLOOD PRESSURE: 60 MMHG | TEMPERATURE: 97.5 F | SYSTOLIC BLOOD PRESSURE: 115 MMHG | RESPIRATION RATE: 18 BRPM | OXYGEN SATURATION: 99 %

## 2022-09-12 DIAGNOSIS — M06.09 SERONEGATIVE ARTHROPATHY OF MULTIPLE SITES (HCC): ICD-10-CM

## 2022-09-12 DIAGNOSIS — K51.911 ULCERATIVE COLITIS WITH RECTAL BLEEDING, UNSPECIFIED LOCATION (HCC): ICD-10-CM

## 2022-09-12 LAB
25(OH)D3 SERPL-MCNC: 46.6 NG/ML (ref 30–100)
ALBUMIN SERPL BCP-MCNC: 3.3 G/DL (ref 3.5–5)
ALP SERPL-CCNC: 191 U/L (ref 46–116)
ALT SERPL W P-5'-P-CCNC: 72 U/L (ref 12–78)
ANION GAP SERPL CALCULATED.3IONS-SCNC: 7 MMOL/L (ref 4–13)
AST SERPL W P-5'-P-CCNC: 36 U/L (ref 5–45)
BASOPHILS # BLD AUTO: 0.05 THOUSANDS/ΜL (ref 0–0.1)
BASOPHILS NFR BLD AUTO: 1 % (ref 0–1)
BILIRUB SERPL-MCNC: 0.48 MG/DL (ref 0.2–1)
BUN SERPL-MCNC: 12 MG/DL (ref 5–25)
CALCIUM ALBUM COR SERPL-MCNC: 9.7 MG/DL (ref 8.3–10.1)
CALCIUM SERPL-MCNC: 9.1 MG/DL (ref 8.3–10.1)
CHLORIDE SERPL-SCNC: 108 MMOL/L (ref 96–108)
CO2 SERPL-SCNC: 26 MMOL/L (ref 21–32)
CREAT SERPL-MCNC: 0.79 MG/DL (ref 0.6–1.3)
CRP SERPL QL: 6.5 MG/L
EOSINOPHIL # BLD AUTO: 0.3 THOUSAND/ΜL (ref 0–0.61)
EOSINOPHIL NFR BLD AUTO: 4 % (ref 0–6)
ERYTHROCYTE [DISTWIDTH] IN BLOOD BY AUTOMATED COUNT: 14.9 % (ref 11.6–15.1)
ERYTHROCYTE [SEDIMENTATION RATE] IN BLOOD: 33 MM/HOUR (ref 0–29)
GFR SERPL CREATININE-BSD FRML MDRD: 81 ML/MIN/1.73SQ M
GLUCOSE P FAST SERPL-MCNC: 80 MG/DL (ref 65–99)
HBV CORE AB SER QL: NORMAL
HBV SURFACE AB SER-ACNC: <3.1 MIU/ML
HBV SURFACE AG SER QL: NORMAL
HCT VFR BLD AUTO: 40.4 % (ref 34.8–46.1)
HGB BLD-MCNC: 13 G/DL (ref 11.5–15.4)
IMM GRANULOCYTES # BLD AUTO: 0.03 THOUSAND/UL (ref 0–0.2)
IMM GRANULOCYTES NFR BLD AUTO: 0 % (ref 0–2)
LYMPHOCYTES # BLD AUTO: 2.09 THOUSANDS/ΜL (ref 0.6–4.47)
LYMPHOCYTES NFR BLD AUTO: 30 % (ref 14–44)
MCH RBC QN AUTO: 28.1 PG (ref 26.8–34.3)
MCHC RBC AUTO-ENTMCNC: 32.2 G/DL (ref 31.4–37.4)
MCV RBC AUTO: 87 FL (ref 82–98)
MONOCYTES # BLD AUTO: 0.7 THOUSAND/ΜL (ref 0.17–1.22)
MONOCYTES NFR BLD AUTO: 10 % (ref 4–12)
NEUTROPHILS # BLD AUTO: 3.83 THOUSANDS/ΜL (ref 1.85–7.62)
NEUTS SEG NFR BLD AUTO: 55 % (ref 43–75)
NRBC BLD AUTO-RTO: 0 /100 WBCS
PLATELET # BLD AUTO: 248 THOUSANDS/UL (ref 149–390)
PMV BLD AUTO: 8.8 FL (ref 8.9–12.7)
POTASSIUM SERPL-SCNC: 3.5 MMOL/L (ref 3.5–5.3)
PROT SERPL-MCNC: 7.3 G/DL (ref 6.4–8.4)
RBC # BLD AUTO: 4.63 MILLION/UL (ref 3.81–5.12)
SODIUM SERPL-SCNC: 141 MMOL/L (ref 135–147)
WBC # BLD AUTO: 7 THOUSAND/UL (ref 4.31–10.16)

## 2022-09-12 PROCEDURE — 82306 VITAMIN D 25 HYDROXY: CPT

## 2022-09-12 PROCEDURE — 86140 C-REACTIVE PROTEIN: CPT

## 2022-09-12 PROCEDURE — 86706 HEP B SURFACE ANTIBODY: CPT

## 2022-09-12 PROCEDURE — 85652 RBC SED RATE AUTOMATED: CPT

## 2022-09-12 PROCEDURE — 36415 COLL VENOUS BLD VENIPUNCTURE: CPT

## 2022-09-12 PROCEDURE — 86704 HEP B CORE ANTIBODY TOTAL: CPT

## 2022-09-12 PROCEDURE — 86480 TB TEST CELL IMMUN MEASURE: CPT

## 2022-09-12 PROCEDURE — 400014 VN F/U

## 2022-09-12 PROCEDURE — 80053 COMPREHEN METABOLIC PANEL: CPT

## 2022-09-12 PROCEDURE — 85025 COMPLETE CBC W/AUTO DIFF WBC: CPT

## 2022-09-12 PROCEDURE — 87340 HEPATITIS B SURFACE AG IA: CPT

## 2022-09-13 LAB
GAMMA INTERFERON BACKGROUND BLD IA-ACNC: 0.02 IU/ML
M TB IFN-G BLD-IMP: NEGATIVE
M TB IFN-G CD4+ BCKGRND COR BLD-ACNC: 0.01 IU/ML
M TB IFN-G CD4+ BCKGRND COR BLD-ACNC: 0.01 IU/ML
MITOGEN IGNF BCKGRD COR BLD-ACNC: >10 IU/ML

## 2022-09-23 PROCEDURE — G0179 MD RECERTIFICATION HHA PT: HCPCS | Performed by: INTERNAL MEDICINE

## 2022-09-28 ENCOUNTER — HOME CARE VISIT (OUTPATIENT)
Dept: HOME HEALTH SERVICES | Facility: HOME HEALTHCARE | Age: 62
End: 2022-09-28
Payer: COMMERCIAL

## 2022-09-28 PROCEDURE — 400014 VN F/U

## 2022-09-28 PROCEDURE — G0299 HHS/HOSPICE OF RN EA 15 MIN: HCPCS

## 2022-09-30 VITALS
TEMPERATURE: 96.3 F | DIASTOLIC BLOOD PRESSURE: 68 MMHG | OXYGEN SATURATION: 98 % | HEART RATE: 84 BPM | SYSTOLIC BLOOD PRESSURE: 110 MMHG | RESPIRATION RATE: 18 BRPM

## 2022-10-07 ENCOUNTER — TELEPHONE (OUTPATIENT)
Dept: GASTROENTEROLOGY | Facility: CLINIC | Age: 62
End: 2022-10-07

## 2022-10-07 ENCOUNTER — OFFICE VISIT (OUTPATIENT)
Dept: GASTROENTEROLOGY | Facility: CLINIC | Age: 62
End: 2022-10-07
Payer: COMMERCIAL

## 2022-10-07 VITALS
TEMPERATURE: 97.9 F | WEIGHT: 131.8 LBS | SYSTOLIC BLOOD PRESSURE: 128 MMHG | HEIGHT: 61 IN | DIASTOLIC BLOOD PRESSURE: 68 MMHG | BODY MASS INDEX: 24.88 KG/M2

## 2022-10-07 DIAGNOSIS — K51.911 ULCERATIVE COLITIS WITH RECTAL BLEEDING, UNSPECIFIED LOCATION (HCC): Primary | ICD-10-CM

## 2022-10-07 PROCEDURE — 99214 OFFICE O/P EST MOD 30 MIN: CPT | Performed by: INTERNAL MEDICINE

## 2022-10-07 RX ORDER — SODIUM CHLORIDE 9 MG/ML
20 INJECTION, SOLUTION INTRAVENOUS ONCE
Status: CANCELLED | OUTPATIENT
Start: 2022-10-21

## 2022-10-07 RX ORDER — PREDNISONE 20 MG/1
30 TABLET ORAL DAILY
Qty: 60 TABLET | Refills: 0 | Status: SHIPPED | OUTPATIENT
Start: 2022-10-07

## 2022-10-07 RX ORDER — USTEKINUMAB 90 MG/ML
90 INJECTION, SOLUTION SUBCUTANEOUS
Qty: 1 ML | Refills: 6 | Status: SHIPPED | OUTPATIENT
Start: 2022-10-07

## 2022-10-07 NOTE — TELEPHONE ENCOUNTER
----- Message from [address] DO sent at 10/7/2022 12:36 PM EDT -----  Please start please start prior authorization for Stelara  She has been on Entyvio but it is not working  I dc'ed here beacon order for Stelara

## 2022-10-07 NOTE — PROGRESS NOTES
[facility] - Outpatient Follow-up Note  Kimberly Ho 63 y o  female MRN: [id number]  Encounter: [id number]          ASSESSMENT AND PLAN:  30-year-old female here for follow-up  1  Ulcerative colitis with rectal bleeding, unspecified location [facility])  -we will resume prednisone and I will have her stay on 30 mg of prednisone daily until she sees me in 4-6 weeks time  - predniSONE 20 mg tablet; Take 1 5 tablets (30 mg total) by mouth daily  Dispense: 60 tablet; Refill: 0      2  Mildly elevated alkaline phosphatase  -not more than 2 times the upper limit of normal, but her daughter has [place] and ulcerative colitis  -she had an MRI/MRCP in July which showed mild intrahepatic biliary ductal dilation but no evidence of beading as well as gallstones  -will monitor for now and consider repeat MRI/MRCP in 1 year    3  She is not immune to hepatitis-B so I recommend she get that vaccine, we will discuss at next visit    I will see her in follow-up in 4 weeks time  ____________________________________________    SUBJECTIVE:  Patient here for follow-up  As she came off of her prednisone her rectal bleeding and diarrhea returned  She feels like she needs to go back on the prednisone  We increased her Entyvio from q 8 weeks he q 6 weeks but this is not helping  I feel like we need to move on to a different medication and she feels the same  REVIEW OF SYSTEMS IS OTHERWISE NEGATIVE        Historical Information   Past Medical History:   Diagnosis Date    Anxiety     Cancer ([facility] [address] )     skin    Change in bowel habits     Elevated liver enzymes     Gall stones     Hashimoto's thyroiditis     Irritable bowel syndrome     Lumbar spondylosis     Osteoarthritis     Seasonal allergies     Ulcerative colitis ([facility] [address] )      Past Surgical History:   Procedure Laterality Date    COLONOSCOPY      EAR SURGERY      cyst removed from ear drum    EAR SURGERY      EAR SURGERY      IRIDECTOMY Optical    AR COLONOSCOPY FLX DX W/COLLJ SPEC WHEN PFRMD N/A 10/9/2017    Procedure: COLONOSCOPY;  Surgeon: Dacia Marte MD;  [facility]; Service: Gastroenterology    AR EXC SKIN MALIG 1 1-2 CM TRUNK,ARM,LEG Right 10/4/2019    Procedure: 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;  Surgeon: Nadeem Farmer MD;  Location: [address] [place] [address] OR;  Service: Plastics    UPPER GASTROINTESTINAL ENDOSCOPY       Social History   Social History     Substance and Sexual Activity   Alcohol Use Yes    Comment: Social     Social History     Substance and Sexual Activity   Drug Use No     Social History     Tobacco Use   Smoking Status Never Smoker   Smokeless Tobacco Never Used     Family History   Problem Relation Age of Onset    Breast cancer Mother     Lung cancer Mother     Atrial fibrillation Father     Stroke Father         CVA    Diabetes Father         Mellitus    Heart disease Father     Other Daughter         Colitis    Breast cancer Family        Meds/Allergies       Current Outpatient Medications:     ALPRAZolam (XANAX) 0 25 mg tablet    ascorbic acid (VITAMIN C) 250 mg tablet    Cholecalciferol (VITAMIN D PO)    Cyanocobalamin (VITAMIN B-12 PO)    dicyclomine (BENTYL) 20 mg tablet    ergocalciferol (ERGOCALCIFEROL) 1 25 MG (25712 UT) capsule    metoprolol tartrate (LOPRESSOR) 25 mg tablet    predniSONE 10 mg tablet    predniSONE 20 mg tablet    bisacodyl (DULCOLAX) 5 mg EC tablet    polyethylene glycol (GOLYTELY) 4000 mL solution    Allergies   Allergen Reactions    Amoxicillin GI Intolerance           Objective     Blood pressure 128/68, temperature 97 9 °F (36 6 °C), temperature source Tympanic, height 5' 1" (1 549 m), weight 59 8 kg (131 lb 12 8 oz)  Body mass index is 24 9 kg/m²        PHYSICAL EXAM:      General Appearance:   Alert, cooperative, no distress   HEENT:   Normocephalic, atraumatic, anicteric      Neck:  Supple, symmetrical, trachea midline   Lungs:   Clear to auscultation bilaterally; no rales, rhonchi or wheezing; respirations unlabored    Heart[de-identified]   Regular rate and rhythm; no murmur, rub, or gallop  Abdomen:   Soft, non-tender, non-distended; normal bowel sounds; no masses, no organomegaly    Genitalia:   Deferred    Rectal:   Deferred    Extremities:  No cyanosis, clubbing or edema    Pulses:  2+ and symmetric    Skin:  No jaundice, rashes, or lesions    Lymph nodes:  No palpable cervical lymphadenopathy        Lab Results:   No visits with results within 1 Day(s) from this visit     Latest known visit with results is:   Appointment on 09/12/2022   Component Date Value    QFT Nil 09/12/2022 0 02     QFT TB1-NIL 09/12/2022 0 01     QFT TB2-NIL 09/12/2022 0 01     QFT Mitogen-NIL 09/12/2022 >10 00     QFT Final Interpretation 09/12/2022 Negative     Hepatitis B Surface Ag 09/12/2022 Non-reactive     Hep B S Ab 09/12/2022 <3 10     Hep B Core Total Ab 09/12/2022 Non-reactive     CRP 09/12/2022 6 5 (A)    Vit D, 25-Hydroxy 09/12/2022 46 6     Sed Rate 09/12/2022 33 (A)    WBC 09/12/2022 7 00     RBC 09/12/2022 4 63     Hemoglobin 09/12/2022 13 0     Hematocrit 09/12/2022 40 4     MCV 09/12/2022 87     MCH 09/12/2022 28 1     MCHC 09/12/2022 32 2     RDW 09/12/2022 14 9     MPV 09/12/2022 8 8 (A)    Platelets 92/47/0879 248     nRBC 09/12/2022 0     Neutrophils Relative 09/12/2022 55     Immat GRANS % 09/12/2022 0     Lymphocytes Relative 09/12/2022 30     Monocytes Relative 09/12/2022 10     Eosinophils Relative 09/12/2022 4     Basophils Relative 09/12/2022 1     Neutrophils Absolute 09/12/2022 3 83     Immature Grans Absolute 09/12/2022 0 03     Lymphocytes Absolute 09/12/2022 2 09     Monocytes Absolute 09/12/2022 0 70     Eosinophils Absolute 09/12/2022 0 30     Basophils Absolute 09/12/2022 0 05     Sodium 09/12/2022 141     Potassium 09/12/2022 3 5     Chloride 09/12/2022 108     CO2 09/12/2022 26     ANION GAP 09/12/2022 7     BUN 09/12/2022 12     Creatinine 09/12/2022 0 79     Glucose, Fasting 09/12/2022 80     Calcium 09/12/2022 9 1     Corrected Calcium 09/12/2022 9 7     AST 09/12/2022 36     ALT 09/12/2022 72     Alkaline Phosphatase 09/12/2022 191 (A)    Total Protein 09/12/2022 7 3     Albumin 09/12/2022 3 3 (A)    Total Bilirubin 09/12/2022 0 48     eGFR 09/12/2022 81          Radiology Results:   No results found

## 2022-10-08 ENCOUNTER — OFFICE VISIT (OUTPATIENT)
Dept: URGENT CARE | Facility: MEDICAL CENTER | Age: 62
End: 2022-10-08
Payer: COMMERCIAL

## 2022-10-08 VITALS
OXYGEN SATURATION: 99 % | BODY MASS INDEX: 24.73 KG/M2 | SYSTOLIC BLOOD PRESSURE: 111 MMHG | WEIGHT: 131 LBS | HEART RATE: 96 BPM | RESPIRATION RATE: 20 BRPM | DIASTOLIC BLOOD PRESSURE: 73 MMHG | TEMPERATURE: 98.7 F | HEIGHT: 61 IN

## 2022-10-08 DIAGNOSIS — H57.12 ACUTE LEFT EYE PAIN: Primary | ICD-10-CM

## 2022-10-08 PROCEDURE — 99213 OFFICE O/P EST LOW 20 MIN: CPT | Performed by: PHYSICIAN ASSISTANT

## 2022-10-08 RX ORDER — TOBRAMYCIN 3 MG/ML
1 SOLUTION/ DROPS OPHTHALMIC 4 TIMES DAILY
Qty: 5 ML | Refills: 0 | Status: SHIPPED | OUTPATIENT
Start: 2022-10-08 | End: 2022-10-15

## 2022-10-08 NOTE — PROGRESS NOTES
[facility] [employer] [facility]    NAME: Kimberly Ho is a 63 y o  female  : 1960    MRN: [id number]  DATE: 2022  TIME: 9:23 AM    Assessment and Plan   Acute left eye pain [H57 12]  1  Acute left eye pain  tobramycin (Tobrex) 0 3 % SOLN     Attempt to call to [facility] for sight  On-call doctor return call saying that they were not covering our office and would not talk with provider  Patient informed and she is waiting to speak have call returned from eye doctor office  Recommend that if she is not able to contact or speak with eye doctor office, she go to emergency room for further evaluation  Patient expressed understanding  Patient Instructions   Patient Instructions   If your not able to get a hold of an eye doctor, please proceed to the emergency room  I am concerned that we do not have everything you need in our [facility] clinic to fully assess what is going on, so I recommend we send you to the emergency room now for further evaluation  When you get there, the emergency room provider(s) will assess you just like I did and decide about further testing based on what they see an how your condition progresses  Chief Complaint     Chief Complaint   Patient presents with    Eye Problem     Patient states she thinks she has opink eye in the L and now in the R   She has sensativity to light, and pain  No draiange       History of Present Illness   Kimberly Ho presents to the clinic c/o  29-year-old female comes in after waking up yesterday morning with left eye pain and photophobia  She said it feels worse today  No acute visual change, drainage  Light sensitive  Did have headache around left eye  In the midst of an ulcerative colitis flare for the last couple months and is on prednisone  She does have history of eye procedures to control glaucoma  She is due for recheck on that in November        Review of Systems   Review of Systems   Constitutional: Positive for activity change  Negative for appetite change, chills, fatigue and fever  HENT: Positive for congestion, postnasal drip and rhinorrhea  Chronic nasal congestion drainage   Eyes: Positive for photophobia, pain and redness  Negative for discharge, itching and visual disturbance  Current Medications     Long-Term Medications   Medication Sig Dispense Refill    ALPRAZolam (XANAX) 0 25 mg tablet Take 1 tablet (0 25 mg total) by mouth daily at bedtime as needed for anxiety 90 tablet 0    bisacodyl (DULCOLAX) 5 mg EC tablet Take 2 tablets (10 mg total) by mouth once for 1 dose 30 tablet 0    dicyclomine (BENTYL) 20 mg tablet Take 1 tablet (20 mg total) by mouth 4 (four) times a day as needed (Abdominal pain) 30 tablet 0    ergocalciferol (ERGOCALCIFEROL) 1 25 MG (83205 UT) capsule Take 1 capsule weekly for 8 weeks   8 capsule 0    metoprolol tartrate (LOPRESSOR) 25 mg tablet Take 1 tablet (25 mg total) by mouth every 12 (twelve) hours as needed (palpitations) 60 tablet 0    polyethylene glycol (GOLYTELY) 4000 mL solution Take 4,000 mL by mouth once for 1 dose 4000 mL 0    ustekinumab (Stelara) 90 mg/mL subcutaneous injection Inject 1 mL (90 mg total) under the skin every 56 days 1 mL 6       Current Allergies     Allergies as of 10/08/2022 - Reviewed 10/08/2022   Allergen Reaction Noted    Amoxicillin GI Intolerance           The following portions of the patient's history were reviewed and updated as appropriate: allergies, current medications, past family history, past medical history, past social history, past surgical history and problem list   Past Medical History:   Diagnosis Date    Anxiety     [facility]     skin    Change in bowel habits     Elevated liver enzymes     Gall stones     Hashimoto's thyroiditis     Irritable bowel syndrome     Lumbar spondylosis     Osteoarthritis     Seasonal allergies     Ulcerative colitis ([facility] [address] )      Past Surgical History: Procedure Laterality Date    COLONOSCOPY      EAR SURGERY      cyst removed from ear drum    EAR SURGERY      EAR SURGERY      IRIDECTOMY      Optical    MD COLONOSCOPY FLX DX W/COLLJ SPEC WHEN PFRMD N/A 10/9/2017    Procedure: COLONOSCOPY;  Surgeon: Violeta Renteria MD;  Location:  GI LAB; Service: Gastroenterology    MD EXC SKIN MALIG 1 1-2 CM TRUNK,ARM,LEG Right 10/4/2019    Procedure: 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;  Surgeon: Raul Lopez MD;  Location: [address] [place] [address];  Service: Plastics    UPPER GASTROINTESTINAL ENDOSCOPY       Family History   Problem Relation Age of Onset   Jose Houser Breast cancer Mother     Lung cancer Mother     Atrial fibrillation Father     Stroke Father         CVA    Diabetes Father         Mellitus    Heart disease Father     Other Daughter         Colitis    Breast cancer Family        Objective   /73   Pulse 96   Temp 98 7 °F (37 1 °C)   Resp 20   Ht 5' 1" (1 549 m)   Wt 59 4 kg (131 lb)   SpO2 99%   BMI 24 75 kg/m²   No LMP recorded  Patient is postmenopausal        Physical Exam     Physical Exam  Vitals and nursing note reviewed  Constitutional:       General: She is not in acute distress  Appearance: Normal appearance  She is well-developed  She is not ill-appearing, toxic-appearing or diaphoretic  Eyes:      Extraocular Movements: Extraocular movements intact  Pupils: Pupils are equal, round, and reactive to light  Comments: Slight redness left bulbar conjunctiva  Palpebral conjunctiva without redness  Global by with mild TTP  No obvious bulging  No limbic flush  Cardiovascular:      Rate and Rhythm: Normal rate  Pulmonary:      Effort: Pulmonary effort is normal  No respiratory distress  Neurological:      Mental Status: She is alert and oriented to person, place, and time     Psychiatric:         Mood and Affect: Mood normal          Behavior: Behavior normal

## 2022-10-08 NOTE — PATIENT INSTRUCTIONS
If your not able to get a hold of an eye doctor, please proceed to the emergency room  I am concerned that we do not have everything you need in our [facility] clinic to fully assess what is going on, so I recommend we send you to the emergency room now for further evaluation  When you get there, the emergency room provider(s) will assess you just like I did and decide about further testing based on what they see an how your condition progresses

## 2022-10-10 NOTE — TELEPHONE ENCOUNTER
Called patient and provided education on Stelara  She would like to have Jf at the [facility] center ([address] [facility] )  I went over the authorization process with her and possible patient assistance options if she would need it  She is aware that Bridget will follow up once a determination is reached  All questions answered  Mailed Stelara education too

## 2022-10-11 NOTE — TELEPHONE ENCOUNTER
Spoke with patient and she is available pretty much anytime  She did mention she received Entyvio on 9/28  Dr Moore - is patient okay to start Stelara right away? Or does she need to wait a certain time frame from last Entyvio dose until her first Stelara dose?

## 2022-10-12 NOTE — TELEPHONE ENCOUNTER
Called and spoke with patient  Appointment information relayed  Also discussed BIF with patient  She has met her out of picket cost, so the infusion should be covered at 100%  However, I still advised her to sign up for the copay card as it will work for both the infusion and also the injections  She expressed understanding and will sign up  I advised her I would touch base with her 2 weeks after the initial infusion and we can discuss next steps moving forward and that I will initiate authorization for the injections at that point  She expressed understanding and was thankful and appreciative of all the help and information

## 2022-10-12 NOTE — TELEPHONE ENCOUNTER
It is ok that she get stelara asap  Does not need a wash out from [facility]  Thank you      Nena Joseph

## 2022-10-18 ENCOUNTER — HOSPITAL ENCOUNTER (OUTPATIENT)
Dept: INFUSION CENTER | Facility: CLINIC | Age: 62
Discharge: HOME | End: 2022-10-18
Payer: COMMERCIAL

## 2022-10-18 VITALS
HEART RATE: 83 BPM | TEMPERATURE: 97.7 F | SYSTOLIC BLOOD PRESSURE: 124 MMHG | RESPIRATION RATE: 16 BRPM | DIASTOLIC BLOOD PRESSURE: 73 MMHG

## 2022-10-18 DIAGNOSIS — K51.911 ULCERATIVE COLITIS WITH RECTAL BLEEDING, UNSPECIFIED LOCATION (HCC): Primary | ICD-10-CM

## 2022-10-18 PROCEDURE — 96365 THER/PROPH/DIAG IV INF INIT: CPT

## 2022-10-18 RX ORDER — SODIUM CHLORIDE 9 MG/ML
20 INJECTION, SOLUTION INTRAVENOUS ONCE
Status: CANCELLED | OUTPATIENT
Start: 2022-10-18

## 2022-10-18 RX ORDER — SODIUM CHLORIDE 9 MG/ML
20 INJECTION, SOLUTION INTRAVENOUS ONCE
Status: COMPLETED | OUTPATIENT
Start: 2022-10-18 | End: 2022-10-18

## 2022-10-18 RX ADMIN — USTEKINUMAB 390 MG: 130 SOLUTION INTRAVENOUS at 11:42

## 2022-10-18 RX ADMIN — SODIUM CHLORIDE 20 ML/HR: 0.9 INJECTION, SOLUTION INTRAVENOUS at 11:23

## 2022-10-27 ENCOUNTER — OFFICE VISIT (OUTPATIENT)
Dept: FAMILY MEDICINE CLINIC | Facility: CLINIC | Age: 62
End: 2022-10-27

## 2022-10-27 VITALS
RESPIRATION RATE: 16 BRPM | HEART RATE: 88 BPM | TEMPERATURE: 97.9 F | HEIGHT: 61 IN | BODY MASS INDEX: 24.77 KG/M2 | OXYGEN SATURATION: 99 % | WEIGHT: 131.2 LBS

## 2022-10-27 DIAGNOSIS — Z00.00 ANNUAL PHYSICAL EXAM: Primary | ICD-10-CM

## 2022-10-27 DIAGNOSIS — E06.3 THYROIDITIS, AUTOIMMUNE: ICD-10-CM

## 2022-10-27 DIAGNOSIS — Z23 ENCOUNTER FOR IMMUNIZATION: ICD-10-CM

## 2022-10-27 DIAGNOSIS — Z12.83 SKIN CANCER SCREENING: ICD-10-CM

## 2022-10-27 NOTE — PROGRESS NOTES
[facility] PRIMARY CARE    NAME: Catalina Forrester  AGE: 63 y o  SEX: female  : 1960     DATE: 10/27/2022     Assessment and Plan:   Xu Glass was seen today for well check  Diagnoses and all orders for this visit:    Annual physical exam    Encounter for immunization  -     influenza vaccine, quadrivalent, recombinant, PF, 0 5 mL, for patients 18 yr+ (FLUBLOK)    Skin cancer screening  -     Ambulatory Referral to Dermatology; Future    Thyroiditis, autoimmune  -     TSH, 3rd generation; Future  -     T4, free; Future  -     Anti-microsomal antibody; Future  -     T3, free; Future      Problem List Items Addressed This Visit        Endocrine    Thyroiditis, autoimmune    Relevant Orders    TSH, 3rd generation    T4, free    Anti-microsomal antibody    T3, free      Other Visit Diagnoses     Annual physical exam    -  Primary    Encounter for immunization        Relevant Orders    influenza vaccine, quadrivalent, recombinant, PF, 0 5 mL, for patients 18 yr+ (FLUBLOK) (Completed)    Skin cancer screening        Relevant Orders    Ambulatory Referral to Dermatology          Immunizations and preventive care screenings were discussed with patient today  Appropriate education was printed on patient's after visit summary  Counseling:  Alcohol/drug use: discussed moderation in alcohol intake, the recommendations for healthy alcohol use  Dental Health: discussed importance of regular tooth brushing, flossing, and dental visits  Injury prevention: discussed safety/seat belts, safety helmets, smoke detectors, carbon dioxide detectors  Sexual health:   OB History    Para Term  AB Living   5 3 0 0 2 0   SAB IAB Ectopic Multiple Live Births   2 0 0 0 0   Obstetric Comments   34 31, 24     · Exercise: the importance of regular exercise/physical activity was discussed  Recommend exercise 3-5 times per week for at least 30 minutes  Chief Complaint:     Chief Complaint   Patient presents with   • Well Check     Pt would like to discuss flu vaccine       History of Present Illness:     Adult Annual Physical   Patient here for a comprehensive physical exam  The patient reports problems - UC  Does receive infusion therapy for UC  Flare all summer, third round of prednisone+  Slowly weaning off  Diet and Physical Activity  · Diet/Nutrition: well balanced diet  · Exercise: pickle ball  Depression Screening  PHQ-2/9 Depression Screening         General Health  · Sleep: variable  · Hearing: normal - right and decreased - none   · Vision: goes for regular eye exams  · Dental: regular dental visits  /GYN Health  · Patient is: postmenopausal, NO HRT  · Last menstrual period: age 54's  · Dr Jourdan Bender gyn-11/8/2021     Review of Systems:     Review of Systems   Constitutional: Fatigue: fair energy  HENT: Negative for dental problem  Eyes: Visual disturbance: UTD  Gastrointestinal:        Toleratin relatively stable improved on infusions  Patient is tolerating   Genitourinary:        Some UI   Skin:        Needs derm follow-up      Past Medical History:     Past Medical History:   Diagnosis Date   • Anxiety    • Cancer ([address] )     skin   • Change in bowel habits    • Elevated liver enzymes    • Gall stones    • Hashimoto's thyroiditis    • Irritable bowel syndrome    • Lumbar spondylosis    • Osteoarthritis    • Seasonal allergies    • Ulcerative colitis ([address] )       Past Surgical History:     Past Surgical History:   Procedure Laterality Date   • COLONOSCOPY     • EAR SURGERY      cyst removed from ear drum   • EAR SURGERY     • EAR SURGERY     • IRIDECTOMY      Optical   • HI COLONOSCOPY FLX DX W/COLLJ SPEC WHEN PFRMD N/A 10/9/2017    Procedure: COLONOSCOPY;  Surgeon: Fatoumata Bills MD;  [facility];   Service: Gastroenterology   • HI EXC SKIN MALIG 1 1-2 CM TRUNK,ARM,LEG Right 10/4/2019    Procedure: 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;  Surgeon: Avinash Arteaga MD;  Location: [address] [place] [address] OR;  Service: Plastics   • UPPER GASTROINTESTINAL ENDOSCOPY        Social History:     Social History     Socioeconomic History   • Marital status: /Civil Union     Spouse name: None   • Number of children: 3   • Years of education: None   • Highest education level: None   Occupational History   • None   Tobacco Use   • Smoking status: Never Smoker   • Smokeless tobacco: Never Used   Vaping Use   • Vaping Use: Never used   Substance and Sexual Activity   • Alcohol use: Yes     Comment: Social   • Drug use: No   • Sexual activity: Not Currently     Partners: Male   Other Topics Concern   • None   Social History Narrative    Per [employer]: Single    Pets in the home     Social Determinants of Health     Financial Resource Strain: Not on file   Food Insecurity: Not on file   Transportation Needs: Not on file   Physical Activity: Not on file   Stress: Not on file   Social Connections: Not on file   Intimate Partner Violence: Not on file   Housing Stability: Not on file      Family History:     Family History   Problem Relation Age of Onset   • Breast cancer Mother    • Lung cancer Mother    • Atrial fibrillation Father    • Stroke Father         CVA   • Diabetes Father         Mellitus   • Heart disease Father    • Other Daughter         Colitis   • Breast cancer Family       Current Medications:     Current Outpatient Medications   Medication Sig Dispense Refill   • ALPRAZolam (XANAX) 0 25 mg tablet Take 1 tablet (0 25 mg total) by mouth daily at bedtime as needed for anxiety 90 tablet 0   • Cholecalciferol (VITAMIN D PO) Take 5,000 Units by mouth daily     • Cyanocobalamin (VITAMIN B-12 PO) Take by mouth daily     • metoprolol tartrate (LOPRESSOR) 25 mg tablet Take 1 tablet (25 mg total) by mouth every 12 (twelve) hours as needed (palpitations) 60 tablet 0   • predniSONE 20 mg tablet Take 1 5 tablets (30 mg total) by mouth daily 60 tablet 0   • ustekinumab (Stelara) 90 mg/mL subcutaneous injection Inject 1 mL (90 mg total) under the skin every 56 days 1 mL 6   • ascorbic acid (VITAMIN C) 250 mg tablet Take 250 mg by mouth daily   (Patient not taking: Reported on 10/27/2022)     • bisacodyl (DULCOLAX) 5 mg EC tablet Take 2 tablets (10 mg total) by mouth once for 1 dose 30 tablet 0   • dicyclomine (BENTYL) 20 mg tablet Take 1 tablet (20 mg total) by mouth 4 (four) times a day as needed (Abdominal pain) (Patient not taking: Reported on 10/27/2022) 30 tablet 0   • ergocalciferol (ERGOCALCIFEROL) 1 25 MG (50697 UT) capsule Take 1 capsule weekly for 8 weeks  (Patient not taking: Reported on 10/27/2022) 8 capsule 0   • polyethylene glycol (GOLYTELY) 4000 mL solution Take 4,000 mL by mouth once for 1 dose 4000 mL 0   • predniSONE 10 mg tablet Take 40 mg daily for 1 week, then decrease by 10 mg weekly until complete  (Patient not taking: No sig reported) 100 tablet 0     No current facility-administered medications for this visit  Allergies: Allergies   Allergen Reactions   • Amoxicillin GI Intolerance      Physical Exam:     Pulse 88   Temp 97 9 °F (36 6 °C) (Temporal)   Resp 16   Ht 5' 1 42" (1 56 m)   Wt 59 5 kg (131 lb 3 2 oz)   SpO2 99%   BMI 24 45 kg/m²     Physical Exam  Vitals and nursing note reviewed  Constitutional:       General: She is not in acute distress  Appearance: Normal appearance  She is well-developed  HENT:      Head: Normocephalic and atraumatic  Right Ear: Tympanic membrane normal       Left Ear: Tympanic membrane normal       Nose: Nose normal       Mouth/Throat:      Mouth: Mucous membranes are dry  Eyes:      Conjunctiva/sclera: Conjunctivae normal    Neck:      Vascular: No carotid bruit  Cardiovascular:      Rate and Rhythm: Normal rate and regular rhythm  Heart sounds: No murmur heard  Pulmonary:      Effort: Pulmonary effort is normal  No respiratory distress        Breath sounds: Normal breath sounds  Abdominal:      Palpations: Abdomen is soft  Tenderness: There is no abdominal tenderness  Musculoskeletal:         General: Normal range of motion  Cervical back: Neck supple  Skin:     General: Skin is warm and dry  Neurological:      Mental Status: She is alert and oriented to person, place, and time  Psychiatric:         Mood and Affect: Mood normal          Behavior: Behavior normal          Thought Content:  Thought content normal          Judgment: Judgment normal           Sneha Aranda DO  [facility]

## 2022-10-27 NOTE — PATIENT INSTRUCTIONS

## 2022-11-08 ENCOUNTER — HOSPITAL ENCOUNTER (OUTPATIENT)
Dept: BONE DENSITY | Facility: MEDICAL CENTER | Age: 62
Discharge: HOME | End: 2022-11-08

## 2022-11-08 DIAGNOSIS — Z79.52 LONG-TERM CORTICOSTEROID USE: ICD-10-CM

## 2022-11-09 ENCOUNTER — HOME CARE VISIT (OUTPATIENT)
Dept: HOME HEALTH SERVICES | Facility: HOME HEALTHCARE | Age: 62
End: 2022-11-09

## 2022-11-11 ENCOUNTER — TELEPHONE (OUTPATIENT)
Dept: GASTROENTEROLOGY | Facility: CLINIC | Age: 62
End: 2022-11-11

## 2022-11-11 NOTE — TELEPHONE ENCOUNTER
Scheduled date of colonoscopy (as of today): 1/12/2023    Physician performing colonoscopy: Dr Erica Coleman    Location of colonoscopy:  [facility]    Bowel prep reviewed with patient: Cely    Instructions reviewed with patient by: AS    Clearances: N/A

## 2022-11-11 NOTE — TELEPHONE ENCOUNTER
Pt rescheduled colonoscopy w/ Dr Orestes Bill 1/12/2023 due to son visiting for an extended period of time

## 2022-11-16 DIAGNOSIS — K51.911 ULCERATIVE COLITIS WITH RECTAL BLEEDING, UNSPECIFIED LOCATION (HCC): ICD-10-CM

## 2022-11-16 RX ORDER — USTEKINUMAB 90 MG/ML
90 INJECTION, SOLUTION SUBCUTANEOUS
Qty: 1 ML | Refills: 6 | Status: SHIPPED | OUTPATIENT
Start: 2022-11-16 | End: 2023-01-12

## 2022-11-17 ENCOUNTER — APPOINTMENT (OUTPATIENT)
Dept: LAB | Facility: MEDICAL CENTER | Age: 62
End: 2022-11-17

## 2022-11-17 DIAGNOSIS — M06.09 SERONEGATIVE ARTHROPATHY OF MULTIPLE SITES (HCC): ICD-10-CM

## 2022-11-17 DIAGNOSIS — K51.911 ULCERATIVE COLITIS WITH RECTAL BLEEDING, UNSPECIFIED LOCATION (HCC): ICD-10-CM

## 2022-11-17 DIAGNOSIS — E06.3 THYROIDITIS, AUTOIMMUNE: ICD-10-CM

## 2022-11-17 LAB
CRP SERPL QL: 8.4 MG/L
T3FREE SERPL-MCNC: 3.13 PG/ML (ref 2.3–4.2)
T4 FREE SERPL-MCNC: 1.09 NG/DL (ref 0.76–1.46)
TSH SERPL DL<=0.05 MIU/L-ACNC: 1.37 UIU/ML (ref 0.45–4.5)

## 2022-11-18 ENCOUNTER — OFFICE VISIT (OUTPATIENT)
Dept: GASTROENTEROLOGY | Facility: CLINIC | Age: 62
End: 2022-11-18

## 2022-11-18 ENCOUNTER — APPOINTMENT (OUTPATIENT)
Dept: LAB | Facility: MEDICAL CENTER | Age: 62
End: 2022-11-18

## 2022-11-18 VITALS
WEIGHT: 134.4 LBS | DIASTOLIC BLOOD PRESSURE: 64 MMHG | TEMPERATURE: 98.6 F | BODY MASS INDEX: 25.37 KG/M2 | SYSTOLIC BLOOD PRESSURE: 110 MMHG | HEIGHT: 61 IN

## 2022-11-18 DIAGNOSIS — K51.911 ULCERATIVE COLITIS WITH RECTAL BLEEDING, UNSPECIFIED LOCATION (HCC): Primary | ICD-10-CM

## 2022-11-18 DIAGNOSIS — D84.821 IMMUNOSUPPRESSION DUE TO DRUG THERAPY (HCC): ICD-10-CM

## 2022-11-18 DIAGNOSIS — Z79.899 IMMUNOSUPPRESSION DUE TO DRUG THERAPY (HCC): ICD-10-CM

## 2022-11-18 LAB — THYROPEROXIDASE AB SERPL-ACNC: 28 IU/ML (ref 0–34)

## 2022-11-18 RX ORDER — SODIUM PICOSULFATE, MAGNESIUM OXIDE, AND ANHYDROUS CITRIC ACID 10; 3.5; 12 MG/160ML; G/160ML; G/160ML
1 LIQUID ORAL ONCE
Qty: 160 ML | Refills: 0 | Status: SHIPPED | OUTPATIENT
Start: 2022-11-18 | End: 2022-11-18

## 2022-11-18 NOTE — PROGRESS NOTES
[facility] - Outpatient Follow-up Note  Reina Wyatt 63 y o  female MRN: [id number]  Encounter: [id number]          ASSESSMENT AND PLAN:  63year old female here for follow up  1  Ulcerative colitis with rectal bleeding, unspecified location ([facility] )  -continue prednisone 10 mg daily and then go down to 5 mg daily, had her first dose of stelara and now is due for her first injection of stelara  2  Immunosuppression due to drug therapy ([facility] )  -Recommend both pneumonia vaccines given immunosuppression  -She already got COVID-vaccine  -Recommend annual flu vaccine annual  -Annual Gyne eval    Follow-up in 2 months time    ________________________________________________________________    SUBJECTIVE:  63year old female here for follow up  Denies any rectal bleeding  Titrating down her prednisone  The pt had her first infusion of stelara and is due in December for her shot  Her symptoms have improved greatly  She had her first dose of Stelara and is now on to her first maintenance injection  REVIEW OF SYSTEMS IS OTHERWISE NEGATIVE  Historical Information   Past Medical History:   Diagnosis Date   • Anxiety    • Cancer ([facility] )     skin   • Change in bowel habits    • Elevated liver enzymes    • Gall stones    • Hashimoto's thyroiditis    • Irritable bowel syndrome    • Lumbar spondylosis    • Osteoarthritis    • Seasonal allergies    • Ulcerative colitis ([facility] )      Past Surgical History:   Procedure Laterality Date   • COLONOSCOPY     • EAR SURGERY      cyst removed from ear drum   • EAR SURGERY     • EAR SURGERY     • IRIDECTOMY      Optical   • TN COLONOSCOPY FLX DX W/COLLJ SPEC WHEN PFRMD N/A 10/9/2017    Procedure: COLONOSCOPY;  Surgeon: Jose Manuel Law MD;  [facility];   Service: Gastroenterology   • TN EXC SKIN MALIG 1 1-2 CM TRUNK,ARM,LEG Right 10/4/2019    Procedure: 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;  Surgeon: Ruddy Miller MD;  Location:  MAIN OR;  Service: Plastics   • UPPER GASTROINTESTINAL ENDOSCOPY       Social History   Social History     Substance and Sexual Activity   Alcohol Use Yes    Comment: Social     Social History     Substance and Sexual Activity   Drug Use No     Social History     Tobacco Use   Smoking Status Never   Smokeless Tobacco Never     Family History   Problem Relation Age of Onset   • Breast cancer Mother    • Lung cancer Mother    • Atrial fibrillation Father    • Stroke Father         CVA   • Diabetes Father         Mellitus   • Heart disease Father    • Other Daughter         Colitis   • Breast cancer Family        Meds/Allergies       Current Outpatient Medications:   •  ALPRAZolam (XANAX) 0 25 mg tablet  •  Cholecalciferol (VITAMIN D PO)  •  Cyanocobalamin (VITAMIN B-12 PO)  •  metoprolol tartrate (LOPRESSOR) 25 mg tablet  •  predniSONE 20 mg tablet  •  ustekinumab (Stelara) 90 mg/mL subcutaneous injection  •  ascorbic acid (VITAMIN C) 250 mg tablet  •  bisacodyl (DULCOLAX) 5 mg EC tablet  •  dicyclomine (BENTYL) 20 mg tablet  •  ergocalciferol (ERGOCALCIFEROL) 1 25 MG (20688 UT) capsule  •  polyethylene glycol (GOLYTELY) 4000 mL solution  •  predniSONE 10 mg tablet    Allergies   Allergen Reactions   • Amoxicillin GI Intolerance           Objective     Blood pressure 110/64, temperature 98 6 °F (37 °C), temperature source Tympanic, height 5' 1 42" (1 56 m), weight 61 kg (134 lb 6 4 oz)  Body mass index is 25 05 kg/m²  PHYSICAL EXAM:      General Appearance:   Alert, cooperative, no distress   HEENT:   Normocephalic, atraumatic, anicteric      Neck:  Supple, symmetrical, trachea midline   Lungs:   Clear to auscultation bilaterally; no rales, rhonchi or wheezing; respirations unlabored    Heart[de-identified]   Regular rate and rhythm; no murmur, rub, or gallop     Abdomen:   Soft, non-tender, non-distended; normal bowel sounds; no masses, no organomegaly    Genitalia:   Deferred    Rectal:   Deferred    Extremities:  No cyanosis, clubbing or edema    Pulses:  2+ and symmetric    Skin:  No jaundice, rashes, or lesions    Lymph nodes:  No palpable cervical lymphadenopathy        Lab Results:   No visits with results within 1 Day(s) from this visit  Latest known visit with results is:   Appointment on 11/17/2022   Component Date Value   • CRP 11/17/2022 8 4 (H)    • TSH 3RD GENERATON 11/17/2022 1 370    • Free T4 11/17/2022 1 09    • THYROID MICROSOMAL ANTIB* 11/17/2022 28    • T3, Free 11/17/2022 3 13          Radiology Results:   DXA bone density spine hip and pelvis    Result Date: 11/9/2022  Narrative: CENTRAL  DXA SCAN CLINICAL HISTORY:   63year old post-menopausal  female risk factors include osteoporosis screening  Additional medical history: Positive colitis, steroid medication  TECHNIQUE: Bone densitometry was performed using a Aginova's W bone densitometer  Regions of interest appear properly placed  There are no obvious fractures or other confounding variables which could limit the study  COMPARISON:  Baseline RESULTS: LUMBAR SPINE:  L1-L4: BMD 0 909 gm/cm2 T-score -1 3 Z-score 0 2 LEFT TOTAL HIP: BMD 0 739 gm/cm2 T-score -1 7 Z-score -0 7 LEFT FEMORAL NECK: BMD 0 662 gm/cm2 T-score -1 7 Z-score -0 4     Impression: 1  Based on the [facility] classification, this study identifies a diagnosis of low bone mass, notable at the spine, total hip, and femoral neck areas and the patient is considered at low risk for fracture  2  A daily intake of calcium of at least 1200 mg and vitamin D, 800-1000 IU, as well as weight bearing and muscle strengthening exercise, fall prevention and avoidance of tobacco and excessive alcohol intake as basic preventive measures are recommended  3  Repeat DXA scan on the same equipment in 18-24 months as clinically indicated   The 10 year risk of hip fracture is 1 5%, with the 10 year risk of major osteoporotic fracture being 13%, as calculated by the [facility] fracture risk assessment tool (FRAX)  The current NOF guidelines recommend treating patients with FRAX 10 year risk score of >3% for hip fracture and >20% for major osteoporotic fracture  WHO CLASSIFICATION: Normal (a T-score of -1 0 or higher) Low bone mineral density (a T-score of less than -1 0 but higher than -2 5) Osteoporosis (a T-score of -2 5 or less) Severe osteoporosis (a T-score of -2 5 or less with a fragility fracture) Thank you for allowing us the opportunity to participate in your patient care  The expanded DEXA report will no longer be arriving in your mail  If you desire to view the full report please contact [facility] or access the PACS system   Workstation performed: [id number]

## 2022-11-21 NOTE — RESULT ENCOUNTER NOTE
Patient notified about elevated CRP  She currently has no infections  Is dealing with colitis issues, however  EMF will discuss further at the time of her next OV  She additionally reports no urinary symptoms 
9

## 2022-11-28 ENCOUNTER — TELEPHONE (OUTPATIENT)
Dept: GASTROENTEROLOGY | Facility: CLINIC | Age: 62
End: 2022-11-28

## 2022-11-28 NOTE — TELEPHONE ENCOUNTER
Patients GI provider:  Dr Gerald Ernandez    Number to return call: [phone]    Reason for call: Pt called in stating that she is having blood in her stool and more frequent trips to the bathroom  Pt stated that she tapered off of her steroid and wants to know if she needs to go back up or what she should do  Above is her number       Scheduled procedure/appointment date if applicable: Appt 01/02/99

## 2022-11-28 NOTE — TELEPHONE ENCOUNTER
Spoke with patient  Hx UC    Patient reports she had Stelara injection 5-6 weeks ago  She is currently tapering off of Prednisone, and is on 5 mg pill currently  Today is her last day of Prednisone  Patient states starting 2 days ago she began having bright red blood in her stool  Patient states that a little blood is mixed in with her stool, but not filling the toilet bowl  Patient states she typically has 2-3 bowel movements a day  However, she is now having 4-5 bowel movements a day  Patient states the bowel movements are soft and she is not having diarrhea  Patient denies abdominal pain  Patient is concerned because her CRP is 8 4  Patient inquired if Dr Diego Fuentes would like her to be back on a prednisone taper again  Advised patient I would call her back with Dr Jenae rodriguez

## 2022-11-29 DIAGNOSIS — K51.911 ULCERATIVE COLITIS WITH RECTAL BLEEDING, UNSPECIFIED LOCATION (HCC): Primary | ICD-10-CM

## 2022-11-29 RX ORDER — PREDNISONE 10 MG/1
20 TABLET ORAL DAILY
Qty: 30 TABLET | Refills: 0 | Status: SHIPPED | OUTPATIENT
Start: 2022-11-29

## 2022-11-29 NOTE — TELEPHONE ENCOUNTER
Spoke with patient  Advised her prednisone has been sent to her pharmacy and provided her with directions

## 2022-12-05 ENCOUNTER — TELEPHONE (OUTPATIENT)
Dept: GASTROENTEROLOGY | Facility: CLINIC | Age: 62
End: 2022-12-05

## 2022-12-05 NOTE — TELEPHONE ENCOUNTER
I received a call from [address]       9       Jaelyn Dias states she received a fax, from number I do not recognize but could not confirm     Fax states Dr Melly Aleman pt are not DR/pt      Regarding Jf    Please follow up

## 2022-12-05 NOTE — TELEPHONE ENCOUNTER
The [facility] and spoke with Noy  She does not see any notes on account or understand what was needed by the message I received   Advised they will reach out if needed

## 2022-12-06 ENCOUNTER — TRANSCRIBE ORDERS (OUTPATIENT)
Dept: GASTROENTEROLOGY | Facility: CLINIC | Age: 62
End: 2022-12-06

## 2022-12-12 ENCOUNTER — TELEPHONE (OUTPATIENT)
Dept: GASTROENTEROLOGY | Facility: CLINIC | Age: 62
End: 2022-12-12

## 2022-12-12 NOTE — TELEPHONE ENCOUNTER
Spoke with patient  Patient states she has a hard lump on her belly button that comes and goes  Patient states this past Saturday after bending down and coming back up she noticed the lump on her stomach appear, but by night time it went away  Patient states the lump has a bluish discoloration to it and is greater than the size of a quarter  Patient states it is painful to touch, and rates the pain a 2/10 on a numerical pain scale  Patient denies history of hernia  Advised patient to speak with PCP  Patient stated she would like Dr Awilda Quick to review and see if she would like patient to speak with PCP as well

## 2022-12-12 NOTE — TELEPHONE ENCOUNTER
Patients GI provider:  Dr Alexis Fisher to return call:     Reason for call: Pt called and stated she has a hard lump right on her bellybutton, is only painful when she touches it please review and reach out to patient     Scheduled procedure/appointment date if applicable: n/a

## 2022-12-13 NOTE — TELEPHONE ENCOUNTER
Spoke with patient  Advised her to send Dr Luciana Ashford a picture via my-chart  Walked patient through steps on how to send a picture  Advised patient to not inject into any abnormal looking skin, but all other parts of the abdomen are ok

## 2022-12-15 ENCOUNTER — TELEPHONE (OUTPATIENT)
Dept: OTHER | Facility: OTHER | Age: 62
End: 2022-12-15

## 2022-12-19 ENCOUNTER — OFFICE VISIT (OUTPATIENT)
Dept: GASTROENTEROLOGY | Facility: CLINIC | Age: 62
End: 2022-12-19

## 2022-12-19 VITALS
BODY MASS INDEX: 24.92 KG/M2 | TEMPERATURE: 97.6 F | DIASTOLIC BLOOD PRESSURE: 66 MMHG | HEIGHT: 61 IN | SYSTOLIC BLOOD PRESSURE: 120 MMHG | WEIGHT: 132 LBS

## 2022-12-19 DIAGNOSIS — Z79.52 LONG-TERM CORTICOSTEROID USE: ICD-10-CM

## 2022-12-19 DIAGNOSIS — K51.911 ULCERATIVE COLITIS WITH RECTAL BLEEDING, UNSPECIFIED LOCATION (HCC): Primary | ICD-10-CM

## 2022-12-19 DIAGNOSIS — Z79.899 IMMUNOSUPPRESSION DUE TO DRUG THERAPY (HCC): ICD-10-CM

## 2022-12-19 DIAGNOSIS — R74.8 ELEVATED ALKALINE PHOSPHATASE LEVEL: ICD-10-CM

## 2022-12-19 DIAGNOSIS — D84.821 IMMUNOSUPPRESSION DUE TO DRUG THERAPY (HCC): ICD-10-CM

## 2022-12-19 RX ORDER — PREDNISONE 1 MG/1
5 TABLET ORAL DAILY
Qty: 30 TABLET | Refills: 1 | Status: SHIPPED | OUTPATIENT
Start: 2022-12-19

## 2022-12-19 RX ORDER — SODIUM PICOSULFATE, MAGNESIUM OXIDE, AND ANHYDROUS CITRIC ACID 10; 3.5; 12 MG/160ML; G/160ML; G/160ML
LIQUID ORAL
COMMUNITY
Start: 2022-11-22 | End: 2022-12-19 | Stop reason: CLARIF

## 2022-12-19 NOTE — H&P (VIEW-ONLY)
[facility] - Outpatient Follow-up Note  Christie Stover 63 y o  female MRN: [id number]  Encounter: [id number]          ASSESSMENT AND PLAN:  63year old female here for follow up  1  Ulcerative colitis with rectal bleeding, unspecified location ([place] [address] )  -responding to stelara and on pred 5 mg daily, instructed patient to stay on prednisone 5 mg every day until after the holidays, she is agreeable to this plan as she wants to enjoy her holidays, I have instructed her after the holidays to decrease to every other day for a week and then try to get them off  2  Elevated alkaline phosphatase level  -Will repeat MRI/MRCPnext summer to ensure no progression of dilated bile ducts   -her daughter has [facility]         3  Immunosuppression due to drug therapy ([place] [address] )  -not immune to Hepatitis B, recommend she get the hepatitis B vaccine series  -also recommend Shingles vaccine as well    4  Long-term corticosteroid use  -currently on 5 mg daily; see above as we will transition her corticosteroids to off in the near future      Follow up in 3 months time  _______________________________________    SUBJECTIVE: 63year old female here for follow up  She is now on prednisone 5 mg currently  Has been on stelara for 9 weeks- had her infusion and her first maintenance dosing on the 13th of December  Reports nurse came out to the house and showed her how to give the dose  Reports now having formed stool but did see blood on Saturday  Not for the past two days  REVIEW OF SYSTEMS IS OTHERWISE NEGATIVE      Historical Information   Past Medical History:   Diagnosis Date   • Anxiety    • Cancer ([facility] )     skin   • Change in bowel habits    • Elevated liver enzymes    • Gall stones    • Hashimoto's thyroiditis    • Irritable bowel syndrome    • Lumbar spondylosis    • Osteoarthritis    • Seasonal allergies    • Ulcerative colitis [facility])      Past Surgical History:   Procedure Laterality Date   • COLONOSCOPY     • EAR SURGERY      cyst removed from ear drum   • EAR SURGERY     • EAR SURGERY     • IRIDECTOMY      Optical   • DE COLONOSCOPY FLX DX W/COLLJ SPEC WHEN PFRMD N/A 10/9/2017    Procedure: COLONOSCOPY;  Surgeon: Jose Manuel Law MD;  Location:  GI LAB; Service: Gastroenterology   • DE EXC SKIN MALIG 1 1-2 CM TRUNK,ARM,LEG Right 10/4/2019    Procedure: 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;  Surgeon: Ruddy Miller MD;  Location: [address] [place] [address];  Service: Plastics   • UPPER GASTROINTESTINAL ENDOSCOPY       Social History   Social History     Substance and Sexual Activity   Alcohol Use Yes    Comment: Social     Social History     Substance and Sexual Activity   Drug Use No     Social History     Tobacco Use   Smoking Status Never   Smokeless Tobacco Never     Family History   Problem Relation Age of Onset   • Breast cancer Mother    • Lung cancer Mother    • Atrial fibrillation Father    • Stroke Father         CVA   • Diabetes Father         Mellitus   • Heart disease Father    • Other Daughter         Colitis   • Breast cancer Family      Meds/Allergies       Current Outpatient Medications:   •  ALPRAZolam (XANAX) 0 25 mg tablet  •  Cholecalciferol (VITAMIN D PO)  •  Cyanocobalamin (VITAMIN B-12 PO)  •  ustekinumab (Stelara) 90 mg/mL subcutaneous injection  •  bisacodyl (DULCOLAX) 5 mg EC tablet  •  polyethylene glycol (GOLYTELY) 4000 mL solution    Allergies   Allergen Reactions   • Amoxicillin GI Intolerance     Objective     Blood pressure 120/66, temperature 97 6 °F (36 4 °C), temperature source Tympanic, height 5' 1" (1 549 m), weight 59 9 kg (132 lb)  Body mass index is 24 94 kg/m²      PHYSICAL EXAM:      General Appearance:   Alert, cooperative, no distress   HEENT:   Normocephalic, atraumatic, anicteric      Neck:  Supple, symmetrical, trachea midline   Lungs:   Clear to auscultation bilaterally; no rales, rhonchi or wheezing; respirations unlabored    Heart[de-identified]   Regular rate and rhythm; no murmur, rub, or gallop  Abdomen:   Soft, non-tender, non-distended; normal bowel sounds; no masses, no organomegaly    Genitalia:   Deferred    Rectal:   Deferred    Extremities:  No cyanosis, clubbing or edema    Pulses:  2+ and symmetric    Skin:  No jaundice, rashes, or lesions    Lymph nodes:  No palpable cervical lymphadenopathy        Lab Results:   No visits with results within 1 Day(s) from this visit  Latest known visit with results is:   Lab on 11/17/2022   Component Date Value   • CRP 11/17/2022 8 4 (H)    • TSH 3RD GENERATON 11/17/2022 1 370    • Free T4 11/17/2022 1 09    • THYROID MICROSOMAL ANTIB* 11/17/2022 28    • T3, Free 11/17/2022 3 13      Radiology Results:   No results found

## 2022-12-19 NOTE — PROGRESS NOTES
[facility] - Outpatient Follow-up Note  Juana Ariza 63 y o  female MRN: [id number]  Encounter: [id number]          ASSESSMENT AND PLAN:  63year old female here for follow up  1  Ulcerative colitis with rectal bleeding, unspecified location ([place] [address] )  -responding to stelara and on pred 5 mg daily, instructed patient to stay on prednisone 5 mg every day until after the holidays, she is agreeable to this plan as she wants to enjoy her holidays, I have instructed her after the holidays to decrease to every other day for a week and then try to get them off  2  Elevated alkaline phosphatase level  -Will repeat MRI/MRCPnext summer to ensure no progression of dilated bile ducts   -her daughter has [facility]         3  Immunosuppression due to drug therapy ([place] [address] )  -not immune to Hepatitis B, recommend she get the hepatitis B vaccine series  -also recommend Shingles vaccine as well    4  Long-term corticosteroid use  -currently on 5 mg daily; see above as we will transition her corticosteroids to off in the near future      Follow up in 3 months time  _______________________________________    SUBJECTIVE: 63year old female here for follow up  She is now on prednisone 5 mg currently  Has been on stelara for 9 weeks- had her infusion and her first maintenance dosing on the 13th of December  Reports nurse came out to the house and showed her how to give the dose  Reports now having formed stool but did see blood on Saturday  Not for the past two days  REVIEW OF SYSTEMS IS OTHERWISE NEGATIVE      Historical Information   Past Medical History:   Diagnosis Date   • Anxiety    • Cancer ([facility] )     skin   • Change in bowel habits    • Elevated liver enzymes    • Gall stones    • Hashimoto's thyroiditis    • Irritable bowel syndrome    • Lumbar spondylosis    • Osteoarthritis    • Seasonal allergies    • Ulcerative colitis [facility])      Past Surgical History:   Procedure Laterality Date   • COLONOSCOPY     • EAR SURGERY      cyst removed from ear drum   • EAR SURGERY     • EAR SURGERY     • IRIDECTOMY      Optical   • SD COLONOSCOPY FLX DX W/COLLJ SPEC WHEN PFRMD N/A 10/9/2017    Procedure: COLONOSCOPY;  Surgeon: Ayden Johnston MD;  Location:  GI LAB; Service: Gastroenterology   • SD EXC SKIN MALIG 1 1-2 CM TRUNK,ARM,LEG Right 10/4/2019    Procedure: 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;  Surgeon: Kvng Fontanez MD;  Location: [facility] MAIN OR;  Service: Plastics   • UPPER GASTROINTESTINAL ENDOSCOPY       Social History   Social History     Substance and Sexual Activity   Alcohol Use Yes    Comment: Social     Social History     Substance and Sexual Activity   Drug Use No     Social History     Tobacco Use   Smoking Status Never   Smokeless Tobacco Never     Family History   Problem Relation Age of Onset   • Breast cancer Mother    • Lung cancer Mother    • Atrial fibrillation Father    • Stroke Father         CVA   • Diabetes Father         Mellitus   • Heart disease Father    • Other Daughter         Colitis   • Breast cancer Family      Meds/Allergies       Current Outpatient Medications:   •  ALPRAZolam (XANAX) 0 25 mg tablet  •  Cholecalciferol (VITAMIN D PO)  •  Cyanocobalamin (VITAMIN B-12 PO)  •  ustekinumab (Stelara) 90 mg/mL subcutaneous injection  •  bisacodyl (DULCOLAX) 5 mg EC tablet  •  polyethylene glycol (GOLYTELY) 4000 mL solution    Allergies   Allergen Reactions   • Amoxicillin GI Intolerance     Objective     Blood pressure 120/66, temperature 97 6 °F (36 4 °C), temperature source Tympanic, height 5' 1" (1 549 m), weight 59 9 kg (132 lb)  Body mass index is 24 94 kg/m²      PHYSICAL EXAM:      General Appearance:   Alert, cooperative, no distress   HEENT:   Normocephalic, atraumatic, anicteric      Neck:  Supple, symmetrical, trachea midline   Lungs:   Clear to auscultation bilaterally; no rales, rhonchi or wheezing; respirations unlabored    Heart[de-identified]   Regular rate and rhythm; no murmur, rub, or gallop  Abdomen:   Soft, non-tender, non-distended; normal bowel sounds; no masses, no organomegaly    Genitalia:   Deferred    Rectal:   Deferred    Extremities:  No cyanosis, clubbing or edema    Pulses:  2+ and symmetric    Skin:  No jaundice, rashes, or lesions    Lymph nodes:  No palpable cervical lymphadenopathy        Lab Results:   No visits with results within 1 Day(s) from this visit  Latest known visit with results is:   Lab on 11/17/2022   Component Date Value   • CRP 11/17/2022 8 4 (H)    • TSH 3RD GENERATON 11/17/2022 1 370    • Free T4 11/17/2022 1 09    • THYROID MICROSOMAL ANTIB* 11/17/2022 28    • T3, Free 11/17/2022 3 13      Radiology Results:   No results found

## 2023-01-04 ENCOUNTER — ANESTHESIA EVENT (OUTPATIENT)
Dept: ANESTHESIOLOGY | Facility: HOSPITAL | Age: 63
End: 2023-01-04

## 2023-01-04 ENCOUNTER — ANESTHESIA (OUTPATIENT)
Dept: ANESTHESIOLOGY | Facility: HOSPITAL | Age: 63
End: 2023-01-04

## 2023-01-12 ENCOUNTER — ANESTHESIA (OUTPATIENT)
Dept: GASTROENTEROLOGY | Facility: AMBULARY SURGERY CENTER | Age: 63
End: 2023-01-12

## 2023-01-12 ENCOUNTER — HOSPITAL ENCOUNTER (OUTPATIENT)
Dept: GASTROENTEROLOGY | Facility: AMBULARY SURGERY CENTER | Age: 63
Setting detail: OUTPATIENT SURGERY
End: 2023-01-12
Attending: INTERNAL MEDICINE

## 2023-01-12 ENCOUNTER — TELEPHONE (OUTPATIENT)
Dept: GASTROENTEROLOGY | Facility: CLINIC | Age: 63
End: 2023-01-12

## 2023-01-12 ENCOUNTER — ANESTHESIA EVENT (OUTPATIENT)
Dept: GASTROENTEROLOGY | Facility: AMBULARY SURGERY CENTER | Age: 63
End: 2023-01-12

## 2023-01-12 VITALS
HEART RATE: 73 BPM | TEMPERATURE: 98.3 F | RESPIRATION RATE: 16 BRPM | WEIGHT: 126 LBS | OXYGEN SATURATION: 98 % | SYSTOLIC BLOOD PRESSURE: 111 MMHG | DIASTOLIC BLOOD PRESSURE: 64 MMHG | BODY MASS INDEX: 23.79 KG/M2 | HEIGHT: 61 IN

## 2023-01-12 DIAGNOSIS — K51.911 ULCERATIVE COLITIS WITH RECTAL BLEEDING, UNSPECIFIED LOCATION (HCC): ICD-10-CM

## 2023-01-12 DIAGNOSIS — K51.911 ULCERATIVE COLITIS WITH RECTAL BLEEDING, UNSPECIFIED LOCATION (HCC): Primary | ICD-10-CM

## 2023-01-12 PROBLEM — K21.9 GASTROESOPHAGEAL REFLUX DISEASE: Status: ACTIVE | Noted: 2023-01-12

## 2023-01-12 RX ORDER — PROPOFOL 10 MG/ML
INJECTION, EMULSION INTRAVENOUS AS NEEDED
Status: DISCONTINUED | OUTPATIENT
Start: 2023-01-12 | End: 2023-01-12

## 2023-01-12 RX ORDER — PROPOFOL 10 MG/ML
INJECTION, EMULSION INTRAVENOUS CONTINUOUS PRN
Status: DISCONTINUED | OUTPATIENT
Start: 2023-01-12 | End: 2023-01-12

## 2023-01-12 RX ORDER — SODIUM CHLORIDE 9 MG/ML
INJECTION, SOLUTION INTRAVENOUS CONTINUOUS PRN
Status: DISCONTINUED | OUTPATIENT
Start: 2023-01-12 | End: 2023-01-12

## 2023-01-12 RX ORDER — USTEKINUMAB 90 MG/ML
90 INJECTION, SOLUTION SUBCUTANEOUS
Qty: 1 ML | Refills: 12 | Status: SHIPPED | OUTPATIENT
Start: 2023-01-12 | End: 2023-07-28

## 2023-01-12 RX ADMIN — PROPOFOL 140 MG: 10 INJECTION, EMULSION INTRAVENOUS at 07:35

## 2023-01-12 RX ADMIN — SODIUM CHLORIDE: 0.9 INJECTION, SOLUTION INTRAVENOUS at 07:31

## 2023-01-12 RX ADMIN — PROPOFOL 140 MCG/KG/MIN: 10 INJECTION, EMULSION INTRAVENOUS at 07:37

## 2023-01-12 RX ADMIN — HYDROCORTISONE SODIUM SUCCINATE 100 MG: 100 INJECTION, POWDER, FOR SOLUTION INTRAMUSCULAR; INTRAVENOUS at 07:33

## 2023-01-12 NOTE — TELEPHONE ENCOUNTER
----- Message from [address] [facility] sent at 1/12/2023  3:41 PM EST -----  Please get pt prior auth for changing stelara to q4 week dosing  I changed the order  Please have her get labs to start imuran in the future  Please get pt new pt appt with Dr Betsy Aschoff  Thank you    Jono Love

## 2023-01-12 NOTE — INTERVAL H&P NOTE
H&P reviewed  After examining the patient I find no changes in the patients condition since the H&P had been written  For colonoscopy for assessment of her UC      Vitals:    01/12/23 0706   BP: 124/65   Pulse: 91   Resp: 18   Temp: (!) 96 9 °F (36 1 °C)   SpO2: 100%

## 2023-01-12 NOTE — ANESTHESIA PREPROCEDURE EVALUATION
Procedure:  COLONOSCOPY    Relevant Problems   ANESTHESIA (within normal limits)      CARDIO   (-) HTN (hypertension)   (-) Hyperlipidemia      ENDO   (-) Diabetes mellitus, type 2 (HCC)      GI/HEPATIC   (+) Gastroesophageal reflux disease      /RENAL (within normal limits)      GYN (within normal limits)      HEMATOLOGY (within normal limits)      MUSCULOSKELETAL   (+) Lumbar spondylosis   (+) Primary generalized (osteo)arthritis      NEURO/PSYCH   (+) Anxiety      PULMONARY   (-) Asthma   (-) Sleep apnea        Physical Exam    Airway    Mallampati score: III  TM Distance: <3 FB  Neck ROM: full     Dental   No notable dental hx     Cardiovascular      Pulmonary      Other Findings        Anesthesia Plan  ASA Score- 2     Anesthesia Type- IV sedation with anesthesia with ASA Monitors  Additional Monitors:   Airway Plan:     Comment: Will give stress dose of steroid due to chronic prednisone use  Plan Factors-Exercise tolerance (METS): >4 METS  Chart reviewed  Existing labs reviewed  Patient summary reviewed  Patient is not a current smoker  Induction- intravenous  Postoperative Plan-     Informed Consent- Anesthetic plan and risks discussed with patient  I personally reviewed this patient with the CRNA  Discussed and agreed on the Anesthesia Plan with the CRNA  Dutch Cramer

## 2023-01-12 NOTE — ANESTHESIA POSTPROCEDURE EVALUATION
Post-Op Assessment Note    CV Status:  Stable    Pain management: adequate     Mental Status:  Alert and awake   Hydration Status:  Euvolemic   PONV Controlled:  Controlled   Airway Patency:  Patent      Post Op Vitals Reviewed: Yes            No notable events documented      BP      Temp     Pulse     Resp      SpO2

## 2023-01-13 ENCOUNTER — TELEPHONE (OUTPATIENT)
Dept: GASTROENTEROLOGY | Facility: CLINIC | Age: 63
End: 2023-01-13

## 2023-01-13 DIAGNOSIS — K51.911 ULCERATIVE COLITIS WITH RECTAL BLEEDING, UNSPECIFIED LOCATION (HCC): ICD-10-CM

## 2023-01-16 DIAGNOSIS — K51.911 ULCERATIVE COLITIS WITH RECTAL BLEEDING, UNSPECIFIED LOCATION (HCC): ICD-10-CM

## 2023-01-16 RX ORDER — PREDNISONE 1 MG/1
5 TABLET ORAL DAILY
Qty: 30 TABLET | Refills: 1 | Status: SHIPPED | OUTPATIENT
Start: 2023-01-16

## 2023-01-16 NOTE — TELEPHONE ENCOUNTER
Spoke with patient  Pt is scheduled with Dr Wendy Cedillo 1/26- Advised patient to complete blood work that was ordered 1/12 prior to [address]  Updated patient Jf authorization was submitted and our office will call w/ updates

## 2023-01-16 NOTE — TELEPHONE ENCOUNTER
Clinical Team - can we please get patient scheduled with Dr Martinez for a new pt appointment and also have patient get labs drawn? Thank you!

## 2023-01-16 NOTE — TELEPHONE ENCOUNTER
Connected with patient on the phone  New patient appointment scheduled with Dr Dane Arreola 1/26  Pt aware to get lab work this week

## 2023-01-16 NOTE — TELEPHONE ENCOUNTER
GI Physician: Dr Kathleene Najjar for Medication: prednisone    Dose: 5mg    Quantity: 30    Pharmacy and Location: [address] [place] [address]        Pt also has questions about the test that was ordered

## 2023-01-17 ENCOUNTER — APPOINTMENT (OUTPATIENT)
Dept: LAB | Facility: MEDICAL CENTER | Age: 63
End: 2023-01-17
Attending: INTERNAL MEDICINE

## 2023-01-17 DIAGNOSIS — K51.911 ULCERATIVE COLITIS WITH RECTAL BLEEDING, UNSPECIFIED LOCATION (HCC): ICD-10-CM

## 2023-01-17 LAB
BASOPHILS # BLD AUTO: 0.06 THOUSANDS/ÂΜL (ref 0–0.1)
BASOPHILS NFR BLD AUTO: 0 % (ref 0–1)
CRP SERPL QL: 4.7 MG/L
EOSINOPHIL # BLD AUTO: 0.03 THOUSAND/ÂΜL (ref 0–0.61)
EOSINOPHIL NFR BLD AUTO: 0 % (ref 0–6)
ERYTHROCYTE [DISTWIDTH] IN BLOOD BY AUTOMATED COUNT: 14.7 % (ref 11.6–15.1)
HCT VFR BLD AUTO: 37.8 % (ref 34.8–46.1)
HGB BLD-MCNC: 12 G/DL (ref 11.5–15.4)
IMM GRANULOCYTES # BLD AUTO: 0.09 THOUSAND/UL (ref 0–0.2)
IMM GRANULOCYTES NFR BLD AUTO: 1 % (ref 0–2)
LYMPHOCYTES # BLD AUTO: 0.74 THOUSANDS/ÂΜL (ref 0.6–4.47)
LYMPHOCYTES NFR BLD AUTO: 5 % (ref 14–44)
MCH RBC QN AUTO: 27.5 PG (ref 26.8–34.3)
MCHC RBC AUTO-ENTMCNC: 31.7 G/DL (ref 31.4–37.4)
MCV RBC AUTO: 87 FL (ref 82–98)
MONOCYTES # BLD AUTO: 0.2 THOUSAND/ÂΜL (ref 0.17–1.22)
MONOCYTES NFR BLD AUTO: 1 % (ref 4–12)
NEUTROPHILS # BLD AUTO: 12.74 THOUSANDS/ÂΜL (ref 1.85–7.62)
NEUTS SEG NFR BLD AUTO: 93 % (ref 43–75)
NRBC BLD AUTO-RTO: 0 /100 WBCS
PLATELET # BLD AUTO: 313 THOUSANDS/UL (ref 149–390)
PMV BLD AUTO: 9 FL (ref 8.9–12.7)
RBC # BLD AUTO: 4.36 MILLION/UL (ref 3.81–5.12)
WBC # BLD AUTO: 13.86 THOUSAND/UL (ref 4.31–10.16)

## 2023-01-19 NOTE — TELEPHONE ENCOUNTER
Stelara every 4 weeks has been denied for quantity limit being exceeded for maintenance therapy       Please advise if you would like to appeal      Thank you

## 2023-01-20 RX ORDER — PREDNISONE 1 MG/1
5 TABLET ORAL DAILY
Qty: 30 TABLET | Refills: 1 | Status: SHIPPED | OUTPATIENT
Start: 2023-01-20

## 2023-01-20 NOTE — TELEPHONE ENCOUNTER
Hello,    Am I moving forward with Appeals, or waiting until she is seen next week by Dr Martinez  Thank you!

## 2023-01-20 NOTE — TELEPHONE ENCOUNTER
RHIANNON Moore, DO  You; Mayur Ansari MD; Dion Bhatt Ibd 12 minutes ago (2:11 PM)     Will you try to get approval for stelara q 6 weeks and then if Juan agrees then we will continue this, otherwise may need to move on to another drug but I think she is having partial benefit to stelara         Thanks so much,   Red Warren

## 2023-01-23 LAB
REF LAB TEST METHOD: NORMAL
TEST INTERPRETATION: NORMAL
TPMT RBC-CCNC: 21 UNITS/ML RBC

## 2023-01-24 ENCOUNTER — TELEPHONE (OUTPATIENT)
Dept: GASTROENTEROLOGY | Facility: CLINIC | Age: 63
End: 2023-01-24

## 2023-01-24 NOTE — TELEPHONE ENCOUNTER
Spoke with patient  She is aware every 4 weeks was denied  Resubmitted to try for every 6 weeks and waiting on determination   Has appt with Juan on 1/26

## 2023-01-24 NOTE — TELEPHONE ENCOUNTER
Patients GI provider:  Dr Balbina Gerber    Number to return call: [phone]    Reason for call: Pt having problems getting Gino Naranjo approved      Scheduled procedure/appointment date if applicable: apt 1/01

## 2023-01-26 ENCOUNTER — OFFICE VISIT (OUTPATIENT)
Dept: GASTROENTEROLOGY | Facility: CLINIC | Age: 63
End: 2023-01-26

## 2023-01-26 VITALS
WEIGHT: 127.2 LBS | BODY MASS INDEX: 23.41 KG/M2 | DIASTOLIC BLOOD PRESSURE: 62 MMHG | HEIGHT: 62 IN | SYSTOLIC BLOOD PRESSURE: 118 MMHG

## 2023-01-26 DIAGNOSIS — R74.8 ELEVATED ALKALINE PHOSPHATASE LEVEL: ICD-10-CM

## 2023-01-26 DIAGNOSIS — K51.911 ULCERATIVE COLITIS WITH RECTAL BLEEDING, UNSPECIFIED LOCATION (HCC): Primary | ICD-10-CM

## 2023-01-26 DIAGNOSIS — E61.1 IRON DEFICIENCY: ICD-10-CM

## 2023-01-26 DIAGNOSIS — Z79.899 IMMUNOSUPPRESSION DUE TO DRUG THERAPY (HCC): ICD-10-CM

## 2023-01-26 DIAGNOSIS — Z79.52 LONG-TERM CORTICOSTEROID USE: ICD-10-CM

## 2023-01-26 DIAGNOSIS — D84.821 IMMUNOSUPPRESSION DUE TO DRUG THERAPY (HCC): ICD-10-CM

## 2023-01-26 RX ORDER — LANOLIN ALCOHOL/MO/W.PET/CERES
1 CREAM (GRAM) TOPICAL
Qty: 90 TABLET | Refills: 1 | Status: SHIPPED | OUTPATIENT
Start: 2023-01-26

## 2023-01-26 NOTE — PROGRESS NOTES
Gastroenterology Outpatient Follow-up - Crohn's Disease  Mickey Douglas 63 y o  female MRN: [id number]  Encounter: [id number]    Mickey Douglas is a 63 y o  female with Ulcerative colitis  Symptom onset:  2017  Diagnosis:  ulcerative colitis  Year of diagnosis:  2017    IBD Summary: Mickey Douglas has has ulcerative pancolitis diagnosed in 2017  She lost response to mesalamine  She has been treated with vedolizumab but lost response again  She is currently on ustekinumab but is a primary nonresponder and continues to require steroids  She also has chronically elevated alkaline phosphatase and had positive AMA in 2017  [facility]  Her daughter had UC and PSC  Ulcerative Colitis Summary  Macroscopic extent of diseases: pancolitis  Microscopic extent of diseases:  pancolitis  Has the patient ever been hospitalized for severe disease: No        Surgical History  Number of IBD surgeries: 0      First IBD surgery:    Most Recent IBD surgery:    Esophageal:  0    Gastroduodenal:  0    Small bowel resection(s):  0    Ileocolonic resection(s): unknown      Colonic resection(s):  0    Ileostomy or colostomy:  no previous ostomy    Complete colectomy:  No         Medications     Year last used Reason for discontinuation   Corticosteroids prior   2023       Thiopurines   never         Methotrexate   never         Infliximab   never         Adalimumab   never         Certolizumab   never         Golimumab   never         Natalizumab   never         Mesalamine prior     CARLOS     Sulfasalzine   never         Vedolizumab prior   2022 CARLOS     Ustekinumab   never         Tofacitinib   never         Other biologic   never             Extraintestinal Manifestations    IBD-associated arthropathy Yes    Uveitis Yes  iritis x 2    Oral aphthous ulcers No   Erythema nodosum No    Pyoderma gangrenosum No    Primary sclerosing cholangitis Yes  MRCP with possible early PSC; chronic AP elevation   Daughter with PSC     Thrombotic complications No          Cancer / Dysplasia History  History of IBD-associated dysplasia: none    Date of diagnosis (Year):     History of colorectal cancer: No   History of cervical dysplasia: No   History of skin cancer: squamous cell carcinoma         Laboratory Data   Most recent (date) Result   PPD   unknown   Quanitferon gold 9/12/2022 negative   TPMT 1/17/2023 normal   Hepatitis A       HBsAb 9/12/2022 negative   HBcAb 9/12/2022 negative   HBsAg 9/12/2022 negative   HCV Ab   unknown       Imaging / Diagnostic Procedures   Most recent (date) Findings   Colonoscopy or sigmoidoscopy #1 1/12/2023            Ulcers/Erosions  small           Strictures  none           Stricture Severity  N/A           Endoscopic Score Fregoso Score:  2 Rutgeert's:  N/A            Findings  (1) Normal TI  (2) Moderate edematous, erythematous, friable and hemorrhagic mucosa with erosion and loss of vascular pattern in the cecum, ascending colon, hepatic flexure, transverse colon, splenic flexure, descending colon, sigmoid colon, rectosigmoid and rectum, consistent with ulcerative colitis  Pathology   A  Colon, Cecum, Biopsy:  - Mildly active chronic colitis  - Negative for granulomata, viral changes, and dysplasia  B  Small Intestine, Terminal Ileum, Biopsy:  - Small intestinal mucosa with no specific pathologic change  C  Colon, Ascending, Biopsy:  - Mildly active chronic colitis  - Negative for granulomata, viral changes, and dysplasia  D  Colon, Transverse, Biopsy:  - Mildly active chronic colitis  - Negative for granulomata, viral changes, and dysplasia  E  Colon, Descending, Biopsy:  - Mildly active chronic colitis  - Negative for granulomata, viral changes, and dysplasia  F  Colon, Sigmoid, Biopsy:  - Superficial fragments of colonic mucosa with mild acute colitis  G  Colon, Rectum, Biopsy:  - Mildly active chronic colitis  - Negative for granulomata, viral changes, and dysplasia  Colonoscopy or sigmoidoscopy #2              Ulcers/Erosions                 Strictures                 Stricture Severity              Endoscopic Score Fregoso Score:    Rugeert's:              Findings              Pathology      EGD       Small bowel follow-through       CT enterography       CT without enterography       MRI enterography       Capsule study       DEXA scan   Lowest Z-score:      CXR (for TB)           HPI:   Interval History:  1/26/2023 Return visit  Zora Clay is establishing care with me for UC  Diagnosed with UC in 2017 - diarrhea and bleeding  She was treated with mesalamine (Lialda) and did well for a couple of years  In 2020, flared and started vedolizumab but she lost response after a few months  Was increased to every 6 weeks, but did not work  She then started ustekinumab 10/2022  However, has been on anf off prednisone for 5 months, currently on 10 mg   Getting Stelara every 8 weeks  Some days are good with formed stools, other days with loose BMs and bleeding  Colonoscopy this month with Fregoso 2 disease  She has had COVID vaccines + booster and flu vaccine  No pneumococcal vaccine  Last pap smear 1 5 years  SCC removed ~2018 from chest   Last derm ~ 6/2022  Has osteopenia  H/o iritis  Of note, her daughter his UC and PSC  Patient has chronic alkaline phosphatase elevation and had positive AMA in 2017  Recent MRCP with mild intrahepatic biliary dilatation but no other signs of PSC     12/19/22  First treated with lialda for about 2 years  First biologic was entyvio which started in October of 2021 but in February of 2022 symptoms worsened, and was treated with prednisone with symptoms abating but as she was titrating down her prednisone symptoms worsened  She is currently on the autoimmune protocol diet since July of 2022  In the past week and a half- reporting normal BM's; formed, 1-2 times daily, without bleeding    Gluten free and antiinflammatory diet - good for joints  Had iritis x 2 in 2022  Using steroid eye drops  Edel Barnes reports the following symptoms over the last 7 days:    Stool Frequency 1-2 stools/day more than normal   Average stools per day: 4   Average liquid stools per day: 0   Consistency of bowel: soft or semi-formed   Awakening from sleep to move bowels? No   Urgency moderate fecal urgency   Visible blood in stool? visible blood in stool half of the time or more   Abdominal pain? mild   General Wellbeing? generally well     Edel Barnes also reports the following symptoms in the last month:    Leakage of stool while sleeping? No   Leakage of stool while awake? No       REVIEW OF SYSTEMS:  During the last 7 days, Edel Barnes experienced the following symptoms:  Unintentional Weight Loss (in the last month) No  Comment:Losing weight that she attributes to dietary  changes   Fever No   Eye irritation No   Mouth sores No   Sore throat No   Chest pain No   Shortness of breath No   Numbness or tingling in hands or feet No   Skin rash No   Pain or swelling in joints No   Bruising or bleeding No   Felt depressed or blue Yes   Fatigue Yes   Dysuria No   Please see HPI for additional pertinent review of systems; otherwise remainder of ROS was unremarkable    MEDICATIONS:    Current Outpatient Medications:   •  ALPRAZolam (XANAX) 0 25 mg tablet  •  Calcium Carb-Cholecalciferol (calcium carbonate-vitamin D) 500 mg-5 mcg tablet  •  Cholecalciferol (VITAMIN D PO)  •  Cyanocobalamin (VITAMIN B-12 PO)  •  predniSONE 5 mg tablet  •  ustekinumab (Stelara) 90 mg/mL subcutaneous injection  •  predniSONE 5 mg tablet    ALLERGIES:  Allergies   Allergen Reactions   • Amoxicillin GI Intolerance       OBJECTIVE:  /62   Ht 5' 1 5" (1 562 m)   Wt 57 7 kg (127 lb 3 2 oz)   BMI 23 65 kg/m²      PHYSICAL EXAM:    General Appearance:   Alert, cooperative, no distress   HEENT:   Normocephalic, atraumatic, anicteric       Neck:  Supple, symmetrical, trachea midline   Lungs:   Clear to auscultation bilaterally; no rales, rhonchi or wheezing; respirations unlabored    Heart[de-identified]   Regular rate and rhythm; no murmur, rub, or gallop  Abdomen:   Soft, non-distended; normal bowel sounds    Abdominal exam was notable for no tenderness with no mass  Genitalia:   Deferred    Rectal:   Perirectal assessment was not performed  Extremities:  No cyanosis, clubbing or edema    Pulses:  2+ and symmetric    Skin:  No jaundice, rashes, or lesions    Lymph nodes:  No palpable cervical lymphadenopathy        Lab Results   Component Value Date    WBC 13 86 (H) 01/17/2023    HGB 12 0 01/17/2023    HCT 37 8 01/17/2023    MCV 87 01/17/2023     01/17/2023     Lab Results   Component Value Date     06/10/2014    SODIUM 141 09/12/2022    K 3 5 09/12/2022     09/12/2022    CO2 26 09/12/2022    ANIONGAP 8 06/10/2014    AGAP 7 09/12/2022    BUN 12 09/12/2022    CREATININE 0 79 09/12/2022    GLUC 89 06/27/2022    GLUF 80 09/12/2022    CALCIUM 9 1 09/12/2022    AST 27 01/27/2023    ALT 41 01/27/2023    ALKPHOS 150 (H) 01/27/2023    PROT 7 0 06/10/2014    TP 7 2 01/27/2023    BILITOT 0 4 06/10/2014    TBILI 0 37 01/27/2023    EGFR 81 09/12/2022     Lab Results   Component Value Date    CRP 4 7 (H) 01/17/2023     Lab Results   Component Value Date    MDCYPUDF35 994 03/20/2020     Lab Results   Component Value Date    FERRITIN 7 (L) 01/27/2023       ASSESSMENT AND PLAN:    Keila Kemp has a history of macroscopic pancolitis and microscopic pancolitis  ulcerative colitis diagnosed in 2017  Current medical therapy is with ustekinumab 90 mg every 8 weeks and prednisone 10 mg daily  My global assessment is that the clinical disease is currently moderately active  The 6-point Fregoso score was 3 and the 9-point Fregoso score was 5  The short CDAI was 79  [place] Freeze has ulcerative pancolitis diagnosed in 2017  She lost response to mesalamine and vedolizumab    She appears to be a primary nonresponder to ustekinumab although dose escalation has still not been attempted  She does not feel that ustekinumab dose helpful to begin with  She also has chronically elevated alkaline phosphatase  Her daughter has [place]  Recent MRCP possibly suggestive of early PSC changes  I did not positive AMA in 2017 so PBC is also a possibility  We discussed prognosis and treatment options moving forward  Specifically we discussed the possibility that she has [place] which would significantly elevate her risk for cholangiocarcinoma and colon cancer  I feel that her disease was fairly severe based on review of her recent colonoscopy  Recent attempts to escalate ustekinumab have failed and her insurance has denied both 4-week and 6-week dosing  1   We are going to switch to infliximab therapy at this time rather than try to appeal the very unreasonable insurance decisions  2   We are going to recheck AMA and also check GGT  If her TIFFANIE is again elevated she should start ursodiol therapy and I will refer her to hepatology  3   Start vitamin D supplementation  She will need bone density testing when she is off steroids  4   Check iron stores  5   She should have annual Pap smears and dermatology follow-up  6   She should continue to follow-up with ophthalmology  7   She is up-to-date with flu and COVID vaccinations  She should get annual shots and boosters  I will discuss pneumococcal vaccination at her next visit  Return in 3 months  Health Maintenance Recommendations:  Vaccines & Infections  · COVID-19 vaccination is recommended when eligible  There is no evidence that the COVID-19 vaccine would cause an IBD flare  · Avoid live vaccines if on immunosuppressive therapy  '  · Yearly influenza vaccine (flu shot)  · Pneumonia vaccines  These include Prevnar 13, followed by Pneumovax at least 8 weeks later  A Pneumovax booster should be given 5 years after    · Shingrix vaccine - series of 2 injections  · If not immune to measles mumps or rubella, MMR vaccine is recommended  However, this is a live vaccine and should be given prior to immunosuppressive therapy  · HPV vaccination as per national guidelines  · Hepatitis A and B vaccinations if not previously vaccinated  · Testing for tuberculosis with QuantiFERON Gold blood test and/or chest xray prior to starting immunosuppressive medications, and then annually    Cancer screening  · Dysplasia surveillance for colorectal cancer  Colonoscopy in all patients with extensive colitis (more than 1/3 of the colon involved) who had disease for at least 8 or more years  Repeat colonoscopy approximately every 12-24 months  In patients with concurrent primary sclerosing cholangitis, history of dysplasia, or family history of colon cancer, repeat colonoscopy annually  · Females: Pap smear annually, especially for woman on immunosuppression  · Annual dermatologic/skin exam in all patients with IBD, especially those on immunosuppression including anti-TNF therapy and/or thiopurines      Miscellaneous  · DEXA scan, once off steroids for 3 months  · Depression screening recommended annually  · Routine dental and ophthalmology examinations    Problem List Items Addressed This Visit        Digestive    Ulcerative colitis ([facility] ) - Primary    Relevant Medications    Calcium Carb-Cholecalciferol (calcium carbonate-vitamin D) 500 mg-5 mcg tablet    Other Relevant Orders    Ferritin (Completed)    Transferrin Saturation       Other    Elevated alkaline phosphatase level    Relevant Orders    Hepatic function panel (Completed)    Gamma GT (Completed)    Antimitochondrial antibody (Completed)    Long-term corticosteroid use    Relevant Medications    Calcium Carb-Cholecalciferol (calcium carbonate-vitamin D) 500 mg-5 mcg tablet    Immunosuppression due to drug therapy ([facility] )   Other Visit Diagnoses     Iron deficiency        Relevant Orders    Ferritin (Completed) Transferrin Saturation          Lien Reveles MD

## 2023-01-26 NOTE — Clinical Note
Hi, we decided to switch to Remicade since insurance has denied Stelara dose escalation and she has failed Entyvio  Thanks

## 2023-01-27 ENCOUNTER — APPOINTMENT (OUTPATIENT)
Dept: LAB | Facility: CLINIC | Age: 63
End: 2023-01-27

## 2023-01-27 DIAGNOSIS — K51.911 ULCERATIVE COLITIS WITH RECTAL BLEEDING, UNSPECIFIED LOCATION (HCC): ICD-10-CM

## 2023-01-27 DIAGNOSIS — R74.8 ELEVATED ALKALINE PHOSPHATASE LEVEL: ICD-10-CM

## 2023-01-27 DIAGNOSIS — E61.1 IRON DEFICIENCY: ICD-10-CM

## 2023-01-27 LAB
ALBUMIN SERPL BCP-MCNC: 3.2 G/DL (ref 3.5–5)
ALP SERPL-CCNC: 150 U/L (ref 46–116)
ALT SERPL W P-5'-P-CCNC: 41 U/L (ref 12–78)
AST SERPL W P-5'-P-CCNC: 27 U/L (ref 5–45)
BILIRUB DIRECT SERPL-MCNC: <0.05 MG/DL (ref 0–0.2)
BILIRUB SERPL-MCNC: 0.37 MG/DL (ref 0.2–1)
FERRITIN SERPL-MCNC: 7 NG/ML (ref 8–388)
GGT SERPL-CCNC: 122 U/L (ref 5–85)
PROT SERPL-MCNC: 7.2 G/DL (ref 6.4–8.4)

## 2023-01-28 LAB — MITOCHONDRIA M2 IGG SER-ACNC: <20 UNITS (ref 0–20)

## 2023-01-29 DIAGNOSIS — F40.240 CLAUSTROPHOBIA: ICD-10-CM

## 2023-01-30 ENCOUNTER — TELEPHONE (OUTPATIENT)
Dept: GASTROENTEROLOGY | Facility: CLINIC | Age: 63
End: 2023-01-30

## 2023-01-30 NOTE — TELEPHONE ENCOUNTER
----- Message from Danitza Freitas MD sent at 1/29/2023  6:17 PM EST -----  Hi, we decided to switch to Remicade since insurance has denied Stelara dose escalation and she has failed Entyvio  Thanks

## 2023-01-31 RX ORDER — FERROUS SULFATE TAB EC 324 MG (65 MG FE EQUIVALENT) 324 (65 FE) MG
324 TABLET DELAYED RESPONSE ORAL
Qty: 90 TABLET | Refills: 1 | Status: SHIPPED | OUTPATIENT
Start: 2023-01-31

## 2023-01-31 RX ORDER — ALPRAZOLAM 0.25 MG/1
TABLET ORAL
Qty: 90 TABLET | Refills: 0 | Status: SHIPPED | OUTPATIENT
Start: 2023-01-31

## 2023-02-01 ENCOUNTER — PATIENT MESSAGE (OUTPATIENT)
Dept: GASTROENTEROLOGY | Facility: CLINIC | Age: 63
End: 2023-02-01

## 2023-02-01 ENCOUNTER — TELEPHONE (OUTPATIENT)
Dept: GASTROENTEROLOGY | Facility: AMBULARY SURGERY CENTER | Age: 63
End: 2023-02-01

## 2023-02-01 DIAGNOSIS — K51.00 ULCERATIVE PANCOLITIS WITHOUT COMPLICATION (HCC): Primary | ICD-10-CM

## 2023-02-01 DIAGNOSIS — K51.911 ULCERATIVE COLITIS WITH RECTAL BLEEDING, UNSPECIFIED LOCATION (HCC): Primary | ICD-10-CM

## 2023-02-01 DIAGNOSIS — E61.1 IRON DEFICIENCY: ICD-10-CM

## 2023-02-01 DIAGNOSIS — Z79.52 LONG-TERM CORTICOSTEROID USE: ICD-10-CM

## 2023-02-01 RX ORDER — PREDNISONE 1 MG/1
15 TABLET ORAL DAILY
Qty: 90 TABLET | Refills: 0 | Status: SHIPPED | OUTPATIENT
Start: 2023-02-01

## 2023-02-01 RX ORDER — ACETAMINOPHEN 325 MG/1
650 TABLET ORAL ONCE
OUTPATIENT
Start: 2023-02-06

## 2023-02-01 RX ORDER — SODIUM CHLORIDE 9 MG/ML
20 INJECTION, SOLUTION INTRAVENOUS ONCE
OUTPATIENT
Start: 2023-02-06

## 2023-02-01 RX ORDER — DIPHENHYDRAMINE HCL 25 MG
25 TABLET ORAL ONCE
OUTPATIENT
Start: 2023-02-06

## 2023-02-01 NOTE — TELEPHONE ENCOUNTER
Insurance is most likely going to rewuire patient to move to home infusions after loading dose  In this case, would recommend moving forward with avsola  I can submit new BIF        Dr Martinez Can you please place therapy plan for Avsola for [place] ([facility])

## 2023-02-01 NOTE — PATIENT COMMUNICATION
Dr Glenn Mendoza and Ray Nielson,  Please see pt's portal message  I did speak with her today and she requested if you do approve her returning to Prednisone 10 mg daily she would like a refill [facility]  She has enough for Thursday only     Thank you

## 2023-02-01 NOTE — TELEPHONE ENCOUNTER
Patients GI provider:  Dr Raul Rock     Number to return call: [phone]    Reason for call: Pt calling requesting to speak with someone regarding prednisone   Wants to know if she can go back to taking 10mg a day    Scheduled procedure/appointment date if applicable: Apt/procedure

## 2023-02-01 NOTE — TELEPHONE ENCOUNTER
Called pt back, documented on pt's portal message from earlier today that was sent to Dr Pierre Donato this morning

## 2023-02-01 NOTE — TELEPHONE ENCOUNTER
Called the patient and left a vm requesting a call back about the infusion that was discussed with Dr Misael Babcock

## 2023-02-02 ENCOUNTER — TELEPHONE (OUTPATIENT)
Dept: GASTROENTEROLOGY | Facility: CLINIC | Age: 63
End: 2023-02-02

## 2023-02-02 NOTE — TELEPHONE ENCOUNTER
Received incoming call from patient  [facility] and already had been shipped  She will be out of Prednisone Sunday  Pt called back and was able to go to pharmacy and receive additional prednisone until delivery from [address]

## 2023-02-02 NOTE — TELEPHONE ENCOUNTER
Connected with the patient via phone and provided education on Avsola  Patient would did not like getting infused in her home environment  I offered an alternative, an out patient non hospital [facility] infusion center [facility]  She was unable to decide while we were on the phone and I suggested that I call her back tomorrow morning for a decision  Kaela: Patient likes [facility] ([address] [facility] ) and prefers this site over every option so if we could at the very least load her there that would be ideal  Thank you!

## 2023-02-03 NOTE — TELEPHONE ENCOUNTER
I reached out to this pleasant patient to see her preference in terms of infusion in the home environment vs outpatient non-hospital affiliated infusion center  She would like to remain with [facility] infusion after loading at the [facility] ([facility] ) infusion center if her insurance company continues to push for home infusions  I reminded her to call if she has any additional concerns or questions and she is aware that Bridget will reach out once the auth is determined

## 2023-02-27 ENCOUNTER — HOSPITAL ENCOUNTER (OUTPATIENT)
Dept: INFUSION CENTER | Facility: CLINIC | Age: 63
Discharge: HOME | End: 2023-02-27

## 2023-02-27 VITALS
WEIGHT: 127.65 LBS | HEART RATE: 86 BPM | TEMPERATURE: 97.8 F | BODY MASS INDEX: 23.73 KG/M2 | RESPIRATION RATE: 18 BRPM | DIASTOLIC BLOOD PRESSURE: 71 MMHG | SYSTOLIC BLOOD PRESSURE: 117 MMHG

## 2023-02-27 DIAGNOSIS — K51.00 ULCERATIVE PANCOLITIS WITHOUT COMPLICATION (HCC): Primary | ICD-10-CM

## 2023-02-27 RX ORDER — ACETAMINOPHEN 325 MG/1
650 TABLET ORAL ONCE
OUTPATIENT
Start: 2023-03-13

## 2023-02-27 RX ORDER — SODIUM CHLORIDE 9 MG/ML
20 INJECTION, SOLUTION INTRAVENOUS ONCE
OUTPATIENT
Start: 2023-03-13

## 2023-02-27 RX ORDER — ACETAMINOPHEN 325 MG/1
650 TABLET ORAL ONCE
Status: COMPLETED | OUTPATIENT
Start: 2023-02-27 | End: 2023-02-27

## 2023-02-27 RX ORDER — SODIUM CHLORIDE 9 MG/ML
20 INJECTION, SOLUTION INTRAVENOUS ONCE
Status: COMPLETED | OUTPATIENT
Start: 2023-02-27 | End: 2023-02-27

## 2023-02-27 RX ORDER — PREDNISOLONE ACETATE 10 MG/ML
1 SUSPENSION/ DROPS OPHTHALMIC 3 TIMES DAILY
COMMUNITY
Start: 2023-01-06

## 2023-02-27 RX ORDER — DIPHENHYDRAMINE HCL 25 MG
25 TABLET ORAL ONCE
OUTPATIENT
Start: 2023-03-13

## 2023-02-27 RX ORDER — DIPHENHYDRAMINE HCL 25 MG
25 TABLET ORAL ONCE
Status: COMPLETED | OUTPATIENT
Start: 2023-02-27 | End: 2023-02-27

## 2023-02-27 RX ADMIN — DIPHENHYDRAMINE HCL 25 MG: 25 TABLET, COATED ORAL at 09:45

## 2023-02-27 RX ADMIN — INFLIXIMAB-AXXQ 300 MG: 100 INJECTION, POWDER, LYOPHILIZED, FOR SOLUTION INTRAVENOUS at 10:36

## 2023-02-27 RX ADMIN — ACETAMINOPHEN 650 MG: 325 TABLET ORAL at 09:45

## 2023-02-27 RX ADMIN — SODIUM CHLORIDE 20 ML/HR: 0.9 INJECTION, SOLUTION INTRAVENOUS at 10:35

## 2023-02-27 NOTE — PROGRESS NOTES
Patient arrived to unit without complaint  Patient denies any recent infection or course of antibiotics  Patient tolerated Avsola infusion without incident  AVS declined and patient aware of next appointment  Patient left in stable condition

## 2023-03-13 ENCOUNTER — HOSPITAL ENCOUNTER (OUTPATIENT)
Dept: INFUSION CENTER | Facility: CLINIC | Age: 63
Discharge: HOME | End: 2023-03-13

## 2023-03-13 DIAGNOSIS — K51.00 ULCERATIVE PANCOLITIS WITHOUT COMPLICATION (HCC): Primary | ICD-10-CM

## 2023-03-13 RX ORDER — SODIUM CHLORIDE 9 MG/ML
20 INJECTION, SOLUTION INTRAVENOUS ONCE
Status: CANCELLED | OUTPATIENT
Start: 2023-04-10

## 2023-03-13 RX ORDER — ACETAMINOPHEN 325 MG/1
650 TABLET ORAL ONCE
OUTPATIENT
Start: 2023-04-10

## 2023-03-13 RX ORDER — DIPHENHYDRAMINE HCL 25 MG
25 TABLET ORAL ONCE
Status: CANCELLED | OUTPATIENT
Start: 2023-04-10

## 2023-03-13 RX ORDER — ACETAMINOPHEN 325 MG/1
650 TABLET ORAL ONCE
Status: COMPLETED | OUTPATIENT
Start: 2023-03-13 | End: 2023-03-13

## 2023-03-13 RX ORDER — SODIUM CHLORIDE 9 MG/ML
20 INJECTION, SOLUTION INTRAVENOUS ONCE
OUTPATIENT
Start: 2023-04-10

## 2023-03-13 RX ORDER — ACETAMINOPHEN 325 MG/1
650 TABLET ORAL ONCE
Status: CANCELLED | OUTPATIENT
Start: 2023-04-10

## 2023-03-13 RX ORDER — DIPHENHYDRAMINE HCL 25 MG
25 TABLET ORAL ONCE
OUTPATIENT
Start: 2023-04-10

## 2023-03-13 RX ORDER — DIPHENHYDRAMINE HCL 25 MG
25 TABLET ORAL ONCE
Status: COMPLETED | OUTPATIENT
Start: 2023-03-13 | End: 2023-03-13

## 2023-03-13 RX ORDER — SODIUM CHLORIDE 9 MG/ML
20 INJECTION, SOLUTION INTRAVENOUS ONCE
Status: COMPLETED | OUTPATIENT
Start: 2023-03-13 | End: 2023-03-13

## 2023-03-13 RX ADMIN — INFLIXIMAB-AXXQ 300 MG: 100 INJECTION, POWDER, LYOPHILIZED, FOR SOLUTION INTRAVENOUS at 12:33

## 2023-03-13 RX ADMIN — ACETAMINOPHEN 650 MG: 325 TABLET ORAL at 11:50

## 2023-03-13 RX ADMIN — DIPHENHYDRAMINE HCL 25 MG: 25 TABLET, COATED ORAL at 11:50

## 2023-03-13 RX ADMIN — SODIUM CHLORIDE 20 ML/HR: 0.9 INJECTION, SOLUTION INTRAVENOUS at 11:50

## 2023-03-13 NOTE — PROGRESS NOTES
Pt presents for avsola  No new meds or concerns  Pt tolerated treatment without adverse reaction  Future appointments discussed  AVS declined

## 2023-03-30 ENCOUNTER — PATIENT MESSAGE (OUTPATIENT)
Dept: GASTROENTEROLOGY | Facility: CLINIC | Age: 63
End: 2023-03-30

## 2023-03-30 DIAGNOSIS — K51.911 ULCERATIVE COLITIS WITH RECTAL BLEEDING, UNSPECIFIED LOCATION (HCC): Primary | ICD-10-CM

## 2023-03-30 DIAGNOSIS — R19.7 DIARRHEA, UNSPECIFIED TYPE: ICD-10-CM

## 2023-03-30 NOTE — TELEPHONE ENCOUNTER
From: Chris Inman  To: Filemon Martinez  Sent: 2/1/2023 3:45 AM EST  Subject: From [facility]    Dr Tatiana Burrell- thank you for taking so much time with me during our visit last week  After our visit I lowered my prednisone from 10 mg to 5 mg per day  Unfortunately my symptoms have gotten worse over the past week  I’m having bowel movements 5-6 times per day and they are completely loose  I’m having some pain and blood and feel very low energy  I was wondering if I should increase the prednisone back to 10mg or even 15mg just to hold me over until I can start on Remicade (if it gets approved)  Please let me know what you think- thank you!   [facility]

## 2023-04-19 PROBLEM — E61.1 IRON DEFICIENCY: Status: ACTIVE | Noted: 2023-04-19

## 2023-04-24 ENCOUNTER — APPOINTMENT (OUTPATIENT)
Dept: LAB | Facility: CLINIC | Age: 63
End: 2023-04-24

## 2023-04-24 DIAGNOSIS — R53.83 OTHER FATIGUE: ICD-10-CM

## 2023-04-24 DIAGNOSIS — E61.1 IRON DEFICIENCY: ICD-10-CM

## 2023-04-24 DIAGNOSIS — K51.911 ULCERATIVE COLITIS WITH RECTAL BLEEDING, UNSPECIFIED LOCATION (HCC): ICD-10-CM

## 2023-04-24 LAB — TSH SERPL DL<=0.05 MIU/L-ACNC: 2.03 UIU/ML (ref 0.45–4.5)

## 2023-04-25 ENCOUNTER — TELEPHONE (OUTPATIENT)
Dept: GASTROENTEROLOGY | Facility: CLINIC | Age: 63
End: 2023-04-25

## 2023-04-25 NOTE — TELEPHONE ENCOUNTER
Patients GI provider:  Dr Leslie Knight    Number to return call: [phone]    Reason for call: Pt calling to speak with someone about iron infusions    Scheduled procedure/appointment date if applicable: N/A

## 2023-04-27 NOTE — TELEPHONE ENCOUNTER
I connected with the patient on the phone  All questions answered  Pt will call infusion center to set up Venofer twice a week

## 2023-04-27 NOTE — TELEPHONE ENCOUNTER
Spoke with patient  Advised no Kaylie Dust is needed for Venofer and provided phone number to call infusion center  Patient is nervous about going twice a week for iron infusions  She is concerned about side effects and receiving that much iron in a short amount of time  Called transferred to [facility], RN to discuss with patient

## 2023-05-02 ENCOUNTER — HOSPITAL ENCOUNTER (OUTPATIENT)
Dept: INFUSION CENTER | Facility: CLINIC | Age: 63
Discharge: HOME | End: 2023-05-02

## 2023-05-02 ENCOUNTER — TELEPHONE (OUTPATIENT)
Dept: GASTROENTEROLOGY | Facility: CLINIC | Age: 63
End: 2023-05-02

## 2023-05-02 VITALS
SYSTOLIC BLOOD PRESSURE: 104 MMHG | DIASTOLIC BLOOD PRESSURE: 68 MMHG | HEART RATE: 92 BPM | RESPIRATION RATE: 18 BRPM | TEMPERATURE: 98.5 F

## 2023-05-02 DIAGNOSIS — K51.911 ULCERATIVE COLITIS WITH RECTAL BLEEDING, UNSPECIFIED LOCATION (HCC): ICD-10-CM

## 2023-05-02 DIAGNOSIS — E61.1 IRON DEFICIENCY: Primary | ICD-10-CM

## 2023-05-02 RX ORDER — SODIUM CHLORIDE 9 MG/ML
20 INJECTION, SOLUTION INTRAVENOUS ONCE
Status: CANCELLED | OUTPATIENT
Start: 2023-05-05

## 2023-05-02 RX ORDER — SODIUM CHLORIDE 9 MG/ML
20 INJECTION, SOLUTION INTRAVENOUS ONCE
Status: COMPLETED | OUTPATIENT
Start: 2023-05-02 | End: 2023-05-02

## 2023-05-02 RX ADMIN — IRON SUCROSE 300 MG: 20 INJECTION, SOLUTION INTRAVENOUS at 09:35

## 2023-05-02 RX ADMIN — SODIUM CHLORIDE 20 ML/HR: 0.9 INJECTION, SOLUTION INTRAVENOUS at 09:11

## 2023-05-05 ENCOUNTER — HOSPITAL ENCOUNTER (OUTPATIENT)
Dept: INFUSION CENTER | Facility: CLINIC | Age: 63
End: 2023-05-05

## 2023-05-05 DIAGNOSIS — E61.1 IRON DEFICIENCY: Primary | ICD-10-CM

## 2023-05-05 DIAGNOSIS — K51.911 ULCERATIVE COLITIS WITH RECTAL BLEEDING, UNSPECIFIED LOCATION (HCC): ICD-10-CM

## 2023-05-05 RX ORDER — SODIUM CHLORIDE 9 MG/ML
20 INJECTION, SOLUTION INTRAVENOUS ONCE
Status: COMPLETED | OUTPATIENT
Start: 2023-05-05 | End: 2023-05-05

## 2023-05-05 RX ORDER — SODIUM CHLORIDE 9 MG/ML
20 INJECTION, SOLUTION INTRAVENOUS ONCE
OUTPATIENT
Start: 2023-05-08

## 2023-05-05 RX ORDER — SODIUM CHLORIDE 9 MG/ML
20 INJECTION, SOLUTION INTRAVENOUS ONCE
Status: CANCELLED | OUTPATIENT
Start: 2023-05-09

## 2023-05-05 RX ADMIN — IRON SUCROSE 300 MG: 20 INJECTION, SOLUTION INTRAVENOUS at 09:21

## 2023-05-05 RX ADMIN — SODIUM CHLORIDE 20 ML/HR: 0.9 INJECTION, SOLUTION INTRAVENOUS at 09:13

## 2023-05-05 NOTE — PROGRESS NOTES
Pt presents for venofer  No new meds or concerns  Pt tolerated infusion without adverse reaction  Future appointments discussed  AVS declined

## 2023-05-08 ENCOUNTER — HOSPITAL ENCOUNTER (OUTPATIENT)
Dept: INFUSION CENTER | Facility: CLINIC | Age: 63
Discharge: HOME | End: 2023-05-08

## 2023-05-08 ENCOUNTER — TELEPHONE (OUTPATIENT)
Dept: GASTROENTEROLOGY | Facility: CLINIC | Age: 63
End: 2023-05-08

## 2023-05-08 NOTE — TELEPHONE ENCOUNTER
I spoke with the patient  Advised patient ok to reschedule 5/8 iron infusion  Next infusion 5/17  Dr Amber Moon aware

## 2023-05-09 ENCOUNTER — APPOINTMENT (OUTPATIENT)
Dept: LAB | Facility: MEDICAL CENTER | Age: 63
End: 2023-05-09

## 2023-05-09 DIAGNOSIS — R53.83 OTHER FATIGUE: ICD-10-CM

## 2023-05-09 DIAGNOSIS — K51.911 ULCERATIVE COLITIS WITH RECTAL BLEEDING, UNSPECIFIED LOCATION (HCC): ICD-10-CM

## 2023-05-09 DIAGNOSIS — E61.1 IRON DEFICIENCY: ICD-10-CM

## 2023-05-17 ENCOUNTER — HOSPITAL ENCOUNTER (OUTPATIENT)
Dept: INFUSION CENTER | Facility: CLINIC | Age: 63
Discharge: HOME | End: 2023-05-17

## 2023-05-17 VITALS
DIASTOLIC BLOOD PRESSURE: 65 MMHG | TEMPERATURE: 99.1 F | HEART RATE: 82 BPM | RESPIRATION RATE: 18 BRPM | SYSTOLIC BLOOD PRESSURE: 111 MMHG

## 2023-05-17 DIAGNOSIS — K51.911 ULCERATIVE COLITIS WITH RECTAL BLEEDING, UNSPECIFIED LOCATION (HCC): ICD-10-CM

## 2023-05-17 DIAGNOSIS — E61.1 IRON DEFICIENCY: Primary | ICD-10-CM

## 2023-05-17 LAB
CALPROTECTIN STL-MCNT: 1130 UG/G (ref 0–120)
INFLIXIMAB AB SERPL-MCNC: <22 NG/ML
INFLIXIMAB SERPL-MCNC: 2.8 UG/ML

## 2023-05-17 RX ORDER — SODIUM CHLORIDE 9 MG/ML
20 INJECTION, SOLUTION INTRAVENOUS ONCE
Status: CANCELLED | OUTPATIENT
Start: 2023-05-24

## 2023-05-17 RX ORDER — SODIUM CHLORIDE 9 MG/ML
20 INJECTION, SOLUTION INTRAVENOUS ONCE
Status: COMPLETED | OUTPATIENT
Start: 2023-05-17 | End: 2023-05-17

## 2023-05-17 RX ADMIN — SODIUM CHLORIDE 20 ML/HR: 0.9 INJECTION, SOLUTION INTRAVENOUS at 12:28

## 2023-05-17 RX ADMIN — IRON SUCROSE 300 MG: 20 INJECTION, SOLUTION INTRAVENOUS at 12:29

## 2023-05-17 NOTE — PROGRESS NOTES
Patient arrived to unit without complaint  Patient tolerated Venofer infusion without incident  AVS declined and patient aware of next appointment  Patient left in stable condition 
97.8

## 2023-05-23 ENCOUNTER — TELEPHONE (OUTPATIENT)
Dept: GASTROENTEROLOGY | Facility: CLINIC | Age: 63
End: 2023-05-23

## 2023-05-23 NOTE — TELEPHONE ENCOUNTER
----- Message from Katie Mohr MD sent at 5/23/2023  7:06 AM EDT -----  Can we please increase Remicade to 10 mg/kg every 4 weeks? She is flaring and her level is very low  Thanks

## 2023-05-24 ENCOUNTER — HOSPITAL ENCOUNTER (OUTPATIENT)
Dept: INFUSION CENTER | Facility: CLINIC | Age: 63
Discharge: HOME | End: 2023-05-24

## 2023-05-24 VITALS
RESPIRATION RATE: 16 BRPM | HEART RATE: 82 BPM | TEMPERATURE: 97.8 F | SYSTOLIC BLOOD PRESSURE: 112 MMHG | DIASTOLIC BLOOD PRESSURE: 71 MMHG

## 2023-05-24 DIAGNOSIS — K51.911 ULCERATIVE COLITIS WITH RECTAL BLEEDING, UNSPECIFIED LOCATION (HCC): ICD-10-CM

## 2023-05-24 DIAGNOSIS — E61.1 IRON DEFICIENCY: Primary | ICD-10-CM

## 2023-05-24 RX ORDER — SODIUM CHLORIDE 9 MG/ML
20 INJECTION, SOLUTION INTRAVENOUS ONCE
Status: CANCELLED | OUTPATIENT
Start: 2023-05-24

## 2023-05-24 RX ORDER — SODIUM CHLORIDE 9 MG/ML
20 INJECTION, SOLUTION INTRAVENOUS ONCE
Status: COMPLETED | OUTPATIENT
Start: 2023-05-24 | End: 2023-05-24

## 2023-05-24 RX ADMIN — IRON SUCROSE 300 MG: 20 INJECTION, SOLUTION INTRAVENOUS at 13:21

## 2023-05-24 RX ADMIN — SODIUM CHLORIDE 20 ML/HR: 0.9 INJECTION, SOLUTION INTRAVENOUS at 13:15

## 2023-05-24 NOTE — PROGRESS NOTES
Pt  Tolerated Venofer without adverse event  Pt  Will follow up with physician as ordered  There are no future appointments at this time

## 2023-05-24 NOTE — PLAN OF CARE
Problem: INFECTION - ADULT  Goal: Absence or prevention of progression during hospitalization  Description: INTERVENTIONS:  - Assess and monitor for signs and symptoms of infection  - Monitor lab/diagnostic results  - Monitor all insertion sites, i e  indwelling lines, tubes, and drains  - Monitor endotracheal if appropriate and nasal secretions for changes in amount and color  - [place] appropriate cooling/warming therapies per order  - Administer medications as ordered  - Instruct and encourage patient and family to use good hand hygiene technique  - Identify and instruct in appropriate isolation precautions for identified infection/condition  Outcome: Progressing  Goal: Absence of fever/infection during neutropenic period  Description: INTERVENTIONS:  - Monitor WBC    Outcome: Progressing     Problem: Knowledge Deficit  Goal: Patient/family/caregiver demonstrates understanding of disease process, treatment plan, medications, and discharge instructions  Description: Complete learning assessment and assess knowledge base    Interventions:  - Provide teaching at level of understanding  - Provide teaching via preferred learning methods  Outcome: Progressing     Problem: INFECTION - ADULT  Goal: Absence or prevention of progression during hospitalization  Description: INTERVENTIONS:  - Assess and monitor for signs and symptoms of infection  - Monitor lab/diagnostic results  - Monitor all insertion sites, i e  indwelling lines, tubes, and drains  - Monitor endotracheal if appropriate and nasal secretions for changes in amount and color  - [place] appropriate cooling/warming therapies per order  - Administer medications as ordered  - Instruct and encourage patient and family to use good hand hygiene technique  - Identify and instruct in appropriate isolation precautions for identified infection/condition  Outcome: Progressing  Goal: Absence of fever/infection during neutropenic period  Description: INTERVENTIONS:  - Monitor WBC    Outcome: Progressing     Problem: Knowledge Deficit  Goal: Patient/family/caregiver demonstrates understanding of disease process, treatment plan, medications, and discharge instructions  Description: Complete learning assessment and assess knowledge base    Interventions:  - Provide teaching at level of understanding  - Provide teaching via preferred learning methods  Outcome: Progressing

## 2023-05-24 NOTE — PLAN OF CARE
Problem: INFECTION - ADULT  Goal: Absence or prevention of progression during hospitalization  Description: INTERVENTIONS:  - Assess and monitor for signs and symptoms of infection  - Monitor lab/diagnostic results  - Monitor all insertion sites, i e  indwelling lines, tubes, and drains  - Monitor endotracheal if appropriate and nasal secretions for changes in amount and color  - [place] appropriate cooling/warming therapies per order  - Administer medications as ordered  - Instruct and encourage patient and family to use good hand hygiene technique  - Identify and instruct in appropriate isolation precautions for identified infection/condition  5/24/2023 1357 by Pepper Joel RN  Outcome: Progressing  5/24/2023 1301 by Pepper Joel RN  Outcome: Progressing  Goal: Absence of fever/infection during neutropenic period  Description: INTERVENTIONS:  - Monitor WBC    5/24/2023 1357 by Pepper Joel RN  Outcome: Progressing  5/24/2023 1301 by Pepper Joel RN  Outcome: Progressing     Problem: Knowledge Deficit  Goal: Patient/family/caregiver demonstrates understanding of disease process, treatment plan, medications, and discharge instructions  Description: Complete learning assessment and assess knowledge base    Interventions:  - Provide teaching at level of understanding  - Provide teaching via preferred learning methods  5/24/2023 1357 by Pepper Joel RN  Outcome: Progressing  5/24/2023 1301 by Pepper Joel RN  Outcome: Progressing     Problem: INFECTION - ADULT  Goal: Absence or prevention of progression during hospitalization  Description: INTERVENTIONS:  - Assess and monitor for signs and symptoms of infection  - Monitor lab/diagnostic results  - Monitor all insertion sites, i e  indwelling lines, tubes, and drains  - Monitor endotracheal if appropriate and nasal secretions for changes in amount and color  - [place] appropriate cooling/warming therapies per order  - Administer medications as ordered  - Instruct and encourage patient and family to use good hand hygiene technique  - Identify and instruct in appropriate isolation precautions for identified infection/condition  5/24/2023 1357 by Shilo Hair RN  Outcome: Progressing  5/24/2023 1301 by Shilo Hair RN  Outcome: Progressing  Goal: Absence of fever/infection during neutropenic period  Description: INTERVENTIONS:  - Monitor WBC    5/24/2023 1357 by Shilo Hair RN  Outcome: Progressing  5/24/2023 1301 by Shilo Hair RN  Outcome: Progressing     Problem: Knowledge Deficit  Goal: Patient/family/caregiver demonstrates understanding of disease process, treatment plan, medications, and discharge instructions  Description: Complete learning assessment and assess knowledge base    Interventions:  - Provide teaching at level of understanding  - Provide teaching via preferred learning methods  5/24/2023 1357 by Shilo Hair RN  Outcome: Progressing  5/24/2023 1301 by Shilo Hair RN  Outcome: Progressing

## 2023-06-12 ENCOUNTER — HOSPITAL ENCOUNTER (OUTPATIENT)
Dept: RADIOLOGY | Facility: HOSPITAL | Age: 63
Discharge: HOME | End: 2023-06-12
Attending: INTERNAL MEDICINE
Payer: COMMERCIAL

## 2023-06-12 DIAGNOSIS — K83.01 PSC (PRIMARY SCLEROSING CHOLANGITIS): ICD-10-CM

## 2023-06-12 PROCEDURE — A9585 GADOBUTROL INJECTION: HCPCS | Performed by: INTERNAL MEDICINE

## 2023-06-12 PROCEDURE — G1004 CDSM NDSC: HCPCS

## 2023-06-12 PROCEDURE — 74183 MRI ABD W/O CNTR FLWD CNTR: CPT

## 2023-06-12 RX ADMIN — GADOBUTROL 5 ML: 604.72 INJECTION INTRAVENOUS at 17:37

## 2023-06-16 ENCOUNTER — TELEPHONE (OUTPATIENT)
Dept: DERMATOLOGY | Facility: CLINIC | Age: 63
End: 2023-06-16

## 2023-06-16 NOTE — TELEPHONE ENCOUNTER
Rec'd call from patient requesting new appt for several areas of concern: most on her face  Requests female provider, if possible  Explained wait list process and she verbalized understanding      [facility] and [facility] locations at patients request

## 2023-06-17 ENCOUNTER — APPOINTMENT (OUTPATIENT)
Dept: LAB | Facility: MEDICAL CENTER | Age: 63
End: 2023-06-17
Payer: COMMERCIAL

## 2023-06-17 DIAGNOSIS — M06.09 SERONEGATIVE ARTHROPATHY OF MULTIPLE SITES (HCC): ICD-10-CM

## 2023-06-17 LAB
BASOPHILS # BLD AUTO: 0.06 THOUSANDS/ÂΜL (ref 0–0.1)
BASOPHILS NFR BLD AUTO: 1 % (ref 0–1)
EOSINOPHIL # BLD AUTO: 0.19 THOUSAND/ÂΜL (ref 0–0.61)
EOSINOPHIL NFR BLD AUTO: 3 % (ref 0–6)
ERYTHROCYTE [SEDIMENTATION RATE] IN BLOOD: 14 MM/HOUR (ref 0–29)
FERRITIN SERPL-MCNC: 63 NG/ML (ref 11–307)
HCT VFR BLD AUTO: 41.2 % (ref 34.8–46.1)
HGB BLD-MCNC: 13.1 G/DL (ref 11.5–15.4)
IMM GRANULOCYTES # BLD AUTO: 0.02 THOUSAND/UL (ref 0–0.2)
IMM GRANULOCYTES NFR BLD AUTO: 0 % (ref 0–2)
IRON SATN MFR SERPL: 19 % (ref 15–50)
IRON SERPL-MCNC: 57 UG/DL (ref 50–170)
LYMPHOCYTES # BLD AUTO: 1.97 THOUSANDS/ÂΜL (ref 0.6–4.47)
LYMPHOCYTES NFR BLD AUTO: 30 % (ref 14–44)
MCH RBC QN AUTO: 27.2 PG (ref 26.8–34.3)
MCHC RBC AUTO-ENTMCNC: 31.8 G/DL (ref 31.4–37.4)
MCV RBC AUTO: 86 FL (ref 82–98)
MONOCYTES # BLD AUTO: 0.61 THOUSAND/ÂΜL (ref 0.17–1.22)
MONOCYTES NFR BLD AUTO: 9 % (ref 4–12)
NEUTROPHILS # BLD AUTO: 3.76 THOUSANDS/ÂΜL (ref 1.85–7.62)
NEUTS SEG NFR BLD AUTO: 57 % (ref 43–75)
NRBC BLD AUTO-RTO: 0 /100 WBCS
PLATELET # BLD AUTO: 231 THOUSANDS/UL (ref 149–390)
PMV BLD AUTO: 9.2 FL (ref 8.9–12.7)
RBC # BLD AUTO: 4.81 MILLION/UL (ref 3.81–5.12)
TIBC SERPL-MCNC: 307 UG/DL (ref 250–450)
WBC # BLD AUTO: 6.61 THOUSAND/UL (ref 4.31–10.16)

## 2023-06-17 PROCEDURE — 85652 RBC SED RATE AUTOMATED: CPT

## 2023-06-17 PROCEDURE — 85025 COMPLETE CBC W/AUTO DIFF WBC: CPT

## 2023-06-20 PROBLEM — M85.89 OSTEOPENIA OF MULTIPLE SITES: Status: ACTIVE | Noted: 2023-06-20

## 2023-06-20 PROBLEM — M70.62 TROCHANTERIC BURSITIS OF BOTH HIPS: Status: ACTIVE | Noted: 2023-06-20

## 2023-06-20 PROBLEM — M70.61 TROCHANTERIC BURSITIS OF BOTH HIPS: Status: ACTIVE | Noted: 2023-06-20

## 2023-07-28 ENCOUNTER — OFFICE VISIT (OUTPATIENT)
Dept: FAMILY MEDICINE CLINIC | Facility: CLINIC | Age: 63
End: 2023-07-28
Payer: COMMERCIAL

## 2023-07-28 VITALS
HEIGHT: 62 IN | WEIGHT: 127.2 LBS | DIASTOLIC BLOOD PRESSURE: 70 MMHG | SYSTOLIC BLOOD PRESSURE: 118 MMHG | OXYGEN SATURATION: 98 % | HEART RATE: 88 BPM | TEMPERATURE: 97.9 F | BODY MASS INDEX: 23.41 KG/M2

## 2023-07-28 DIAGNOSIS — F41.9 ANXIETY: Primary | ICD-10-CM

## 2023-07-28 PROCEDURE — 99214 OFFICE O/P EST MOD 30 MIN: CPT | Performed by: NURSE PRACTITIONER

## 2023-07-28 RX ORDER — BUSPIRONE HYDROCHLORIDE 5 MG/1
TABLET ORAL
Qty: 60 TABLET | Refills: 0 | Status: SHIPPED | OUTPATIENT
Start: 2023-07-28 | End: 2023-08-27

## 2023-07-28 NOTE — PROGRESS NOTES
Name: Christiano Marcos      : 1960      MRN: [id number]  Encounter Provider: RODNEY Lama  Encounter Date: 2023   Encounter department: [address] [place] [address]     1. Anxiety  -     busPIRone (BUSPAR) 5 mg tablet; Take 1 tablet (5 mg total) by mouth daily for 3 days, THEN 1 tablet (5 mg total) 2 (two) times a day for 27 days. Discussed treatment and medication options with patient. Will start bupar 5 mg daily x 3 days if well tolerated can increase to 5 mg BID. Could increase to 5 mg TID over addition 3-5 days if needed. Continue with xanax as needed. Therapy information provided. Crisis hotline and support resources provided to patient as well in AVS.  Re-check in office in 2 weeks. No signs of infection or tonsil stone on exam. Follow up with ENT. Subjective      Here to discuss anxiety. Has had worsening anxiety over the past few weeks. She states she has something going on in her life and it's very stressful and all she can think about. Having a hard time sleeping. Has xanax to use as needed. It works as needed but daily it makes it feel worse. She gets GERD from it. She was on lexapro in  but it made her feel empty. Denies si/hi. Also has sore throat on left side since trying to get tonsil stone out 1 week ago. Dry air hurting it more. She was able to get part of tonsil stone out. Review of Systems   Constitutional: Negative for chills and fever. Eyes: Negative for discharge. Respiratory: Negative for shortness of breath. Cardiovascular: Negative for chest pain. Gastrointestinal: Negative for constipation and diarrhea. Genitourinary: Negative for difficulty urinating. Musculoskeletal: Negative for joint swelling. Skin: Negative for rash. Neurological: Negative for headaches. Hematological: Negative for adenopathy. Psychiatric/Behavioral: Positive for sleep disturbance.  Negative for self-injury and suicidal ideas. The patient is nervous/anxious. Current Outpatient Medications on File Prior to Visit   Medication Sig   • ALPRAZolam (XANAX) 0.25 mg tablet TAKE 1 TABLET DAILY AT BEDTIME AS NEEDED FOR ANXIETY   • Calcium Carb-Cholecalciferol (calcium carbonate-vitamin D) 500 mg-5 mcg tablet Take 1 tablet by mouth daily with breakfast   • Cholecalciferol (VITAMIN D PO) Take 5,000 Units by mouth daily   • Cyanocobalamin (VITAMIN B-12 PO) Take by mouth daily   • inFLIXimab-axxq (AVSOLA IV) Inject into a catheter in a vein every 4 weeks   • [DISCONTINUED] ferrous sulfate 324 (65 Fe) mg Take 1 tablet (324 mg total) by mouth daily before breakfast (Patient not taking: Reported on 4/19/2023)   • [DISCONTINUED] prednisoLONE acetate (PRED FORTE) 1 % ophthalmic suspension Administer 1 drop to both eyes 3 (three) times a day (Patient not taking: Reported on 7/28/2023)   • [DISCONTINUED] predniSONE 2.5 mg tablet Take 1 tablet (2.5 mg total) by mouth daily (Patient not taking: Reported on 7/28/2023)   • [DISCONTINUED] ustekinumab (Stelara) 90 mg/mL subcutaneous injection Inject 1 mL (90 mg total) under the skin every 28 days (Patient not taking: Reported on 4/19/2023)       Objective     /70 (BP Location: Left arm, Patient Position: Sitting, Cuff Size: Standard)   Pulse 88   Temp 97.9 °F (36.6 °C) (Temporal)   Ht 5' 1.5" (1.562 m)   Wt 57.7 kg (127 lb 3.2 oz)   SpO2 98%   BMI 23.65 kg/m²     Physical Exam  Vitals and nursing note reviewed. Constitutional:       General: She is not in acute distress. Appearance: She is well-developed. She is not diaphoretic. HENT:      Head: Normocephalic and atraumatic. Right Ear: External ear normal.      Left Ear: External ear normal.      Mouth/Throat:      Mouth: Mucous membranes are moist.      Pharynx: No oropharyngeal exudate or posterior oropharyngeal erythema. Eyes:      General: Lids are normal.         Right eye: No discharge. Left eye: No discharge. Conjunctiva/sclera: Conjunctivae normal.   Pulmonary:      Effort: Pulmonary effort is normal. No respiratory distress. Musculoskeletal:         General: No deformity. Cervical back: Neck supple. Skin:     General: Skin is warm and dry. Neurological:      Mental Status: She is alert and oriented to person, place, and time. Psychiatric:         Attention and Perception: Attention and perception normal.         Mood and Affect: Mood is anxious. Mood is not elated. Affect is tearful. Affect is not blunt or angry. Speech: Speech normal.         Behavior: Behavior normal. Behavior is cooperative. Thought Content: Thought content normal. Thought content is not paranoid or delusional. Thought content does not include homicidal or suicidal ideation. Thought content does not include homicidal or suicidal plan.          Cognition and Memory: Cognition and memory normal.         Judgment: Judgment normal.       RODNEY [place]

## 2023-07-28 NOTE — PATIENT INSTRUCTIONS
[employer] testing- check with insurance. Start medication for anxiety, this is Buspar. Start one pill daily for 3 days, if well tolerated then increase to one pill twice a day routinely for a few weeks. You can continue with just one pill daily if needed in cases where your body is taking longer to adjust to it. We will then follow up to see how you are doing. Medication can then be increased if needed at that time. Follow up in 3-4 weeks after starting medication. How to find a therapist:  You can call the back of your insurance card for covered providers or check on your insurance's website. Or you can visit [facility] to find a covered therapist. Karmen Walter can filter by your insurance type, location, and other preferences. This will give you a list with pictures and bios about the therapists so you can appropriately choose one you feel will be the most helpful for your goals. I personally recommend Carroll Rand as an excellent therapist.   Carroll Rand [facility]   For now she offers virtual appointments. Appointments can be made through the link above. Additional information can be found at:   Carroll Rand, Licensed Professional Counselor, [place], [place], [address]  [facility]   You can e-mail or call her with any questions. She does offer sliding scale options for payment if your insurance is not listed. Please call the office if you are experiencing any worsening of symptoms or no symptom improvement. Buspirone (By mouth)   Buspirone ([id number])  Treats anxiety. Brand Name(s):   There may be other brand names for this medicine. When This Medicine Should Not Be Used: You should not use this medicine if you have had an allergic reaction to buspirone. How to Use This Medicine:   Tablet  Your doctor will tell you how much of this medicine to take and how often. Do not take more medicine or take it more often than your doctor tells you to.   You may take this medicine with or without food, but take it the same way each time. You may need to take this medicine for 1 or 2 weeks before you begin to feel better. If a dose is missed:   If you miss a dose or forget to take your medicine, take it as soon as you can. If it is almost time for your next dose, wait until then to take the medicine and skip the missed dose. Do not use extra medicine to make up for a missed dose. How to Store and Dispose of This Medicine:   Store the medicine at room temperature, away from heat, moisture, and direct light. Keep all medicine out of the reach of children and never share your medicine with anyone. Drugs and Foods to Avoid:   Ask your doctor or pharmacist before using any other medicine, including over-the-counter medicines, vitamins, and herbal products. You should not use buspirone when you are also using an MAO inhibitor (such as Eldepryl®, Marplan®, Nardil®, Parnate®). Do not eat grapefruit, drink grapefruit juice, or drink alcohol while you are using buspirone. Make sure your doctor knows if you are also using cimetidine (Kim Cos), dexamethasone (Decadron®), diltiazem (Leon Villareal), erythromycin (Erythro-Tab®), itraconazole (Sporanox®), nefazodone (Serzone®), rifampin (Rifadin®, Rifamate®, Rifater®), verapamil (Calan®, Covera®), or medicine for seizures (such as Dilantin®, Luminal®, Tegretol®). Make sure your doctor knows if you are using any medicines that make you sleepy (such as sleeping pills, cold and allergy medicine, narcotic pain relievers, or sedatives). Warnings While Using This Medicine:   Make sure your doctor knows if you are pregnant or breastfeeding, or if you have kidney or liver disease. Do not stop using this medicine suddenly without asking your doctor. You may need to slowly decrease your dose before stopping it completely. This medicine may make you dizzy or drowsy.  Avoid driving, using machines, or doing anything else that could be dangerous if you are not alert. Possible Side Effects While Using This Medicine:   Call your doctor right away if you notice any of these side effects: Fast or pounding heartbeat  Numbness or tingling feeling  Tremors or shaking  If you notice these less serious side effects, talk with your doctor:   Drowsiness or weakness  Dry mouth  Feeling restless or nervous, trouble sleeping  Headache  Nausea, constipation, upset stomach  If you notice other side effects that you think are caused by this medicine, tell your doctor. Call your doctor for medical advice about side effects. You may report side effects to FDA at 0-453-MWA-7935  © 2017 [facility] Information is for End User's use only and may not be sold, redistributed or otherwise used for commercial purposes. The above information is an  only. It is not intended as medical advice for individual conditions or treatments. Talk to your doctor, nurse or pharmacist before following any medical regimen to see if it is safe and effective for you. Please call the office if you are experiencing any worsening of symptoms or no symptom improvement. There are multiple resources available to help with mental health when you need to speak to someone. [employer] is a confidential 7 days/week telephone support service manned by trained mental health consumers. [employer] operates daily but is not able to accept calls between 2AM-6AM.   [facility] provides support, a listening ear and can provide information about available services. [facility] specializes in the concerns of mental health consumers, their families and friends. However, we are also here for anyone who has a mental health concern, is confused about or just doesn't know anything about mental health or where to get information.  To reach Rachelle COINTERRA, call [phone] accepts calls between 6:00 AM to 10:00 AM and from 4:00 PM to 12:00 AM.   Text CONNECT to 263892 from anywhere in the [facility], anytime, about any type of crisis. A live, trained Crisis Counselor receives the text and lets you know that they are here to listen. The volunteer Crisis Counselor will help you move from a hot moment to a cool moment. [facility] ([facility]) AT [facility] - licensed telephone and mobile crisis services that provide mental health assessments to all age groups regardless of income or insurance. Crisis Inter[facility] operates 24-hour/7 days a week. [address]  [address]ryce Miller assists consumer who are experiencing a mental health emergency and lack the resources to assist themselves. Immediate intervention for suicidal and depressed individuals with home visits/outreach being top priority. Crisis can be contacted at [phone]. [facility]) offers various education & support groups for you & your family. For more information visit their website at   http://www.[employer]/.  Dial 2-1-1 to get connected/get help. Free, confidential information & referral available 24/7: Aging Services, Child & Youth Services, Counseling, Education/Training, Food/Shelter/Clothing, Health Services, Parenting, Substance Abuse, Support Groups, Volunteer Opportunities, & much more. Phone: 2-1-1 or [phone], Web: [email or web address], Email: Guzman@[employer]     [facility]  The [facility] ([address] [place] [address]) offers persons with a mental illness a safe, healing environment to explore their personal and vocational potential and receive support in achieving their goals. Instead of traditional therapy, the work of the house is the rehabilitation. As members contribute meaningful work to the house, they build confidence and a sense of purpose. Be a Member - It's Free  Membership in the [facility] is open to any [facility] resident who is 25 or older and has a history of mental illness. Membership is free for life.   [facility] [address] [facility], [address] [place] [address]  Hours: Monday - Friday: 8 a.m. - 4 p.m. With varying hours on holidays   ([address] of [facility] ([address], [place]) provides a stress-free atmosphere for persons 18 and older who have experienced mental health issues. What The [address]  Holiday gatherings  Recovery  Employment  Education  Community Resources  Empowerment  Helps participants achieve their goals  Enhances quality of life  Encourages leadership     The [address]  Hours: Monday through Friday: 4 p.m. - 9 p.m. Saturday: 2 p.m. - 8 p.m. Closed Sundays  Varying hours on holidays            Contact [phone]     Support & Referral Helpline  Support and Referral Helpline is a 24-hour, 7 days-a-week, listening and referral service provided to all of [place]. Please call [phone] or tty [phone] to speak with one of our specialists. The helpline is possible through partnerships with the [facility]. The [address] [place] [address] (formerly known as the [facility]) provides free and confidential emotional support to people in suicidal crisis or emotional distress 24 hours a day, 7 days a week, across the [facility]. The [facility] is comprised of a national network of over 200 local crisis centers, combining custom local care and resources with national standards and best practices.

## 2023-08-07 ENCOUNTER — PATIENT MESSAGE (OUTPATIENT)
Dept: GASTROENTEROLOGY | Facility: CLINIC | Age: 63
End: 2023-08-07

## 2023-08-07 DIAGNOSIS — K51.911 ULCERATIVE COLITIS WITH RECTAL BLEEDING, UNSPECIFIED LOCATION (HCC): Primary | ICD-10-CM

## 2023-08-08 NOTE — TELEPHONE ENCOUNTER
From: Kaela Viera  To: Memo Pacheco Nhannarda  Sent: 8/7/2023 7:02 PM EDT  Subject: From Patricia Akins,  I have an upcoming appointment with you on August 22nd. Tomorrow is my third infusion of Avsola at the higher dose. I feel ok but I’m still having some colitis symptoms. I go to the bathroom 3-5 times a day and I occasionally still see blood. My question is - should I have any tests done before my upcoming visit? My last fecal calprotection test was pretty high so I didn’t know if you wanted me to have that retested before my visit.  Thanks for your time, Janie Davies

## 2023-08-12 ENCOUNTER — APPOINTMENT (OUTPATIENT)
Dept: LAB | Facility: MEDICAL CENTER | Age: 63
End: 2023-08-12
Payer: COMMERCIAL

## 2023-08-12 DIAGNOSIS — K51.911 ULCERATIVE COLITIS WITH RECTAL BLEEDING, UNSPECIFIED LOCATION (HCC): ICD-10-CM

## 2023-08-12 LAB
CRP SERPL QL: <3 MG/L
ERYTHROCYTE [DISTWIDTH] IN BLOOD BY AUTOMATED COUNT: 15.6 % (ref 11.6–15.1)
HCT VFR BLD AUTO: 40.8 % (ref 34.8–46.1)
HGB BLD-MCNC: 13.2 G/DL (ref 11.5–15.4)
MCH RBC QN AUTO: 29.1 PG (ref 26.8–34.3)
MCHC RBC AUTO-ENTMCNC: 32.4 G/DL (ref 31.4–37.4)
MCV RBC AUTO: 90 FL (ref 82–98)
PLATELET # BLD AUTO: 189 THOUSANDS/UL (ref 149–390)
PMV BLD AUTO: 8.9 FL (ref 8.9–12.7)
RBC # BLD AUTO: 4.54 MILLION/UL (ref 3.81–5.12)
WBC # BLD AUTO: 4.71 THOUSAND/UL (ref 4.31–10.16)

## 2023-08-12 PROCEDURE — 86140 C-REACTIVE PROTEIN: CPT

## 2023-08-12 PROCEDURE — 36415 COLL VENOUS BLD VENIPUNCTURE: CPT

## 2023-08-12 PROCEDURE — 85027 COMPLETE CBC AUTOMATED: CPT

## 2023-08-16 ENCOUNTER — APPOINTMENT (OUTPATIENT)
Dept: LAB | Facility: MEDICAL CENTER | Age: 63
End: 2023-08-16
Payer: COMMERCIAL

## 2023-08-16 DIAGNOSIS — K51.911 ULCERATIVE COLITIS WITH RECTAL BLEEDING, UNSPECIFIED LOCATION (HCC): ICD-10-CM

## 2023-08-16 DIAGNOSIS — Z79.899 ENCOUNTER FOR LONG-TERM (CURRENT) USE OF OTHER MEDICATIONS: ICD-10-CM

## 2023-08-16 DIAGNOSIS — M06.09 SERONEGATIVE ARTHROPATHY OF MULTIPLE SITES (HCC): ICD-10-CM

## 2023-08-16 LAB
ALBUMIN SERPL BCP-MCNC: 3.2 G/DL (ref 3.5–5)
ALP SERPL-CCNC: 127 U/L (ref 46–116)
ALT SERPL W P-5'-P-CCNC: 75 U/L (ref 12–78)
ANION GAP SERPL CALCULATED.3IONS-SCNC: 3 MMOL/L
AST SERPL W P-5'-P-CCNC: 52 U/L (ref 5–45)
BILIRUB SERPL-MCNC: 0.47 MG/DL (ref 0.2–1)
BUN SERPL-MCNC: 13 MG/DL (ref 5–25)
CALCIUM ALBUM COR SERPL-MCNC: 9.3 MG/DL (ref 8.3–10.1)
CALCIUM SERPL-MCNC: 8.7 MG/DL (ref 8.3–10.1)
CHLORIDE SERPL-SCNC: 112 MMOL/L (ref 96–108)
CO2 SERPL-SCNC: 26 MMOL/L (ref 21–32)
CREAT SERPL-MCNC: 0.76 MG/DL (ref 0.6–1.3)
GFR SERPL CREATININE-BSD FRML MDRD: 84 ML/MIN/1.73SQ M
GLUCOSE P FAST SERPL-MCNC: 92 MG/DL (ref 65–99)
POTASSIUM SERPL-SCNC: 4.1 MMOL/L (ref 3.5–5.3)
PROT SERPL-MCNC: 7.1 G/DL (ref 6.4–8.4)
SODIUM SERPL-SCNC: 141 MMOL/L (ref 135–147)

## 2023-08-16 PROCEDURE — 80053 COMPREHEN METABOLIC PANEL: CPT

## 2023-08-16 PROCEDURE — 83993 ASSAY FOR CALPROTECTIN FECAL: CPT

## 2023-08-16 PROCEDURE — 36415 COLL VENOUS BLD VENIPUNCTURE: CPT

## 2023-08-17 ENCOUNTER — TELEPHONE (OUTPATIENT)
Dept: ADMINISTRATIVE | Facility: OTHER | Age: 63
End: 2023-08-17

## 2023-08-17 DIAGNOSIS — R92.8 ABNORMAL FINDING ON MAMMOGRAPHY: Primary | ICD-10-CM

## 2023-08-17 NOTE — TELEPHONE ENCOUNTER
----- Message from Eileenppmignon Lass sent at 8/16/2023  2:06 PM EDT -----  Regarding: care gap request - mammo  08/16/23 2:06 PM    Hello, our patient attached above has had Mammogram completed/performed. Please assist in updating the patient chart by pulling the Care Everywhere (CE) document. The date of service is 08/10/2023.      Thank you,  [address] [place] [address]

## 2023-08-17 NOTE — TELEPHONE ENCOUNTER
Upon review of the In Basket request we were able to locate, review, and update the patient chart as requested for Mammogram.    Any additional questions or concerns should be emailed to the Practice Liaisons via the appropriate education email address, please do not reply via In Basket. Thank you  Danya Gallgeos         08/17/23 8:42 AM     VB CareGap SmartForm used to document caregap status.     Danya Gallegos

## 2023-08-18 LAB — CALPROTECTIN STL-MCNT: 543 UG/G (ref 0–120)

## 2023-08-22 ENCOUNTER — OFFICE VISIT (OUTPATIENT)
Dept: GASTROENTEROLOGY | Facility: CLINIC | Age: 63
End: 2023-08-22
Payer: COMMERCIAL

## 2023-08-22 VITALS
SYSTOLIC BLOOD PRESSURE: 98 MMHG | HEIGHT: 62 IN | DIASTOLIC BLOOD PRESSURE: 60 MMHG | WEIGHT: 130.6 LBS | TEMPERATURE: 97.5 F | BODY MASS INDEX: 24.03 KG/M2 | HEART RATE: 91 BPM | OXYGEN SATURATION: 98 %

## 2023-08-22 DIAGNOSIS — R74.8 ELEVATED ALKALINE PHOSPHATASE LEVEL: ICD-10-CM

## 2023-08-22 DIAGNOSIS — R74.01 ELEVATED TRANSAMINASE LEVEL: ICD-10-CM

## 2023-08-22 DIAGNOSIS — K51.911 ULCERATIVE COLITIS WITH RECTAL BLEEDING, UNSPECIFIED LOCATION (HCC): Primary | ICD-10-CM

## 2023-08-22 DIAGNOSIS — E61.1 IRON DEFICIENCY: ICD-10-CM

## 2023-08-22 DIAGNOSIS — E53.8 VITAMIN B12 DEFICIENCY: ICD-10-CM

## 2023-08-22 DIAGNOSIS — D84.9 IMMUNOSUPPRESSION (HCC): ICD-10-CM

## 2023-08-22 PROCEDURE — 99215 OFFICE O/P EST HI 40 MIN: CPT | Performed by: INTERNAL MEDICINE

## 2023-08-22 NOTE — PROGRESS NOTES
Gastroenterology Outpatient Follow-up - Crohn's Disease  Bradley Dillard 66 y.o. female MRN: [id number]  Encounter: [id number]    Bradley Dillard is a 66 y.o. female with Ulcerative colitis. Symptom onset:  2017  Diagnosis:  ulcerative colitis  Year of diagnosis:  2017    IBD Summary: Bradley Dillard has has ulcerative pancolitis diagnosed in 2017. She lost response to mesalamine. She has been treated with vedolizumab but lost response again. She is currently on ustekinumab but is a primary nonresponder and continues to require steroids. She also has chronically elevated alkaline phosphatase and had positive AMA in 2017. MRCP with suggestion of early [facility]. Her daughter had UC and PSC. Ulcerative Colitis Summary  Macroscopic extent of diseases: pancolitis  Microscopic extent of diseases:  pancolitis  Has the patient ever been hospitalized for severe disease: No        Surgical History  Number of IBD surgeries: 0      First IBD surgery:    Most Recent IBD surgery:    Esophageal:  0    Gastroduodenal:  0    Small bowel resection(s):  0    Ileocolonic resection(s): unknown      Colonic resection(s):  0    Ileostomy or colostomy:  no previous ostomy    Complete colectomy:  No         Medications     Year last used Reason for discontinuation   Corticosteroids prior   2023       Thiopurines   never         Methotrexate   never         Infliximab   never         Adalimumab   never         Certolizumab   never         Golimumab   never         Natalizumab   never         Mesalamine prior     CARLOS     Sulfasalzine   never         Vedolizumab prior   2022 CARLOS     Ustekinumab   never         Tofacitinib   never         Other biologic   never             Extraintestinal Manifestations    IBD-associated arthropathy Yes    Uveitis Yes  iritis x 2    Oral aphthous ulcers No   Erythema nodosum No    Pyoderma gangrenosum No    Primary sclerosing cholangitis Yes  MRCP 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  Daughter with PSC.    Thrombotic complications No          Cancer / Dysplasia History  History of IBD-associated dysplasia: none    Date of diagnosis (Year):     History of colorectal cancer: No   History of cervical dysplasia: No   History of skin cancer: squamous cell carcinoma         Laboratory Data   Most recent (date) Result   PPD   unknown   Quanitferon gold 9/12/2022 negative   TPMT 1/17/2023 normal   Hepatitis A       HBsAb 9/12/2022 negative   HBcAb 9/12/2022 negative   HBsAg 9/12/2022 negative   HCV Ab   unknown       Imaging / Diagnostic Procedures   Most recent (date) Findings   Colonoscopy or sigmoidoscopy #1 1/12/2023            Ulcers/Erosions  small           Strictures  none           Stricture Severity  N/A           Endoscopic Score Fregoso Score:  2 Rutgeert's:  N/A            Findings  (1) Normal TI. (2) Moderate edematous, erythematous, friable and hemorrhagic mucosa with erosion and loss of vascular pattern in the cecum, ascending colon, hepatic flexure, transverse colon, splenic flexure, descending colon, sigmoid colon, rectosigmoid and rectum, consistent with ulcerative colitis. Pathology   A. Colon, Cecum, Biopsy:  - Mildly active chronic colitis. - Negative for granulomata, viral changes, and dysplasia. B. Small Intestine, Terminal Ileum, Biopsy:  - Small intestinal mucosa with no specific pathologic change. C. Colon, Ascending, Biopsy:  - Mildly active chronic colitis. - Negative for granulomata, viral changes, and dysplasia. D. Colon, Transverse, Biopsy:  - Mildly active chronic colitis. - Negative for granulomata, viral changes, and dysplasia. E. Colon, Descending, Biopsy:  - Mildly active chronic colitis. - Negative for granulomata, viral changes, and dysplasia. F. Colon, Sigmoid, Biopsy:  - Superficial fragments of colonic mucosa with mild acute colitis. G. Colon, Rectum, Biopsy:  - Mildly active chronic colitis. - Negative for granulomata, viral changes, and dysplasia. Colonoscopy or sigmoidoscopy #2              Ulcers/Erosions                 Strictures                 Stricture Severity              Endoscopic Score Fregoso Score:    Rugeert's:              Findings              Pathology      EGD       Small bowel follow-through       CT enterography       CT without enterography       MRI enterography       Capsule study       DEXA scan   Lowest Z-score:      CXR (for TB)           HPI:   Interval History:  8/22/2023 Follow up  Since last visit, we increased infliximab based on subtherapeutic drug level and she is now on 10 mg/kg every 4 weeks. She has had 3 monthly doses of high dose infliximab. Feels like this has helped and she is slowly improving. Calprotectin improved from 11 100-543. Still has 3-4 BMs per day with occasional blood, but much improved from before. No longer on steroids. No pain. Occasional incontinence when she has anxiety. Labs from August showed mild ALT and AST elevation. Has brain fog and fatigue after infusions; no fevers. Still needs Prevnar. Will get flu shot. Completed 4 IV iron infusions and felt much improved. Needs GYN appointment. Saw dermatology. 4/19/2023 Return  Since last visit, started infliximab (Avsola) and has had 3 loading doses. 3rd dose was on 4/10. Feels like BMs have improved, but are still inconsistent. No abdominal pain. Occasional urgency. Calprotectin 372 but trending down. No bleeding for the past 2 weeks. Energy is poor. She cannot tolerate PO iron. Gets SOB with exertion. Poor exercise tolerance. She feels dizzy on occasion since startign avsola. She is still on prednisone but down to 5 mg daily. Has been on it since July. Seeing Derm in June for h/o SCC. Overdue for Pap. She may have [address] based on chronic Ap elevation and recent MRCP, although last AP was normal.     1/26/2023 Return visit  Katiana Abad is establishing care with me for UC.   Diagnosed with UC in 2017 - diarrhea and bleeding. She was treated with mesalamine (Lialda) and did well for a couple of years. In 2020, flared and started vedolizumab but she lost response after a few months. Ustekinumab was increased to every 6 weeks, but did not work. She then started ustekinumab 10/2022. However, has been on anf off prednisone for 5 months, currently on 10 mg.  Getting Stelara every 8 weeks. Some days are good with formed stools, other days with loose BMs and bleeding. Colonoscopy this month with Fregoso 2 disease. She has had COVID vaccines + booster and flu vaccine. No pneumococcal vaccine. Last pap smear 1.5 years. SCC removed ~2018 from chest.  Last derm ~ 6/2022. Has osteopenia. H/o iritis. Of note, her daughter his UC and PSC. Patient has chronic alkaline phosphatase elevation and had positive AMA in 2017. Recent MRCP with mild intrahepatic biliary dilatation but no other signs of PSC.    12/19/22  First treated with lialda for about 2 years. First biologic was entyvio which started in October of 2021 but in February of 2022 symptoms worsened, and was treated with prednisone with symptoms abating but as she was titrating down her prednisone symptoms worsened  She is currently on the autoimmune protocol diet since July of 2022  In the past week and a half- reporting normal BM's; formed, 1-2 times daily, without bleeding. Gluten free and antiinflammatory diet - good for joints. Had iritis x 2 in 2022. Using steroid eye drops. Colin Ross reports the following symptoms over the last 7 days:    Stool Frequency 1-2 stools/day more than normal   Average stools per day: 4   Average liquid stools per day: 0   Consistency of bowel: soft or semi-formed   Awakening from sleep to move bowels?  No   Urgency mild fecal urgency   Visible blood in stool? visible blood in stool less than half the time   Abdominal pain? none   General Wellbeing? slightly under par     Colin Ross also reports the following symptoms in the last month:    Leakage of stool while sleeping? No   Leakage of stool while awake? Yes  Comment:A month ago       REVIEW OF SYSTEMS:  During the last 7 days, Cornelio Giron experienced the following symptoms:  Unintentional Weight Loss (in the last month) No   Fever No   Eye irritation No   Mouth sores No   Sore throat No   Chest pain No   Shortness of breath No   Numbness or tingling in hands or feet No   Skin rash No   Pain or swelling in joints No   Bruising or bleeding No   Felt depressed or blue Yes  Comment:Anxiety   Fatigue No   Dysuria No   Please see HPI for additional pertinent review of systems; otherwise remainder of ROS was unremarkable    MEDICATIONS:    Current Outpatient Medications:   •  ALPRAZolam (XANAX) 0.25 mg tablet  •  Calcium Carb-Cholecalciferol (calcium carbonate-vitamin D) 500 mg-5 mcg tablet  •  Cholecalciferol (VITAMIN D PO)  •  Cyanocobalamin (VITAMIN B-12 PO)  •  inFLIXimab-axxq (AVSOLA IV)  •  busPIRone (BUSPAR) 5 mg tablet    ALLERGIES:  Allergies   Allergen Reactions   • Amoxicillin GI Intolerance       OBJECTIVE:  BP 98/60 (BP Location: Left arm, Patient Position: Sitting, Cuff Size: Adult)   Pulse 91   Temp 97.5 °F (36.4 °C) (Tympanic)   Ht 5' 1.5" (1.562 m)   Wt 59.2 kg (130 lb 9.6 oz)   SpO2 98%   BMI 24.28 kg/m²      PHYSICAL EXAM:    General Appearance:   Alert, cooperative, no distress   HEENT:   Normocephalic, atraumatic, anicteric. Neck:  Supple, symmetrical, trachea midline   Lungs:   Clear to auscultation bilaterally; no rales, rhonchi or wheezing; respirations unlabored    Heart[de-identified]   Regular rate and rhythm; no murmur, rub, or gallop. Abdomen:   Soft, non-distended; normal bowel sounds    Abdominal exam was notable for no tenderness with no mass. Genitalia:   Deferred    Rectal:   Perirectal assessment was not performed.                      Extremities:  No cyanosis, clubbing or edema    Pulses:  2+ and symmetric    Skin:  No jaundice, rashes, or lesions    Lymph nodes:  No palpable cervical lymphadenopathy        Lab Results   Component Value Date    WBC 4.71 08/12/2023    HGB 13.2 08/12/2023    HCT 40.8 08/12/2023    MCV 90 08/12/2023     08/12/2023     Lab Results   Component Value Date     06/10/2014    SODIUM 141 08/16/2023    K 4.1 08/16/2023     (H) 08/16/2023    CO2 26 08/16/2023    ANIONGAP 8 06/10/2014    AGAP 3 08/16/2023    BUN 13 08/16/2023    CREATININE 0.76 08/16/2023    GLUC 87 04/13/2023    GLUF 92 08/16/2023    CALCIUM 8.7 08/16/2023    AST 52 (H) 08/16/2023    ALT 75 08/16/2023    ALKPHOS 127 (H) 08/16/2023    PROT 7.0 06/10/2014    TP 7.1 08/16/2023    BILITOT 0.4 06/10/2014    TBILI 0.47 08/16/2023    EGFR 84 08/16/2023     Lab Results   Component Value Date    CRP <3.0 08/12/2023     Lab Results   Component Value Date    [id number] 075 03/20/2020     Lab Results   Component Value Date    FERRITIN 63 06/17/2023       ASSESSMENT AND PLAN:    Rick Marroquin has a history of macroscopic pancolitis and microscopic pancolitis  ulcerative colitis diagnosed in 2017. Current medical therapy is with infliximab 10 mg/kg every 4 weeks. My global assessment is that the clinical disease is currently  . The 6-point Fregoso score was 2 and the 9-point Fregoso score was 5. The short CDAI was 93. Rick Marroquin was diagnosed with ulcerative pancolitis in 2017. She was a nonresponder to mesalamine and vedolizumab, and lost response to ustekinumab. She is now on infliximab which has been dose escalated based on TDM showing trough of 2.8 mcg/ml. Clinically, she has had partial improvement and feels that she is continuing to get better. Calprotectin has trended in the right direction. Bowel function is improved but still not at baseline. She and I are hopeful that she is on the right track. She has completed infusions of IV iron. She also has mildly elevated LFTs on her last set of labs with ALT 75, AST 52.   It is possible, although unlikely, that this is related to dose increase of infliximab. We had previously had some concern about PSC due to chronic mild elevation of alkaline phosphatase and questionable biliary abnormalities on MRCP. However, repeat MRCP from 6/2023 did not substantiate diagnosis of PSC.  1.  Continue infliximab 10 mg/kg every 4 weeks. Check another trough level. 2.  Repeat CMP to follow-up on elevated LFTs. Repeat CBC and CRP. 3.  Check iron, B12, and vitamin D levels. 4.  I encouraged her to get Prevnar 20 and she will discuss with her PCP. 5.  Annual GYN exam.  6.  Continue annual dermatology exams. She has history of nonmelanoma skin cancer. Return in 3 months. Health Maintenance Recommendations:  Vaccines & Infections  · COVID-19 vaccination and boosters are recommended. There is no evidence that the COVID-19 vaccine would cause an IBD flare. · Avoid live vaccines if on immunosuppressive therapy. · Yearly influenza vaccine (flu shot). · Pneumonia vaccines for patients on immunosuppression. These include Prevnar 20, followed by Pneumovax 23 at least 8 weeks later. · Shingrix vaccine (series of 2 injections) for al patients 72 and older. Patients on tofacitinib or upadacitinib should be vaccinated regardless of age. · If not immune to measles mumps or rubella, MMR vaccine is recommended. However, this is a live vaccine and should be given prior to immunosuppressive therapy. · HPV vaccination as per national guidelines. · Hepatitis A and B vaccinations if not previously vaccinated. · Testing for tuberculosis with QuantiFERON Gold blood test and/or chest xray prior to starting immunosuppressive medications, and then annually    Cancer screening  · Dysplasia surveillance for colorectal cancer. Colonoscopy in all patients with extensive colitis (more than 1/3 of the colon involved) who had disease for at least 8 or more years. Repeat colonoscopy approximately every 12-24 months.   In patients with concurrent primary sclerosing cholangitis, history of dysplasia, or family history of colon cancer, repeat colonoscopy annually. · Females: Pap smear annually for woman on immunosuppression. · Annual dermatologic/skin exam in all patients with IBD, especially those on immunosuppression with thiopurines or CEDRICK inhibitors. Miscellaneous  · DEXA scan, once off steroids for 3 months  · Depression screening recommended annually  · Routine dental and ophthalmology examinations    Problem List Items Addressed This Visit        Digestive    Ulcerative colitis ([address]) - Primary    Relevant Orders    CBC and Platelet    Vitamin X25    C-reactive protein    Comprehensive metabolic panel    Ferritin    Vitamin D 25 hydroxy    TIBC Panel (incl. Iron, TIBC, % Iron Saturation)    Calprotectin,Fecal    Infliximab Concentration and Anti-Infliximab Antibody([facility])       Other    Vitamin B12 deficiency    Relevant Orders    Vitamin B12    Iron deficiency    Relevant Orders    CBC and Platelet    Vitamin Y75    C-reactive protein    Comprehensive metabolic panel    Ferritin    Vitamin D 25 hydroxy    TIBC Panel (incl.  Iron, TIBC, % Iron Saturation)    Calprotectin,Fecal       Tarsha Melara MD

## 2023-09-14 DIAGNOSIS — M79.641 PAIN IN BOTH HANDS: Primary | ICD-10-CM

## 2023-09-14 DIAGNOSIS — M79.642 PAIN IN BOTH HANDS: Primary | ICD-10-CM

## 2023-09-14 DIAGNOSIS — K51.911 ULCERATIVE COLITIS WITH RECTAL BLEEDING, UNSPECIFIED LOCATION (HCC): ICD-10-CM

## 2023-09-23 PROBLEM — R79.0 LOW IRON STORES: Status: ACTIVE | Noted: 2023-04-19

## 2023-09-25 ENCOUNTER — HOSPITAL ENCOUNTER (OUTPATIENT)
Dept: RADIOLOGY | Facility: HOSPITAL | Age: 63
Discharge: HOME | End: 2023-09-25
Payer: COMMERCIAL

## 2023-09-25 DIAGNOSIS — M06.09 SERONEGATIVE ARTHROPATHY OF MULTIPLE SITES (HCC): ICD-10-CM

## 2023-09-25 PROCEDURE — 76882 US LMTD JT/FCL EVL NVASC XTR: CPT

## 2023-10-04 ENCOUNTER — TELEPHONE (OUTPATIENT)
Age: 63
End: 2023-10-04

## 2023-10-04 ENCOUNTER — HOSPITAL ENCOUNTER (EMERGENCY)
Facility: HOSPITAL | Age: 63
Discharge: HOME | End: 2023-10-04
Attending: EMERGENCY MEDICINE
Payer: COMMERCIAL

## 2023-10-04 ENCOUNTER — OFFICE VISIT (OUTPATIENT)
Dept: URGENT CARE | Facility: MEDICAL CENTER | Age: 63
End: 2023-10-04
Payer: COMMERCIAL

## 2023-10-04 ENCOUNTER — APPOINTMENT (EMERGENCY)
Dept: CT IMAGING | Facility: HOSPITAL | Age: 63
End: 2023-10-04
Payer: COMMERCIAL

## 2023-10-04 ENCOUNTER — APPOINTMENT (EMERGENCY)
Dept: RADIOLOGY | Facility: HOSPITAL | Age: 63
End: 2023-10-04
Payer: COMMERCIAL

## 2023-10-04 VITALS
TEMPERATURE: 98.2 F | RESPIRATION RATE: 18 BRPM | HEART RATE: 94 BPM | WEIGHT: 127.21 LBS | BODY MASS INDEX: 23.65 KG/M2 | DIASTOLIC BLOOD PRESSURE: 69 MMHG | SYSTOLIC BLOOD PRESSURE: 121 MMHG | OXYGEN SATURATION: 98 %

## 2023-10-04 VITALS
RESPIRATION RATE: 18 BRPM | BODY MASS INDEX: 23.19 KG/M2 | HEART RATE: 104 BPM | OXYGEN SATURATION: 97 % | TEMPERATURE: 99 F | HEIGHT: 62 IN | DIASTOLIC BLOOD PRESSURE: 65 MMHG | SYSTOLIC BLOOD PRESSURE: 111 MMHG | WEIGHT: 126 LBS

## 2023-10-04 DIAGNOSIS — R06.02 SHORTNESS OF BREATH: Primary | ICD-10-CM

## 2023-10-04 DIAGNOSIS — R53.83 FATIGUE: ICD-10-CM

## 2023-10-04 DIAGNOSIS — R42 LIGHTHEADEDNESS: ICD-10-CM

## 2023-10-04 DIAGNOSIS — R06.09 DOE (DYSPNEA ON EXERTION): Primary | ICD-10-CM

## 2023-10-04 LAB
2HR DELTA HS TROPONIN: 1 NG/L
ABO GROUP BLD: NORMAL
ABO GROUP BLD: NORMAL
ALBUMIN SERPL BCP-MCNC: 3.4 G/DL (ref 3.5–5)
ALP SERPL-CCNC: 89 U/L (ref 34–104)
ALT SERPL W P-5'-P-CCNC: 35 U/L (ref 7–52)
ANION GAP SERPL CALCULATED.3IONS-SCNC: 7 MMOL/L
AST SERPL W P-5'-P-CCNC: 38 U/L (ref 13–39)
ATRIAL RATE: 92 BPM
BASOPHILS # BLD AUTO: 0.02 THOUSANDS/ÂΜL (ref 0–0.1)
BASOPHILS NFR BLD AUTO: 0 % (ref 0–1)
BILIRUB SERPL-MCNC: 0.46 MG/DL (ref 0.2–1)
BLD GP AB SCN SERPL QL: NEGATIVE
BNP SERPL-MCNC: 9 PG/ML (ref 0–100)
BUN SERPL-MCNC: 15 MG/DL (ref 5–25)
CALCIUM ALBUM COR SERPL-MCNC: 9.1 MG/DL (ref 8.3–10.1)
CALCIUM SERPL-MCNC: 8.6 MG/DL (ref 8.4–10.2)
CARDIAC TROPONIN I PNL SERPL HS: 4 NG/L
CARDIAC TROPONIN I PNL SERPL HS: 5 NG/L
CHLORIDE SERPL-SCNC: 102 MMOL/L (ref 96–108)
CO2 SERPL-SCNC: 23 MMOL/L (ref 21–32)
CREAT SERPL-MCNC: 1.11 MG/DL (ref 0.6–1.3)
D DIMER PPP FEU-MCNC: 1.13 UG/ML FEU
EOSINOPHIL # BLD AUTO: 0.06 THOUSAND/ÂΜL (ref 0–0.61)
EOSINOPHIL NFR BLD AUTO: 1 % (ref 0–6)
ERYTHROCYTE [DISTWIDTH] IN BLOOD BY AUTOMATED COUNT: 12.6 % (ref 11.6–15.1)
GFR SERPL CREATININE-BSD FRML MDRD: 53 ML/MIN/1.73SQ M
GLUCOSE SERPL-MCNC: 105 MG/DL (ref 65–140)
HCT VFR BLD AUTO: 39.1 % (ref 34.8–46.1)
HGB BLD-MCNC: 12.5 G/DL (ref 11.5–15.4)
IMM GRANULOCYTES # BLD AUTO: 0.02 THOUSAND/UL (ref 0–0.2)
IMM GRANULOCYTES NFR BLD AUTO: 0 % (ref 0–2)
LACTATE SERPL-SCNC: 0.7 MMOL/L (ref 0.5–2)
LYMPHOCYTES # BLD AUTO: 2.11 THOUSANDS/ÂΜL (ref 0.6–4.47)
LYMPHOCYTES NFR BLD AUTO: 43 % (ref 14–44)
MCH RBC QN AUTO: 28.8 PG (ref 26.8–34.3)
MCHC RBC AUTO-ENTMCNC: 32 G/DL (ref 31.4–37.4)
MCV RBC AUTO: 90 FL (ref 82–98)
MONOCYTES # BLD AUTO: 0.62 THOUSAND/ÂΜL (ref 0.17–1.22)
MONOCYTES NFR BLD AUTO: 13 % (ref 4–12)
NEUTROPHILS # BLD AUTO: 2.12 THOUSANDS/ÂΜL (ref 1.85–7.62)
NEUTS SEG NFR BLD AUTO: 43 % (ref 43–75)
NRBC BLD AUTO-RTO: 0 /100 WBCS
P AXIS: 60 DEGREES
PLATELET # BLD AUTO: 198 THOUSANDS/UL (ref 149–390)
PMV BLD AUTO: 8.6 FL (ref 8.9–12.7)
POTASSIUM SERPL-SCNC: 3.8 MMOL/L (ref 3.5–5.3)
PR INTERVAL: 158 MS
PROT SERPL-MCNC: 6.7 G/DL (ref 6.4–8.4)
QRS AXIS: 73 DEGREES
QRSD INTERVAL: 78 MS
QT INTERVAL: 330 MS
QTC INTERVAL: 408 MS
RBC # BLD AUTO: 4.34 MILLION/UL (ref 3.81–5.12)
RH BLD: POSITIVE
RH BLD: POSITIVE
SODIUM SERPL-SCNC: 132 MMOL/L (ref 135–147)
SPECIMEN EXPIRATION DATE: NORMAL
T WAVE AXIS: 58 DEGREES
VENTRICULAR RATE: 92 BPM
WBC # BLD AUTO: 4.95 THOUSAND/UL (ref 4.31–10.16)

## 2023-10-04 PROCEDURE — 99285 EMERGENCY DEPT VISIT HI MDM: CPT | Performed by: EMERGENCY MEDICINE

## 2023-10-04 PROCEDURE — 85379 FIBRIN DEGRADATION QUANT: CPT | Performed by: EMERGENCY MEDICINE

## 2023-10-04 PROCEDURE — 96360 HYDRATION IV INFUSION INIT: CPT

## 2023-10-04 PROCEDURE — 86850 RBC ANTIBODY SCREEN: CPT

## 2023-10-04 PROCEDURE — 83605 ASSAY OF LACTIC ACID: CPT

## 2023-10-04 PROCEDURE — 86901 BLOOD TYPING SEROLOGIC RH(D): CPT

## 2023-10-04 PROCEDURE — 86900 BLOOD TYPING SEROLOGIC ABO: CPT

## 2023-10-04 PROCEDURE — 71275 CT ANGIOGRAPHY CHEST: CPT

## 2023-10-04 PROCEDURE — 80053 COMPREHEN METABOLIC PANEL: CPT

## 2023-10-04 PROCEDURE — 93010 ELECTROCARDIOGRAM REPORT: CPT | Performed by: INTERNAL MEDICINE

## 2023-10-04 PROCEDURE — G1004 CDSM NDSC: HCPCS

## 2023-10-04 PROCEDURE — 84484 ASSAY OF TROPONIN QUANT: CPT

## 2023-10-04 PROCEDURE — 93005 ELECTROCARDIOGRAM TRACING: CPT

## 2023-10-04 PROCEDURE — 71046 X-RAY EXAM CHEST 2 VIEWS: CPT

## 2023-10-04 PROCEDURE — 83880 ASSAY OF NATRIURETIC PEPTIDE: CPT

## 2023-10-04 PROCEDURE — 85025 COMPLETE CBC W/AUTO DIFF WBC: CPT

## 2023-10-04 PROCEDURE — 36415 COLL VENOUS BLD VENIPUNCTURE: CPT

## 2023-10-04 PROCEDURE — 99285 EMERGENCY DEPT VISIT HI MDM: CPT

## 2023-10-04 PROCEDURE — 99213 OFFICE O/P EST LOW 20 MIN: CPT

## 2023-10-04 RX ADMIN — IOHEXOL 85 ML: 350 INJECTION, SOLUTION INTRAVENOUS at 21:16

## 2023-10-04 RX ADMIN — SODIUM CHLORIDE 1000 ML: 0.9 INJECTION, SOLUTION INTRAVENOUS at 17:43

## 2023-10-04 NOTE — ED PROVIDER NOTES
hHistory  Chief Complaint   Patient presents with   • Shortness of Breath     Pt reports SOB with exertion. Pt reports three weeks, at least, of feeling this way. Lightheadedness and headache for past week. Pt reports that this feels the same way as when her iron was low. Pt has colitis as well and states more blood in her stool over the past three weeks. 66year old female with PMH presents to the ED for evaluation of worsening GUERRA for three weeks , lightheaded, HA, and worsening hematochezia           Prior to Admission Medications   Prescriptions Last Dose Informant Patient Reported? Taking? ALPRAZolam (XANAX) 0.25 mg tablet  Self No No   Sig: TAKE 1 TABLET DAILY AT BEDTIME AS NEEDED FOR ANXIETY   Calcium Carb-Cholecalciferol (calcium carbonate-vitamin D) 500 mg-5 mcg tablet  Self No No   Sig: Take 1 tablet by mouth daily with breakfast   Cholecalciferol (VITAMIN D PO)  Self Yes No   Sig: Take 5,000 Units by mouth daily   Cyanocobalamin (VITAMIN B-12 PO)  Self Yes No   Sig: Take by mouth daily   busPIRone (BUSPAR) 5 mg tablet  Self No No   Sig: Take 1 tablet (5 mg total) by mouth daily for 3 days, THEN 1 tablet (5 mg total) 2 (two) times a day for 27 days.    Patient not taking: Reported on 8/22/2023   inFLIXimab-axxq (AVSOLA IV)  Self Yes No   Sig: Inject into a catheter in a vein every 4 weeks      Facility-Administered Medications: None       Past Medical History:   Diagnosis Date   • Anxiety    • Cancer ([address])     skin   • Change in bowel habits    • Elevated liver enzymes    • Gall stones    • GERD (gastroesophageal reflux disease)    • Hashimoto's thyroiditis    • Iron deficiency 4/19/2023   • Irritable bowel syndrome    • Lumbar spondylosis    • Osteoarthritis    • Seasonal allergies    • Ulcerative colitis ([address])        Past Surgical History:   Procedure Laterality Date   • COLONOSCOPY     • EAR SURGERY      cyst removed from ear drum   • EAR SURGERY     • EAR SURGERY     • IRIDECTOMY      Optical   • NV COLONOSCOPY FLX DX W/COLLJ SPEC WHEN PFRMD N/A 10/9/2017    Procedure: COLONOSCOPY;  Surgeon: Toya Hunter MD;  Location: [facility]; Service: Gastroenterology   • NV EXCISION MAL LESION TRUNK/ARM/LEG 1.1-2.0 CM Right 10/4/2019    Procedure: 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;  Surgeon: Luh Christina MD;  Location: [address] [place] [address] MAIN OR;  Service: Plastics   • UPPER GASTROINTESTINAL ENDOSCOPY         Family History   Problem Relation Age of Onset   • Breast cancer Mother    • Lung cancer Mother    • Atrial fibrillation Father    • Stroke Father         CVA   • Diabetes Father         Mellitus   • Heart disease Father    • Other Daughter         Colitis   • Breast cancer Family      I have reviewed and agree with the history as documented. E-Cigarette/Vaping   • E-Cigarette Use Never User      E-Cigarette/Vaping Substances   • Nicotine No    • THC No    • CBD No      Social History     Tobacco Use   • Smoking status: Never   • Smokeless tobacco: Never   Vaping Use   • Vaping Use: Never used   Substance Use Topics   • Alcohol use: Yes     Comment: Social   • Drug use: No        Review of Systems    Physical Exam  ED Triage Vitals [10/04/23 1554]   Temperature Pulse Respirations Blood Pressure SpO2   98.2 °F (36.8 °C) 105 18 95/56 97 %      Temp Source Heart Rate Source Patient Position - Orthostatic VS BP Location FiO2 (%)   Oral Monitor Sitting Right arm --      Pain Score       No Pain             Orthostatic Vital Signs  Vitals:    10/04/23 1554 10/04/23 1615   BP: 95/56 105/63   Pulse: 105 92   Patient Position - Orthostatic VS: Sitting        Physical Exam    ED Medications  Medications - No data to display    Diagnostic Studies  Results Reviewed     None                 No orders to display         Procedures  Procedures      ED Course  ED Course as of 10/04/23 1711   Wed Oct 04, 2023   1705 This EKG was interpreted by me.  The EKG demonstrates Normal sinus rhythm with rate of 93, normal intervals and axis, normal QRS, non specific acute ST-T changes                                         MDM      Disposition  Final diagnoses:   None     ED Disposition     None      Follow-up Information    None         Patient's Medications   Discharge Prescriptions    No medications on file     No discharge procedures on file. PDMP Review       Value Time User    PDMP Reviewed  Yes 7/10/2023  2:61 PM Jolly Magana DO           ED Provider  Attending physically available and evaluated Teresa Rosas. I managed the patient along with the ED Attending.     Electronically Signed by Breath sounds: Normal breath sounds. No wheezing. Chest:      Chest wall: No tenderness. Abdominal:      General: Abdomen is flat. There is no distension. Palpations: Abdomen is soft. Tenderness: There is no abdominal tenderness. Musculoskeletal:         General: No swelling, tenderness or deformity. Normal range of motion. Cervical back: Normal range of motion and neck supple. No rigidity or tenderness. Right lower leg: No edema. Left lower leg: No edema. Skin:     General: Skin is warm and dry. Neurological:      General: No focal deficit present. Mental Status: She is alert and oriented to person, place, and time. ED Medications  Medications   sodium chloride 0.9 % bolus 1,000 mL (0 mL Intravenous Stopped 10/4/23 1847)   iohexol (OMNIPAQUE) 350 MG/ML injection (MULTI-DOSE) 85 mL (85 mL Intravenous Given 10/4/23 2116)       Diagnostic Studies  Results Reviewed     Procedure Component Value Units Date/Time    HS Troponin I 2hr [[id number]]  (Normal) Collected: 10/04/23 1927    Lab Status: Final result Specimen: Blood from Arm, Left Updated: 10/04/23 1957     hs TnI 2hr 5 ng/L      Delta 2hr hsTnI 1 ng/L     D-Dimer [[id number]]  (Abnormal) Collected: 10/04/23 1859    Lab Status: Final result Specimen: Blood from Arm, Right Updated: 10/04/23 1940     D-Dimer, Quant 1.13 ug/ml FEU     Narrative: In the evaluation for possible pulmonary embolism, in the appropriate (Well's Score of 4 or less) patient, the age adjusted d-dimer cutoff for this patient can be calculated as:    Age x 0.01 (in ug/mL) for Age-adjusted D-dimer exclusion threshold for a patient over 50 years.     B-Type Natriuretic Peptide(BNP) [[id number]]  (Normal) Collected: 10/04/23 1738    Lab Status: Final result Specimen: Blood from Arm, Left Updated: 10/04/23 1841     BNP 9 pg/mL     HS Troponin 0hr (reflex protocol) [[id number]]  (Normal) Collected: 10/04/23 1738    Lab Status: Final result Specimen: Blood from Arm, Left Updated: 10/04/23 1810     hs TnI 0hr 4 ng/L     Lactic acid, plasma (w/reflex if result > 2.0) [[id number]]  (Normal) Collected: 10/04/23 1738    Lab Status: Final result Specimen: Blood from Arm, Left Updated: 10/04/23 1803     LACTIC ACID 0.7 mmol/L     Narrative:      Result may be elevated if tourniquet was used during collection.     Comprehensive metabolic panel [[id number]]  (Abnormal) Collected: 10/04/23 1738    Lab Status: Final result Specimen: Blood from Arm, Left Updated: 10/04/23 1803     Sodium 132 mmol/L      Potassium 3.8 mmol/L      Chloride 102 mmol/L      CO2 23 mmol/L      ANION GAP 7 mmol/L      BUN 15 mg/dL      Creatinine 1.11 mg/dL      Glucose 105 mg/dL      Calcium 8.6 mg/dL      Corrected Calcium 9.1 mg/dL      AST 38 U/L      ALT 35 U/L      Alkaline Phosphatase 89 U/L      Total Protein 6.7 g/dL      Albumin 3.4 g/dL      Total Bilirubin 0.46 mg/dL      eGFR 53 ml/min/1.73sq m     Narrative:      [place] guidelines for Chronic Kidney Disease (CKD):   •  Stage 1 with normal or high GFR (GFR > 90 mL/min/1.73 square meters)  •  Stage 2 Mild CKD (GFR = 60-89 mL/min/1.73 square meters)  •  Stage 3A Moderate CKD (GFR = 45-59 mL/min/1.73 square meters)  •  Stage 3B Moderate CKD (GFR = 30-44 mL/min/1.73 square meters)  •  Stage 4 Severe CKD (GFR = 15-29 mL/min/1.73 square meters)  •  Stage 5 End Stage CKD (GFR <15 mL/min/1.73 square meters)  Note: GFR calculation is accurate only with a steady state creatinine    CBC and differential [[id number]]  (Abnormal) Collected: 10/04/23 1738    Lab Status: Final result Specimen: Blood from Arm, Left Updated: 10/04/23 1748     WBC 4.95 Thousand/uL      RBC 4.34 Million/uL      Hemoglobin 12.5 g/dL      Hematocrit 39.1 %      MCV 90 fL      MCH 28.8 pg      MCHC 32.0 g/dL      RDW 12.6 %      MPV 8.6 fL      Platelets 601 Thousands/uL      nRBC 0 /100 WBCs      Neutrophils Relative 43 %      Immat GRANS % 0 % Lymphocytes Relative 43 %      Monocytes Relative 13 %      Eosinophils Relative 1 %      Basophils Relative 0 %      Neutrophils Absolute 2.12 Thousands/µL      Immature Grans Absolute 0.02 Thousand/uL      Lymphocytes Absolute 2.11 Thousands/µL      Monocytes Absolute 0.62 Thousand/µL      Eosinophils Absolute 0.06 Thousand/µL      Basophils Absolute 0.02 Thousands/µL                  CTA ED chest PE study   Final Result by Greg Noriega MD (10/04 2235)      No evidence of acute pulmonary embolus, thoracic aortic aneurysm or dissection. No acute cardiopulmonary process. Workstation performed: [id number]         XR chest 2 views   ED Interpretation by Karol Calix MD (10/04 1805)   Primary reviewed: No acute abnormality. Final Result by Shawna Montana MD (10/05 8961)      No acute cardiopulmonary disease. Workstation performed: [id number]               Procedures  Procedures      ED Course  ED Course as of 10/10/23 0637   Wed Oct 04, 2023   1705 This EKG was interpreted by me. The EKG demonstrates Normal sinus rhythm with rate of 93, normal intervals and axis, normal QRS, non specific acute ST-T changes     1912 BNP: 9   1912 LACTIC ACID: 0.7   1912 Hemoglobin: 12.5   1912 Sodium(!): 132   2010 D-Dimer, Quant(!): 1.13   2242 CTA ED chest PE study  CTA PE negative for PE                             SBIRT 22yo+    Flowsheet Row Most Recent Value   Initial Alcohol Screen: US AUDIT-C     1. How often do you have a drink containing alcohol? 0 Filed at: 10/04/2023 1740   2. How many drinks containing alcohol do you have on a typical day you are drinking? 0 Filed at: 10/04/2023 1740   3a. Male UNDER 65: How often do you have five or more drinks on one occasion? 0 Filed at: 10/04/2023 1740   3b. FEMALE Any Age, or MALE 65+: How often do you have 4 or more drinks on one occassion?  0 Filed at: 10/04/2023 1740   Audit-C Score 0 Filed at: 10/04/2023 1740   RAGHAV: How many times in the past year have you. .. Used an illegal drug or used a prescription medication for non-medical reasons? Never Filed at: 10/04/2023 1740          Wells' Criteria for PE    Flowsheet Row Most Recent Value   Wells' Criteria for PE    Clinical signs and symptoms of DVT 0 Filed at: 10/04/2023 1951   PE is primary diagnosis or equally likely 0 Filed at: 10/04/2023 1951   HR >100 1.5 Filed at: 10/04/2023 1951   Immobilization at least 3 days or Surgery in the previous 4 weeks 0 Filed at: 10/04/2023 1951   Previous, objectively diagnosed PE or DVT 0 Filed at: 10/04/2023 1951   Hemoptysis 0 Filed at: 10/04/2023 1951   Malignancy with treatment within 6 months or palliative 0 Filed at: 10/04/2023 1951   Wells' Criteria Total 1.5 Filed at: 10/04/2023 1951            Medical Decision Making  66year old female with PMH of UC on [facility] infusion, anxiety, IBS presents to the ED for evaluation of worsening GUERRA for three weeks, lightheaded, HA, and worsening hematochezia. Patient's UC infusion nurse found patient hypotensive today with heart rate of 115 contacted with GI physician who recommended patient to come into the ER for further evaluation. Differentials include but not limited to anemia, dehydration, CHF, PE, ACS  Labs: CBC, CMP, D-dimer, BNP  Imaging: Chest x-ray and CTA  Abs and imaging discussed with patient who expressed understanding agrees with plan to follow-up with GI    GUERRA (dyspnea on exertion): acute illness or injury  Fatigue: acute illness or injury  Amount and/or Complexity of Data Reviewed  External Data Reviewed: notes. Labs: ordered. Decision-making details documented in ED Course. Radiology: ordered and independent interpretation performed. Decision-making details documented in ED Course. Risk  Prescription drug management.             Disposition  Final diagnoses:   GUERRA (dyspnea on exertion)   Fatigue     Time reflects when diagnosis was documented in both MDM as applicable and the Disposition within this note     Time User Action Codes Description Comment    10/4/2023 10:43 PM Roldan Felipe Mckinney Add [R06.09] GUERRA (dyspnea on exertion)     10/4/2023 10:44 PM Felipe Montilla Add [R53.83] Fatigue       ED Disposition     ED Disposition   Discharge    Condition   Stable    Date/Time   Wed Oct 4, 2023 10:43 PM    Comment   Cristin Rosario [facility] discharge to home/self care. Follow-up Information     Follow up With Specialties Details Why [address] [facility], [place], DO Family Medicine In 3 days  [address] [facility]  [phone]            Discharge Medication List as of 10/4/2023 10:45 PM      CONTINUE these medications which have NOT CHANGED    Details   ALPRAZolam (XANAX) 0.25 mg tablet TAKE 1 TABLET DAILY AT BEDTIME AS NEEDED FOR ANXIETY, Normal      busPIRone (BUSPAR) 5 mg tablet Multiple Dosages:Starting Fri 7/28/2023, Until Sun 7/30/2023 at 2359, THEN Starting Mon 7/31/2023, Until Sat 8/26/2023 at 2359Take 1 tablet (5 mg total) by mouth daily for 3 days, THEN 1 tablet (5 mg total) 2 (two) times a day for 27 days. , Normal      Calcium Carb-Cholecalciferol (calcium carbonate-vitamin D) 500 mg-5 mcg tablet Take 1 tablet by mouth daily with breakfast, Starting Thu 1/26/2023, Normal      Cholecalciferol (VITAMIN D PO) Take 5,000 Units by mouth daily, Historical Med      Cyanocobalamin (VITAMIN B-12 PO) Take by mouth daily, Historical Med      inFLIXimab-axxq (AVSOLA IV) Inject into a catheter in a vein every 4 weeks, Historical Med           No discharge procedures on file. PDMP Review       Value Time User    PDMP Reviewed  Yes 7/10/2023  2:37 PM Alana Wilkins DO           ED Provider  Attending physically available and evaluated Patsy Major. I managed the patient along with the ED Attending.     Electronically Signed by         Christian Driver DO  10/10/23 5791

## 2023-10-04 NOTE — PROGRESS NOTES
[facility]        NAME: Merlyn Reddy is a 66 y.o. female  : 1960    MRN: [id number]  DATE: 2023  TIME: 3:38 PM    Assessment and Plan   Shortness of breath [R06.02]  1. Shortness of breath  Transfer to other facility      2. Lightheadedness  Transfer to other facility        Advised patient we are unable to order labs in this setting and that she should proceed to the ER for further evaluation. Patient agreeable to plan. Offered EMS, patient declined.  driving her to [address] [place] [address]. Patient Instructions       Follow up with PCP in 3-5 days. Proceed to  ER if symptoms worsen. Chief Complaint     Chief Complaint   Patient presents with   • Shortness of Breath     Patient states she was supposed to have an infusion today and her BP was low. She has been complaining of feeling SOB with activity. He has low iron hx. She has ulcerative colitis and has noticed more blood in her stools the last two weeks. History of Present Illness       Patient states for the past few weeks she has been feeling intermittent shortness of breath and lightheadedness, headaches. Shortness of breath is mainly occurring with exertion. She admits to feeling fatigued. Her symptoms have worsened over the last 2 weeks. She has a history of ulcerative colitis and iron deficiency anemia. When her infusion nurse came today her vitals were BP 80/60, . The infusion nurse contacted the GI doctor and recommended the patient proceed to the ER. She did not want to go to the ER, so she came here, thinking we could perform labs. She states she feels similar to when her iron deficiency anemia was bad in the past and required infusions. Review of Systems   Review of Systems   Constitutional: Positive for fatigue. Negative for appetite change, chills and fever. Respiratory: Positive for shortness of breath. Negative for chest tightness. Cardiovascular: Positive for palpitations (occasional). Negative for chest pain. Gastrointestinal: Positive for blood in stool (bright red) and diarrhea (softer stools). Negative for abdominal pain, nausea and vomiting. Musculoskeletal: Positive for myalgias. Neurological: Positive for light-headedness and headaches. Current Medications       Current Outpatient Medications:   •  ALPRAZolam (XANAX) 0.25 mg tablet, TAKE 1 TABLET DAILY AT BEDTIME AS NEEDED FOR ANXIETY, Disp: 90 tablet, Rfl: 0  •  Calcium Carb-Cholecalciferol (calcium carbonate-vitamin D) 500 mg-5 mcg tablet, Take 1 tablet by mouth daily with breakfast, Disp: 90 tablet, Rfl: 1  •  Cholecalciferol (VITAMIN D PO), Take 5,000 Units by mouth daily, Disp: , Rfl:   •  Cyanocobalamin (VITAMIN B-12 PO), Take by mouth daily, Disp: , Rfl:   •  inFLIXimab-axxq (AVSOLA IV), Inject into a catheter in a vein every 4 weeks, Disp: , Rfl:   •  busPIRone (BUSPAR) 5 mg tablet, Take 1 tablet (5 mg total) by mouth daily for 3 days, THEN 1 tablet (5 mg total) 2 (two) times a day for 27 days.  (Patient not taking: Reported on 8/22/2023), Disp: 60 tablet, Rfl: 0    Current Allergies     Allergies as of 10/04/2023 - Reviewed 10/04/2023   Allergen Reaction Noted   • Amoxicillin GI Intolerance             The following portions of the patient's history were reviewed and updated as appropriate: allergies, current medications, past family history, past medical history, past social history, past surgical history and problem list.     Past Medical History:   Diagnosis Date   • Anxiety    • Cancer ([address])     skin   • Change in bowel habits    • Elevated liver enzymes    • Gall stones    • GERD (gastroesophageal reflux disease)    • Hashimoto's thyroiditis    • Iron deficiency 4/19/2023   • Irritable bowel syndrome    • Lumbar spondylosis    • Osteoarthritis    • Seasonal allergies    • Ulcerative colitis ([address])        Past Surgical History:   Procedure Laterality Date   • COLONOSCOPY     • EAR SURGERY      cyst removed from ear drum   • EAR SURGERY     • EAR SURGERY     • IRIDECTOMY      Optical   • NH COLONOSCOPY FLX DX W/COLLJ SPEC WHEN PFRMD N/A 10/9/2017    Procedure: COLONOSCOPY;  Surgeon: Belinda Mari MD;  Location:  GI LAB; Service: Gastroenterology   • NH EXCISION MAL LESION TRUNK/ARM/LEG 1.1-2.0 CM Right 10/4/2019    Procedure: 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;  Surgeon: Patsy Odonnell MD;  Location: [facility] MAIN OR;  Service: Plastics   • UPPER GASTROINTESTINAL ENDOSCOPY         Family History   Problem Relation Age of Onset   • Breast cancer Mother    • Lung cancer Mother    • Atrial fibrillation Father    • Stroke Father         CVA   • Diabetes Father         Mellitus   • Heart disease Father    • Other Daughter         Colitis   • Breast cancer Family          Medications have been verified. Objective   /65   Pulse 104   Temp 99 °F (37.2 °C)   Resp 18   Ht 5' 1.5" (1.562 m)   Wt 57.2 kg (126 lb)   SpO2 97%   BMI 23.42 kg/m²        Physical Exam     Physical Exam  Vitals and nursing note reviewed. Constitutional:       General: She is not in acute distress. Appearance: Normal appearance. She is not ill-appearing. Cardiovascular:      Rate and Rhythm: Normal rate and regular rhythm. Pulses: Normal pulses. Heart sounds: Normal heart sounds. No murmur heard. Pulmonary:      Effort: Pulmonary effort is normal. No respiratory distress. Breath sounds: Normal breath sounds. Abdominal:      General: Abdomen is flat. Bowel sounds are normal. There is no distension. Palpations: Abdomen is soft. Tenderness: There is no abdominal tenderness. There is no guarding. Neurological:      Mental Status: She is alert.

## 2023-10-04 NOTE — TELEPHONE ENCOUNTER
[facility]. She is in the ER. Feels tired and short of breath. Having diarrhea and bleeding. We have increased Remicade to 10 mg/kg every 4 weeks. Last calprotectin was elevated. She felt like this last time she was anemic. I agree with checking labs today. She should also have C. difficile ruled out. We will check her Remicade level prior to next infusion. She should likely reschedule her infusion for ASAP. I will review her labs.

## 2023-10-04 NOTE — TELEPHONE ENCOUNTER
[facility] RN called about pt not feeling well and bp is 80/60 and pulse 110. Pt feels dizzy when she stands up. She also notes increased rectal bleeding. Pt wants to avoid ED but is agreeable to getting her labs checked and evaluation at urgent care.

## 2023-10-04 NOTE — ED NOTES
Patient transported to [facility] [facility] [address]  10/04/23 6444

## 2023-10-04 NOTE — ED ATTENDING ATTESTATION
10/4/2023  I, Yecenia Che MD, saw and evaluated the patient. I have discussed the patient with the resident/non-physician practitioner and agree with the resident's/non-physician practitioner's findings, Plan of Care, and MDM as documented in the resident's/non-physician practitioner's note, except where noted. All available labs and Radiology studies were reviewed. I was present for key portions of any procedure(s) performed by the resident/non-physician practitioner and I was immediately available to provide assistance. At this point I agree with the current assessment done in the Emergency Department. I have conducted an independent evaluation of this patient a history and physical is as follows:  37-year-old female with history of ulcerative colitis who has been having intermittent rectal bleeding for over the past year presents today for evaluation of exertional dyspnea, lightheadedness is beginning Kvng worse for the past several weeks. Patient states she was post to have her biologic infusion today and was found to be mildly hypotensive and sent to the ER for further evaluation. Patient states that she has no symptoms at rest but reports that over the past week she has been having diarrhea with blood. She denies abdominal pain, chest pain, respecters for DVT or PE.  10 systems reviewed otherwise negative. On exam no distress, HEENT unremarkable, neck, lungs normal, cardiac normal, abdomen normal.  Medical decision making; exertional dyspnea and lightheadedness-likely secondary to anemia and associated acute coronary syndrome, dysrhythmia, congestive heart failure, lecture abnormality. No history to suggest pulmonary embolism work-up is not indicated. We will do cardiac work-up, likely admit. Will transfuse if needed. Rectal bleeding without pain and hemodynamically stable patient likely secondary to known ulcerative colitis-we will check labs, type and screen.   If anemic will consult GI.  If h&H stable will follow-up as outpatient.   ED Course         Critical Care Time  Procedures

## 2023-10-05 ENCOUNTER — VBI (OUTPATIENT)
Dept: ADMINISTRATIVE | Facility: OTHER | Age: 63
End: 2023-10-05

## 2023-10-05 ENCOUNTER — PATIENT MESSAGE (OUTPATIENT)
Dept: GASTROENTEROLOGY | Facility: CLINIC | Age: 63
End: 2023-10-05

## 2023-10-05 DIAGNOSIS — K51.911 ULCERATIVE COLITIS WITH RECTAL BLEEDING, UNSPECIFIED LOCATION (HCC): Primary | ICD-10-CM

## 2023-10-05 DIAGNOSIS — R19.7 DIARRHEA, UNSPECIFIED TYPE: ICD-10-CM

## 2023-10-05 LAB
ATRIAL RATE: 87 BPM
P AXIS: 54 DEGREES
PR INTERVAL: 176 MS
QRS AXIS: 79 DEGREES
QRSD INTERVAL: 66 MS
QT INTERVAL: 344 MS
QTC INTERVAL: 413 MS
T WAVE AXIS: 64 DEGREES
VENTRICULAR RATE: 87 BPM

## 2023-10-05 PROCEDURE — 93010 ELECTROCARDIOGRAM REPORT: CPT

## 2023-10-05 NOTE — DISCHARGE INSTRUCTIONS
You were evaluated in the Emergency Department today for dyspnea on exertion. Please schedule an appointment with your gastroenterologist within the next 2-3 days. Return to the Emergency Department if you experience worsening or uncontrolled pain, fevers 100.4°F or greater, recurrent vomiting, inability to tolerate food or fluids by mouth, bloody stools or vomit, black or tarry stools, or any other concerning symptoms. Thank you for choosing us for your care.

## 2023-10-05 NOTE — TELEPHONE ENCOUNTER
10/05/23 12:52 PM    Patient contacted post ED visit, [facility] department spoke with patient/caregiver and outreach was successful. Thank you.   Jelly Ovalles  [facility]

## 2023-10-06 ENCOUNTER — APPOINTMENT (OUTPATIENT)
Dept: LAB | Facility: MEDICAL CENTER | Age: 63
End: 2023-10-06
Payer: COMMERCIAL

## 2023-10-06 DIAGNOSIS — R19.7 DIARRHEA, UNSPECIFIED TYPE: ICD-10-CM

## 2023-10-06 DIAGNOSIS — K51.911 ULCERATIVE COLITIS WITH RECTAL BLEEDING, UNSPECIFIED LOCATION (HCC): ICD-10-CM

## 2023-10-06 PROCEDURE — 87493 C DIFF AMPLIFIED PROBE: CPT

## 2023-10-06 PROCEDURE — 83993 ASSAY FOR CALPROTECTIN FECAL: CPT

## 2023-10-07 LAB — C DIFF TOX GENS STL QL NAA+PROBE: NEGATIVE

## 2023-10-09 LAB — CALPROTECTIN STL-MCNT: 2360 UG/G (ref 0–120)

## 2023-10-10 ENCOUNTER — TELEPHONE (OUTPATIENT)
Age: 63
End: 2023-10-10

## 2023-10-10 NOTE — TELEPHONE ENCOUNTER
I spoke with patient, she did see Dr. Cam Corral result note and is in the process of answering his questions regarding symptoms/infusion. She is concerned about increased calprotectin level. Patient also questioned be able to directly call into office and I reviewed that calls go into call center and we can transfer or message staff member to speak directly with her if needed.

## 2023-10-10 NOTE — TELEPHONE ENCOUNTER
Patients GI provider:  Dr. Cam Corral    Number to return call: [phone]    Reason for call: Pt calling for her calprotectin results.     Scheduled procedure/appointment date if applicable: Appt 46/21/24

## 2023-10-13 ENCOUNTER — OFFICE VISIT (OUTPATIENT)
Dept: GASTROENTEROLOGY | Facility: CLINIC | Age: 63
End: 2023-10-13
Payer: COMMERCIAL

## 2023-10-13 VITALS
BODY MASS INDEX: 23 KG/M2 | DIASTOLIC BLOOD PRESSURE: 68 MMHG | HEIGHT: 62 IN | HEART RATE: 85 BPM | SYSTOLIC BLOOD PRESSURE: 99 MMHG | OXYGEN SATURATION: 98 % | WEIGHT: 125 LBS | TEMPERATURE: 97.6 F

## 2023-10-13 DIAGNOSIS — K51.911 ULCERATIVE COLITIS WITH RECTAL BLEEDING, UNSPECIFIED LOCATION (HCC): ICD-10-CM

## 2023-10-13 DIAGNOSIS — D84.9 IMMUNOSUPPRESSION (HCC): ICD-10-CM

## 2023-10-13 DIAGNOSIS — E61.1 IRON DEFICIENCY: ICD-10-CM

## 2023-10-13 DIAGNOSIS — B37.9 YEAST INFECTION: Primary | ICD-10-CM

## 2023-10-13 PROCEDURE — 99215 OFFICE O/P EST HI 40 MIN: CPT | Performed by: INTERNAL MEDICINE

## 2023-10-13 RX ORDER — FLUCONAZOLE 150 MG/1
150 TABLET ORAL DAILY
Qty: 2 TABLET | Refills: 0 | Status: SHIPPED | OUTPATIENT
Start: 2023-10-13 | End: 2023-10-15

## 2023-10-13 RX ORDER — BUDESONIDE 9 MG/1
9 TABLET, FILM COATED, EXTENDED RELEASE ORAL DAILY
Qty: 30 TABLET | Refills: 1 | Status: SHIPPED | OUTPATIENT
Start: 2023-10-13

## 2023-10-13 NOTE — PROGRESS NOTES
Gastroenterology Outpatient Follow-up - Crohn's Disease  Eva Almazan 66 y.o. female MRN: [id number]  Encounter: [id number]    Eva Almazan is a 66 y.o. female with Ulcerative colitis. Symptom onset:  2017  Diagnosis:  ulcerative colitis  Year of diagnosis:  2017    IBD Summary: Eva Almazan has has ulcerative pancolitis diagnosed in 2017. She lost response to mesalamine. She has been treated with vedolizumab but lost response again. She is currently on ustekinumab but is a primary nonresponder and continues to require steroids. She also has chronically elevated alkaline phosphatase and had positive AMA in 2017. MRCP with suggestion of early [address] Her daughter had UC and PSC. Ulcerative Colitis Summary  Macroscopic extent of diseases: pancolitis  Microscopic extent of diseases:  pancolitis  Has the patient ever been hospitalized for severe disease: No        Surgical History  Number of IBD surgeries: 0      First IBD surgery:    Most Recent IBD surgery:    Esophageal:  0    Gastroduodenal:  0    Small bowel resection(s):  0    Ileocolonic resection(s): unknown      Colonic resection(s):  0    Ileostomy or colostomy:  no previous ostomy    Complete colectomy:  No         Medications     Year last used Reason for discontinuation   Corticosteroids prior   2023       Thiopurines   never         Methotrexate   never         Infliximab   never         Adalimumab   never         Certolizumab   never         Golimumab   never         Natalizumab   never         Mesalamine prior     CARLOS     Sulfasalzine   never         Vedolizumab prior   2022 CARLOS     Ustekinumab   never         Tofacitinib   never         Other biologic   never             Extraintestinal Manifestations    IBD-associated arthropathy Yes    Uveitis Yes  iritis x 2    Oral aphthous ulcers No   Erythema nodosum No    Pyoderma gangrenosum No    Primary sclerosing cholangitis Yes  MRCP 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  Daughter with PSC.    Thrombotic complications No          Cancer / Dysplasia History  History of IBD-associated dysplasia: none    Date of diagnosis (Year):     History of colorectal cancer: No   History of cervical dysplasia: No   History of skin cancer: squamous cell carcinoma         Laboratory Data   Most recent (date) Result   PPD   unknown   Quanitferon gold 9/12/2022 negative   TPMT 1/17/2023 normal   Hepatitis A       HBsAb 9/12/2022 negative   HBcAb 9/12/2022 negative   HBsAg 9/12/2022 negative   HCV Ab   unknown       Imaging / Diagnostic Procedures   Most recent (date) Findings   Colonoscopy or sigmoidoscopy #1 1/12/2023            Ulcers/Erosions  small           Strictures  none           Stricture Severity  N/A           Endoscopic Score Fregoso Score:  2 Rutgeert's:  N/A            Findings  (1) Normal TI. (2) Moderate edematous, erythematous, friable and hemorrhagic mucosa with erosion and loss of vascular pattern in the cecum, ascending colon, hepatic flexure, transverse colon, splenic flexure, descending colon, sigmoid colon, rectosigmoid and rectum, consistent with ulcerative colitis. Pathology   A. Colon, Cecum, Biopsy:  - Mildly active chronic colitis. - Negative for granulomata, viral changes, and dysplasia. B. Small Intestine, Terminal Ileum, Biopsy:  - Small intestinal mucosa with no specific pathologic change. C. Colon, Ascending, Biopsy:  - Mildly active chronic colitis. - Negative for granulomata, viral changes, and dysplasia. D. Colon, Transverse, Biopsy:  - Mildly active chronic colitis. - Negative for granulomata, viral changes, and dysplasia. E. Colon, Descending, Biopsy:  - Mildly active chronic colitis. - Negative for granulomata, viral changes, and dysplasia. F. Colon, Sigmoid, Biopsy:  - Superficial fragments of colonic mucosa with mild acute colitis. G. Colon, Rectum, Biopsy:  - Mildly active chronic colitis. - Negative for granulomata, viral changes, and dysplasia. Colonoscopy or sigmoidoscopy #2              Ulcers/Erosions                 Strictures                 Stricture Severity              Endoscopic Score Fregoso Score:    Rugeert's:              Findings              Pathology      EGD       Small bowel follow-through       CT enterography       CT without enterography       MRI enterography       Capsule study       DEXA scan   Lowest Z-score:      CXR (for TB)           HPI:   Interval History:  10/13/2020 Follow up  She is not doing well since her last visit. She remains on infliximab (Avsola) 10 mg every 4 weeks. Last infusion was on 10/9/2023. However, she reports starting to feel fatigued approximately 90 days ago. This coincided with seeing more blood in the stool, having increased urgency, lightheadedness, low energy. No abdominal pain. Calprotectin on 10/6/2023 was 2360, which was 3 days prior to her infusion. She says that her stool frequency has slightly improved since the last infliximab dose. C. diff was negative. She had 1 visit to the ER because of lightheadedness and hypotension, but Hgb was 12.5 and BP there was normal.  This delayed infliximab by 4 days. She saw Rheumatology for seronegative arthropathy and started an anti-inflammatory diet. Joint pain is much improved. Also avoiding gluten for the past 3 years. Thinks she has yeast infection. Having bad GERD currently. She has history of SCC. She had iron infusions in June.      8/22/2023 Follow up  Since last visit, we increased infliximab based on subtherapeutic drug level and she is now on 10 mg/kg every 4 weeks. She has had 3 monthly doses of high dose infliximab. Feels like this has helped and she is slowly improving. Calprotectin improved from 11 100-543. Still has 3-4 BMs per day with occasional blood, but much improved from before. No longer on steroids. No pain. Occasional incontinence when she has anxiety. Labs from August showed mild ALT and AST elevation.   Has brain fog and fatigue after infusions; no fevers. Still needs Prevnar. Will get flu shot. Completed 4 IV iron infusions and felt much improved. Needs GYN appointment. Saw dermatology. 4/19/2023 Return  Since last visit, started infliximab (Avsola) and has had 3 loading doses. 3rd dose was on 4/10. Feels like BMs have improved, but are still inconsistent. No abdominal pain. Occasional urgency. Calprotectin 372 but trending down. No bleeding for the past 2 weeks. Energy is poor. She cannot tolerate PO iron. Gets SOB with exertion. Poor exercise tolerance. She feels dizzy on occasion since startign avsola. She is still on prednisone but down to 5 mg daily. Has been on it since July. Seeing Derm in June for h/o SCC. Overdue for Pap. She may have [address] based on chronic Ap elevation and recent MRCP, although last AP was normal.     1/26/2023 Return visit  Nikole Medrano is establishing care with me for UC. Diagnosed with UC in 2017 - diarrhea and bleeding. She was treated with mesalamine (Lialda) and did well for a couple of years. In 2020, flared and started vedolizumab but she lost response after a few months. Ustekinumab was increased to every 6 weeks, but did not work. She then started ustekinumab 10/2022. However, has been on anf off prednisone for 5 months, currently on 10 mg.  Getting Stelara every 8 weeks. Some days are good with formed stools, other days with loose BMs and bleeding. Colonoscopy this month with Fregoso 2 disease. She has had COVID vaccines + booster and flu vaccine. No pneumococcal vaccine. Last pap smear 1.5 years. SCC removed ~2018 from chest.  Last derm ~ 6/2022. Has osteopenia. H/o iritis. Of note, her daughter his UC and PSC. Patient has chronic alkaline phosphatase elevation and had positive AMA in 2017. Recent MRCP with mild intrahepatic biliary dilatation but no other signs of PSC.    12/19/22  First treated with lialda for about 2 years.     First biologic was entyvio which started in October of 2021 but in February of 2022 symptoms worsened, and was treated with prednisone with symptoms abating but as she was titrating down her prednisone symptoms worsened  She is currently on the autoimmune protocol diet since July of 2022  In the past week and a half- reporting normal BM's; formed, 1-2 times daily, without bleeding. Gluten free and antiinflammatory diet - good for joints. Had iritis x 2 in 2022. Using steroid eye drops. Rai Schulte reports the following symptoms over the last 7 days:    Stool Frequency 1-2 stools/day more than normal   Average stools per day: 4   Average liquid stools per day: 0   Consistency of bowel: soft or semi-formed   Awakening from sleep to move bowels? No   Urgency moderately severe fecal urgency   Visible blood in stool? visible blood in stool half of the time or more   Abdominal pain? none   General Wellbeing? poor     Rai Schulte also reports the following symptoms in the last month:    Leakage of stool while sleeping? No   Leakage of stool while awake?  Yes       REVIEW OF SYSTEMS:  During the last 7 days, Rai Schulte experienced the following symptoms:  Unintentional Weight Loss (in the last month) No   Fever No   Eye irritation No   Mouth sores No   Sore throat No   Chest pain No   Shortness of breath No   Numbness or tingling in hands or feet No   Skin rash No   Pain or swelling in joints Yes  Comment:in hands   Bruising or bleeding No   Felt depressed or blue Yes   Fatigue Yes   Dysuria No   Please see HPI for additional pertinent review of systems; otherwise remainder of ROS was unremarkable    MEDICATIONS:    Current Outpatient Medications:     ALPRAZolam (XANAX) 0.25 mg tablet    budesonide 9 mg TB24    Calcium Carb-Cholecalciferol (calcium carbonate-vitamin D) 500 mg-5 mcg tablet    Cholecalciferol (VITAMIN D PO)    fluconazole (DIFLUCAN) 150 mg tablet    inFLIXimab-axxq (AVSOLA IV)    busPIRone (BUSPAR) 5 mg tablet    Cyanocobalamin (VITAMIN B-12 PO)    ALLERGIES:  Allergies   Allergen Reactions    Amoxicillin GI Intolerance       OBJECTIVE:  BP 99/68 (BP Location: Left arm, Patient Position: Sitting, Cuff Size: Adult)   Pulse 85   Temp 97.6 °F (36.4 °C) (Tympanic)   Ht 5' 1.5" (1.562 m)   Wt 56.7 kg (125 lb)   SpO2 98%   BMI 23.24 kg/m²      PHYSICAL EXAM:    General Appearance:   Alert, cooperative, no distress   HEENT:   Normocephalic, atraumatic, anicteric. Neck:  Supple, symmetrical, trachea midline   Lungs:   Clear to auscultation bilaterally; no rales, rhonchi or wheezing; respirations unlabored    Heart[de-identified]   Regular rate and rhythm; no murmur, rub, or gallop. Abdomen:   Soft, non-distended; normal bowel sounds    Abdominal exam was notable for no tenderness with no mass. Genitalia:   Deferred    Rectal:   Perirectal assessment was not performed.    Extremities:  No cyanosis, clubbing or edema    Pulses:  2+ and symmetric    Skin:  No jaundice, rashes, or lesions    Lymph nodes:  No palpable cervical lymphadenopathy        Lab Results   Component Value Date    WBC 4.95 10/04/2023    HGB 12.5 10/04/2023    HCT 39.1 10/04/2023    MCV 90 10/04/2023     10/04/2023     Lab Results   Component Value Date     06/10/2014    SODIUM 132 (L) 10/04/2023    K 3.8 10/04/2023     10/04/2023    CO2 23 10/04/2023    ANIONGAP 8 06/10/2014    AGAP 7 10/04/2023    BUN 15 10/04/2023    CREATININE 1.11 10/04/2023    GLUC 105 10/04/2023    GLUF 92 08/16/2023    CALCIUM 8.6 10/04/2023    AST 38 10/04/2023    ALT 35 10/04/2023    ALKPHOS 89 10/04/2023    PROT 7.0 06/10/2014    TP 6.7 10/04/2023    BILITOT 0.4 06/10/2014    TBILI 0.46 10/04/2023    EGFR 53 10/04/2023     Lab Results   Component Value Date    CRP <3.0 08/12/2023     Lab Results   Component Value Date    HRTHLFYF99 782 03/20/2020     Lab Results   Component Value Date    FERRITIN 63 06/17/2023       ASSESSMENT AND PLAN:    Joseph Arriaga has a history of macroscopic pancolitis and microscopic pancolitis  ulcerative colitis diagnosed in 2017. Current medical therapy is with infliximab 10 mg/kg every 4 weeks. My global assessment is that the clinical disease is currently moderately active. The 6-point Fregoso score was 3 and the 9-point Fregoso score was 5. The short CDAI was 142. Prashant Larson has ulcerative pancolitis diagnosed in 2017. She has been on mesalamine, vedolizumab, and ustekinumab with either PNR or CARLOS. She is now on infliximab but had low drug level and had dose escalation to 10 mg/kg every 4 weeks. Unfortunately, she seems to be flaring as evidenced by clinical symptoms and elevated calprotectin. She does report some improvement after the last dose of infliximab, and we have not repeated a drug level since the recent dose increase. 1. For now, continue with infliximab 10 mg/kg every 4 weeks. We will check another infliximab trough. 2. Recent C. diff was negative. Check enteric stool panel and o&p. 3. Check calprotectin prior to next infliximab. 4. Trial of budesonide (Uceris)  5. Annual Pap smear and dermatology exam  6. CBC and CMP checked in the ER and normal  7. She has completed IV iron infusions  8. Recommend flu shot and COVID booster  Return in 3 months. Updates via [facility]. Health Maintenance Recommendations:  Vaccines & Infections  COVID-19 vaccination and boosters are recommended. There is no evidence that the COVID-19 vaccine would cause an IBD flare. Avoid live vaccines if on immunosuppressive therapy. Yearly influenza vaccine (flu shot). Pneumonia vaccines for patients on immunosuppression. These include Prevnar 20, followed by Pneumovax 23 at least 8 weeks later. Shingrix vaccine (series of 2 injections) for al patients 65 and older. Patients on tofacitinib or upadacitinib should be vaccinated regardless of age. If not immune to measles mumps or rubella, MMR vaccine is recommended.  However, this is a live vaccine and should be given prior to immunosuppressive therapy. HPV vaccination as per national guidelines. Hepatitis A and B vaccinations if not previously vaccinated. Testing for tuberculosis with QuantiFERON Gold blood test and/or chest xray prior to starting immunosuppressive medications, and then annually    Cancer screening  Dysplasia surveillance for colorectal cancer. Colonoscopy in all patients with extensive colitis (more than 1/3 of the colon involved) who had disease for at least 8 or more years. Repeat colonoscopy approximately every 12-24 months. In patients with concurrent primary sclerosing cholangitis, history of dysplasia, or family history of colon cancer, repeat colonoscopy annually. Females: Pap smear annually for woman on immunosuppression. Annual dermatologic/skin exam in all patients with IBD, especially those on immunosuppression with thiopurines or CEDRICK inhibitors.     Miscellaneous  DEXA scan, once off steroids for 3 months  Depression screening recommended annually  Routine dental and ophthalmology examinations    Problem List Items Addressed This Visit          Digestive    Ulcerative colitis ([address])    Relevant Medications    budesonide 9 mg TB24    Other Relevant Orders    Stool Enteric Bacterial Panel by PCR    Ova and parasite examination     Other Visit Diagnoses       Yeast infection    -  Primary    Relevant Medications    fluconazole (DIFLUCAN) 150 mg tablet            Davie Sanford MD

## 2023-10-25 PROBLEM — R76.8 POSITIVE ANA (ANTINUCLEAR ANTIBODY): Status: ACTIVE | Noted: 2021-09-20

## 2023-10-30 ENCOUNTER — OFFICE VISIT (OUTPATIENT)
Dept: FAMILY MEDICINE CLINIC | Facility: CLINIC | Age: 63
End: 2023-10-30
Payer: COMMERCIAL

## 2023-10-30 VITALS
BODY MASS INDEX: 22.75 KG/M2 | HEART RATE: 72 BPM | SYSTOLIC BLOOD PRESSURE: 112 MMHG | WEIGHT: 128.4 LBS | TEMPERATURE: 97.9 F | OXYGEN SATURATION: 99 % | DIASTOLIC BLOOD PRESSURE: 68 MMHG | HEIGHT: 63 IN | RESPIRATION RATE: 16 BRPM

## 2023-10-30 DIAGNOSIS — Z00.00 ANNUAL PHYSICAL EXAM: Primary | ICD-10-CM

## 2023-10-30 DIAGNOSIS — Z23 ENCOUNTER FOR IMMUNIZATION: ICD-10-CM

## 2023-10-30 PROCEDURE — 90471 IMMUNIZATION ADMIN: CPT

## 2023-10-30 PROCEDURE — 90686 IIV4 VACC NO PRSV 0.5 ML IM: CPT

## 2023-10-30 PROCEDURE — 99396 PREV VISIT EST AGE 40-64: CPT | Performed by: FAMILY MEDICINE

## 2023-10-30 NOTE — PROGRESS NOTES
[address] [facility]    NAME: Filemon Ware  AGE: 66 y.o. SEX: female  : 1960     DATE: 10/30/2023     Assessment and Plan:   Guzman Fuentes was seen today for well check. Diagnoses and all orders for this visit:    Annual physical exam    Encounter for immunization  -     influenza vaccine, quadrivalent, 0.5 mL, preservative-free, for adult and pediatric patients 6 mos+ (AFLURIA, FLUARIX, FLULAVAL, FLUZONE)       Problem List Items Addressed This Visit    None  Visit Diagnoses       Annual physical exam    -  Primary    Encounter for immunization        Relevant Orders    influenza vaccine, quadrivalent, 0.5 mL, preservative-free, for adult and pediatric patients 6 mos+ (AFLURIA, FLUARIX, FLULAVAL, FLUZONE) (Completed)            Immunizations and preventive care screenings were discussed with patient today. Appropriate education was printed on patient's after visit summary. Counseling:  Alcohol/drug use: discussed moderation in alcohol intake, the recommendations for healthy alcohol use  Dental Health: discussed importance of regular tooth brushing, flossing, and dental visits. Injury prevention: discussed safety/seat belts, safety helmets, smoke detectors, carbon dioxide detectors  Sexual health: no issues  Exercise: the importance of regular exercise/physical activity was discussed. Recommend exercise 3-5 times per week for at least 30 minutes. Return in about 1 year (around 10/30/2024) for Annual physical.     Chief Complaint:     Chief Complaint   Patient presents with    Well Check      History of Present Illness:     Adult Annual Physical   Patient here for a comprehensive physical exam. The patient reports problems -   . Diet and Physical Activity  Diet/Nutrition: well balanced diet. Exercise: walking and pickle ball .       Depression Screening  PHQ-2/9 Depression Screening           General Health  Sleep: variable . Hearing LOSS- has hearing in right  Vision: most recent eye exam <1 year ago and wears glasses. Dental: regular dental visits. /GYN Health  Patient is: postmenopausal  Last menstrual period: age 49    OB History          5    Para   3    Term                AB   2    Living             SAB   2    IAB        Ectopic        Multiple        Live Births               Obstetric Comments   34.31, 25              Advanced Care Planning  Do you have an advanced directive? no  Do you have a durable medical power of ? no  In process   Review of Systems:     Review of Systems   Gastrointestinal:         Following GI -   Genitourinary:         Bladder ok, up at night pee   Neurological:  Negative for headaches. Psychiatric/Behavioral:          e winter      Past Medical History:     Past Medical History:   Diagnosis Date    Anxiety     Cancer ([address])     skin    Change in bowel habits     Elevated liver enzymes     Gall stones     GERD (gastroesophageal reflux disease)     Hashimoto's thyroiditis     Iron deficiency 2023    Irritable bowel syndrome     Lumbar spondylosis     Osteoarthritis     Seasonal allergies     Ulcerative colitis ([address])       Past Surgical History:     Past Surgical History:   Procedure Laterality Date    COLONOSCOPY      EAR SURGERY      cyst removed from ear drum    EAR SURGERY      EAR SURGERY      IRIDECTOMY      Optical    PA COLONOSCOPY FLX DX W/COLLJ SPEC WHEN PFRMD N/A 10/9/2017    Procedure: COLONOSCOPY;  Surgeon: Dedra Amezcua MD;  Location: [facility];   Service: Gastroenterology    PA EXCISION MAL LESION TRUNK/ARM/LEG 1.1-2.0 CM Right 10/4/2019    Procedure: 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;  Surgeon: Lauren Lynch MD;  Location: [address] [place] [address];  Service: Plastics    UPPER GASTROINTESTINAL ENDOSCOPY        Social History:     Social History     Socioeconomic History    Marital status: /Civil Union     Spouse name: None Number of children: 3    Years of education: None    Highest education level: None   Occupational History    None   Tobacco Use    Smoking status: Never    Smokeless tobacco: Never   Vaping Use    Vaping Use: Never used   Substance and Sexual Activity    Alcohol use: Not Currently    Drug use: No    Sexual activity: Not Currently     Partners: Male   Other Topics Concern    None   Social History Narrative    Per [employer]: Single    Pets in the home     Social Determinants of Health     Financial Resource Strain: Not on file   Food Insecurity: Not on file   Transportation Needs: Not on file   Physical Activity: Not on file   Stress: Not on file   Social Connections: Not on file   Intimate Partner Violence: Not on file   Housing Stability: Not on file      Family History:     Family History   Problem Relation Age of Onset    Breast cancer Mother     Lung cancer Mother     Atrial fibrillation Father     Stroke Father         CVA    Diabetes Father         Mellitus    Heart disease Father     Other Daughter         Colitis    Breast cancer Family       Current Medications:     Current Outpatient Medications   Medication Sig Dispense Refill    ALPRAZolam (XANAX) 0.25 mg tablet TAKE 1 TABLET DAILY AT BEDTIME AS NEEDED FOR ANXIETY 90 tablet 0    budesonide 9 mg TB24 Take 9 mg by mouth in the morning 30 tablet 1    Calcium Carb-Cholecalciferol (calcium carbonate-vitamin D) 500 mg-5 mcg tablet Take 1 tablet by mouth daily with breakfast 90 tablet 1    Cholecalciferol (VITAMIN D PO) Take 5,000 Units by mouth daily      inFLIXimab-axxq (AVSOLA IV) Inject into a catheter in a vein every 4 weeks      busPIRone (BUSPAR) 5 mg tablet Take 1 tablet (5 mg total) by mouth daily for 3 days, THEN 1 tablet (5 mg total) 2 (two) times a day for 27 days.  (Patient not taking: Reported on 8/22/2023) 60 tablet 0    Cyanocobalamin (VITAMIN B-12 PO) Take by mouth daily (Patient not taking: Reported on 10/30/2023)       No current facility-administered medications for this visit. Allergies: Allergies   Allergen Reactions    Amoxicillin GI Intolerance      Physical Exam:     /68   Pulse 72   Temp 97.9 °F (36.6 °C) (Temporal)   Resp 16   Ht 5' 2.6" (1.59 m)   Wt 58.2 kg (128 lb 6.4 oz)   SpO2 99%   BMI 23.04 kg/m²     Physical Exam  Vitals and nursing note reviewed. Constitutional:       General: She is not in acute distress. Appearance: Normal appearance. She is well-developed and normal weight. HENT:      Head: Normocephalic and atraumatic. Eyes:      Conjunctiva/sclera: Conjunctivae normal.   Neck:      Vascular: No carotid bruit. Cardiovascular:      Rate and Rhythm: Normal rate and regular rhythm. Heart sounds: No murmur heard. Pulmonary:      Effort: Pulmonary effort is normal. No respiratory distress. Breath sounds: Normal breath sounds. Abdominal:      General: Bowel sounds are normal.      Palpations: Abdomen is soft. There is no mass. Tenderness: There is no abdominal tenderness. Musculoskeletal:         General: No swelling. Skin:     General: Skin is warm. Findings: No rash. Neurological:      Mental Status: She is alert. Psychiatric:         Mood and Affect: Mood normal.         Behavior: Behavior normal.         Thought Content:  Thought content normal.         Judgment: Judgment normal.          Alana Wilkins, DO  [facility]

## 2023-10-30 NOTE — PATIENT INSTRUCTIONS
Wellness Visit for Adults   AMBULATORY CARE:   A wellness visit  is when you see your healthcare provider to get screened for health problems. Your healthcare provider will also give you advice on how to stay healthy. Write down your questions so you remember to ask them. Ask your healthcare provider how often you should have a wellness visit. What happens at a wellness visit:  Your healthcare provider will ask about your health, and your family history of health problems. This includes high blood pressure, heart disease, and cancer. He or she will ask if you have symptoms that concern you, if you smoke, and about your mood. You may also be asked about your intake of medicines, supplements, food, and alcohol. Any of the following may be done: Your weight  will be checked. Your height may also be checked so your body mass index (BMI) can be calculated. Your BMI shows if you are at a healthy weight. Your blood pressure  and heart rate will be checked. Your temperature may also be checked. Blood and urine tests  may be done. Blood tests may be done to check your cholesterol levels. Abnormal cholesterol levels increase your risk for heart disease and stroke. You may also need a blood or urine test to check for diabetes if you are at increased risk. Urine tests may be done to look for signs of an infection or kidney disease. A physical exam  includes checking your heartbeat and lungs with a stethoscope. Your healthcare provider may also check your skin to look for sun damage. Screening tests  may be recommended. A screening test is done to check for diseases that may not cause symptoms. The screening tests you may need depend on your age, gender, family history, and lifestyle habits. For example, colorectal screening may be recommended if you are 48years old or older. Screening tests you need if you are a woman:   A Pap smear  is used to screen for cervical cancer.  Pap smears are usually done every 3 to 5 years depending on your age. You may need them more often if you have had abnormal Pap smear test results in the past. Ask your healthcare provider how often you should have a Pap smear. A mammogram  is an x-ray of your breasts to screen for breast cancer. Experts recommend mammograms every 2 years starting at age 48 years. You may need a mammogram at age 52 years or younger if you have an increased risk for breast cancer. Talk to your healthcare provider about when you should start having mammograms and how often you need them. Vaccines you may need:   Get an influenza vaccine  every year. The influenza vaccine protects you from the flu. Several types of viruses cause the flu. The viruses change over time, so new vaccines are made each year. Get a tetanus-diphtheria (Td) booster vaccine  every 10 years. This vaccine protects you against tetanus and diphtheria. Tetanus is a severe infection that may cause painful muscle spasms and lockjaw. Diphtheria is a severe bacterial infection that causes a thick covering in the back of your mouth and throat. Get a human papillomavirus (HPV) vaccine  if you are female and aged 23 to 32 or male 23 to 24 and never received it. This vaccine protects you from HPV infection. HPV is the most common infection spread by sexual contact. HPV may also cause vaginal, penile, and anal cancers. Get a pneumococcal vaccine  if you are aged 72 years or older. The pneumococcal vaccine is an injection given to protect you from pneumococcal disease. Pneumococcal disease is an infection caused by pneumococcal bacteria. The infection may cause pneumonia, meningitis, or an ear infection. Get a shingles vaccine  if you are 60 or older, even if you have had shingles before. The shingles vaccine is an injection to protect you from the varicella-zoster virus. This is the same virus that causes chickenpox.  Shingles is a painful rash that develops in people who had chickenpox or have been exposed to the virus. How to eat healthy:  My Plate is a model for planning healthy meals. It shows the types and amounts of foods that should go on your plate. Fruits and vegetables make up about half of your plate, and grains and protein make up the other half. A serving of dairy is included on the side of your plate. The amount of calories and serving sizes you need depends on your age, gender, weight, and height. Examples of healthy foods are listed below:  Eat a variety of vegetables  such as dark green, red, and orange vegetables. You can also include canned vegetables low in sodium (salt) and frozen vegetables without added butter or sauces. Eat a variety of fresh fruits , canned fruit in 100% juice, frozen fruit, and dried fruit. Include whole grains. At least half of the grains you eat should be whole grains. Examples include whole-wheat bread, wheat pasta, brown rice, and whole-grain cereals such as oatmeal.    Eat a variety of protein foods such as seafood (fish and shellfish), lean meat, and poultry without skin (turkey and chicken). Examples of lean meats include pork leg, shoulder, or tenderloin, and beef round, sirloin, tenderloin, and extra lean ground beef. Other protein foods include eggs and egg substitutes, beans, peas, soy products, nuts, and seeds. Choose low-fat dairy products such as skim or 1% milk or low-fat yogurt, cheese, and cottage cheese. Limit unhealthy fats  such as butter, hard margarine, and shortening. Exercise:  Exercise at least 30 minutes per day on most days of the week. Some examples of exercise include walking, biking, dancing, and swimming. You can also fit in more physical activity by taking the stairs instead of the elevator or parking farther away from stores. Include muscle strengthening activities 2 days each week. Regular exercise provides many health benefits.  It helps you manage your weight, and decreases your risk for type 2 diabetes, heart disease, stroke, and high blood pressure. Exercise can also help improve your mood. Ask your healthcare provider about the best exercise plan for you. General health and safety guidelines:   Do not smoke. Nicotine and other chemicals in cigarettes and cigars can cause lung damage. Ask your healthcare provider for information if you currently smoke and need help to quit. E-cigarettes or smokeless tobacco still contain nicotine. Talk to your healthcare provider before you use these products. Limit alcohol. A drink of alcohol is 12 ounces of beer, 5 ounces of wine, or 1½ ounces of liquor. Lose weight, if needed. Being overweight increases your risk of certain health conditions. These include heart disease, high blood pressure, type 2 diabetes, and certain types of cancer. Protect your skin. Do not sunbathe or use tanning beds. Use sunscreen with a SPF 15 or higher. Apply sunscreen at least 15 minutes before you go outside. Reapply sunscreen every 2 hours. Wear protective clothing, hats, and sunglasses when you are outside. Drive safely. Always wear your seatbelt. Make sure everyone in your car wears a seatbelt. A seatbelt can save your life if you are in an accident. Do not use your cell phone when you are driving. This could distract you and cause an accident. Pull over if you need to make a call or send a text message. Practice safe sex. Use latex condoms if are sexually active and have more than one partner. Your healthcare provider may recommend screening tests for sexually transmitted infections (STIs). Wear helmets, lifejackets, and protective gear. Always wear a helmet when you ride a bike or motorcycle, go skiing, or play sports that could cause a head injury. Wear protective equipment when you play sports. Wear a lifejacket when you are on a boat or doing water sports.     © Copyright Alexandra Escalante 2023 Information is for End User's use only and may not be sold, redistributed or otherwise used for commercial purposes. The above information is an  only. It is not intended as medical advice for individual conditions or treatments. Talk to your doctor, nurse or pharmacist before following any medical regimen to see if it is safe and effective for you.

## 2023-11-04 ENCOUNTER — APPOINTMENT (OUTPATIENT)
Dept: LAB | Facility: MEDICAL CENTER | Age: 63
End: 2023-11-04
Payer: COMMERCIAL

## 2023-11-04 DIAGNOSIS — K51.00 ULCERATIVE PANCOLITIS WITHOUT COMPLICATION (HCC): ICD-10-CM

## 2023-11-04 DIAGNOSIS — R79.0 LOW IRON STORES: ICD-10-CM

## 2023-11-04 DIAGNOSIS — E53.8 VITAMIN B12 DEFICIENCY: ICD-10-CM

## 2023-11-04 DIAGNOSIS — M06.09 SERONEGATIVE ARTHROPATHY OF MULTIPLE SITES (HCC): ICD-10-CM

## 2023-11-04 DIAGNOSIS — E61.1 IRON DEFICIENCY: ICD-10-CM

## 2023-11-04 DIAGNOSIS — E06.3 THYROIDITIS, AUTOIMMUNE: ICD-10-CM

## 2023-11-04 DIAGNOSIS — K51.911 ULCERATIVE COLITIS WITH RECTAL BLEEDING, UNSPECIFIED LOCATION (HCC): ICD-10-CM

## 2023-11-04 LAB
25(OH)D3 SERPL-MCNC: 28.7 NG/ML (ref 30–100)
ALBUMIN SERPL BCP-MCNC: 3.5 G/DL (ref 3.5–5)
ALP SERPL-CCNC: 93 U/L (ref 34–104)
ALT SERPL W P-5'-P-CCNC: 43 U/L (ref 7–52)
ANION GAP SERPL CALCULATED.3IONS-SCNC: 7 MMOL/L
AST SERPL W P-5'-P-CCNC: 30 U/L (ref 13–39)
BASOPHILS # BLD AUTO: 0.02 THOUSANDS/ÂΜL (ref 0–0.1)
BASOPHILS NFR BLD AUTO: 0 % (ref 0–1)
BILIRUB SERPL-MCNC: 0.47 MG/DL (ref 0.2–1)
BUN SERPL-MCNC: 15 MG/DL (ref 5–25)
CALCIUM SERPL-MCNC: 9 MG/DL (ref 8.4–10.2)
CHLORIDE SERPL-SCNC: 108 MMOL/L (ref 96–108)
CO2 SERPL-SCNC: 25 MMOL/L (ref 21–32)
CREAT SERPL-MCNC: 0.57 MG/DL (ref 0.6–1.3)
CRP SERPL QL: 2.8 MG/L
EOSINOPHIL # BLD AUTO: 0.14 THOUSAND/ÂΜL (ref 0–0.61)
EOSINOPHIL NFR BLD AUTO: 2 % (ref 0–6)
ERYTHROCYTE [DISTWIDTH] IN BLOOD BY AUTOMATED COUNT: 14.3 % (ref 11.6–15.1)
ERYTHROCYTE [SEDIMENTATION RATE] IN BLOOD: 30 MM/HOUR (ref 0–29)
FERRITIN SERPL-MCNC: 11 NG/ML (ref 11–307)
GFR SERPL CREATININE-BSD FRML MDRD: 100 ML/MIN/1.73SQ M
GLUCOSE P FAST SERPL-MCNC: 89 MG/DL (ref 65–99)
HCT VFR BLD AUTO: 40.3 % (ref 34.8–46.1)
HGB BLD-MCNC: 12.9 G/DL (ref 11.5–15.4)
IMM GRANULOCYTES # BLD AUTO: 0.02 THOUSAND/UL (ref 0–0.2)
IMM GRANULOCYTES NFR BLD AUTO: 0 % (ref 0–2)
IRON SATN MFR SERPL: 10 % (ref 15–50)
IRON SERPL-MCNC: 37 UG/DL (ref 50–212)
LYMPHOCYTES # BLD AUTO: 3.11 THOUSANDS/ÂΜL (ref 0.6–4.47)
LYMPHOCYTES NFR BLD AUTO: 47 % (ref 14–44)
MCH RBC QN AUTO: 29.8 PG (ref 26.8–34.3)
MCHC RBC AUTO-ENTMCNC: 32 G/DL (ref 31.4–37.4)
MCV RBC AUTO: 93 FL (ref 82–98)
MONOCYTES # BLD AUTO: 0.83 THOUSAND/ÂΜL (ref 0.17–1.22)
MONOCYTES NFR BLD AUTO: 12 % (ref 4–12)
NEUTROPHILS # BLD AUTO: 2.67 THOUSANDS/ÂΜL (ref 1.85–7.62)
NEUTS SEG NFR BLD AUTO: 39 % (ref 43–75)
NRBC BLD AUTO-RTO: 0 /100 WBCS
PLATELET # BLD AUTO: 182 THOUSANDS/UL (ref 149–390)
PMV BLD AUTO: 8.9 FL (ref 8.9–12.7)
POTASSIUM SERPL-SCNC: 3.9 MMOL/L (ref 3.5–5.3)
PROT SERPL-MCNC: 6.7 G/DL (ref 6.4–8.4)
RBC # BLD AUTO: 4.33 MILLION/UL (ref 3.81–5.12)
RHEUMATOID FACT SER QL LA: NEGATIVE
SODIUM SERPL-SCNC: 140 MMOL/L (ref 135–147)
TIBC SERPL-MCNC: 359 UG/DL (ref 250–450)
UIBC SERPL-MCNC: 322 UG/DL (ref 155–355)
VIT B12 SERPL-MCNC: 516 PG/ML (ref 180–914)
WBC # BLD AUTO: 6.79 THOUSAND/UL (ref 4.31–10.16)

## 2023-11-04 PROCEDURE — 86235 NUCLEAR ANTIGEN ANTIBODY: CPT

## 2023-11-04 PROCEDURE — 82728 ASSAY OF FERRITIN: CPT

## 2023-11-04 PROCEDURE — 86038 ANTINUCLEAR ANTIBODIES: CPT

## 2023-11-04 PROCEDURE — 82306 VITAMIN D 25 HYDROXY: CPT

## 2023-11-04 PROCEDURE — 86200 CCP ANTIBODY: CPT

## 2023-11-04 PROCEDURE — 82607 VITAMIN B-12: CPT

## 2023-11-04 PROCEDURE — 36415 COLL VENOUS BLD VENIPUNCTURE: CPT

## 2023-11-04 PROCEDURE — 83550 IRON BINDING TEST: CPT

## 2023-11-04 PROCEDURE — 86225 DNA ANTIBODY NATIVE: CPT

## 2023-11-04 PROCEDURE — 85025 COMPLETE CBC W/AUTO DIFF WBC: CPT

## 2023-11-04 PROCEDURE — 82397 CHEMILUMINESCENT ASSAY: CPT

## 2023-11-04 PROCEDURE — 86140 C-REACTIVE PROTEIN: CPT

## 2023-11-04 PROCEDURE — 80053 COMPREHEN METABOLIC PANEL: CPT

## 2023-11-04 PROCEDURE — 83540 ASSAY OF IRON: CPT

## 2023-11-04 PROCEDURE — 85652 RBC SED RATE AUTOMATED: CPT

## 2023-11-04 PROCEDURE — 80230 DRUG ASSAY INFLIXIMAB: CPT

## 2023-11-04 PROCEDURE — 86430 RHEUMATOID FACTOR TEST QUAL: CPT

## 2023-11-04 PROCEDURE — 86039 ANTINUCLEAR ANTIBODIES (ANA): CPT

## 2023-11-06 LAB
ANA HOMOGEN SER QL IF: NORMAL
ANA HOMOGEN TITR SER: NORMAL {TITER}
ANA SER QL IA: POSITIVE
CHROMATIN AB SERPL-ACNC: NEGATIVE
DSDNA AB SER-ACNC: <4 IU/ML
ENA SS-A AB SER IA-ACNC: NEGATIVE
ENA SS-B AB SER IA-ACNC: NEGATIVE

## 2023-11-08 LAB — CCP AB SER IA-ACNC: 1.4

## 2023-11-14 LAB
INFLIXIMAB AB SERPL-MCNC: <22 NG/ML
INFLIXIMAB SERPL-MCNC: 6.7 UG/ML

## 2023-11-30 ENCOUNTER — TELEPHONE (OUTPATIENT)
Dept: FAMILY MEDICINE CLINIC | Facility: CLINIC | Age: 63
End: 2023-11-30

## 2023-11-30 NOTE — TELEPHONE ENCOUNTER
Patient called stating her  has tested yesterday at home for covid and it was positive, and she is asking for a script for Paxlovid if she starts with symptoms that she can start taking. Patient uses [address] [place], [place]    Patient can be reach at [phone].

## 2023-12-02 ENCOUNTER — NURSE TRIAGE (OUTPATIENT)
Dept: FAMILY MEDICINE CLINIC | Facility: CLINIC | Age: 63
End: 2023-12-02

## 2023-12-02 DIAGNOSIS — U07.1 COVID-19: Primary | ICD-10-CM

## 2023-12-02 RX ORDER — NIRMATRELVIR AND RITONAVIR 300-100 MG
3 KIT ORAL 2 TIMES DAILY
Qty: 30 TABLET | Refills: 0 | Status: SHIPPED | OUTPATIENT
Start: 2023-12-02 | End: 2023-12-05

## 2023-12-02 NOTE — TELEPHONE ENCOUNTER
Patient calling in reporting that she tested positive for COVID today and would like to be started on Paxlovid. Patient stated her symptoms started yesterday. Currently experiencing a cough, congestion, headaches, body aches, fever- Tmax 101.9 temporal, chills, fatigue and decreased taste. Patient denies any SOB or wheezing. On call provider contacted, gave a verbal order to send in a prescription for Paxlovid 300/100 therapy pack. Recommends patient follow up with her PCP on Monday and stated she should go to the ED with any worsening symptoms. Patient made aware. COVID care advice reviewed, patient verbalized understanding.    Reason for Disposition  • HIGH RISK for severe COVID complications (e.g., weak immune system, age > 59 years, obesity with BMI > 25, pregnant, chronic lung disease or other chronic medical condition)  (Exception: Already seen by PCP and no new or worsening symptoms.)    Protocols used: Coronavirus (COVID-19) Diagnosed or Suspected-ADULT-

## 2023-12-02 NOTE — TELEPHONE ENCOUNTER
Regarding: request to Paxlovid  ----- Message from Irene Reeves sent at 12/2/2023  9:39 AM EST -----  " I tested positive for covid and would like to get Paxlovid. "

## 2023-12-02 NOTE — TELEPHONE ENCOUNTER
Answer Assessment - Initial Assessment Questions  1. COVID-19 DIAGNOSIS: "Who made your COVID-19 diagnosis?" "Was it confirmed by a positive lab test or self-test?" If not diagnosed by a doctor (or NP/PA), ask "Are there lots of cases (community spread) where you live?" Note: See [facility] health department website, if unsure. Tested positive today     2. COVID-19 EXPOSURE: "Was there any known exposure to COVID before the symptoms began?" CDC Definition of close contact: within 6 feet (2 meters) for a total of 15 minutes or more over a 24-hour period.  is also positive     3. ONSET: "When did the COVID-19 symptoms start?"       Yesterday     4. WORST SYMPTOM: "What is your worst symptom?" (e.g., cough, fever, shortness of breath, muscle aches)      Chills, body aches, congestion, headache     5. COUGH: "Do you have a cough?" If Yes, ask: "How bad is the cough?"        Yes- dry cough just started     6. FEVER: "Do you have a fever?" If Yes, ask: "What is your temperature, how was it measured, and when did it start?"      101.9 Tmax- temporal     7. RESPIRATORY STATUS: "Describe your breathing?" (e.g., shortness of breath, wheezing, unable to speak)       Denies     8. BETTER-SAME-WORSE: "Are you getting better, staying the same or getting worse compared to yesterday?"  If getting worse, ask, "In what way?"      Worse     9. HIGH RISK DISEASE: "Do you have any chronic medical problems?" (e.g., asthma, heart or lung disease, weak immune system, obesity, etc.)      Immunosuppressed- autoimmune disease        10. VACCINE: "Have you had the COVID-19 vaccine?" If Yes, ask: "Which one, how many shots, when did you get it?"        Yes     11. BOOSTER: "Have you received your COVID-19 booster?" If Yes, ask: "Which one and when did you get it?"        Yes but not up to date    12. PREGNANCY: "Is there any chance you are pregnant?" "When was your last menstrual period?"        N/a    13.  OTHER SYMPTOMS: "Do you have any other symptoms?"  (e.g., chills, fatigue, headache, loss of smell or taste, muscle pain, sore throat)        Increased fatigue, decreased taste     14.  O2 SATURATION MONITOR:  "Do you use an oxygen saturation monitor (pulse oximeter) at home?" If Yes, ask "What is your reading (oxygen level) today?" "What is your usual oxygen saturation reading?" (e.g., 95%)        97%    Protocols used: Coronavirus (COVID-19) Diagnosed or Suspected-ADULT-

## 2023-12-03 ENCOUNTER — NURSE TRIAGE (OUTPATIENT)
Dept: OTHER | Facility: OTHER | Age: 63
End: 2023-12-03

## 2023-12-03 NOTE — TELEPHONE ENCOUNTER
Reason for Disposition  • [1] Age > 50 years  AND [2] now alert and feels fine    Answer Assessment - Initial Assessment Questions  1. ONSET: "How long were you unconscious?" (minutes) "When did it happen?"      Last night I was on the toilet and had diarrhea. I felt woozy so I lowered myself to the floor but then I woke up on the floor. I am not sure how long   2. CONTENT: "What happened during period of unconsciousness?" (e.g., seizure activity)       I don't know. I think it was brief  3. MENTAL STATUS: "Alert and oriented now?" (oriented x 3 = name, month, location)       Yes, a & o now   4. TRIGGER: "What do you think caused the fainting?" "What were you doing just before you fainted?"  (e.g., exercise, sudden standing up, prolonged standing)      I was on the toilet having diarrhea   5. RECURRENT SYMPTOM: "Have you ever passed out before?" If Yes, ask: "When was the last time?" and "What happened that time?"       Unknown   6. INJURY: "Did you sustain any injury during the fall?"       No injury  7. CARDIAC SYMPTOMS: "Have you had any of the following symptoms: chest pain, difficulty breathing, palpitations?"      No   8. NEUROLOGIC SYMPTOMS: "Have you had any of the following symptoms: headache, numbness, vertigo, weakness?"      No   9. GI SYMPTOMS: "Have you had any of the following symptoms: abdominal pain, vomiting, diarrhea, blood in stools?"      Had diarrhea last night and today. It is explosive . I do have UC as well   10.  OTHER SYMPTOMS: "Do you have any other symptoms?"        Congestion and cough    Protocols used: Fainting-ADULT-

## 2023-12-03 NOTE — TELEPHONE ENCOUNTER
Pt called stating she started with congestion and cough on Friday, and took a Covid test yesterday which was Positive. Pt states she called and had Paxlovid called in for her but she has not yet started taking it. Pt states last night she got up to go to the bathroom, was sitting on the toilet having diarrhea and felt weak. She lowered herself to the floor and then awoke lying on the floor. Pt was not injured. Today, pt feels well, has no weakness or dizziness. She is drinking fluids including ginger ale and electrolyte drink. She has decided to hold off starting the Paxlovid as she feels that she has mild Covid symptoms and does not wan to introduce anything else into her system that may make her feel nauseated . Advised pt per protocol but pt refuses to go to the ER at this time. Pt will monitor for new or concerning symptoms and will call back or go to ER if she feels it is warranted.

## 2023-12-05 ENCOUNTER — TELEPHONE (OUTPATIENT)
Dept: FAMILY MEDICINE CLINIC | Facility: CLINIC | Age: 63
End: 2023-12-05

## 2023-12-05 ENCOUNTER — TELEMEDICINE (OUTPATIENT)
Dept: FAMILY MEDICINE CLINIC | Facility: CLINIC | Age: 63
End: 2023-12-05
Payer: COMMERCIAL

## 2023-12-05 DIAGNOSIS — U07.1 COVID-19 VIRUS INFECTION: Primary | ICD-10-CM

## 2023-12-05 PROCEDURE — 99213 OFFICE O/P EST LOW 20 MIN: CPT | Performed by: NURSE PRACTITIONER

## 2023-12-05 NOTE — PROGRESS NOTES
COVID-19 Outpatient Progress Note    Assessment/Plan:    Problem List Items Addressed This Visit    None     Disposition:     Patient with asymptomatic/mild COVID-19: They were recommended to isolate for at least 5 days (day 0 is the day symptoms appeared or the date the specimen was collected for the positive test for people who are asymptomatic). If they are asymptomatic or symptoms are improving with no fevers in the past 24 hours, isolation may be ended followed by 5 days of wearing a high quality mask when around others to minimize risk of infecting others. They should wear a mask through day 10 and a test-based strategy may be used to remove a mask sooner. Patient is aware of isolation recommendations. Discussed symptom management. Can use nasal flushes/nasal saline spray to help with congestion. Advised patient that antibiotics would not be indicated at this time as this is viral.  Advised patient of red flag symptoms as to when to call if symptoms were to change or worsen. Continue to monitor symptoms. Stay well-hydrated. Increase electrolytes. Take time when changing positions. If she has any more vasovagal type event she is to call immediately. At this time that episode sounds secondary to dehydration illness and going to the bathroom. Blood pressure typically runs low to begin with. She will call for any changes. I have spent a total time of 20 minutes on the day of the encounter for this patient including importance of treatment compliance, impressions and obtaining or reviewing history.        Encounter provider: RODNEY Walker     Provider located at: [address] [place] [address] [address]  [facility] [id number]  [phone]     Recent Visits  Date Type Provider Dept   11/30/23 Telephone [facility]   Showing recent visits within past 7 days and meeting all other requirements  Future Appointments  No visits were found meeting these conditions. Showing future appointments within next 150 days and meeting all other requirements     This virtual check-in was done via [employer] and patient was informed that this is a secure, HIPAA-compliant platform. She agrees to proceed. Patient agrees to participate in a virtual check in via telephone or video visit instead of presenting to the office to address urgent/immediate medical needs. Patient is aware this is a billable service. She acknowledged consent and understanding of privacy and security of the video platform. The patient has agreed to participate and understands they can discontinue the visit at any time. After connecting through [facility], the patient was identified by name and date of birth. Dawson Jonas was informed that this was a telemedicine visit and that the exam was being conducted confidentially over secure lines. My office door was closed. No one else was in the room. Dawson Jonas acknowledged consent and understanding of privacy and security of the telemedicine visit. I informed the patient that I have reviewed her record in Epic and presented the opportunity for her to ask any questions regarding the visit today. The patient agreed to participate. Verification of patient location:  Patient is located in the following state in which I hold an active license: [place]    Subjective:   Dawson Jonas is a 66 y.o. female who has been screened for COVID-19. Symptom change since last report: unchanged. Patient's symptoms include nasal congestion, anosmia, loss of taste, cough (occasional) and myalgias. Patient denies fever, chills, fatigue, malaise, rhinorrhea, sore throat, shortness of breath, chest tightness, abdominal pain, nausea, vomiting, diarrhea and headaches. - Date of symptom onset: 12/1/2023  - Date of positive COVID-19 test: 12/2/2023. Type of test: Home antigen.  Patient with typical symptoms of COVID-19 and they attest that they were positive on home rapid antigen testing. Image of positive result is not able to be uploaded into their chart. COVID-19 vaccination status: Fully vaccinated with booster    Janie Rosen has been staying home and has isolated themselves in her home. She is taking care to not share personal items and is cleaning all surfaces that are touched often, like counters, tabletops, and doorknobs using household cleaning sprays or wipes. She is wearing a mask when she leaves her room. Called 12/2- requesting paxlovid that was sent over. She didn't take it as she wasn't having any respiratory symptoms. Today is day 4 of symptoms. She feels it could be turning into a sinus infection. She states left side of nose is congested. Showering did help. She is drinking a lot of water. Eating and drinking okay. First time with COVID. Day 5 of symptoms. 12/6/23. Passed out in her bathroom a few days ago. She feels it was vasovagal syncope. Her daughter is a nurse and discussed it with her. This happened Sunday. She felt it coming on so she kneeled on the floor. It was only a few seconds. She was going to the bathroom at the time- bowel movement. Lab Results   Component Value Date    SARSCOV2 Negative 07/25/2022       Review of Systems   Constitutional:  Negative for chills, fatigue and fever. HENT:  Positive for congestion. Negative for rhinorrhea and sore throat. Eyes:  Negative for discharge. Respiratory:  Positive for cough (occasional). Negative for chest tightness and shortness of breath. Cardiovascular:  Negative for chest pain. Gastrointestinal:  Negative for abdominal pain, constipation, diarrhea, nausea and vomiting. Genitourinary:  Negative for difficulty urinating. Musculoskeletal:  Positive for myalgias. Negative for joint swelling. Skin:  Negative for rash. Neurological:  Negative for headaches. Hematological:  Negative for adenopathy.    Psychiatric/Behavioral:  The patient is not nervous/anxious. Current Outpatient Medications on File Prior to Visit   Medication Sig    ALPRAZolam (XANAX) 0.25 mg tablet TAKE 1 TABLET DAILY AT BEDTIME AS NEEDED FOR ANXIETY    budesonide 9 mg TB24 Take 9 mg by mouth in the morning    busPIRone (BUSPAR) 5 mg tablet Take 1 tablet (5 mg total) by mouth daily for 3 days, THEN 1 tablet (5 mg total) 2 (two) times a day for 27 days. (Patient not taking: Reported on 8/22/2023)    Calcium Carb-Cholecalciferol (calcium carbonate-vitamin D) 500 mg-5 mcg tablet Take 1 tablet by mouth daily with breakfast    Cholecalciferol (VITAMIN D PO) Take 5,000 Units by mouth daily    Cyanocobalamin (VITAMIN B-12 PO) Take by mouth daily (Patient not taking: Reported on 10/30/2023)    inFLIXimab-axxq (AVSOLA IV) Inject into a catheter in a vein every 4 weeks    nirmatrelvir & ritonavir (Paxlovid, 300/100,) tablet therapy pack Take 3 tablets by mouth 2 (two) times a day for 5 days Take 2 nirmatrelvir tablets + 1 ritonavir tablet together per dose       Objective: There were no vitals taken for this visit. Physical Exam  Constitutional:       General: She is not in acute distress. Appearance: Normal appearance. She is not ill-appearing, toxic-appearing or diaphoretic. HENT:      Head: Normocephalic and atraumatic. Nose: Nose normal.   Pulmonary:      Effort: Pulmonary effort is normal. No respiratory distress. Skin:     Coloration: Skin is not pale. Neurological:      General: No focal deficit present. Mental Status: She is alert and oriented to person, place, and time.    Psychiatric:         Mood and Affect: Mood normal.       RODNEY Cook

## 2023-12-08 ENCOUNTER — NURSE TRIAGE (OUTPATIENT)
Age: 63
End: 2023-12-08

## 2023-12-08 NOTE — TELEPHONE ENCOUNTER
===View-only below this line===  ----- Message -----  From: Vickie Sung MD  Sent: 12/8/2023   1:12 PM EST  To: [facility]    Would wait 1 more week for the Remicade.

## 2023-12-08 NOTE — TELEPHONE ENCOUNTER
Spoke with patient, reviewed provider's message. Pt will schedule inflixamab infusion for 12/15 or 12/18.

## 2023-12-08 NOTE — TELEPHONE ENCOUNTER
Pt called to report continuing COVID symptoms and concerned regarding overdue Infliximab infusion. Feeling "lousy, I feel like a sinus infection". Had PCP telemedicine visit 12/5/23 and states per PCP, symptoms likely related to Covid. Afebrile; took temp while on call 97.6 and 98.4. Denies respiratory symptoms, SOB. I recommended pt reach back out to PCP for continued symptoms however pt states she is feeling a "little better" and wants to "wait it out". Pt aware to proceed to Urgent Care/ED for any worsening. Pt would like to discuss when it would be appropriate to get infusion. Please advise. Answer Assessment - Initial Assessment Questions  1. REASON FOR CALL or QUESTION:     Pt called to report continuing COVID symptoms and concerned regarding overdue Infliximab infusion. Feeling "lousy, I feel like a "sinus infection". Had PCP telemedicine visit and states per PCP, symptoms likely related to Covid. Afebrile; took temp while on call 97.6 and 98. 4. Protocols used:  Information Only Call - No Triage-ADULT-[place]

## 2023-12-08 NOTE — TELEPHONE ENCOUNTER
Reason for Disposition  • Nursing judgment    Protocols used:  Information Only Call - No Triage-ADULT-[place]

## 2023-12-08 NOTE — TELEPHONE ENCOUNTER
Patients GI provider:  Dr. Dali Cisneros    Number to return call: [phone]    Reason for call: Pt calling to advise she had Covid, is still not feeling well. Wanted to check to see if that would be an issue for her upcoming infusion. Transferred to [facility] in triage for further assistance.     Scheduled procedure/appointment date if applicable: Apt 45/45/1378

## 2023-12-14 DIAGNOSIS — K51.911 ULCERATIVE COLITIS WITH RECTAL BLEEDING, UNSPECIFIED LOCATION (HCC): ICD-10-CM

## 2023-12-14 RX ORDER — BUDESONIDE 9 MG/1
9 TABLET, FILM COATED, EXTENDED RELEASE ORAL DAILY
Qty: 30 TABLET | Refills: 5 | Status: SHIPPED | OUTPATIENT
Start: 2023-12-14

## 2023-12-14 NOTE — TELEPHONE ENCOUNTER
Reason for call:   [x] Refill   [] Prior Auth  [] Other:     Office:   [] PCP/Provider -   [x] Specialty/Provider - GI    Medication:   Budesonide 9mg tablet- Take 1 tablet by mouth in the morning      Pharmacy: [facility]    Does the patient have enough for 3 days?    [] Yes   [x] No - Send as HP to POD

## 2024-01-09 ENCOUNTER — OFFICE VISIT (OUTPATIENT)
Dept: GASTROENTEROLOGY | Facility: CLINIC | Age: 64
End: 2024-01-09
Payer: COMMERCIAL

## 2024-01-09 VITALS
HEIGHT: 62 IN | TEMPERATURE: 97.6 F | BODY MASS INDEX: 24.92 KG/M2 | HEART RATE: 99 BPM | OXYGEN SATURATION: 99 % | WEIGHT: 135.4 LBS | DIASTOLIC BLOOD PRESSURE: 80 MMHG | SYSTOLIC BLOOD PRESSURE: 123 MMHG

## 2024-01-09 DIAGNOSIS — E61.1 IRON DEFICIENCY: ICD-10-CM

## 2024-01-09 DIAGNOSIS — E55.9 VITAMIN D DEFICIENCY: ICD-10-CM

## 2024-01-09 DIAGNOSIS — K51.911 ULCERATIVE COLITIS WITH RECTAL BLEEDING, UNSPECIFIED LOCATION (HCC): Primary | ICD-10-CM

## 2024-01-09 PROCEDURE — 99215 OFFICE O/P EST HI 40 MIN: CPT | Performed by: INTERNAL MEDICINE

## 2024-01-09 RX ORDER — TIMOLOL MALEATE 6.8 MG/ML
SOLUTION/ DROPS OPHTHALMIC
COMMUNITY
Start: 2023-11-28

## 2024-01-09 NOTE — PROGRESS NOTES
Gastroenterology Outpatient Follow-up - Crohn's Disease  Leigh Ann Sarabia 63 y.o. female MRN: [id number]  Encounter: [id number]    Leigh Ann Sarabia is a 63 y.o. female with Ulcerative colitis.    Symptom onset:  2017  Diagnosis:  ulcerative colitis  Year of diagnosis:  2017    IBD Summary: Leigh Ann Sarabia has has ulcerative pancolitis diagnosed in 2017.  She lost response to mesalamine.  She has been treated with vedolizumab but lost response again.  She then switched to  ustekinumab but was a primary nonresponder.  Currently on infliximab with partial response despite dose escalation.  She also has chronically elevated alkaline phosphatase and had positive AMA in 2017.  MRCP with suggestion of early PSC.  Her daughter had UC and PSC.    Ulcerative Colitis Summary  Macroscopic extent of diseases: pancolitis  Microscopic extent of diseases:  pancolitis  Has the patient ever been hospitalized for severe disease: No        Surgical History  Number of IBD surgeries: 0      First IBD surgery:    Most Recent IBD surgery:    Esophageal:  0    Gastroduodenal:  0    Small bowel resection(s):  0    Ileocolonic resection(s): unknown      Colonic resection(s):  0    Ileostomy or colostomy:  no previous ostomy    Complete colectomy:  No         Medications     Year last used Reason for discontinuation   Corticosteroids prior   2023       Thiopurines   never         Methotrexate   never         Infliximab   never         Adalimumab   never         Certolizumab   never         Golimumab   never         Natalizumab   never         Mesalamine prior     CARLOS     Sulfasalzine   never         Vedolizumab prior   2022 CARLOS     Ustekinumab   never         Tofacitinib   never         Other biologic   never             Extraintestinal Manifestations    IBD-associated arthropathy Yes    Uveitis Yes  iritis x 2    Oral aphthous ulcers No   Erythema nodosum No    Pyoderma gangrenosum No    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 Daughter with PSC.    Thrombotic complications No          Cancer / Dysplasia History  History of IBD-associated dysplasia: none    Date of diagnosis (Year):     History of colorectal cancer: No   History of cervical dysplasia: No   History of skin cancer: squamous cell carcinoma         Laboratory Data   Most recent (date) Result   PPD   unknown   Quanitferon gold 9/12/2022 negative   TPMT 1/17/2023 normal   Hepatitis A       HBsAb 9/12/2022 negative   HBcAb 9/12/2022 negative   HBsAg 9/12/2022 negative   HCV Ab   unknown       Imaging / Diagnostic Procedures   Most recent (date) Findings   Colonoscopy or sigmoidoscopy #1 1/12/2023            Ulcers/Erosions  small           Strictures  none           Stricture Severity  N/A           Endoscopic Score Fregoso Score:  2 Rutgeert's:  N/A            Findings  (1) Normal TI.  (2) Moderate edematous, erythematous, friable and hemorrhagic mucosa with erosion and loss of vascular pattern in the cecum, ascending colon, hepatic flexure, transverse colon, splenic flexure, descending colon, sigmoid colon, rectosigmoid and rectum, consistent with ulcerative colitis.           Pathology   A. Colon, Cecum, Biopsy:  - Mildly active chronic colitis.  - Negative for granulomata, viral changes, and dysplasia.  B. Small Intestine, Terminal Ileum, Biopsy:  - Small intestinal mucosa with no specific pathologic change.  C. Colon, Ascending, Biopsy:  - Mildly active chronic colitis.  - Negative for granulomata, viral changes, and dysplasia.  D. Colon, Transverse, Biopsy:  - Mildly active chronic colitis.  - Negative for granulomata, viral changes, and dysplasia.  E. Colon, Descending, Biopsy:  - Mildly active chronic colitis.  - Negative for granulomata, viral changes, and dysplasia.  F. Colon, Sigmoid, Biopsy:  - Superficial fragments of colonic mucosa with mild acute colitis.  G. Colon, Rectum, Biopsy:  - Mildly active chronic colitis.  - Negative for  granulomata, viral changes, and dysplasia.   Colonoscopy or sigmoidoscopy #2              Ulcers/Erosions                 Strictures                 Stricture Severity              Endoscopic Score Fregoso Score:    Rugeert's:              Findings              Pathology      EGD       Small bowel follow-through       CT enterography       CT without enterography       MRI enterography       Capsule study       DEXA scan   Lowest Z-score:      CXR (for TB)           HPI:   Interval History:  1/9/2024 Follow up  Since last visit, she has remained on infliximab 10 mg every 4 weeks.  Trough level was only 6.7 mcg/mL, no antibodies.  She had COVID and had to delay her infusion.  She is also on Uceris 9 mg every other day.  Feels ok.  Still has 4-5 BMs per day, sometimes sees blood, urgency has improved, energy is ok.  However, still does not feel like she is 100%.   Her calprotectin was 2360 before we started Uceris; we have not rechecked.  Still feels fatigued.  Has gained weight. Overdue for pap smear; did see dermatologist.  Labs from 11/4/2023 show infliximab trough of 6.7 ug/ml without antibodies.  CRP 2.8, ESR 30, CBC with normal hemoglobin 12.9, MCV 93, ferritin low at 11, CMP with normal LFTs and kidney function.  RF negative, cyclic citrullinatd peptide antibody normal, TIFFANIE positive.    10/13/2020 Follow up  She is not doing well since her last visit.  She remains on infliximab (Avsola) 10 mg every 4 weeks.  Last infusion was on 10/9/2023. However, she reports starting to feel fatigued approximately 90 days ago.  This coincided with seeing more blood in the stool, having increased urgency, lightheadedness, low energy.  No abdominal pain.  Calprotectin on 10/6/2023 was 2360, which was 3 days prior to her infusion.  She says that her stool frequency has slightly improved since the last infliximab dose.  C. diff was negative.  She had 1 visit to the ER because of lightheadedness and hypotension, but Hgb was 12.5 and  BP there was normal.  This delayed infliximab by 4 days.  She saw Rheumatology for seronegative arthropathy and started an anti-inflammatory diet.  Joint pain is much improved.  Also avoiding gluten for the past 3 years.   Thinks she has yeast infection. Having bad GERD currently.  She has history of SCC.  She had iron infusions in June.      8/22/2023 Follow up  Since last visit, we increased infliximab based on subtherapeutic drug level and she is now on 10 mg/kg every 4 weeks.  She has had 3 monthly doses of high dose infliximab.  Feels like this has helped and she is slowly improving.  Calprotectin improved from 11 100-543.  Still has 3-4 BMs per day with occasional blood, but much improved from before.  No longer on steroids.  No pain.  Occasional incontinence when she has anxiety.   Labs from August showed mild ALT and AST elevation.  Has brain fog and fatigue after infusions; no fevers.  Still needs Prevnar.  Will get flu shot.  Completed 4 IV iron infusions and felt much improved.  Needs GYN appointment.  Saw dermatology.      4/19/2023 Return  Since last visit, started infliximab (Avsola) and has had 3 loading doses.  3rd dose was on 4/10.  Feels like BMs have improved, but are still inconsistent.  No abdominal pain. Occasional urgency.  Calprotectin 372 but trending down.  No bleeding for the past 2 weeks.  Energy is poor.  She cannot tolerate PO iron.  Gets SOB with exertion.  Poor exercise tolerance.  She feels dizzy on occasion since startign avsola. She is still on prednisone but down to 5 mg daily.  Has been on it since July.  Seeing Derm in June for h/o SCC.  Overdue for Pap.  She may have PSC based on chronic Ap elevation and recent MRCP, although last AP was normal.     1/26/2023 Return visit  Leigh Ann Sarabia is establishing care with me for UC.  Diagnosed with UC in 2017 - diarrhea and bleeding.  She was treated with mesalamine (Lialda) and did well for a couple of years. In 2020, flared and  started vedolizumab but she lost response after a few months.  Ustekinumab was increased to every 6 weeks, but did not work. She then started ustekinumab 10/2022. However, has been on anf off prednisone for 5 months, currently on 10 mg.  Getting Stelara every 8 weeks.  Some days are good with formed stools, other days with loose BMs and bleeding. Colonoscopy this month with Fregoso 2 disease.  She has had COVID vaccines + booster and flu vaccine. No pneumococcal vaccine.  Last pap smear 1.5 years.  SCC removed ~2018 from chest.  Last derm ~ 6/2022.    Has osteopenia.  H/o iritis.  Of note, her daughter his UC and PSC.  Patient has chronic alkaline phosphatase elevation and had positive AMA in 2017.  Recent MRCP with mild intrahepatic biliary dilatation but no other signs of PSC.    12/19/22  First treated with lialda for about 2 years.    First biologic was entyvio which started in October of 2021 but in February of 2022 symptoms worsened, and was treated with prednisone with symptoms abating but as she was titrating down her prednisone symptoms worsened  She is currently on the autoimmune protocol diet since July of 2022  In the past week and a half- reporting normal BM's; formed, 1-2 times daily, without bleeding.  Gluten free and antiinflammatory diet - good for joints.  Had iritis x 2 in 2022.  Using steroid eye drops.      Leigh Ann reports the following symptoms over the last 7 days:    Stool Frequency 1-2 stools/day more than normal   Average stools per day: 5   Average liquid stools per day: 0   Consistency of bowel: soft or semi-formed   Awakening from sleep to move bowels? No   Urgency mild fecal urgency   Visible blood in stool? visible blood in stool half of the time or more   Abdominal pain? none   General Wellbeing? slightly under par     Leigh Ann also reports the following symptoms in the last month:    Leakage of stool while sleeping? No   Leakage of stool while awake? No       REVIEW OF SYSTEMS:  During the  "last 7 days, Leigh Ann experienced the following symptoms:  Unintentional Weight Loss (in the last month) Yes  Comment:Weight gain   Fever No   Eye irritation No   Mouth sores No   Sore throat No   Chest pain No   Shortness of breath No   Numbness or tingling in hands or feet No   Skin rash No   Pain or swelling in joints Yes  Comment:hip pain   Bruising or bleeding No   Felt depressed or blue Yes   Fatigue Yes   Dysuria No   Please see HPI for additional pertinent review of systems; otherwise remainder of ROS was unremarkable    MEDICATIONS:    Current Outpatient Medications:     ALPRAZolam (XANAX) 0.25 mg tablet    budesonide 9 mg TB24    Calcium Carb-Cholecalciferol (calcium carbonate-vitamin D) 500 mg-5 mcg tablet    inFLIXimab-axxq (AVSOLA IV)    Timolol Maleate, Once-Daily, 0.5 % SOLN    busPIRone (BUSPAR) 5 mg tablet    Cholecalciferol (VITAMIN D PO)    Cyanocobalamin (VITAMIN B-12 PO)    Vedolizumab (ENTYVIO SC)    ALLERGIES:  Allergies   Allergen Reactions    Amoxicillin GI Intolerance       OBJECTIVE:  /80 (BP Location: Left arm, Patient Position: Sitting, Cuff Size: Standard)   Pulse 99   Temp 97.6 °F (36.4 °C) (Tympanic)   Ht 5' 1.5\" (1.562 m)   Wt 61.4 kg (135 lb 6.4 oz)   SpO2 99%   BMI 25.17 kg/m²      PHYSICAL EXAM:    General Appearance:   Alert, cooperative, no distress   HEENT:   Normocephalic, atraumatic, anicteric.     Neck:  Supple, symmetrical, trachea midline   Lungs:   Clear to auscultation bilaterally; no rales, rhonchi or wheezing; respirations unlabored    Heart::   Regular rate and rhythm; no murmur, rub, or gallop.   Abdomen:   Soft, non-distended; normal bowel sounds    Abdominal exam was notable for no tenderness with no mass.     Genitalia:   Deferred    Rectal:   Perirectal assessment was not performed.                     Extremities:  No cyanosis, clubbing or edema    Pulses:  2+ and symmetric    Skin:  No jaundice, rashes, or lesions    Lymph nodes:  No palpable cervical " lymphadenopathy        Lab Results   Component Value Date    WBC 6.79 11/04/2023    HGB 12.9 11/04/2023    HCT 40.3 11/04/2023    MCV 93 11/04/2023     11/04/2023     Lab Results   Component Value Date     06/10/2014    SODIUM 140 11/04/2023    K 3.9 11/04/2023     11/04/2023    CO2 25 11/04/2023    ANIONGAP 8 06/10/2014    AGAP 7 11/04/2023    BUN 15 11/04/2023    CREATININE 0.57 (L) 11/04/2023    GLUC 105 10/04/2023    GLUF 89 11/04/2023    CALCIUM 9.0 11/04/2023    AST 30 11/04/2023    ALT 43 11/04/2023    ALKPHOS 93 11/04/2023    PROT 7.0 06/10/2014    TP 6.7 11/04/2023    BILITOT 0.4 06/10/2014    TBILI 0.47 11/04/2023    EGFR 100 11/04/2023     Lab Results   Component Value Date    CRP 2.8 11/04/2023     Lab Results   Component Value Date    ZUKHUOEA61 516 11/04/2023     Lab Results   Component Value Date    FERRITIN 11 11/04/2023       ASSESSMENT AND PLAN:    Leigh Ann has a history of macroscopic pancolitis and microscopic pancolitis  ulcerative colitis diagnosed in 2017. Current medical therapy is with infliximab 10 mg/kg every 8 weeks. My global assessment is that the clinical disease is currently mildy active.     The 6-point Fregoso score was 3 and the 9-point Fregoso score was 4.  The short CDAI was 93.      Leigh Ann has ulcerative pancolitis diagnosed in 2017. She has been on mesalamine, vedolizumab, and ustekinumab with either PNR or CARLOS.  She is now on infliximab response.  Her symptoms have improved with dose escalation but she is still not at baseline.  Her trough level was under 10 mcg/mL even on 10 mg/kg every 4 weeks.  Her calprotectin was over 2000 and she had clinical improvement with Uceris.  1.  We are going to repeat another calprotectin level.  If it is still elevated, it would be very reasonable to increase infliximab further to 15 mg/kg every 4 weeks.  This will is based on therapeutic drug monitoring showing suboptimal level and the fact that she had partial clinical improvement  with the dose escalation thus far.  2.  Repeat CBC and iron labs.  3.  Recheck vitamin D.  4.  For now, continue Uceris 9 mg every other day.  5.  Continue infliximab 10 mg/kg every 4 weeks.  6.  Annual dermatology and GYN exams.  7.  Vaccinations as outlined below.  Return in 3 months.      Health Maintenance Recommendations:  Vaccines & Infections  COVID-19 vaccination and boosters are recommended. There is no evidence that the COVID-19 vaccine would cause an IBD flare.  Avoid live vaccines if on immunosuppressive therapy.  Yearly influenza vaccine (flu shot).  Pneumonia vaccines for patients on immunosuppression. These include Prevnar 20, followed by Pneumovax 23 at least 8 weeks later.  Shingrix vaccine (series of 2 injections) for al patients 65 and older. Patients on tofacitinib or upadacitinib should be vaccinated regardless of age.  If not immune to measles mumps or rubella, MMR vaccine is recommended. However, this is a live vaccine and should be given prior to immunosuppressive therapy.  HPV vaccination as per national guidelines.  Hepatitis A and B vaccinations if not previously vaccinated.  Testing for tuberculosis with QuantiFERON Gold blood test and/or chest xray prior to starting immunosuppressive medications, and then annually    Cancer screening  Dysplasia surveillance for colorectal cancer. Colonoscopy in all patients with extensive colitis (more than 1/3 of the colon involved) who had disease for at least 8 or more years.  Repeat colonoscopy approximately every 12-24 months.  In patients with concurrent primary sclerosing cholangitis, history of dysplasia, or family history of colon cancer, repeat colonoscopy annually.    Females: Pap smear annually for woman on immunosuppression.  Annual dermatologic/skin exam in all patients with IBD, especially those on immunosuppression with thiopurines or CEDRICK inhibitors.    Miscellaneous  DEXA scan, once off steroids for 3 months  Depression screening  recommended annually  Routine dental and ophthalmology examinations    Problem List Items Addressed This Visit       Vitamin D deficiency    Relevant Orders    Vitamin D 25 hydroxy    Ulcerative colitis (HCC) - Primary    Relevant Orders    Calprotectin,Fecal    TIBC Panel (incl. Iron, TIBC, % Iron Saturation)    Ferritin    CBC and differential    Vitamin D 25 hydroxy     Other Visit Diagnoses       Iron deficiency        Relevant Orders    TIBC Panel (incl. Iron, TIBC, % Iron Saturation)    Ferritin    CBC and differential            Filemon Martinez MD

## 2024-01-10 ENCOUNTER — LAB (OUTPATIENT)
Dept: LAB | Facility: MEDICAL CENTER | Age: 64
End: 2024-01-10
Payer: COMMERCIAL

## 2024-01-10 DIAGNOSIS — K51.911 ULCERATIVE COLITIS WITH RECTAL BLEEDING, UNSPECIFIED LOCATION (HCC): ICD-10-CM

## 2024-01-10 DIAGNOSIS — E55.9 VITAMIN D DEFICIENCY: ICD-10-CM

## 2024-01-10 DIAGNOSIS — E61.1 IRON DEFICIENCY: ICD-10-CM

## 2024-01-10 LAB
25(OH)D3 SERPL-MCNC: 37.3 NG/ML (ref 30–100)
BASOPHILS # BLD AUTO: 0.04 THOUSANDS/ÂΜL (ref 0–0.1)
BASOPHILS NFR BLD AUTO: 1 % (ref 0–1)
EOSINOPHIL # BLD AUTO: 0.13 THOUSAND/ÂΜL (ref 0–0.61)
EOSINOPHIL NFR BLD AUTO: 2 % (ref 0–6)
ERYTHROCYTE [DISTWIDTH] IN BLOOD BY AUTOMATED COUNT: 14.7 % (ref 11.6–15.1)
FERRITIN SERPL-MCNC: 10 NG/ML (ref 11–307)
HCT VFR BLD AUTO: 39.7 % (ref 34.8–46.1)
HGB BLD-MCNC: 12.8 G/DL (ref 11.5–15.4)
IMM GRANULOCYTES # BLD AUTO: 0.04 THOUSAND/UL (ref 0–0.2)
IMM GRANULOCYTES NFR BLD AUTO: 1 % (ref 0–2)
IRON SATN MFR SERPL: 13 % (ref 15–50)
IRON SERPL-MCNC: 50 UG/DL (ref 50–212)
LYMPHOCYTES # BLD AUTO: 2.68 THOUSANDS/ÂΜL (ref 0.6–4.47)
LYMPHOCYTES NFR BLD AUTO: 33 % (ref 14–44)
MCH RBC QN AUTO: 29.6 PG (ref 26.8–34.3)
MCHC RBC AUTO-ENTMCNC: 32.2 G/DL (ref 31.4–37.4)
MCV RBC AUTO: 92 FL (ref 82–98)
MONOCYTES # BLD AUTO: 0.98 THOUSAND/ÂΜL (ref 0.17–1.22)
MONOCYTES NFR BLD AUTO: 12 % (ref 4–12)
NEUTROPHILS # BLD AUTO: 4.23 THOUSANDS/ÂΜL (ref 1.85–7.62)
NEUTS SEG NFR BLD AUTO: 51 % (ref 43–75)
NRBC BLD AUTO-RTO: 0 /100 WBCS
PLATELET # BLD AUTO: 208 THOUSANDS/UL (ref 149–390)
PMV BLD AUTO: 9.2 FL (ref 8.9–12.7)
RBC # BLD AUTO: 4.33 MILLION/UL (ref 3.81–5.12)
TIBC SERPL-MCNC: 399 UG/DL (ref 250–450)
UIBC SERPL-MCNC: 349 UG/DL (ref 155–355)
WBC # BLD AUTO: 8.1 THOUSAND/UL (ref 4.31–10.16)

## 2024-01-10 PROCEDURE — 82306 VITAMIN D 25 HYDROXY: CPT

## 2024-01-10 PROCEDURE — 83540 ASSAY OF IRON: CPT

## 2024-01-10 PROCEDURE — 83550 IRON BINDING TEST: CPT

## 2024-01-10 PROCEDURE — 85025 COMPLETE CBC W/AUTO DIFF WBC: CPT

## 2024-01-10 PROCEDURE — 36415 COLL VENOUS BLD VENIPUNCTURE: CPT

## 2024-01-10 PROCEDURE — 82728 ASSAY OF FERRITIN: CPT

## 2024-01-12 ENCOUNTER — APPOINTMENT (OUTPATIENT)
Dept: LAB | Facility: CLINIC | Age: 64
End: 2024-01-12
Payer: COMMERCIAL

## 2024-01-12 PROCEDURE — 83993 ASSAY FOR CALPROTECTIN FECAL: CPT

## 2024-01-16 LAB — CALPROTECTIN STL-MCNT: 499 UG/G (ref 0–120)

## 2024-01-19 ENCOUNTER — TELEPHONE (OUTPATIENT)
Dept: GASTROENTEROLOGY | Facility: CLINIC | Age: 64
End: 2024-01-19

## 2024-01-19 NOTE — TELEPHONE ENCOUNTER
----- Message from Gloria Narvaez sent at 1/19/2024  7:52 AM EST -----    ----- Message -----  From: Filemon Martinez MD  Sent: 1/19/2024  12:01 AM EST  To: Gastro Ibd    Hi, she is already on Avsola 10 mg/kg every 4 weeks.  I would like to increase the dose further to 15 mg/kg every 4 weeks.  Her trough level is still low and she is having a clear partial response.  I am sure I will have to write a letter as some point. Thanks  -VT

## 2024-01-19 NOTE — RESULT ENCOUNTER NOTE
Hi, she is already on Avsola 10 mg/kg every 4 weeks.  I would like to increase the dose further to 15 mg/kg every 4 weeks.  Her trough level is still low and she is having a clear partial response.  I am sure I will have to write a letter as some point. Thanks  -VT

## 2024-01-29 DIAGNOSIS — F40.240 CLAUSTROPHOBIA: ICD-10-CM

## 2024-01-29 RX ORDER — ALPRAZOLAM 0.25 MG/1
0.25 TABLET ORAL 2 TIMES DAILY PRN
Qty: 90 TABLET | Refills: 0 | Status: SHIPPED | OUTPATIENT
Start: 2024-01-29

## 2024-01-29 NOTE — TELEPHONE ENCOUNTER
Reason for call:   [x] Refill   [] Prior Auth  [] Other:     Office:   [x] PCP/Provider - Dr Valentino   [] Specialty/Provider -     Medication: alprazolam     Dose/Frequency: 0.25 mg /  as needed    Quantity: 90D    Pharmacy: [facility]     Does the patient have enough for 3 days?   [x] Yes   [] No - Send as HP to POD

## 2024-02-01 ENCOUNTER — TRANSCRIBE ORDERS (OUTPATIENT)
Dept: GASTROENTEROLOGY | Facility: CLINIC | Age: 64
End: 2024-02-01

## 2024-02-22 ENCOUNTER — PATIENT MESSAGE (OUTPATIENT)
Dept: GASTROENTEROLOGY | Facility: CLINIC | Age: 64
End: 2024-02-22

## 2024-02-29 ENCOUNTER — TELEPHONE (OUTPATIENT)
Dept: GASTROENTEROLOGY | Facility: CLINIC | Age: 64
End: 2024-02-29

## 2024-02-29 DIAGNOSIS — K51.911 ULCERATIVE COLITIS WITH RECTAL BLEEDING, UNSPECIFIED LOCATION (HCC): Primary | ICD-10-CM

## 2024-02-29 NOTE — TELEPHONE ENCOUNTER
----- Message from Sierraike Acevedo sent at 2/29/2024 10:34 AM EST -----  Regarding: FW: From Leigh Ann Sarabia  Contact: [phone]    ----- Message -----  From: Leigh Ann Sarabia  Sent: 2/29/2024   1:02 AM EST  To: Gastroenterology Pod Clinical  Subject: From Leigh Ann Sarabia                                Hi Dr. Martinez- alan to bother you again. So- about  3 weeks ago my  and I got a 10 week old puppy. It turns out that he had Giardia. He went through one course of medication  but he still had symptoms. Now he is going through another round of medication. I know that it’s rare for a human to get Giardia   from a dog but I’m  a little concerned. The past 3 or 4 days I’ve been having really bad urgency- like barely making it to the bathroom. And I’ve been having very loose bowel movements 6 or 7 times a day. I haven’t even started tapering off of Budesonide yet. I have no idea if it’s connected but I was wondering if I could be tested for Giardia and possibly even c-diff? Thank you- Leigh Ann

## 2024-03-02 ENCOUNTER — APPOINTMENT (OUTPATIENT)
Dept: LAB | Facility: MEDICAL CENTER | Age: 64
End: 2024-03-02

## 2024-03-04 ENCOUNTER — APPOINTMENT (OUTPATIENT)
Dept: LAB | Facility: MEDICAL CENTER | Age: 64
End: 2024-03-04
Payer: COMMERCIAL

## 2024-03-04 DIAGNOSIS — K51.911 ULCERATIVE COLITIS WITH RECTAL BLEEDING, UNSPECIFIED LOCATION (HCC): ICD-10-CM

## 2024-03-04 PROCEDURE — 83993 ASSAY FOR CALPROTECTIN FECAL: CPT

## 2024-03-04 PROCEDURE — 87493 C DIFF AMPLIFIED PROBE: CPT

## 2024-03-05 LAB — C DIFF TOX GENS STL QL NAA+PROBE: NEGATIVE

## 2024-03-08 LAB — CALPROTECTIN STL-MCNT: 973 UG/G (ref 0–120)

## 2024-03-12 ENCOUNTER — TELEPHONE (OUTPATIENT)
Age: 64
End: 2024-03-12

## 2024-03-19 ENCOUNTER — APPOINTMENT (OUTPATIENT)
Dept: LAB | Facility: MEDICAL CENTER | Age: 64
End: 2024-03-19

## 2024-03-20 ENCOUNTER — APPOINTMENT (OUTPATIENT)
Dept: LAB | Facility: CLINIC | Age: 64
End: 2024-03-20
Payer: COMMERCIAL

## 2024-03-20 DIAGNOSIS — R19.7 DIARRHEA, UNSPECIFIED TYPE: ICD-10-CM

## 2024-03-20 DIAGNOSIS — R19.7 DIARRHEA, UNSPECIFIED TYPE: Primary | ICD-10-CM

## 2024-03-20 PROCEDURE — 87209 SMEAR COMPLEX STAIN: CPT

## 2024-03-20 PROCEDURE — 87177 OVA AND PARASITES SMEARS: CPT

## 2024-03-20 PROCEDURE — 87329 GIARDIA AG IA: CPT

## 2024-03-21 LAB — G LAMBLIA AG STL QL IA: NEGATIVE

## 2024-04-03 ENCOUNTER — TELEPHONE (OUTPATIENT)
Dept: GASTROENTEROLOGY | Facility: CLINIC | Age: 64
End: 2024-04-03

## 2024-04-17 ENCOUNTER — OFFICE VISIT (OUTPATIENT)
Dept: GASTROENTEROLOGY | Facility: CLINIC | Age: 64
End: 2024-04-17
Payer: COMMERCIAL

## 2024-04-17 ENCOUNTER — ANESTHESIA (OUTPATIENT)
Dept: ANESTHESIOLOGY | Facility: HOSPITAL | Age: 64
End: 2024-04-17

## 2024-04-17 ENCOUNTER — ANESTHESIA EVENT (OUTPATIENT)
Dept: ANESTHESIOLOGY | Facility: HOSPITAL | Age: 64
End: 2024-04-17

## 2024-04-17 VITALS
DIASTOLIC BLOOD PRESSURE: 82 MMHG | SYSTOLIC BLOOD PRESSURE: 120 MMHG | HEIGHT: 62 IN | HEART RATE: 85 BPM | WEIGHT: 137 LBS | BODY MASS INDEX: 25.21 KG/M2 | TEMPERATURE: 97.2 F | OXYGEN SATURATION: 99 %

## 2024-04-17 DIAGNOSIS — K51.911 ULCERATIVE COLITIS WITH RECTAL BLEEDING, UNSPECIFIED LOCATION (HCC): Primary | ICD-10-CM

## 2024-04-17 DIAGNOSIS — D84.9 IMMUNOSUPPRESSION (HCC): ICD-10-CM

## 2024-04-17 DIAGNOSIS — E61.1 IRON DEFICIENCY: ICD-10-CM

## 2024-04-17 PROCEDURE — 99215 OFFICE O/P EST HI 40 MIN: CPT | Performed by: INTERNAL MEDICINE

## 2024-04-17 NOTE — PROGRESS NOTES
Gastroenterology Outpatient Follow-up - Crohn's Disease  Leigh Ann Sarabia 63 y.o. female MRN: [id number]  Encounter: [id number]    Leigh Ann Sarabia is a 63 y.o. female with Ulcerative colitis.    Symptom onset:  2017  Diagnosis:  ulcerative colitis  Year of diagnosis:  2017    IBD Summary: Leigh Ann Sarabia has has ulcerative pancolitis diagnosed in 2017.  She lost response to mesalamine.  She has been treated with vedolizumab but lost response again.  She then switched to  ustekinumab but was a primary nonresponder.  Currently on infliximab with partial response despite dose escalation.  She also has chronically elevated alkaline phosphatase and had positive AMA in 2017.  MRCP with suggestion of early PSC.  Her daughter had UC and PSC.    Ulcerative Colitis Summary  Macroscopic extent of diseases: pancolitis  Microscopic extent of diseases:  pancolitis  Has the patient ever been hospitalized for severe disease: No        Surgical History  Number of IBD surgeries: 0      First IBD surgery:    Most Recent IBD surgery:    Esophageal:  0    Gastroduodenal:  0    Small bowel resection(s):  0    Ileocolonic resection(s): unknown      Colonic resection(s):  0    Ileostomy or colostomy:  no previous ostomy    Complete colectomy:  No         Medications     Year last used Reason for discontinuation   Corticosteroids prior   2023       Thiopurines   never         Methotrexate   never         Infliximab   never         Adalimumab   never         Certolizumab   never         Golimumab   never         Natalizumab   never         Mesalamine prior     CARLOS     Sulfasalzine   never         Vedolizumab prior   2022 CARLOS     Ustekinumab   never         Tofacitinib   never         Other biologic   never             Extraintestinal Manifestations    IBD-associated arthropathy Yes    Uveitis Yes  iritis x 2    Oral aphthous ulcers No   Erythema nodosum No    Pyoderma gangrenosum No    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 Daughter with PSC.    Thrombotic complications No          Cancer / Dysplasia History  History of IBD-associated dysplasia: none    Date of diagnosis (Year):     History of colorectal cancer: No   History of cervical dysplasia: No   History of skin cancer: squamous cell carcinoma         Laboratory Data   Most recent (date) Result   PPD   unknown   Quanitferon gold 9/12/2022 negative   TPMT 1/17/2023 normal   Hepatitis A       HBsAb 9/12/2022 negative   HBcAb 9/12/2022 negative   HBsAg 9/12/2022 negative   HCV Ab   unknown       Imaging / Diagnostic Procedures   Most recent (date) Findings   Colonoscopy or sigmoidoscopy #1 1/12/2023            Ulcers/Erosions  small           Strictures  none           Stricture Severity  N/A           Endoscopic Score Fregoso Score:  2 Rutgeert's:  N/A            Findings  (1) Normal TI.  (2) Moderate edematous, erythematous, friable and hemorrhagic mucosa with erosion and loss of vascular pattern in the cecum, ascending colon, hepatic flexure, transverse colon, splenic flexure, descending colon, sigmoid colon, rectosigmoid and rectum, consistent with ulcerative colitis.           Pathology   A. Colon, Cecum, Biopsy:  - Mildly active chronic colitis.  - Negative for granulomata, viral changes, and dysplasia.  B. Small Intestine, Terminal Ileum, Biopsy:  - Small intestinal mucosa with no specific pathologic change.  C. Colon, Ascending, Biopsy:  - Mildly active chronic colitis.  - Negative for granulomata, viral changes, and dysplasia.  D. Colon, Transverse, Biopsy:  - Mildly active chronic colitis.  - Negative for granulomata, viral changes, and dysplasia.  E. Colon, Descending, Biopsy:  - Mildly active chronic colitis.  - Negative for granulomata, viral changes, and dysplasia.  F. Colon, Sigmoid, Biopsy:  - Superficial fragments of colonic mucosa with mild acute colitis.  G. Colon, Rectum, Biopsy:  - Mildly active chronic colitis.  - Negative for  granulomata, viral changes, and dysplasia.   Colonoscopy or sigmoidoscopy #2              Ulcers/Erosions                 Strictures                 Stricture Severity              Endoscopic Score Fregoso Score:    Rugeert's:              Findings              Pathology      EGD       Small bowel follow-through       CT enterography       CT without enterography       MRI enterography       Capsule study       DEXA scan   Lowest Z-score:      CXR (for TB)           HPI:   Interval History:  4/17/2024 Follow up  Since last visit, we increased infliximab to 15 mg/kg every 4 weeks based on trough of 6.7 ug/ml with 10 mg/kg every 4 week dosing.  She has tapered off budesonide.  Despite the higher dose, she continues to have 6 loose BMs per day.  Feels that symptoms have worsened since coming off budesonide.  Feels that she is not in clinical remission and her disease remains active.  No abdominal pain.  Has gained weight.  Feels very tired.  Her last infliximab infusion was 1 week ago.    Calprotectin in March was 973.  Infectious stool tests were negative.  CBC in January was normal with Hgb 12.8, MCV 92.  Vitamin D was normal.  Ferritin was 10.  She is taking iron supplements.      1/9/2024 Follow up  Since last visit, she has remained on infliximab 10 mg every 4 weeks.  Trough level was only 6.7 mcg/mL, no antibodies.  She had COVID and had to delay her infusion.  She is also on Uceris 9 mg every other day.  Feels ok.  Still has 4-5 BMs per day, sometimes sees blood, urgency has improved, energy is ok.  However, still does not feel like she is 100%.   Her calprotectin was 2360 before we started Uceris; we have not rechecked.  Still feels fatigued.  Has gained weight. Overdue for pap smear; did see dermatologist.  Labs from 11/4/2023 show infliximab trough of 6.7 ug/ml without antibodies.  CRP 2.8, ESR 30, CBC with normal hemoglobin 12.9, MCV 93, ferritin low at 11, CMP with normal LFTs and kidney function.  RF  negative, cyclic citrullinatd peptide antibody normal, TIFFANIE positive.    10/13/2020 Follow up  She is not doing well since her last visit.  She remains on infliximab (Avsola) 10 mg every 4 weeks.  Last infusion was on 10/9/2023. However, she reports starting to feel fatigued approximately 90 days ago.  This coincided with seeing more blood in the stool, having increased urgency, lightheadedness, low energy.  No abdominal pain.  Calprotectin on 10/6/2023 was 2360, which was 3 days prior to her infusion.  She says that her stool frequency has slightly improved since the last infliximab dose.  C. diff was negative.  She had 1 visit to the ER because of lightheadedness and hypotension, but Hgb was 12.5 and BP there was normal.  This delayed infliximab by 4 days.  She saw Rheumatology for seronegative arthropathy and started an anti-inflammatory diet.  Joint pain is much improved.  Also avoiding gluten for the past 3 years.   Thinks she has yeast infection. Having bad GERD currently.  She has history of SCC.  She had iron infusions in June.      8/22/2023 Follow up  Since last visit, we increased infliximab based on subtherapeutic drug level and she is now on 10 mg/kg every 4 weeks.  She has had 3 monthly doses of high dose infliximab.  Feels like this has helped and she is slowly improving.  Calprotectin improved from 11 100-543.  Still has 3-4 BMs per day with occasional blood, but much improved from before.  No longer on steroids.  No pain.  Occasional incontinence when she has anxiety.   Labs from August showed mild ALT and AST elevation.  Has brain fog and fatigue after infusions; no fevers.  Still needs Prevnar.  Will get flu shot.  Completed 4 IV iron infusions and felt much improved.  Needs GYN appointment.  Saw dermatology.      4/19/2023 Return  Since last visit, started infliximab (Avsola) and has had 3 loading doses.  3rd dose was on 4/10.  Feels like BMs have improved, but are still inconsistent.  No  abdominal pain. Occasional urgency.  Calprotectin 372 but trending down.  No bleeding for the past 2 weeks.  Energy is poor.  She cannot tolerate PO iron.  Gets SOB with exertion.  Poor exercise tolerance.  She feels dizzy on occasion since startign avsola. She is still on prednisone but down to 5 mg daily.  Has been on it since July.  Seeing Derm in June for h/o SCC.  Overdue for Pap.  She may have PSC based on chronic Ap elevation and recent MRCP, although last AP was normal.     1/26/2023 Return visit  Leigh Ann Sarabia is establishing care with me for UC.  Diagnosed with UC in 2017 - diarrhea and bleeding.  She was treated with mesalamine (Lialda) and did well for a couple of years. In 2020, flared and started vedolizumab but she lost response after a few months.  Ustekinumab was increased to every 6 weeks, but did not work. She then started ustekinumab 10/2022. However, has been on anf off prednisone for 5 months, currently on 10 mg.  Getting Stelara every 8 weeks.  Some days are good with formed stools, other days with loose BMs and bleeding. Colonoscopy this month with Fregoso 2 disease.  She has had COVID vaccines + booster and flu vaccine. No pneumococcal vaccine.  Last pap smear 1.5 years.  SCC removed ~2018 from chest.  Last derm ~ 6/2022.    Has osteopenia.  H/o iritis.  Of note, her daughter his UC and PSC.  Patient has chronic alkaline phosphatase elevation and had positive AMA in 2017.  Recent MRCP with mild intrahepatic biliary dilatation but no other signs of PSC.    12/19/22  First treated with lialda for about 2 years.    First biologic was entyvio which started in October of 2021 but in February of 2022 symptoms worsened, and was treated with prednisone with symptoms abating but as she was titrating down her prednisone symptoms worsened  She is currently on the autoimmune protocol diet since July of 2022  In the past week and a half- reporting normal BM's; formed, 1-2 times daily, without bleeding.   "Gluten free and antiinflammatory diet - good for joints.  Had iritis x 2 in 2022.  Using steroid eye drops.      Leigh Ann reports the following symptoms over the last 7 days:    Stool Frequency >4 stools/day more than normal   Average stools per day: 6   Average liquid stools per day: 0   Consistency of bowel: soft or semi-formed   Awakening from sleep to move bowels? No   Urgency moderately severe fecal urgency   Visible blood in stool? visible blood in stool half of the time or more   Abdominal pain? none   General Wellbeing? slightly under par     Leigh Ann also reports the following symptoms in the last month:    Leakage of stool while sleeping? No   Leakage of stool while awake? Yes       REVIEW OF SYSTEMS:  During the last 7 days, Leigh Ann experienced the following symptoms:  Unintentional Weight Loss (in the last month) No   Fever No   Eye irritation No   Mouth sores No   Sore throat No   Chest pain No   Shortness of breath No   Numbness or tingling in hands or feet No   Skin rash No   Pain or swelling in joints No   Bruising or bleeding No   Felt depressed or blue Yes   Fatigue Yes   Dysuria No   Please see HPI for additional pertinent review of systems; otherwise remainder of ROS was unremarkable    MEDICATIONS:    Current Outpatient Medications:     ALPRAZolam (XANAX) 0.25 mg tablet    Calcium Carb-Cholecalciferol (calcium carbonate-vitamin D) 500 mg-5 mcg tablet    Cholecalciferol (VITAMIN D PO)    fluticasone (FLONASE) 50 mcg/act nasal spray    inFLIXimab-axxq (AVSOLA IV)    Timolol Maleate, Once-Daily, 0.5 % SOLN    budesonide 9 mg TB24    busPIRone (BUSPAR) 5 mg tablet    Cyanocobalamin (VITAMIN B-12 PO)    Vedolizumab (ENTYVIO SC)    ALLERGIES:  Allergies   Allergen Reactions    Amoxicillin GI Intolerance       OBJECTIVE:  /82 (BP Location: Left arm, Patient Position: Sitting, Cuff Size: Standard)   Pulse 85   Temp (!) 97.2 °F (36.2 °C) (Tympanic)   Ht 5' 1.5\" (1.562 m)   Wt 62.1 kg (137 lb)   " SpO2 99%   BMI 25.47 kg/m²      PHYSICAL EXAM:    General Appearance:   Alert, cooperative, no distress   HEENT:   Normocephalic, atraumatic, anicteric.     Neck:  Supple, symmetrical, trachea midline   Lungs:   Clear to auscultation bilaterally; no rales, rhonchi or wheezing; respirations unlabored    Heart::   Regular rate and rhythm; no murmur, rub, or gallop.   Abdomen:   Soft, non-distended; normal bowel sounds    Abdominal exam was notable for no tenderness with no mass.     Genitalia:   Deferred    Rectal:   Perirectal assessment was not performed.                     Extremities:  No cyanosis, clubbing or edema    Pulses:  2+ and symmetric    Skin:  No jaundice, rashes, or lesions    Lymph nodes:  No palpable cervical lymphadenopathy        Lab Results   Component Value Date    WBC 6.99 04/19/2024    HGB 12.7 04/19/2024    HCT 39.7 04/19/2024    MCV 93 04/19/2024     04/19/2024     Lab Results   Component Value Date     06/10/2014    SODIUM 139 04/19/2024    K 4.2 04/19/2024     04/19/2024    CO2 28 04/19/2024    ANIONGAP 8 06/10/2014    AGAP 6 04/19/2024    BUN 15 04/19/2024    CREATININE 0.67 04/19/2024    GLUC 105 10/04/2023    GLUF 92 04/19/2024    CALCIUM 8.7 04/19/2024    AST 65 (H) 04/19/2024    ALT 71 (H) 04/19/2024    ALKPHOS 89 04/19/2024    PROT 7.0 06/10/2014    TP 6.5 04/19/2024    BILITOT 0.4 06/10/2014    TBILI 0.42 04/19/2024    EGFR 95 04/19/2024     Lab Results   Component Value Date    CRP 3.3 (H) 04/19/2024     Lab Results   Component Value Date    LRJYXQAY14 516 11/04/2023     Lab Results   Component Value Date    FERRITIN 9 (L) 04/19/2024       ASSESSMENT AND PLAN:    Leigh Ann has a history of macroscopic pancolitis and microscopic pancolitis  ulcerative colitis diagnosed in 2017. Current medical therapy is with infliximab 15 mg/kg every 4 weeks. My global assessment is that the clinical disease is currently moderately active.     The 6-point Fregoso score was 5 and the  9-point Fregoso score was 7.  The short CDAI was 93.      Leigh Ann has ulcerative pancolitis diagnosed in 2017. She has been on mesalamine, vedolizumab, and ustekinumab with either PNR or CARLOS.  Currently, she is on infliximab with partial response.  We have dose escalated based on TDM and she is on 15 mg/kg every 4 weeks.  Her last colonoscopy was in January 2023 and she had active disease.  Recent calprotectin in March was still significantly elevated.  She has iron deficiency.    1. Schedule colonoscopy to r/o CMV.  2. Repeat infliximab trough.  3. Next drug we will likely try is upadacitinib.  4. Repeat CBC, CMP, CRP.  5. Repeat calprotectin.  6. Recheck iron studies.  7. Annual derm and and GYN exams.  Return in 3 months.      Health Maintenance Recommendations:  Vaccines & Infections  COVID-19 vaccination and boosters are recommended. There is no evidence that the COVID-19 vaccine would cause an IBD flare.  Avoid live vaccines if on immunosuppressive therapy.  Yearly influenza vaccine (flu shot).  Pneumonia vaccines for patients on immunosuppression. These include Prevnar 20, followed by Pneumovax 23 at least 8 weeks later.  Shingrix vaccine (series of 2 injections) for al patients 65 and older. Patients on tofacitinib or upadacitinib should be vaccinated regardless of age.  If not immune to measles mumps or rubella, MMR vaccine is recommended. However, this is a live vaccine and should be given prior to immunosuppressive therapy.  HPV vaccination as per national guidelines.  Hepatitis A and B vaccinations if not previously vaccinated.  Testing for tuberculosis with QuantiFERON Gold blood test and/or chest xray prior to starting immunosuppressive medications, and then annually    Cancer screening  Dysplasia surveillance for colorectal cancer. Colonoscopy in all patients with extensive colitis (more than 1/3 of the colon involved) who had disease for at least 8 or more years.  Repeat colonoscopy approximately every  12-24 months.  In patients with concurrent primary sclerosing cholangitis, history of dysplasia, or family history of colon cancer, repeat colonoscopy annually.    Females: Pap smear annually for woman on immunosuppression.  Annual dermatologic/skin exam in all patients with IBD, especially those on immunosuppression with thiopurines or CEDRICK inhibitors.    Miscellaneous  DEXA scan, once off steroids for 3 months  Depression screening recommended annually  Routine dental and ophthalmology examinations    Problem List Items Addressed This Visit       Ulcerative colitis (HCC) - Primary    Relevant Orders    Colonoscopy    Comprehensive metabolic panel (Completed)    CBC and differential (Completed)    C-reactive protein (Completed)    TIBC Panel (incl. Iron, TIBC, % Iron Saturation) (Completed)    Ferritin (Completed)    Calprotectin,Fecal    Infliximab Concentration and Anti-Infliximab Antibody       Filemon Martinez MD

## 2024-04-17 NOTE — H&P (VIEW-ONLY)
Gastroenterology Outpatient Follow-up - Crohn's Disease  Leigh Ann Sarabia 63 y.o. female MRN: [id number]  Encounter: [id number]    Leigh Ann Sarabia is a 63 y.o. female with Ulcerative colitis.    Symptom onset:  2017  Diagnosis:  ulcerative colitis  Year of diagnosis:  2017    IBD Summary: Leigh Ann Sarabia has has ulcerative pancolitis diagnosed in 2017.  She lost response to mesalamine.  She has been treated with vedolizumab but lost response again.  She then switched to  ustekinumab but was a primary nonresponder.  Currently on infliximab with partial response despite dose escalation.  She also has chronically elevated alkaline phosphatase and had positive AMA in 2017.  MRCP with suggestion of early PSC.  Her daughter had UC and PSC.    Ulcerative Colitis Summary  Macroscopic extent of diseases: pancolitis  Microscopic extent of diseases:  pancolitis  Has the patient ever been hospitalized for severe disease: No        Surgical History  Number of IBD surgeries: 0      First IBD surgery:    Most Recent IBD surgery:    Esophageal:  0    Gastroduodenal:  0    Small bowel resection(s):  0    Ileocolonic resection(s): unknown      Colonic resection(s):  0    Ileostomy or colostomy:  no previous ostomy    Complete colectomy:  No         Medications     Year last used Reason for discontinuation   Corticosteroids prior   2023       Thiopurines   never         Methotrexate   never         Infliximab   never         Adalimumab   never         Certolizumab   never         Golimumab   never         Natalizumab   never         Mesalamine prior     CARLOS     Sulfasalzine   never         Vedolizumab prior   2022 CARLOS     Ustekinumab   never         Tofacitinib   never         Other biologic   never             Extraintestinal Manifestations    IBD-associated arthropathy Yes    Uveitis Yes  iritis x 2    Oral aphthous ulcers No   Erythema nodosum No    Pyoderma gangrenosum No    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 Daughter with PSC.    Thrombotic complications No          Cancer / Dysplasia History  History of IBD-associated dysplasia: none    Date of diagnosis (Year):     History of colorectal cancer: No   History of cervical dysplasia: No   History of skin cancer: squamous cell carcinoma         Laboratory Data   Most recent (date) Result   PPD   unknown   Quanitferon gold 9/12/2022 negative   TPMT 1/17/2023 normal   Hepatitis A       HBsAb 9/12/2022 negative   HBcAb 9/12/2022 negative   HBsAg 9/12/2022 negative   HCV Ab   unknown       Imaging / Diagnostic Procedures   Most recent (date) Findings   Colonoscopy or sigmoidoscopy #1 1/12/2023            Ulcers/Erosions  small           Strictures  none           Stricture Severity  N/A           Endoscopic Score Fregoso Score:  2 Rutgeert's:  N/A            Findings  (1) Normal TI.  (2) Moderate edematous, erythematous, friable and hemorrhagic mucosa with erosion and loss of vascular pattern in the cecum, ascending colon, hepatic flexure, transverse colon, splenic flexure, descending colon, sigmoid colon, rectosigmoid and rectum, consistent with ulcerative colitis.           Pathology   A. Colon, Cecum, Biopsy:  - Mildly active chronic colitis.  - Negative for granulomata, viral changes, and dysplasia.  B. Small Intestine, Terminal Ileum, Biopsy:  - Small intestinal mucosa with no specific pathologic change.  C. Colon, Ascending, Biopsy:  - Mildly active chronic colitis.  - Negative for granulomata, viral changes, and dysplasia.  D. Colon, Transverse, Biopsy:  - Mildly active chronic colitis.  - Negative for granulomata, viral changes, and dysplasia.  E. Colon, Descending, Biopsy:  - Mildly active chronic colitis.  - Negative for granulomata, viral changes, and dysplasia.  F. Colon, Sigmoid, Biopsy:  - Superficial fragments of colonic mucosa with mild acute colitis.  G. Colon, Rectum, Biopsy:  - Mildly active chronic colitis.  - Negative for  granulomata, viral changes, and dysplasia.   Colonoscopy or sigmoidoscopy #2              Ulcers/Erosions                 Strictures                 Stricture Severity              Endoscopic Score Fregoso Score:    Rugeert's:              Findings              Pathology      EGD       Small bowel follow-through       CT enterography       CT without enterography       MRI enterography       Capsule study       DEXA scan   Lowest Z-score:      CXR (for TB)           HPI:   Interval History:  4/17/2024 Follow up  Since last visit, we increased infliximab to 15 mg/kg every 4 weeks based on trough of 6.7 ug/ml with 10 mg/kg every 4 week dosing.  She has tapered off budesonide.  Despite the higher dose, she continues to have 6 loose BMs per day.  Feels that symptoms have worsened since coming off budesonide.  Feels that she is not in clinical remission and her disease remains active.  No abdominal pain.  Has gained weight.  Feels very tired.  Her last infliximab infusion was 1 week ago.    Calprotectin in March was 973.  Infectious stool tests were negative.  CBC in January was normal with Hgb 12.8, MCV 92.  Vitamin D was normal.  Ferritin was 10.  She is taking iron supplements.      1/9/2024 Follow up  Since last visit, she has remained on infliximab 10 mg every 4 weeks.  Trough level was only 6.7 mcg/mL, no antibodies.  She had COVID and had to delay her infusion.  She is also on Uceris 9 mg every other day.  Feels ok.  Still has 4-5 BMs per day, sometimes sees blood, urgency has improved, energy is ok.  However, still does not feel like she is 100%.   Her calprotectin was 2360 before we started Uceris; we have not rechecked.  Still feels fatigued.  Has gained weight. Overdue for pap smear; did see dermatologist.  Labs from 11/4/2023 show infliximab trough of 6.7 ug/ml without antibodies.  CRP 2.8, ESR 30, CBC with normal hemoglobin 12.9, MCV 93, ferritin low at 11, CMP with normal LFTs and kidney function.  RF  negative, cyclic citrullinatd peptide antibody normal, TIFFANIE positive.    10/13/2020 Follow up  She is not doing well since her last visit.  She remains on infliximab (Avsola) 10 mg every 4 weeks.  Last infusion was on 10/9/2023. However, she reports starting to feel fatigued approximately 90 days ago.  This coincided with seeing more blood in the stool, having increased urgency, lightheadedness, low energy.  No abdominal pain.  Calprotectin on 10/6/2023 was 2360, which was 3 days prior to her infusion.  She says that her stool frequency has slightly improved since the last infliximab dose.  C. diff was negative.  She had 1 visit to the ER because of lightheadedness and hypotension, but Hgb was 12.5 and BP there was normal.  This delayed infliximab by 4 days.  She saw Rheumatology for seronegative arthropathy and started an anti-inflammatory diet.  Joint pain is much improved.  Also avoiding gluten for the past 3 years.   Thinks she has yeast infection. Having bad GERD currently.  She has history of SCC.  She had iron infusions in June.      8/22/2023 Follow up  Since last visit, we increased infliximab based on subtherapeutic drug level and she is now on 10 mg/kg every 4 weeks.  She has had 3 monthly doses of high dose infliximab.  Feels like this has helped and she is slowly improving.  Calprotectin improved from 11 100-543.  Still has 3-4 BMs per day with occasional blood, but much improved from before.  No longer on steroids.  No pain.  Occasional incontinence when she has anxiety.   Labs from August showed mild ALT and AST elevation.  Has brain fog and fatigue after infusions; no fevers.  Still needs Prevnar.  Will get flu shot.  Completed 4 IV iron infusions and felt much improved.  Needs GYN appointment.  Saw dermatology.      4/19/2023 Return  Since last visit, started infliximab (Avsola) and has had 3 loading doses.  3rd dose was on 4/10.  Feels like BMs have improved, but are still inconsistent.  No  abdominal pain. Occasional urgency.  Calprotectin 372 but trending down.  No bleeding for the past 2 weeks.  Energy is poor.  She cannot tolerate PO iron.  Gets SOB with exertion.  Poor exercise tolerance.  She feels dizzy on occasion since startign avsola. She is still on prednisone but down to 5 mg daily.  Has been on it since July.  Seeing Derm in June for h/o SCC.  Overdue for Pap.  She may have PSC based on chronic Ap elevation and recent MRCP, although last AP was normal.     1/26/2023 Return visit  Leigh Ann Sarabia is establishing care with me for UC.  Diagnosed with UC in 2017 - diarrhea and bleeding.  She was treated with mesalamine (Lialda) and did well for a couple of years. In 2020, flared and started vedolizumab but she lost response after a few months.  Ustekinumab was increased to every 6 weeks, but did not work. She then started ustekinumab 10/2022. However, has been on anf off prednisone for 5 months, currently on 10 mg.  Getting Stelara every 8 weeks.  Some days are good with formed stools, other days with loose BMs and bleeding. Colonoscopy this month with Fregoso 2 disease.  She has had COVID vaccines + booster and flu vaccine. No pneumococcal vaccine.  Last pap smear 1.5 years.  SCC removed ~2018 from chest.  Last derm ~ 6/2022.    Has osteopenia.  H/o iritis.  Of note, her daughter his UC and PSC.  Patient has chronic alkaline phosphatase elevation and had positive AMA in 2017.  Recent MRCP with mild intrahepatic biliary dilatation but no other signs of PSC.    12/19/22  First treated with lialda for about 2 years.    First biologic was entyvio which started in October of 2021 but in February of 2022 symptoms worsened, and was treated with prednisone with symptoms abating but as she was titrating down her prednisone symptoms worsened  She is currently on the autoimmune protocol diet since July of 2022  In the past week and a half- reporting normal BM's; formed, 1-2 times daily, without bleeding.   "Gluten free and antiinflammatory diet - good for joints.  Had iritis x 2 in 2022.  Using steroid eye drops.      Leigh Ann reports the following symptoms over the last 7 days:    Stool Frequency >4 stools/day more than normal   Average stools per day: 6   Average liquid stools per day: 0   Consistency of bowel: soft or semi-formed   Awakening from sleep to move bowels? No   Urgency moderately severe fecal urgency   Visible blood in stool? visible blood in stool half of the time or more   Abdominal pain? none   General Wellbeing? slightly under par     Leigh Ann also reports the following symptoms in the last month:    Leakage of stool while sleeping? No   Leakage of stool while awake? Yes       REVIEW OF SYSTEMS:  During the last 7 days, Leigh Ann experienced the following symptoms:  Unintentional Weight Loss (in the last month) No   Fever No   Eye irritation No   Mouth sores No   Sore throat No   Chest pain No   Shortness of breath No   Numbness or tingling in hands or feet No   Skin rash No   Pain or swelling in joints No   Bruising or bleeding No   Felt depressed or blue Yes   Fatigue Yes   Dysuria No   Please see HPI for additional pertinent review of systems; otherwise remainder of ROS was unremarkable    MEDICATIONS:    Current Outpatient Medications:     ALPRAZolam (XANAX) 0.25 mg tablet    Calcium Carb-Cholecalciferol (calcium carbonate-vitamin D) 500 mg-5 mcg tablet    Cholecalciferol (VITAMIN D PO)    fluticasone (FLONASE) 50 mcg/act nasal spray    inFLIXimab-axxq (AVSOLA IV)    Timolol Maleate, Once-Daily, 0.5 % SOLN    budesonide 9 mg TB24    busPIRone (BUSPAR) 5 mg tablet    Cyanocobalamin (VITAMIN B-12 PO)    Vedolizumab (ENTYVIO SC)    ALLERGIES:  Allergies   Allergen Reactions    Amoxicillin GI Intolerance       OBJECTIVE:  /82 (BP Location: Left arm, Patient Position: Sitting, Cuff Size: Standard)   Pulse 85   Temp (!) 97.2 °F (36.2 °C) (Tympanic)   Ht 5' 1.5\" (1.562 m)   Wt 62.1 kg (137 lb)   " SpO2 99%   BMI 25.47 kg/m²      PHYSICAL EXAM:    General Appearance:   Alert, cooperative, no distress   HEENT:   Normocephalic, atraumatic, anicteric.     Neck:  Supple, symmetrical, trachea midline   Lungs:   Clear to auscultation bilaterally; no rales, rhonchi or wheezing; respirations unlabored    Heart::   Regular rate and rhythm; no murmur, rub, or gallop.   Abdomen:   Soft, non-distended; normal bowel sounds    Abdominal exam was notable for no tenderness with no mass.     Genitalia:   Deferred    Rectal:   Perirectal assessment was not performed.                     Extremities:  No cyanosis, clubbing or edema    Pulses:  2+ and symmetric    Skin:  No jaundice, rashes, or lesions    Lymph nodes:  No palpable cervical lymphadenopathy        Lab Results   Component Value Date    WBC 6.99 04/19/2024    HGB 12.7 04/19/2024    HCT 39.7 04/19/2024    MCV 93 04/19/2024     04/19/2024     Lab Results   Component Value Date     06/10/2014    SODIUM 139 04/19/2024    K 4.2 04/19/2024     04/19/2024    CO2 28 04/19/2024    ANIONGAP 8 06/10/2014    AGAP 6 04/19/2024    BUN 15 04/19/2024    CREATININE 0.67 04/19/2024    GLUC 105 10/04/2023    GLUF 92 04/19/2024    CALCIUM 8.7 04/19/2024    AST 65 (H) 04/19/2024    ALT 71 (H) 04/19/2024    ALKPHOS 89 04/19/2024    PROT 7.0 06/10/2014    TP 6.5 04/19/2024    BILITOT 0.4 06/10/2014    TBILI 0.42 04/19/2024    EGFR 95 04/19/2024     Lab Results   Component Value Date    CRP 3.3 (H) 04/19/2024     Lab Results   Component Value Date    ROISPQBN66 516 11/04/2023     Lab Results   Component Value Date    FERRITIN 9 (L) 04/19/2024       ASSESSMENT AND PLAN:    Leigh Ann has a history of macroscopic pancolitis and microscopic pancolitis  ulcerative colitis diagnosed in 2017. Current medical therapy is with infliximab 15 mg/kg every 4 weeks. My global assessment is that the clinical disease is currently moderately active.     The 6-point Fregoso score was 5 and the  9-point Fregoso score was 7.  The short CDAI was 93.      Leigh Ann has ulcerative pancolitis diagnosed in 2017. She has been on mesalamine, vedolizumab, and ustekinumab with either PNR or CARLOS.  Currently, she is on infliximab with partial response.  We have dose escalated based on TDM and she is on 15 mg/kg every 4 weeks.  Her last colonoscopy was in January 2023 and she had active disease.  Recent calprotectin in March was still significantly elevated.  She has iron deficiency.    1. Schedule colonoscopy to r/o CMV.  2. Repeat infliximab trough.  3. Next drug we will likely try is upadacitinib.  4. Repeat CBC, CMP, CRP.  5. Repeat calprotectin.  6. Recheck iron studies.  7. Annual derm and and GYN exams.  Return in 3 months.      Health Maintenance Recommendations:  Vaccines & Infections  COVID-19 vaccination and boosters are recommended. There is no evidence that the COVID-19 vaccine would cause an IBD flare.  Avoid live vaccines if on immunosuppressive therapy.  Yearly influenza vaccine (flu shot).  Pneumonia vaccines for patients on immunosuppression. These include Prevnar 20, followed by Pneumovax 23 at least 8 weeks later.  Shingrix vaccine (series of 2 injections) for al patients 65 and older. Patients on tofacitinib or upadacitinib should be vaccinated regardless of age.  If not immune to measles mumps or rubella, MMR vaccine is recommended. However, this is a live vaccine and should be given prior to immunosuppressive therapy.  HPV vaccination as per national guidelines.  Hepatitis A and B vaccinations if not previously vaccinated.  Testing for tuberculosis with QuantiFERON Gold blood test and/or chest xray prior to starting immunosuppressive medications, and then annually    Cancer screening  Dysplasia surveillance for colorectal cancer. Colonoscopy in all patients with extensive colitis (more than 1/3 of the colon involved) who had disease for at least 8 or more years.  Repeat colonoscopy approximately every  12-24 months.  In patients with concurrent primary sclerosing cholangitis, history of dysplasia, or family history of colon cancer, repeat colonoscopy annually.    Females: Pap smear annually for woman on immunosuppression.  Annual dermatologic/skin exam in all patients with IBD, especially those on immunosuppression with thiopurines or CEDRICK inhibitors.    Miscellaneous  DEXA scan, once off steroids for 3 months  Depression screening recommended annually  Routine dental and ophthalmology examinations    Problem List Items Addressed This Visit       Ulcerative colitis (HCC) - Primary    Relevant Orders    Colonoscopy    Comprehensive metabolic panel (Completed)    CBC and differential (Completed)    C-reactive protein (Completed)    TIBC Panel (incl. Iron, TIBC, % Iron Saturation) (Completed)    Ferritin (Completed)    Calprotectin,Fecal    Infliximab Concentration and Anti-Infliximab Antibody       Filemon Martinez MD

## 2024-04-19 ENCOUNTER — APPOINTMENT (OUTPATIENT)
Dept: LAB | Facility: CLINIC | Age: 64
End: 2024-04-19
Payer: COMMERCIAL

## 2024-04-19 DIAGNOSIS — K51.911 ULCERATIVE COLITIS WITH RECTAL BLEEDING, UNSPECIFIED LOCATION (HCC): ICD-10-CM

## 2024-04-19 LAB
ALBUMIN SERPL BCP-MCNC: 3.4 G/DL (ref 3.5–5)
ALP SERPL-CCNC: 89 U/L (ref 34–104)
ALT SERPL W P-5'-P-CCNC: 71 U/L (ref 7–52)
ANION GAP SERPL CALCULATED.3IONS-SCNC: 6 MMOL/L (ref 4–13)
AST SERPL W P-5'-P-CCNC: 65 U/L (ref 13–39)
BASOPHILS # BLD AUTO: 0.05 THOUSANDS/ÂΜL (ref 0–0.1)
BASOPHILS NFR BLD AUTO: 1 % (ref 0–1)
BILIRUB SERPL-MCNC: 0.42 MG/DL (ref 0.2–1)
BUN SERPL-MCNC: 15 MG/DL (ref 5–25)
CALCIUM ALBUM COR SERPL-MCNC: 9.2 MG/DL (ref 8.3–10.1)
CALCIUM SERPL-MCNC: 8.7 MG/DL (ref 8.4–10.2)
CHLORIDE SERPL-SCNC: 105 MMOL/L (ref 96–108)
CO2 SERPL-SCNC: 28 MMOL/L (ref 21–32)
CREAT SERPL-MCNC: 0.67 MG/DL (ref 0.6–1.3)
CRP SERPL QL: 3.3 MG/L
EOSINOPHIL # BLD AUTO: 0.13 THOUSAND/ÂΜL (ref 0–0.61)
EOSINOPHIL NFR BLD AUTO: 2 % (ref 0–6)
ERYTHROCYTE [DISTWIDTH] IN BLOOD BY AUTOMATED COUNT: 12.8 % (ref 11.6–15.1)
FERRITIN SERPL-MCNC: 9 NG/ML (ref 11–307)
GFR SERPL CREATININE-BSD FRML MDRD: 95 ML/MIN/1.73SQ M
GLUCOSE P FAST SERPL-MCNC: 92 MG/DL (ref 65–99)
HCT VFR BLD AUTO: 39.7 % (ref 34.8–46.1)
HGB BLD-MCNC: 12.7 G/DL (ref 11.5–15.4)
IMM GRANULOCYTES # BLD AUTO: 0.03 THOUSAND/UL (ref 0–0.2)
IMM GRANULOCYTES NFR BLD AUTO: 0 % (ref 0–2)
IRON SATN MFR SERPL: 15 % (ref 15–50)
IRON SERPL-MCNC: 46 UG/DL (ref 50–212)
LYMPHOCYTES # BLD AUTO: 1.96 THOUSANDS/ÂΜL (ref 0.6–4.47)
LYMPHOCYTES NFR BLD AUTO: 28 % (ref 14–44)
MCH RBC QN AUTO: 29.8 PG (ref 26.8–34.3)
MCHC RBC AUTO-ENTMCNC: 32 G/DL (ref 31.4–37.4)
MCV RBC AUTO: 93 FL (ref 82–98)
MONOCYTES # BLD AUTO: 0.99 THOUSAND/ÂΜL (ref 0.17–1.22)
MONOCYTES NFR BLD AUTO: 14 % (ref 4–12)
NEUTROPHILS # BLD AUTO: 3.83 THOUSANDS/ÂΜL (ref 1.85–7.62)
NEUTS SEG NFR BLD AUTO: 55 % (ref 43–75)
NRBC BLD AUTO-RTO: 0 /100 WBCS
PLATELET # BLD AUTO: 198 THOUSANDS/UL (ref 149–390)
PMV BLD AUTO: 9 FL (ref 8.9–12.7)
POTASSIUM SERPL-SCNC: 4.2 MMOL/L (ref 3.5–5.3)
PROT SERPL-MCNC: 6.5 G/DL (ref 6.4–8.4)
RBC # BLD AUTO: 4.26 MILLION/UL (ref 3.81–5.12)
SODIUM SERPL-SCNC: 139 MMOL/L (ref 135–147)
TIBC SERPL-MCNC: 307 UG/DL (ref 250–450)
UIBC SERPL-MCNC: 261 UG/DL (ref 155–355)
WBC # BLD AUTO: 6.99 THOUSAND/UL (ref 4.31–10.16)

## 2024-04-19 PROCEDURE — 85025 COMPLETE CBC W/AUTO DIFF WBC: CPT

## 2024-04-19 PROCEDURE — 36415 COLL VENOUS BLD VENIPUNCTURE: CPT

## 2024-04-19 PROCEDURE — 83540 ASSAY OF IRON: CPT

## 2024-04-19 PROCEDURE — 80053 COMPREHEN METABOLIC PANEL: CPT

## 2024-04-19 PROCEDURE — 86140 C-REACTIVE PROTEIN: CPT

## 2024-04-19 PROCEDURE — 82728 ASSAY OF FERRITIN: CPT

## 2024-04-19 PROCEDURE — 83550 IRON BINDING TEST: CPT

## 2024-04-22 ENCOUNTER — APPOINTMENT (OUTPATIENT)
Dept: LAB | Facility: CLINIC | Age: 64
End: 2024-04-22
Payer: COMMERCIAL

## 2024-04-22 DIAGNOSIS — K51.911 ULCERATIVE COLITIS WITH RECTAL BLEEDING, UNSPECIFIED LOCATION (HCC): ICD-10-CM

## 2024-04-22 PROCEDURE — 83993 ASSAY FOR CALPROTECTIN FECAL: CPT

## 2024-04-22 RX ORDER — SODIUM CHLORIDE 9 MG/ML
125 INJECTION, SOLUTION INTRAVENOUS CONTINUOUS
Status: CANCELLED | OUTPATIENT
Start: 2024-04-22

## 2024-04-24 ENCOUNTER — ANESTHESIA EVENT (OUTPATIENT)
Dept: GASTROENTEROLOGY | Facility: MEDICAL CENTER | Age: 64
End: 2024-04-24

## 2024-04-24 ENCOUNTER — ANESTHESIA (OUTPATIENT)
Dept: GASTROENTEROLOGY | Facility: MEDICAL CENTER | Age: 64
End: 2024-04-24

## 2024-04-24 ENCOUNTER — HOSPITAL ENCOUNTER (OUTPATIENT)
Dept: GASTROENTEROLOGY | Facility: MEDICAL CENTER | Age: 64
Setting detail: OUTPATIENT SURGERY
Discharge: HOME | End: 2024-04-24
Admitting: INTERNAL MEDICINE
Payer: COMMERCIAL

## 2024-04-24 VITALS
HEIGHT: 61 IN | HEART RATE: 75 BPM | RESPIRATION RATE: 16 BRPM | TEMPERATURE: 97.6 F | SYSTOLIC BLOOD PRESSURE: 117 MMHG | DIASTOLIC BLOOD PRESSURE: 63 MMHG | WEIGHT: 137 LBS | BODY MASS INDEX: 25.86 KG/M2 | OXYGEN SATURATION: 99 %

## 2024-04-24 DIAGNOSIS — K51.911 ULCERATIVE COLITIS WITH RECTAL BLEEDING, UNSPECIFIED LOCATION (HCC): ICD-10-CM

## 2024-04-24 PROCEDURE — 88305 TISSUE EXAM BY PATHOLOGIST: CPT | Performed by: PATHOLOGY

## 2024-04-24 PROCEDURE — 45380 COLONOSCOPY AND BIOPSY: CPT | Performed by: INTERNAL MEDICINE

## 2024-04-24 PROCEDURE — 45385 COLONOSCOPY W/LESION REMOVAL: CPT | Performed by: INTERNAL MEDICINE

## 2024-04-24 RX ORDER — PROPOFOL 10 MG/ML
INJECTION, EMULSION INTRAVENOUS AS NEEDED
Status: DISCONTINUED | OUTPATIENT
Start: 2024-04-24 | End: 2024-04-24

## 2024-04-24 RX ORDER — PROPOFOL 10 MG/ML
INJECTION, EMULSION INTRAVENOUS CONTINUOUS PRN
Status: DISCONTINUED | OUTPATIENT
Start: 2024-04-24 | End: 2024-04-24

## 2024-04-24 RX ORDER — BUDESONIDE 9 MG/1
9 TABLET, FILM COATED, EXTENDED RELEASE ORAL DAILY
Qty: 30 TABLET | Refills: 1 | Status: SHIPPED | OUTPATIENT
Start: 2024-04-24

## 2024-04-24 RX ORDER — SODIUM CHLORIDE 9 MG/ML
125 INJECTION, SOLUTION INTRAVENOUS CONTINUOUS
Status: DISCONTINUED | OUTPATIENT
Start: 2024-04-24 | End: 2024-04-28 | Stop reason: HOSPADM

## 2024-04-24 RX ADMIN — PROPOFOL 80 MG: 10 INJECTION, EMULSION INTRAVENOUS at 08:57

## 2024-04-24 RX ADMIN — PROPOFOL 50 MG: 10 INJECTION, EMULSION INTRAVENOUS at 09:02

## 2024-04-24 RX ADMIN — PROPOFOL 100 MCG/KG/MIN: 10 INJECTION, EMULSION INTRAVENOUS at 08:57

## 2024-04-24 RX ADMIN — Medication 40 MG: at 09:03

## 2024-04-24 RX ADMIN — SODIUM CHLORIDE 125 ML/HR: 0.9 INJECTION, SOLUTION INTRAVENOUS at 08:49

## 2024-04-24 NOTE — ANESTHESIA PREPROCEDURE EVALUATION
Procedure:  COLONOSCOPY    Relevant Problems   ANESTHESIA (within normal limits)      CARDIO (within normal limits)      GI/HEPATIC   (+) Gastroesophageal reflux disease      MUSCULOSKELETAL   (+) Lumbar spondylosis   (+) Primary generalized (osteo)arthritis      NEURO/PSYCH   (+) Anxiety      PULMONARY (within normal limits)        Physical Exam    Airway    Mallampati score: II         Dental       Cardiovascular  Cardiovascular exam normal    Pulmonary  Pulmonary exam normal Breath sounds clear to auscultation    Other Findings  post-pubertal.      Anesthesia Plan  ASA Score- 2     Anesthesia Type- IV sedation with anesthesia with ASA Monitors.         Additional Monitors:     Airway Plan:            Plan Factors-    Chart reviewed.   Existing labs reviewed. Patient summary reviewed.    Patient is not a current smoker.              Induction- intravenous.    Postoperative Plan-     Informed Consent- Anesthetic plan and risks discussed with patient.

## 2024-04-24 NOTE — ANESTHESIA POSTPROCEDURE EVALUATION
"Post-Op Assessment Note    CV Status:  Stable    Pain management: adequate       Mental Status:  Alert and awake   Hydration Status:  Euvolemic   PONV Controlled:  Controlled   Airway Patency:  Patent     Post Op Vitals Reviewed: Yes    No anethesia notable event occurred.    Staff: Anesthesiologist               BP      Temp      Pulse     Resp      SpO2      /63   Pulse 75   Temp 97.6 °F (36.4 °C) (Temporal)   Resp 16   Ht 5' 0.5\" (1.537 m)   Wt 62.1 kg (137 lb)   SpO2 99%   BMI 26.32 kg/m²     "

## 2024-04-26 LAB — CALPROTECTIN STL-MCNT: 797 UG/G (ref 0–120)

## 2024-04-26 PROCEDURE — 88305 TISSUE EXAM BY PATHOLOGIST: CPT | Performed by: PATHOLOGY

## 2024-04-29 ENCOUNTER — PATIENT MESSAGE (OUTPATIENT)
Dept: GASTROENTEROLOGY | Facility: CLINIC | Age: 64
End: 2024-04-29

## 2024-04-29 DIAGNOSIS — R79.0 LOW IRON STORES: Primary | ICD-10-CM

## 2024-04-29 DIAGNOSIS — K51.911 ULCERATIVE COLITIS WITH RECTAL BLEEDING, UNSPECIFIED LOCATION (HCC): ICD-10-CM

## 2024-04-29 RX ORDER — SODIUM CHLORIDE 9 MG/ML
20 INJECTION, SOLUTION INTRAVENOUS ONCE
OUTPATIENT
Start: 2024-05-10

## 2024-05-06 ENCOUNTER — APPOINTMENT (OUTPATIENT)
Dept: LAB | Facility: CLINIC | Age: 64
End: 2024-05-06
Payer: COMMERCIAL

## 2024-05-06 DIAGNOSIS — R74.8 ELEVATED ALKALINE PHOSPHATASE LEVEL: ICD-10-CM

## 2024-05-06 DIAGNOSIS — K51.911 ULCERATIVE COLITIS WITH RECTAL BLEEDING, UNSPECIFIED LOCATION (HCC): ICD-10-CM

## 2024-05-06 DIAGNOSIS — R74.01 ELEVATED TRANSAMINASE LEVEL: Primary | ICD-10-CM

## 2024-05-06 LAB
ALBUMIN SERPL BCP-MCNC: 3.5 G/DL (ref 3.5–5)
ALP SERPL-CCNC: 86 U/L (ref 34–104)
ALT SERPL W P-5'-P-CCNC: 64 U/L (ref 7–52)
AST SERPL W P-5'-P-CCNC: 64 U/L (ref 13–39)
BILIRUB DIRECT SERPL-MCNC: 0.13 MG/DL (ref 0–0.2)
BILIRUB SERPL-MCNC: 0.45 MG/DL (ref 0.2–1)
PROT SERPL-MCNC: 6.8 G/DL (ref 6.4–8.4)

## 2024-05-06 PROCEDURE — 36415 COLL VENOUS BLD VENIPUNCTURE: CPT

## 2024-05-06 PROCEDURE — 82397 CHEMILUMINESCENT ASSAY: CPT

## 2024-05-06 PROCEDURE — 80230 DRUG ASSAY INFLIXIMAB: CPT

## 2024-05-06 PROCEDURE — 80076 HEPATIC FUNCTION PANEL: CPT

## 2024-05-10 ENCOUNTER — HOSPITAL ENCOUNTER (OUTPATIENT)
Dept: INFUSION CENTER | Facility: CLINIC | Age: 64
End: 2024-05-10
Payer: COMMERCIAL

## 2024-05-10 ENCOUNTER — APPOINTMENT (OUTPATIENT)
Dept: LAB | Facility: CLINIC | Age: 64
End: 2024-05-10
Payer: COMMERCIAL

## 2024-05-10 VITALS
TEMPERATURE: 98.5 F | DIASTOLIC BLOOD PRESSURE: 74 MMHG | HEART RATE: 86 BPM | RESPIRATION RATE: 18 BRPM | SYSTOLIC BLOOD PRESSURE: 115 MMHG

## 2024-05-10 DIAGNOSIS — R74.01 ELEVATED TRANSAMINASE LEVEL: ICD-10-CM

## 2024-05-10 DIAGNOSIS — R74.8 ELEVATED ALKALINE PHOSPHATASE LEVEL: ICD-10-CM

## 2024-05-10 DIAGNOSIS — K51.911 ULCERATIVE COLITIS WITH RECTAL BLEEDING, UNSPECIFIED LOCATION (HCC): Primary | ICD-10-CM

## 2024-05-10 DIAGNOSIS — R79.0 LOW IRON STORES: ICD-10-CM

## 2024-05-10 LAB — ANA SER QL IA: POSITIVE

## 2024-05-10 PROCEDURE — 86235 NUCLEAR ANTIGEN ANTIBODY: CPT

## 2024-05-10 PROCEDURE — 86381 MITOCHONDRIAL ANTIBODY EACH: CPT

## 2024-05-10 PROCEDURE — 86038 ANTINUCLEAR ANTIBODIES: CPT

## 2024-05-10 PROCEDURE — 86376 MICROSOMAL ANTIBODY EACH: CPT

## 2024-05-10 PROCEDURE — 96365 THER/PROPH/DIAG IV INF INIT: CPT

## 2024-05-10 PROCEDURE — 86039 ANTINUCLEAR ANTIBODIES (ANA): CPT

## 2024-05-10 PROCEDURE — 86225 DNA ANTIBODY NATIVE: CPT

## 2024-05-10 PROCEDURE — 82784 ASSAY IGA/IGD/IGG/IGM EACH: CPT

## 2024-05-10 PROCEDURE — 86015 ACTIN ANTIBODY EACH: CPT

## 2024-05-10 PROCEDURE — 82787 IGG 1 2 3 OR 4 EACH: CPT

## 2024-05-10 PROCEDURE — 36415 COLL VENOUS BLD VENIPUNCTURE: CPT

## 2024-05-10 RX ORDER — SODIUM CHLORIDE 9 MG/ML
20 INJECTION, SOLUTION INTRAVENOUS ONCE
Status: CANCELLED | OUTPATIENT
Start: 2024-05-17

## 2024-05-10 RX ORDER — SODIUM CHLORIDE 9 MG/ML
20 INJECTION, SOLUTION INTRAVENOUS ONCE
Status: COMPLETED | OUTPATIENT
Start: 2024-05-10 | End: 2024-05-10

## 2024-05-10 RX ADMIN — IRON SUCROSE 300 MG: 20 INJECTION, SOLUTION INTRAVENOUS at 14:20

## 2024-05-10 RX ADMIN — SODIUM CHLORIDE 20 ML/HR: 0.9 INJECTION, SOLUTION INTRAVENOUS at 14:15

## 2024-05-10 NOTE — PROGRESS NOTES
Patient arrived to unit without complaint. Patient tolerated Venofer infusion without incident. AVS declined and patient aware of next appointment on 5/17/24 at 11:00. Patient left in stable condition.

## 2024-05-11 LAB
ACTIN IGG SERPL-ACNC: 9 UNITS (ref 0–19)
LKM-1 AB SER-ACNC: <20.1 UNITS (ref 0–20)
MITOCHONDRIA M2 IGG SER-ACNC: 21 UNITS (ref 0–20)

## 2024-05-13 LAB
ANA HOMOGEN SER QL IF: NORMAL
ANA HOMOGEN TITR SER: NORMAL {TITER}
CHROMATIN AB SERPL-ACNC: NEGATIVE
DSDNA AB SER-ACNC: <4 IU/ML
ENA SS-A AB SER IA-ACNC: NEGATIVE
ENA SS-B AB SER IA-ACNC: NEGATIVE
INFLIXIMAB AB SERPL-MCNC: <22 NG/ML
INFLIXIMAB SERPL-MCNC: 18 UG/ML

## 2024-05-15 LAB
IGG SERPL-MCNC: 1555 MG/DL (ref 586–1602)
IGG1 SER-MCNC: 836 MG/DL (ref 248–810)
IGG2 SER-MCNC: 331 MG/DL (ref 130–555)
IGG3 SER-MCNC: 122 MG/DL (ref 15–102)
IGG4 SER-MCNC: 52 MG/DL (ref 2–96)

## 2024-05-16 NOTE — RESULT ENCOUNTER NOTE
Hello, I would like her to be seen in the hepatology clinic for a second opinion regarding abnormal LFTs.  She has IBD.  Thank you. -VT

## 2024-05-17 ENCOUNTER — HOSPITAL ENCOUNTER (OUTPATIENT)
Dept: INFUSION CENTER | Facility: CLINIC | Age: 64
End: 2024-05-17
Payer: COMMERCIAL

## 2024-05-17 VITALS
RESPIRATION RATE: 18 BRPM | WEIGHT: 140.65 LBS | TEMPERATURE: 98.2 F | DIASTOLIC BLOOD PRESSURE: 67 MMHG | SYSTOLIC BLOOD PRESSURE: 105 MMHG | HEART RATE: 98 BPM | BODY MASS INDEX: 27.02 KG/M2

## 2024-05-17 DIAGNOSIS — K51.911 ULCERATIVE COLITIS WITH RECTAL BLEEDING, UNSPECIFIED LOCATION (HCC): Primary | ICD-10-CM

## 2024-05-17 DIAGNOSIS — R79.0 LOW IRON STORES: ICD-10-CM

## 2024-05-17 RX ORDER — SODIUM CHLORIDE 9 MG/ML
20 INJECTION, SOLUTION INTRAVENOUS ONCE
Status: COMPLETED | OUTPATIENT
Start: 2024-05-17 | End: 2024-05-17

## 2024-05-17 RX ORDER — SODIUM CHLORIDE 9 MG/ML
20 INJECTION, SOLUTION INTRAVENOUS ONCE
Status: CANCELLED | OUTPATIENT
Start: 2024-05-24

## 2024-05-17 RX ADMIN — IRON SUCROSE 300 MG: 20 INJECTION, SOLUTION INTRAVENOUS at 11:26

## 2024-05-17 RX ADMIN — SODIUM CHLORIDE 20 ML/HR: 0.9 INJECTION, SOLUTION INTRAVENOUS at 11:26

## 2024-05-17 NOTE — PROGRESS NOTES
Patient arrived to the unit and denied complications with previous infusions. Tolerated venofer infusion well without adverse affects. Aware of future appointment on 5/24/24.

## 2024-05-24 ENCOUNTER — TELEPHONE (OUTPATIENT)
Dept: GASTROENTEROLOGY | Facility: CLINIC | Age: 64
End: 2024-05-24

## 2024-05-24 ENCOUNTER — HOSPITAL ENCOUNTER (OUTPATIENT)
Dept: INFUSION CENTER | Facility: CLINIC | Age: 64
End: 2024-05-24
Payer: COMMERCIAL

## 2024-05-24 VITALS
SYSTOLIC BLOOD PRESSURE: 96 MMHG | HEART RATE: 88 BPM | RESPIRATION RATE: 18 BRPM | DIASTOLIC BLOOD PRESSURE: 65 MMHG | TEMPERATURE: 98 F

## 2024-05-24 DIAGNOSIS — K51.911 ULCERATIVE COLITIS WITH RECTAL BLEEDING, UNSPECIFIED LOCATION (HCC): Primary | ICD-10-CM

## 2024-05-24 DIAGNOSIS — R79.0 LOW IRON STORES: ICD-10-CM

## 2024-05-24 DIAGNOSIS — K51.011 ULCERATIVE PANCOLITIS WITH RECTAL BLEEDING (HCC): Primary | ICD-10-CM

## 2024-05-24 DIAGNOSIS — R19.7 DIARRHEA, UNSPECIFIED TYPE: ICD-10-CM

## 2024-05-24 PROCEDURE — 96365 THER/PROPH/DIAG IV INF INIT: CPT

## 2024-05-24 RX ORDER — SODIUM CHLORIDE 9 MG/ML
20 INJECTION, SOLUTION INTRAVENOUS ONCE
Status: CANCELLED | OUTPATIENT
Start: 2024-05-31

## 2024-05-24 RX ORDER — SODIUM CHLORIDE 9 MG/ML
20 INJECTION, SOLUTION INTRAVENOUS ONCE
Status: COMPLETED | OUTPATIENT
Start: 2024-05-24 | End: 2024-05-24

## 2024-05-24 RX ADMIN — IRON SUCROSE 300 MG: 20 INJECTION, SOLUTION INTRAVENOUS at 11:26

## 2024-05-24 RX ADMIN — SODIUM CHLORIDE 20 ML/HR: 0.9 INJECTION, SOLUTION INTRAVENOUS at 11:26

## 2024-05-24 NOTE — PROGRESS NOTES
Patient arrived to the unit and denied complications with previous infusions. Tolerated venofer infusion well without adverse affects. Aware of future appointment on 5/31. Patient mentioned that she wanted to skip her final infusion. She is going to reach out to her doctor to see if she can skip it.

## 2024-05-24 NOTE — TELEPHONE ENCOUNTER
Leigh Ann Sarabia   to [facility] (supporting Filemon Martinez MD)         5/24/24  5:33 AM  Hi Dr. Martinez- hope you’re doing well. I was wondering if I could get a test for C-diff. I spent time with a friend about 12 days ago who told me she had been diagnosed with C-diff two weeks earlier. I didnt know this until we’d already been together for a couple of hours. The past couple of days I’ve had a lot of urgency and last night I started with watery diarrhea .Im just concerned and would like to make sure I don’t have it if I possibly can. Thank you so much - Leigh Ann

## 2024-05-25 ENCOUNTER — APPOINTMENT (OUTPATIENT)
Dept: LAB | Facility: MEDICAL CENTER | Age: 64
End: 2024-05-25

## 2024-05-30 ENCOUNTER — APPOINTMENT (OUTPATIENT)
Dept: LAB | Facility: CLINIC | Age: 64
End: 2024-05-30
Payer: COMMERCIAL

## 2024-05-30 DIAGNOSIS — K51.911 ULCERATIVE COLITIS WITH RECTAL BLEEDING, UNSPECIFIED LOCATION (HCC): Primary | ICD-10-CM

## 2024-05-31 ENCOUNTER — HOSPITAL ENCOUNTER (OUTPATIENT)
Dept: INFUSION CENTER | Facility: CLINIC | Age: 64
End: 2024-05-31
Payer: COMMERCIAL

## 2024-05-31 VITALS
SYSTOLIC BLOOD PRESSURE: 95 MMHG | DIASTOLIC BLOOD PRESSURE: 64 MMHG | TEMPERATURE: 98.3 F | RESPIRATION RATE: 18 BRPM | HEART RATE: 83 BPM

## 2024-05-31 DIAGNOSIS — R79.0 LOW IRON STORES: ICD-10-CM

## 2024-05-31 DIAGNOSIS — K51.911 ULCERATIVE COLITIS WITH RECTAL BLEEDING, UNSPECIFIED LOCATION (HCC): Primary | ICD-10-CM

## 2024-05-31 LAB
C COLI+JEJUNI TUF STL QL NAA+PROBE: NEGATIVE
EC STX1+STX2 GENES STL QL NAA+PROBE: NEGATIVE
SALMONELLA SP SPAO STL QL NAA+PROBE: NEGATIVE
SHIGELLA SP+EIEC IPAH STL QL NAA+PROBE: NEGATIVE

## 2024-05-31 PROCEDURE — 96365 THER/PROPH/DIAG IV INF INIT: CPT

## 2024-05-31 RX ORDER — SODIUM CHLORIDE 9 MG/ML
20 INJECTION, SOLUTION INTRAVENOUS ONCE
OUTPATIENT
Start: 2024-06-07

## 2024-05-31 RX ORDER — SODIUM CHLORIDE 9 MG/ML
20 INJECTION, SOLUTION INTRAVENOUS ONCE
Status: COMPLETED | OUTPATIENT
Start: 2024-05-31 | End: 2024-05-31

## 2024-05-31 RX ADMIN — SODIUM CHLORIDE 20 ML/HR: 0.9 INJECTION, SOLUTION INTRAVENOUS at 11:44

## 2024-05-31 RX ADMIN — IRON SUCROSE 300 MG: 20 INJECTION, SOLUTION INTRAVENOUS at 11:43

## 2024-05-31 NOTE — PROGRESS NOTES
Patient arrived to unit without complaint. Patient tolerated Venofer infusion without incident. AVS declined and patient does not have future appointments at this time. At present, patient will forego final Venofer treatment and discuss with ordering physician. Patient left in stable condition.

## 2024-06-24 ENCOUNTER — PATIENT MESSAGE (OUTPATIENT)
Dept: GASTROENTEROLOGY | Facility: CLINIC | Age: 64
End: 2024-06-24

## 2024-06-24 DIAGNOSIS — K51.011 ULCERATIVE PANCOLITIS WITH RECTAL BLEEDING (HCC): Primary | ICD-10-CM

## 2024-06-26 ENCOUNTER — APPOINTMENT (OUTPATIENT)
Dept: LAB | Facility: CLINIC | Age: 64
End: 2024-06-26
Payer: COMMERCIAL

## 2024-06-26 ENCOUNTER — TRANSCRIBE ORDERS (OUTPATIENT)
Dept: LAB | Facility: CLINIC | Age: 64
End: 2024-06-26

## 2024-06-26 DIAGNOSIS — E11.29 TYPE 2 DIABETES MELLITUS WITH OTHER KIDNEY COMPLICATION, UNSPECIFIED WHETHER LONG TERM INSULIN USE (HCC): ICD-10-CM

## 2024-06-26 DIAGNOSIS — I48.11 LONGSTANDING PERSISTENT ATRIAL FIBRILLATION (HCC): ICD-10-CM

## 2024-06-26 DIAGNOSIS — K51.011 ULCERATIVE PANCOLITIS WITH RECTAL BLEEDING (HCC): ICD-10-CM

## 2024-06-26 DIAGNOSIS — M06.09 RHEUMATOID ARTHRITIS OF MULTIPLE SITES WITHOUT RHEUMATOID FACTOR (HCC): Primary | ICD-10-CM

## 2024-06-26 DIAGNOSIS — I48.20 CHRONIC ATRIAL FIBRILLATION (HCC): ICD-10-CM

## 2024-06-26 DIAGNOSIS — M1A.00X0 CHRONIC GOUTY ARTHRITIS: Primary | ICD-10-CM

## 2024-06-26 DIAGNOSIS — I50.30 DIASTOLIC HEART FAILURE, UNSPECIFIED HF CHRONICITY (HCC): Primary | ICD-10-CM

## 2024-06-26 DIAGNOSIS — Z79.01 LONG TERM (CURRENT) USE OF ANTICOAGULANTS: ICD-10-CM

## 2024-06-26 PROCEDURE — 83993 ASSAY FOR CALPROTECTIN FECAL: CPT

## 2024-06-30 LAB — CALPROTECTIN STL-MCNT: 958 UG/G (ref 0–120)

## 2024-07-10 ENCOUNTER — TELEPHONE (OUTPATIENT)
Age: 64
End: 2024-07-10

## 2024-07-10 NOTE — TELEPHONE ENCOUNTER
Patients GI provider:  Dr. reynolds    Reason for call:Minerva from [facility]  is calling to have the pts labs from  faxed to [phone]    Scheduled procedure/appointment date if applicable: Apt7/25/24

## 2024-07-25 ENCOUNTER — OFFICE VISIT (OUTPATIENT)
Dept: GASTROENTEROLOGY | Facility: CLINIC | Age: 64
End: 2024-07-25

## 2024-07-25 VITALS
SYSTOLIC BLOOD PRESSURE: 104 MMHG | WEIGHT: 140.4 LBS | TEMPERATURE: 97.2 F | DIASTOLIC BLOOD PRESSURE: 76 MMHG | BODY MASS INDEX: 25.83 KG/M2 | HEIGHT: 62 IN

## 2024-07-25 DIAGNOSIS — Z87.19 HISTORY OF ULCERATIVE COLITIS: ICD-10-CM

## 2024-07-25 DIAGNOSIS — R74.8 ELEVATED LIVER ENZYMES: Primary | ICD-10-CM

## 2024-07-25 NOTE — PROGRESS NOTES
64 year-old female with history of ulcerative pan-colitis (diagnosed in 2017, currently on Remicade) who presents for initial evaluation for abnormal liver tests. She has had intermittent, asymptomatic fluctuations in serum transaminases and ALP over the years.      Serologic work-up showed +TIFFANIE, AMA but negative ASMA. IgM and IgG were normal. IgG4 levels are normal. Her viral serologies were negative. She had a prior MRI/MRCP which showed smooth ducts and no evidence of stricture in June 2023 although prior studies have shown slight intrahepatic ductal dilatation centrally and in the left lobe which was felt to be artifact now.     She reports drinking 1-2 EtOH weekly and denies recreational drug use. She reports taking vitamins but denies any supplement use. Her daughter has UC and PSC.     In reviewing her liver chemistries, she was noted to have a chronically elevated ALP since 2021 with peak elevation 230s. More recently, she was noted to have normal ALP from October 2023. Serum transaminases seem to have also risen since April 2024. These patterns seem to correlate with when she was started on budesonide and recently tapered off 3-4 months ago.      Given AMA positivity, I am concerned for PBC and suspect that her normalized ALP reflects partially treatment with budesonide (although this is not typical therapy for PBC but can be considered in patients with AIH overlap). PSC seems less likely given her normal MRCP but cannot exclude small-duct disease without a liver biopsy. Other possibilities include DILI (?possibly due to infliximab) but her fluctuating levels are atypical for this.      Would recommend repeating her AMA and trending her ALP now that she has been off budesonide. If her ALP rebounds, would favor treatment with UDCA 15 mg/kg for PBC. Liver biopsy was also discussed but deferred at this time given low levels of inflammation but can reconsider if her liver tests worsen or her diagnosis remains  unclear. Thank you for the opportunity to consult in her care.     Marium Alvarado MD

## 2024-07-25 NOTE — PROGRESS NOTES
Consultation -  [facility]  Leigh Ann Sarabia 64 y.o. female MRN: [id number]  @ Encounter: [id number]    ASSESSMENT AND PLAN:      Leigh Ann Sarabia is a 64 y.o. old female with PMH of ulcerative pancolitis, GERD, who was referred to the hepatology clinic for further evaluation of elevated liver enzymes concerning for PSC.      #Elevated liver enzymes  #History of UC, not in remission  #Positive AMA      Plan  - Given history, previously positive AMA, there is a concern for PSC. Although other differential for her elevated liver enzymes include infliximab hepatotoxicity, early iron overload, other auto immune liver disease   - We will recheck AMA  - Also check CMP, Alpha-1 anti trypsin phenotype, US elastography        Follow up in 3 months.  Thank you for this consultation.  ______________________________________________________________________    HPI: Patient is a 64-year-old female with a past medical history of ulcerative pancolitis, GERD, who was referred to the hepatology clinic for further evaluation of elevated liver enzymes.       She was diagnosed with ulcerative pancolitis in 2017.  Initially trialed on mesalamine lost response.  She has been subsequently trialed on vedolizumab, Ustekinumab without adequate response and is now currently on infliximab with response despite escalation.  She has had chronically elevated alkaline phosphatase (peaked 227) and fluctuations in her ALT and AST last few years (peaked of 129 and 69 effectively).  GGT elevated 122. Most recent labs showed phosphatase 86, ALT 64, AST 64.  Previous work up shoowed positive AMA and TIFFANIE but negative ASMA, IgM and IgG. MRI abdomen and pelvis with/without contrast in 2023 showed change in appearance of her biliary tree without evidence of primary sclerosing cholangitis.  There was some signal dropout to suggest developing hepatic iron overload.    She had 4 IV iron transfusions in May/June 23 and has had additional 4 IV iron  infusions this year as well.  She denies any right upper quadrant or epigastric pain, nausea, vomiting, jaundice or scleral icterus, pruritus.  Daughter has a history of ulcerative colitis and PSC as well.  She still having 4-5  bowel movements daily with occasional blood.  She was taking NSAIDs and Tylenol few times a week but has since stopped last few weeks.  Denies any additional family history of disease.  She has 1 to 2 glasses of wine per week.  Denies tobacco use or IV drug use.        HEALTHCARE MAINTENANCE:   Hepatitis C screening Negative in 2022    Last EGD: 06/2021 showing LA grade A esophagitis and mild gastritis    Last Colonoscopy: 04/2024  showing evidence of partially treated UC        REVIEW OF SYSTEMS:    Review of Systems     See above    Historical Information   Past Medical History:   Diagnosis Date    Anxiety     Cancer (HCC)     skin    Change in bowel habits     Elevated liver enzymes     Gall stones     GERD (gastroesophageal reflux disease)     Hashimoto's thyroiditis     Iron deficiency 4/19/2023    Irritable bowel syndrome     Lumbar spondylosis     Osteoarthritis     Seasonal allergies     Ulcerative colitis (HCC)      Past Surgical History:   Procedure Laterality Date    COLONOSCOPY      EAR SURGERY      cyst removed from ear drum    EAR SURGERY      EAR SURGERY      IRIDECTOMY      Optical    ID COLONOSCOPY FLX DX W/COLLJ SPEC WHEN PFRMD N/A 10/9/2017    Procedure: COLONOSCOPY;  Surgeon: Cristian Unger MD;  Location: [facility];  Service: Gastroenterology    ID EXCISION MAL LESION TRUNK/ARM/LEG 1.1-2.0 CM Right 10/4/2019    Procedure: 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;  Surgeon: Gini Carrero MD;  Location:  [facility];  Service: Plastics    UPPER GASTROINTESTINAL ENDOSCOPY       Social History   Social History     Substance and Sexual Activity   Alcohol Use Yes     Social History     Substance and Sexual Activity   Drug Use No     Social History     Tobacco Use   Smoking  "Status Never   Smokeless Tobacco Never     Family History   Problem Relation Age of Onset    Breast cancer Mother     Lung cancer Mother     Atrial fibrillation Father     Stroke Father         CVA    Diabetes Father         Mellitus    Heart disease Father     Other Daughter         Colitis    Breast cancer Family        Meds/Allergies     Not in a hospital admission.  No current facility-administered medications for this visit.       Allergies   Allergen Reactions    Amoxicillin GI Intolerance       Objective     Blood pressure 104/76, temperature (!) 97.2 °F (36.2 °C), temperature source Tympanic, height 5' 1.5\" (1.562 m), weight 63.7 kg (140 lb 6.4 oz). Body mass index is 26.1 kg/m².        PHYSICAL EXAM:      Physical Exam  Vitals and nursing note reviewed.   Constitutional:       General: She is not in acute distress.     Appearance: She is well-developed.   HENT:      Head: Normocephalic and atraumatic.   Eyes:      Conjunctiva/sclera: Conjunctivae normal.   Cardiovascular:      Rate and Rhythm: Normal rate and regular rhythm.      Heart sounds: No murmur heard.  Pulmonary:      Effort: Pulmonary effort is normal. No respiratory distress.      Breath sounds: Normal breath sounds.   Abdominal:      Palpations: Abdomen is soft.      Tenderness: There is no abdominal tenderness. There is no guarding or rebound.   Musculoskeletal:         General: No swelling.      Cervical back: Neck supple.   Skin:     General: Skin is warm and dry.      Capillary Refill: Capillary refill takes less than 2 seconds.   Neurological:      Mental Status: She is alert.   Psychiatric:         Mood and Affect: Mood normal.         Lab Results:   No visits with results within 1 Day(s) from this visit.   Latest known visit with results is:   Appointment on 06/26/2024   Component Date Value    Calprotectin 06/26/2024 958 (H)        Imaging Studies: I have personally reviewed pertinent imaging studies.    Nata Khan, " MD  Gastroenterology Fellow  Available via [employer] Secure Chat  7/26/2024 1:56 PM

## 2024-07-26 ENCOUNTER — TELEPHONE (OUTPATIENT)
Dept: GASTROENTEROLOGY | Facility: CLINIC | Age: 64
End: 2024-07-26

## 2024-07-26 ENCOUNTER — TELEPHONE (OUTPATIENT)
Age: 64
End: 2024-07-26

## 2024-07-26 NOTE — TELEPHONE ENCOUNTER
----- Message from Marium Alvarado MD sent at 7/26/2024  9:48 AM EDT -----  Hi this patient was scheduled for an US elastography yesterday in clinic but should have a complete US as well with it. Can we please contact central scheduling to change the order?

## 2024-07-26 NOTE — TELEPHONE ENCOUNTER
Hi Dr. Alvarado:  Patient returned your call, states that she stopped taking the budesonide 9 mg for 3-4 months. Also, scheduled the scheduled the US Abdomen complete complete together with the US Elastography on 8/7/24 at [facility].

## 2024-07-26 NOTE — TELEPHONE ENCOUNTER
Patients GI provider:  Dr. Khan    Number to return call: [phone]    Reason for call: Pt returning Mayra's call re: a medication. I warm transfer to [facility] at the office.    Scheduled procedure/appointment date if applicable: Apt 9/3/24

## 2024-08-22 ENCOUNTER — APPOINTMENT (OUTPATIENT)
Dept: LAB | Facility: MEDICAL CENTER | Age: 64
End: 2024-08-22
Payer: COMMERCIAL

## 2024-08-22 ENCOUNTER — HOSPITAL ENCOUNTER (OUTPATIENT)
Dept: ULTRASOUND IMAGING | Facility: MEDICAL CENTER | Age: 64
Discharge: HOME | End: 2024-08-22
Payer: COMMERCIAL

## 2024-08-22 DIAGNOSIS — R74.8 ELEVATED LIVER ENZYMES: ICD-10-CM

## 2024-08-22 DIAGNOSIS — Z87.19 HISTORY OF ULCERATIVE COLITIS: ICD-10-CM

## 2024-08-22 LAB
ALBUMIN SERPL BCG-MCNC: 3.5 G/DL (ref 3.5–5)
ALP SERPL-CCNC: 91 U/L (ref 34–104)
ALT SERPL W P-5'-P-CCNC: 73 U/L (ref 7–52)
ANION GAP SERPL CALCULATED.3IONS-SCNC: 7 MMOL/L (ref 4–13)
AST SERPL W P-5'-P-CCNC: 73 U/L (ref 13–39)
BILIRUB SERPL-MCNC: 0.64 MG/DL (ref 0.2–1)
BUN SERPL-MCNC: 10 MG/DL (ref 5–25)
CALCIUM SERPL-MCNC: 9.4 MG/DL (ref 8.4–10.2)
CHLORIDE SERPL-SCNC: 104 MMOL/L (ref 96–108)
CO2 SERPL-SCNC: 28 MMOL/L (ref 21–32)
CREAT SERPL-MCNC: 0.56 MG/DL (ref 0.6–1.3)
GFR SERPL CREATININE-BSD FRML MDRD: 100 ML/MIN/1.73SQ M
GLUCOSE P FAST SERPL-MCNC: 87 MG/DL (ref 65–99)
POTASSIUM SERPL-SCNC: 4.1 MMOL/L (ref 3.5–5.3)
PROT SERPL-MCNC: 7.2 G/DL (ref 6.4–8.4)
SODIUM SERPL-SCNC: 139 MMOL/L (ref 135–147)

## 2024-08-22 PROCEDURE — 82104 ALPHA-1-ANTITRYPSIN PHENO: CPT

## 2024-08-22 PROCEDURE — 82103 ALPHA-1-ANTITRYPSIN TOTAL: CPT

## 2024-08-22 PROCEDURE — 80053 COMPREHEN METABOLIC PANEL: CPT

## 2024-08-22 PROCEDURE — 36415 COLL VENOUS BLD VENIPUNCTURE: CPT

## 2024-08-22 PROCEDURE — 76981 USE PARENCHYMA: CPT

## 2024-08-22 PROCEDURE — 76700 US EXAM ABDOM COMPLETE: CPT

## 2024-08-22 PROCEDURE — 86381 MITOCHONDRIAL ANTIBODY EACH: CPT

## 2024-08-23 LAB — MITOCHONDRIA M2 IGG SER-ACNC: 20.6 UNITS (ref 0–20)

## 2024-08-27 ENCOUNTER — APPOINTMENT (OUTPATIENT)
Dept: LAB | Facility: MEDICAL CENTER | Age: 64
End: 2024-08-27
Payer: COMMERCIAL

## 2024-08-27 DIAGNOSIS — K51.011 ULCERATIVE PANCOLITIS WITH RECTAL BLEEDING (HCC): ICD-10-CM

## 2024-08-27 PROCEDURE — 83993 ASSAY FOR CALPROTECTIN FECAL: CPT

## 2024-08-28 LAB
A1AT PHENOTYP SERPL IFE: NORMAL
A1AT SERPL-MCNC: 176 MG/DL (ref 101–187)
CALPROTECTIN STL-MCNC: 643 ÂΜG/G

## 2024-09-03 ENCOUNTER — OFFICE VISIT (OUTPATIENT)
Dept: GASTROENTEROLOGY | Facility: CLINIC | Age: 64
End: 2024-09-03
Payer: COMMERCIAL

## 2024-09-03 VITALS
DIASTOLIC BLOOD PRESSURE: 70 MMHG | SYSTOLIC BLOOD PRESSURE: 112 MMHG | TEMPERATURE: 97.5 F | HEIGHT: 62 IN | OXYGEN SATURATION: 96 % | WEIGHT: 141 LBS | HEART RATE: 82 BPM | BODY MASS INDEX: 25.95 KG/M2

## 2024-09-03 DIAGNOSIS — Z79.52 LONG-TERM CORTICOSTEROID USE: ICD-10-CM

## 2024-09-03 DIAGNOSIS — E55.9 VITAMIN D DEFICIENCY: ICD-10-CM

## 2024-09-03 DIAGNOSIS — K51.911 ULCERATIVE COLITIS WITH RECTAL BLEEDING, UNSPECIFIED LOCATION (HCC): ICD-10-CM

## 2024-09-03 DIAGNOSIS — E61.1 IRON DEFICIENCY: ICD-10-CM

## 2024-09-03 DIAGNOSIS — R74.01 ELEVATED TRANSAMINASE LEVEL: ICD-10-CM

## 2024-09-03 DIAGNOSIS — D84.9 IMMUNOSUPPRESSION (HCC): Primary | ICD-10-CM

## 2024-09-03 PROCEDURE — 99214 OFFICE O/P EST MOD 30 MIN: CPT | Performed by: INTERNAL MEDICINE

## 2024-09-03 RX ORDER — LANOLIN ALCOHOL/MO/W.PET/CERES
1 CREAM (GRAM) TOPICAL
Qty: 90 TABLET | Refills: 3 | Status: SHIPPED | OUTPATIENT
Start: 2024-09-03

## 2024-09-03 NOTE — PROGRESS NOTES
Gastroenterology Outpatient Follow-up - Crohn's Disease  Leigh Ann Sarabia 64 y.o. female MRN: [id number]  Encounter: [id number]    Leigh Ann Sarabia is a 64 y.o. female with Ulcerative colitis.    Symptom onset:  2017  Diagnosis:  ulcerative colitis  Year of diagnosis:  2017    IBD Summary: Leigh Ann Sarabia has has ulcerative pancolitis diagnosed in 2017.  She lost response to mesalamine.  She has been treated with vedolizumab but lost response again.  She then switched to  ustekinumab but was a primary nonresponder.  Currently on infliximab with partial response despite dose escalation.  She also has chronically elevated alkaline phosphatase and had positive AMA in 2017.  MRCP with suggestion of early PSC.  Her daughter has UC and PSC.    Ulcerative Colitis Summary  Macroscopic extent of diseases: pancolitis  Microscopic extent of diseases:  pancolitis  Has the patient ever been hospitalized for severe disease: No        Surgical History  Number of IBD surgeries: 0      First IBD surgery:    Most Recent IBD surgery:    Esophageal:  0    Gastroduodenal:  0    Small bowel resection(s):  0    Ileocolonic resection(s): unknown      Colonic resection(s):  0    Ileostomy or colostomy:  no previous ostomy    Complete colectomy:  No         Medications     Year last used Reason for discontinuation   Corticosteroids prior   2023       Thiopurines   never         Methotrexate   never         Infliximab   never         Adalimumab   never         Certolizumab   never         Golimumab   never         Natalizumab   never         Mesalamine prior     CARLOS     Sulfasalzine   never         Vedolizumab prior   2022 CARLOS     Ustekinumab   never         Tofacitinib   never         Other biologic   never             Extraintestinal Manifestations    IBD-associated arthropathy Yes    Uveitis Yes  iritis x 2    Oral aphthous ulcers No   Erythema nodosum No    Pyoderma gangrenosum No    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 Daughter with PSC.    Thrombotic complications No          Cancer / Dysplasia History  History of IBD-associated dysplasia: none    Date of diagnosis (Year):     History of colorectal cancer: No   History of cervical dysplasia: No   History of skin cancer: squamous cell carcinoma         Laboratory Data   Most recent (date) Result   PPD   unknown   Quanitferon gold 9/12/2022 negative   TPMT 1/17/2023 normal   Hepatitis A       HBsAb 9/12/2022 negative   HBcAb 9/12/2022 negative   HBsAg 9/12/2022 negative   HCV Ab   unknown       Imaging / Diagnostic Procedures   Most recent (date) Findings   Colonoscopy or sigmoidoscopy #1 1/12/2023            Ulcers/Erosions  small           Strictures  none           Stricture Severity  N/A           Endoscopic Score Fregoso Score:  2 Rutgeert's:  N/A            Findings  (1) Normal TI.  (2) Moderate edematous, erythematous, friable and hemorrhagic mucosa with erosion and loss of vascular pattern in the cecum, ascending colon, hepatic flexure, transverse colon, splenic flexure, descending colon, sigmoid colon, rectosigmoid and rectum, consistent with ulcerative colitis.           Pathology   A. Colon, Cecum, Biopsy:  - Mildly active chronic colitis.  - Negative for granulomata, viral changes, and dysplasia.  B. Small Intestine, Terminal Ileum, Biopsy:  - Small intestinal mucosa with no specific pathologic change.  C. Colon, Ascending, Biopsy:  - Mildly active chronic colitis.  - Negative for granulomata, viral changes, and dysplasia.  D. Colon, Transverse, Biopsy:  - Mildly active chronic colitis.  - Negative for granulomata, viral changes, and dysplasia.  E. Colon, Descending, Biopsy:  - Mildly active chronic colitis.  - Negative for granulomata, viral changes, and dysplasia.  F. Colon, Sigmoid, Biopsy:  - Superficial fragments of colonic mucosa with mild acute colitis.  G. Colon, Rectum, Biopsy:  - Mildly active chronic colitis.  - Negative for  granulomata, viral changes, and dysplasia.   Colonoscopy or sigmoidoscopy #2              Ulcers/Erosions                 Strictures                 Stricture Severity              Endoscopic Score Fregoso Score:    Rugeert's:              Findings              Pathology      EGD       Small bowel follow-through       CT enterography       CT without enterography       MRI enterography       Capsule study       DEXA scan   Lowest Z-score:      CXR (for TB)           HPI:   Interval History:  9/3/2024 Follow up  Leigh Ann remains on infliximab 15 mg/kg every 4 weeks.  The dose has been increased based on therapeutic drug monitoring and response to therapy.  Her trough was 18 mcg/mL on 5/6/2024.  We did repeat a colonoscopy in April, and I felt that there was improvement compared to the procedure area earlier.  She feels that the medication is working partially.  Calprotectin on 8/27/2024 was 643, down from 958 two months earlier.  She continues to have frequent loose stools with urgency, although her symptoms are not as bad as they used to be.  She also stopped taking ibuprofen and noticed significant decrease in bleeding, although she was only taking 200 mcg daily for joint pain.  She is not on steroids currently.  She also followed up with Dr. Alvarado regarding elevated LFTs and borderline AMA.  Dr. Alvarado felt that she likely has PBC which was being partially treated with budesonide.  She has since tapered off budesonide.  MRCP did not show evidence of PSC.  She gets annual dermatology visits and Pap smears.  She takes calcium and occasional vitamin D.  She has osteopenia based on DEXA from 2022.  We have discussed switching to upadacitinib, but she is nervous to stop infliximab, which she feels is working partially. LFTs from 8/22/2024: AMA 20.6, AST 73, ALT 73, AP 91, Tb 0.64.    4/17/2024 Follow up  Since last visit, we increased infliximab to 15 mg/kg every 4 weeks based on trough of 6.7 ug/ml with 10 mg/kg every 4 week  dosing.  She has tapered off budesonide.  Despite the higher dose, she continues to have 6 loose BMs per day.  Feels that symptoms have worsened since coming off budesonide.  Feels that she is not in clinical remission and her disease remains active.  No abdominal pain.  Has gained weight.  Feels very tired.  Her last infliximab infusion was 1 week ago.    Calprotectin in March was 973.  Infectious stool tests were negative.  CBC in January was normal with Hgb 12.8, MCV 92.  Vitamin D was normal.  Ferritin was 10.  She is taking iron supplements.      1/9/2024 Follow up  Since last visit, she has remained on infliximab 10 mg every 4 weeks.  Trough level was only 6.7 mcg/mL, no antibodies.  She had COVID and had to delay her infusion.  She is also on Uceris 9 mg every other day.  Feels ok.  Still has 4-5 BMs per day, sometimes sees blood, urgency has improved, energy is ok.  However, still does not feel like she is 100%.   Her calprotectin was 2360 before we started Uceris; we have not rechecked.  Still feels fatigued.  Has gained weight. Overdue for pap smear; did see dermatologist.  Labs from 11/4/2023 show infliximab trough of 6.7 ug/ml without antibodies.  CRP 2.8, ESR 30, CBC with normal hemoglobin 12.9, MCV 93, ferritin low at 11, CMP with normal LFTs and kidney function.  RF negative, cyclic citrullinatd peptide antibody normal, TIFFANIE positive.    10/13/2020 Follow up  She is not doing well since her last visit.  She remains on infliximab (Avsola) 10 mg every 4 weeks.  Last infusion was on 10/9/2023. However, she reports starting to feel fatigued approximately 90 days ago.  This coincided with seeing more blood in the stool, having increased urgency, lightheadedness, low energy.  No abdominal pain.  Calprotectin on 10/6/2023 was 2360, which was 3 days prior to her infusion.  She says that her stool frequency has slightly improved since the last infliximab dose.  C. diff was negative.  She had 1 visit to the ER  because of lightheadedness and hypotension, but Hgb was 12.5 and BP there was normal.  This delayed infliximab by 4 days.  She saw Rheumatology for seronegative arthropathy and started an anti-inflammatory diet.  Joint pain is much improved.  Also avoiding gluten for the past 3 years.   Thinks she has yeast infection. Having bad GERD currently.  She has history of SCC.  She had iron infusions in June.      8/22/2023 Follow up  Since last visit, we increased infliximab based on subtherapeutic drug level and she is now on 10 mg/kg every 4 weeks.  She has had 3 monthly doses of high dose infliximab.  Feels like this has helped and she is slowly improving.  Calprotectin improved from 11 100-543.  Still has 3-4 BMs per day with occasional blood, but much improved from before.  No longer on steroids.  No pain.  Occasional incontinence when she has anxiety.   Labs from August showed mild ALT and AST elevation.  Has brain fog and fatigue after infusions; no fevers.  Still needs Prevnar.  Will get flu shot.  Completed 4 IV iron infusions and felt much improved.  Needs GYN appointment.  Saw dermatology.      4/19/2023 Return  Since last visit, started infliximab (Avsola) and has had 3 loading doses.  3rd dose was on 4/10.  Feels like BMs have improved, but are still inconsistent.  No abdominal pain. Occasional urgency.  Calprotectin 372 but trending down.  No bleeding for the past 2 weeks.  Energy is poor.  She cannot tolerate PO iron.  Gets SOB with exertion.  Poor exercise tolerance.  She feels dizzy on occasion since startign avsola. She is still on prednisone but down to 5 mg daily.  Has been on it since July.  Seeing Derm in June for h/o SCC.  Overdue for Pap.  She may have PSC based on chronic Ap elevation and recent MRCP, although last AP was normal.     1/26/2023 Return visit  Leigh Ann Sarabia is establishing care with me for UC.  Diagnosed with UC in 2017 - diarrhea and bleeding.  She was treated with mesalamine  (Lialda) and did well for a couple of years. In 2020, flared and started vedolizumab but she lost response after a few months.  Ustekinumab was increased to every 6 weeks, but did not work. She then started ustekinumab 10/2022. However, has been on anf off prednisone for 5 months, currently on 10 mg.  Getting Stelara every 8 weeks.  Some days are good with formed stools, other days with loose BMs and bleeding. Colonoscopy this month with Fregoso 2 disease.  She has had COVID vaccines + booster and flu vaccine. No pneumococcal vaccine.  Last pap smear 1.5 years.  SCC removed ~2018 from chest.  Last derm ~ 6/2022.    Has osteopenia.  H/o iritis.  Of note, her daughter his UC and PSC.  Patient has chronic alkaline phosphatase elevation and had positive AMA in 2017.  Recent MRCP with mild intrahepatic biliary dilatation but no other signs of PSC.    12/19/22  First treated with lialda for about 2 years.    First biologic was entyvio which started in October of 2021 but in February of 2022 symptoms worsened, and was treated with prednisone with symptoms abating but as she was titrating down her prednisone symptoms worsened  She is currently on the autoimmune protocol diet since July of 2022  In the past week and a half- reporting normal BM's; formed, 1-2 times daily, without bleeding.  Gluten free and antiinflammatory diet - good for joints.  Had iritis x 2 in 2022.  Using steroid eye drops.      Leigh Ann reports the following symptoms over the last 7 days:    Stool Frequency 3-4 stools/day more than normal   Average stools per day: 5   Average liquid stools per day: 0   Consistency of bowel: soft or semi-formed   Awakening from sleep to move bowels? No   Urgency moderately severe fecal urgency   Visible blood in stool? visible blood in stool half of the time or more   Abdominal pain? none   General Wellbeing? slightly under par     Leigh Ann also reports the following symptoms in the last month:    Leakage of stool while  "sleeping? No   Leakage of stool while awake? No       REVIEW OF SYSTEMS:  During the last 7 days, Leigh Ann experienced the following symptoms:  Unintentional Weight Loss (in the last month) No   Fever No   Eye irritation No   Mouth sores No   Sore throat No   Chest pain No   Shortness of breath No   Numbness or tingling in hands or feet No   Skin rash No   Pain or swelling in joints No   Bruising or bleeding No   Felt depressed or blue No   Fatigue Yes   Dysuria No   Please see HPI for additional pertinent review of systems; otherwise remainder of ROS was unremarkable    MEDICATIONS:    Current Outpatient Medications:     ALPRAZolam (XANAX) 0.25 mg tablet    Calcium Carb-Cholecalciferol (calcium carbonate-vitamin D) 500 mg-5 mcg tablet    Cholecalciferol (VITAMIN D PO)    inFLIXimab-axxq (AVSOLA IV)    Timolol Maleate, Once-Daily, 0.5 % SOLN    fluticasone (FLONASE) 50 mcg/act nasal spray    ALLERGIES:  Allergies   Allergen Reactions    Amoxicillin GI Intolerance       OBJECTIVE:  /70 (BP Location: Left arm, Patient Position: Sitting, Cuff Size: Standard)   Pulse 82   Temp 97.5 °F (36.4 °C) (Tympanic)   Ht 5' 1.5\" (1.562 m)   Wt 64 kg (141 lb)   SpO2 96%   BMI 26.21 kg/m²      PHYSICAL EXAM:    General Appearance:   Alert, cooperative, no distress   HEENT:   Normocephalic, atraumatic, anicteric.     Neck:  Supple, symmetrical, trachea midline   Lungs:   Clear to auscultation bilaterally; no rales, rhonchi or wheezing; respirations unlabored    Heart::   Regular rate and rhythm; no murmur, rub, or gallop.   Abdomen:   Soft, non-distended; normal bowel sounds    Abdominal exam was notable for no tenderness with no mass.     Genitalia:   Deferred    Rectal:   Perirectal assessment was not performed.                     Extremities:  No cyanosis, clubbing or edema    Pulses:  2+ and symmetric    Skin:  No jaundice, rashes, or lesions    Lymph nodes:  No palpable cervical lymphadenopathy        Lab Results "   Component Value Date    WBC 6.99 04/19/2024    HGB 12.7 04/19/2024    HCT 39.7 04/19/2024    MCV 93 04/19/2024     04/19/2024     Lab Results   Component Value Date     06/10/2014    SODIUM 139 08/22/2024    K 4.1 08/22/2024     08/22/2024    CO2 28 08/22/2024    ANIONGAP 8 06/10/2014    AGAP 7 08/22/2024    BUN 10 08/22/2024    CREATININE 0.56 (L) 08/22/2024    GLUC 105 10/04/2023    GLUF 87 08/22/2024    CALCIUM 9.4 08/22/2024    AST 73 (H) 08/22/2024    ALT 73 (H) 08/22/2024    ALKPHOS 91 08/22/2024    PROT 7.0 06/10/2014    TP 7.2 08/22/2024    BILITOT 0.4 06/10/2014    TBILI 0.64 08/22/2024    EGFR 100 08/22/2024     Lab Results   Component Value Date    CRP 3.3 (H) 04/19/2024     Lab Results   Component Value Date    RSNLCJBR36 516 11/04/2023     Lab Results   Component Value Date    FERRITIN 9 (L) 04/19/2024       ASSESSMENT AND PLAN:    Leigh Ann has a history of macroscopic pancolitis and microscopic pancolitis  ulcerative colitis diagnosed in 2017. Current medical therapy is with infliximab 15 mg/kg every 4 weeks. My global assessment is that the clinical disease is currently moderately active.     The 6-point Fregoso score was 4 and the 9-point Fregoso score was 6.  The short CDAI was 93.      Leigh Ann has ulcerative pancolitis diagnosed in 2017. She has already been on mesalamine, vedolizumab, and ustekinumab with either PNR or CARLOS.  Currently, she is on high-dose infliximab with partial response.  We have increased the dose to 15 mg/kg every 4 weeks based on low trough levels and partial clinical improvement.  Her last colonoscopy was in April 2024 and I felt that she had improved compared to the previous colonoscopy a year earlier, and there were areas of normal mucosa.  Therefore, we opted to continue with infliximab.  However, her calprotectin has not normalized, and I am starting to think that we should switch to upadacitinib.  Leigh Ann is nervous about this, so we decided to check 1 more  calprotectin level since the trend has been reassuring.  We did discuss the safety of upadacitinib, specifically warnings about  MACE and thromboembolic events.  These warnings come from tofacitinib in patients with RA and cardiovascular risk factors.  It is not clear whether they also apply to upadacitinib.  We also talked about the increased risk for shingles and need for Shingrix vaccine if she switches.  We also discussed questionable risk of increased cancer with Joey inhibitors.    For now, she will remain on infliximab 15 mg/kg every 4 weeks.  We are going to check another calprotectin level in 1 to 2 months.  If the level remains elevated at, we will likely switch therapy.  We will also check another AMA level now that she is off steroids.  Check iron and vitamin D levels.  She should be on a daily vitamin D with calcium supplements.  She will speak to Dr. Valentino about getting the Shingrix vaccine.  She will also discuss repeating DEXA.  She also needs pneumococcal vaccination.  Continue annual Pap smears and dermatology exams.  She will keep me updated.  Return in 3 months.      Health Maintenance Recommendations:  Vaccines & Infections  COVID-19 vaccination and boosters are recommended. There is no evidence that the COVID-19 vaccine would cause an IBD flare.  Avoid live vaccines if on immunosuppressive therapy.  Yearly influenza vaccine (flu shot).  Pneumonia vaccines for patients on immunosuppression. These include Prevnar 20, followed by Pneumovax 23 at least 8 weeks later.  Shingrix vaccine (series of 2 injections) for al patients 65 and older. Patients on tofacitinib or upadacitinib should be vaccinated regardless of age.  If not immune to measles mumps or rubella, MMR vaccine is recommended. However, this is a live vaccine and should be given prior to immunosuppressive therapy.  HPV vaccination as per national guidelines.  Hepatitis A and B vaccinations if not previously vaccinated.  Testing for  tuberculosis with QuantiFERON Gold blood test and/or chest xray prior to starting immunosuppressive medications, and then annually    Cancer screening  Dysplasia surveillance for colorectal cancer. Colonoscopy in all patients with extensive colitis (more than 1/3 of the colon involved) who had disease for at least 8 or more years.  Repeat colonoscopy approximately every 12-24 months.  In patients with concurrent primary sclerosing cholangitis, history of dysplasia, or family history of colon cancer, repeat colonoscopy annually.    Females: Pap smear annually for woman on immunosuppression.  Annual dermatologic/skin exam in all patients with IBD, especially those on immunosuppression with thiopurines or CEDRICK inhibitors.    Miscellaneous  DEXA scan, once off steroids for 3 months  Depression screening recommended annually  Routine dental and ophthalmology examinations    Problem List Items Addressed This Visit       Ulcerative colitis (HCC)    Relevant Medications    Calcium Carb-Cholecalciferol (calcium carbonate-vitamin D) 500 mg-5 mcg tablet    Other Relevant Orders    Calprotectin,Fecal    Antimitochondrial antibody    TIBC Panel (incl. Iron, TIBC, % Iron Saturation)    Vitamin D 25 hydroxy    Long-term corticosteroid use    Relevant Medications    Calcium Carb-Cholecalciferol (calcium carbonate-vitamin D) 500 mg-5 mcg tablet       Filemon Martinez MD

## 2024-09-06 DIAGNOSIS — K51.911 ULCERATIVE COLITIS WITH RECTAL BLEEDING, UNSPECIFIED LOCATION (HCC): Primary | ICD-10-CM

## 2024-09-06 DIAGNOSIS — R74.8 ELEVATED ALKALINE PHOSPHATASE LEVEL: ICD-10-CM

## 2024-09-12 ENCOUNTER — OFFICE VISIT (OUTPATIENT)
Dept: FAMILY MEDICINE CLINIC | Facility: CLINIC | Age: 64
End: 2024-09-12
Payer: COMMERCIAL

## 2024-09-12 VITALS
WEIGHT: 141.8 LBS | TEMPERATURE: 97.5 F | OXYGEN SATURATION: 98 % | DIASTOLIC BLOOD PRESSURE: 80 MMHG | HEIGHT: 62 IN | HEART RATE: 102 BPM | SYSTOLIC BLOOD PRESSURE: 126 MMHG | BODY MASS INDEX: 26.09 KG/M2

## 2024-09-12 DIAGNOSIS — M06.09 SERONEGATIVE ARTHROPATHY OF MULTIPLE SITES (HCC): ICD-10-CM

## 2024-09-12 DIAGNOSIS — M62.830 SPASM OF THORACOLUMBAR MUSCLE: Primary | ICD-10-CM

## 2024-09-12 PROCEDURE — 99213 OFFICE O/P EST LOW 20 MIN: CPT | Performed by: FAMILY MEDICINE

## 2024-09-12 RX ORDER — METHOCARBAMOL 500 MG/1
TABLET, FILM COATED ORAL
Qty: 30 TABLET | Refills: 0 | Status: SHIPPED | OUTPATIENT
Start: 2024-09-12

## 2024-09-12 NOTE — PATIENT INSTRUCTIONS
Patient Education     Muscle Spasm ED   General Information   You came to the Emergency Department (ED) for muscle spasms. A muscle spasm is a sudden, often painful, tightening of a muscle. This can involve part of a muscle, the whole muscle, or even a group of muscles. A muscle spasm is also called a muscle cramp and it can last for a few seconds or a few minutes. Most of the time, muscle spasms will go away without treatment.  What care is needed at home?   Call your regular doctor to let them know you were in the ED. Make a follow-up appointment if you were told to.  Gentle stretching should help stop a spasm. Often, you can ease the spasm just by stretching the muscle. Stretching exercises keep your muscles flexible. They also stop them from getting too tight. Do stretches slowly and hold each stretch for 20 to 30 seconds. Try to do the stretches you were shown 2 to 3 times each day.  Ice or heat may help ease your pain. Either one may help stop a spasm, but most people find that heat is more helpful.  Soak the sore area in warm water or using a heating pad can help stop the spasm and lower pain. Heat also helps muscles stretch easier. Do not leave a heating pad on more than 20 minutes at a time. Be sure to check your skin while the heating pad is on to avoid burns. Never go to sleep with a heating pad on.  Putting ice on a muscle that is in spasm can help ease the spasm and reduce pain. Use an ice pack or bag of frozen vegetables wrapped in a towel over the painful part. Never put ice right on the skin. Do not leave the ice on more than 10 to 15 minutes at a time. Do not try to stretch the muscle right after icing.  Massaging the cramping muscle with firm pressure may help ease the spasm.  Drinking extra fluids can help muscle spasms if they are caused by a loss of body fluids. Avoid intense exercise in hot and humid weather to lower the chance of getting muscle spasms.  Sometimes, you may get muscle spasms if  Pregnancy: Your Second Trimester Changes    Each day, you and your baby are changing and growing together. Hereâs a quick look at whatâs happening to both of you. How You Are Changing  Even when you donât notice it, your body is adapting to meet the needs of your growing baby. The changes in your body might also affect your moods. Your body  Your uterus expands as baby grows. As the weeks go by, you will feel more pressure on your bladder, stomach, and other organs. You may notice some skin color changes on your forehead, nose, or cheeks. Freckles may darken, and moles may grow. You may notice a darker line on your abdomen between your belly button and pubic bone in the midline. Your moods  The second trimester is often easier than the first. Still, be prepared for mood swings. These are due to the increase in hormones (chemicals that affect the way organs work) produced by your body. These mood swings are a normal part of pregnancy. How your baby is growing       Month 4  Babyâs heartbeat may be heard with a Doppler (hand-held ultrasound device) by 9 to 10 weeks. Â Eyebrows, eyelashes and fingernails begin to form. Â  Month 5  You may feel your baby move. After a growth spurt, your baby nears 10 inches. Month 6  Babyâs fingerprints have formed. Your baby weighs about 1 Â to 2 pounds and is about 12 inches long. Â© 700 [facility] The [address] [place] [address] [address]kw[address], [place], [address] All rights reserved. This information is not intended as a substitute for professional medical care. Always follow your healthcare professional's instructions. you don’t get enough of certain nutrients in your diet, like potassium, magnesium, or carbohydrates. If this is the case, changing your diet can help you to avoid muscle cramps. Talk to your doctor about what to eat and drink before and after exercise.  You may want to take medicine like ibuprofen, naproxen, or acetaminophen to help with pain.  When do I need to call the doctor?   You are having a lot of muscle spasms.  You continue to have muscle spasms and you feel extremely tired or have weakness in your arm or leg.  You are not able to get relief from a muscle spasm.  You have new or worsening symptoms.  Last Reviewed Date   2020-09-22  Consumer Information Use and Disclaimer   This generalized information is a limited summary of diagnosis, treatment, and/or medication information. It is not meant to be comprehensive and should be used as a tool to help the user understand and/or assess potential diagnostic and treatment options. It does NOT include all information about conditions, treatments, medications, side effects, or risks that may apply to a specific patient. It is not intended to be medical advice or a substitute for the medical advice, diagnosis, or treatment of a health care provider based on the health care provider's examination and assessment of a patient’s specific and unique circumstances. Patients must speak with a health care provider for complete information about their health, medical questions, and treatment options, including any risks or benefits regarding use of medications. This information does not endorse any treatments or medications as safe, effective, or approved for treating a specific patient. [employer] and its affiliates disclaim any warranty or liability relating to this information or the use thereof. The use of this information is governed by the Terms of Use, available at https://www.woltersAdvizzeruwer.com/en/know/clinical-effectiveness-terms   Copyright   Copyright © 2024  [employer] and its affiliates and/or licensors. All rights reserved.

## 2024-09-27 DIAGNOSIS — F40.240 CLAUSTROPHOBIA: ICD-10-CM

## 2024-09-27 NOTE — TELEPHONE ENCOUNTER
ALPRAZolam (XANAX) 0.25 mg tablet Take 1 tablet (0.25 mg total) by mouth 2 (two) times a day as needed for anxiety       [facility] to [facility]  Completely out.

## 2024-09-28 RX ORDER — ALPRAZOLAM 0.25 MG
0.25 TABLET ORAL 2 TIMES DAILY PRN
Qty: 90 TABLET | Refills: 0 | Status: SHIPPED | OUTPATIENT
Start: 2024-09-28

## 2024-09-28 NOTE — TELEPHONE ENCOUNTER
Patient has an upcoming visit in November.  Please make a note on her chart so that we can have agreement form completed at that time

## 2024-10-17 ENCOUNTER — TELEPHONE (OUTPATIENT)
Age: 64
End: 2024-10-17

## 2024-10-17 NOTE — TELEPHONE ENCOUNTER
Pt seen by dr Martinez 9/3/24 & Dr Alvarado in [facility] clinic on 7/25/24. Liver biopsy ordered on 9/6/24 and is scheduled for 10/28/24 @ 0830 under Dr Alvarado. Spoke with pt. Pt has questions regarding prep for procedure, what they are looking for. Provided with phone number for IR. Pt advised to speak to them regarding details of procedure prep and what to expect. Pt agreeable and verbalized understanding of all information

## 2024-10-17 NOTE — TELEPHONE ENCOUNTER
LOV: 9/3/24    HX:UC,     Pt transferred to nurse line.  Pt asking if anyone will call her about her liver biopsy that is scheduled on 10/18/24.  She has not received instructions and states a provider has not talked to her about the procedure and she is unsure what to expect.  Advised will reach out to provider to discuss with pt.      [phone]

## 2024-10-17 NOTE — TELEPHONE ENCOUNTER
Patient contacted office with liver bx questions. Call transferred to nurse triage to further assist.

## 2024-10-18 RX ORDER — SODIUM CHLORIDE 9 MG/ML
75 INJECTION, SOLUTION INTRAVENOUS CONTINUOUS
OUTPATIENT
Start: 2024-10-18

## 2024-10-21 ENCOUNTER — TELEPHONE (OUTPATIENT)
Dept: RADIOLOGY | Facility: HOSPITAL | Age: 64
End: 2024-10-21

## 2024-10-28 ENCOUNTER — HOSPITAL ENCOUNTER (OUTPATIENT)
Dept: RADIOLOGY | Facility: HOSPITAL | Age: 64
Discharge: HOME | End: 2024-10-28
Attending: RADIOLOGY | Admitting: RADIOLOGY
Payer: COMMERCIAL

## 2024-10-28 VITALS
HEART RATE: 77 BPM | SYSTOLIC BLOOD PRESSURE: 116 MMHG | BODY MASS INDEX: 27.05 KG/M2 | OXYGEN SATURATION: 97 % | RESPIRATION RATE: 18 BRPM | TEMPERATURE: 97.8 F | DIASTOLIC BLOOD PRESSURE: 70 MMHG | WEIGHT: 145.5 LBS

## 2024-10-28 DIAGNOSIS — K51.911 ULCERATIVE COLITIS WITH RECTAL BLEEDING, UNSPECIFIED LOCATION (HCC): ICD-10-CM

## 2024-10-28 LAB
ANION GAP SERPL CALCULATED.3IONS-SCNC: 4 MMOL/L (ref 4–13)
BASOPHILS # BLD AUTO: 0.04 THOUSANDS/ΜL (ref 0–0.1)
BASOPHILS NFR BLD AUTO: 1 % (ref 0–1)
BUN SERPL-MCNC: 16 MG/DL (ref 5–25)
CALCIUM SERPL-MCNC: 9.3 MG/DL (ref 8.4–10.2)
CHLORIDE SERPL-SCNC: 106 MMOL/L (ref 96–108)
CO2 SERPL-SCNC: 29 MMOL/L (ref 21–32)
CREAT SERPL-MCNC: 0.68 MG/DL (ref 0.6–1.3)
EOSINOPHIL # BLD AUTO: 0.15 THOUSAND/ΜL (ref 0–0.61)
EOSINOPHIL NFR BLD AUTO: 2 % (ref 0–6)
ERYTHROCYTE [DISTWIDTH] IN BLOOD BY AUTOMATED COUNT: 12.8 % (ref 11.6–15.1)
GFR SERPL CREATININE-BSD FRML MDRD: 94 ML/MIN/1.73SQ M
GLUCOSE P FAST SERPL-MCNC: 98 MG/DL (ref 65–99)
GLUCOSE SERPL-MCNC: 98 MG/DL (ref 65–140)
HCT VFR BLD AUTO: 41.9 % (ref 34.8–46.1)
HGB BLD-MCNC: 13.9 G/DL (ref 11.5–15.4)
IMM GRANULOCYTES # BLD AUTO: 0.02 THOUSAND/UL (ref 0–0.2)
IMM GRANULOCYTES NFR BLD AUTO: 0 % (ref 0–2)
INR PPP: 0.91 (ref 0.85–1.19)
LYMPHOCYTES # BLD AUTO: 1.84 THOUSANDS/ΜL (ref 0.6–4.47)
LYMPHOCYTES NFR BLD AUTO: 30 % (ref 14–44)
MCH RBC QN AUTO: 30.5 PG (ref 26.8–34.3)
MCHC RBC AUTO-ENTMCNC: 33.2 G/DL (ref 31.4–37.4)
MCV RBC AUTO: 92 FL (ref 82–98)
MONOCYTES # BLD AUTO: 0.87 THOUSAND/ΜL (ref 0.17–1.22)
MONOCYTES NFR BLD AUTO: 14 % (ref 4–12)
NEUTROPHILS # BLD AUTO: 3.3 THOUSANDS/ΜL (ref 1.85–7.62)
NEUTS SEG NFR BLD AUTO: 53 % (ref 43–75)
NRBC BLD AUTO-RTO: 0 /100 WBCS
PLATELET # BLD AUTO: 166 THOUSANDS/UL (ref 149–390)
PMV BLD AUTO: 8.5 FL (ref 8.9–12.7)
POTASSIUM SERPL-SCNC: 3.8 MMOL/L (ref 3.5–5.3)
PROTHROMBIN TIME: 12.5 SECONDS (ref 12.3–15)
RBC # BLD AUTO: 4.56 MILLION/UL (ref 3.81–5.12)
SODIUM SERPL-SCNC: 139 MMOL/L (ref 135–147)
WBC # BLD AUTO: 6.22 THOUSAND/UL (ref 4.31–10.16)

## 2024-10-28 PROCEDURE — 88313 SPECIAL STAINS GROUP 2: CPT | Performed by: PATHOLOGY

## 2024-10-28 PROCEDURE — 47000 NEEDLE BIOPSY OF LIVER PERQ: CPT

## 2024-10-28 PROCEDURE — 85610 PROTHROMBIN TIME: CPT | Performed by: RADIOLOGY

## 2024-10-28 PROCEDURE — 85025 COMPLETE CBC W/AUTO DIFF WBC: CPT | Performed by: RADIOLOGY

## 2024-10-28 PROCEDURE — 88307 TISSUE EXAM BY PATHOLOGIST: CPT | Performed by: PATHOLOGY

## 2024-10-28 PROCEDURE — 99152 MOD SED SAME PHYS/QHP 5/>YRS: CPT

## 2024-10-28 PROCEDURE — 99152 MOD SED SAME PHYS/QHP 5/>YRS: CPT | Performed by: RADIOLOGY

## 2024-10-28 PROCEDURE — 76942 ECHO GUIDE FOR BIOPSY: CPT | Performed by: RADIOLOGY

## 2024-10-28 PROCEDURE — 47000 NEEDLE BIOPSY OF LIVER PERQ: CPT | Performed by: RADIOLOGY

## 2024-10-28 PROCEDURE — 88341 IMHCHEM/IMCYTCHM EA ADD ANTB: CPT | Performed by: PATHOLOGY

## 2024-10-28 PROCEDURE — 76942 ECHO GUIDE FOR BIOPSY: CPT

## 2024-10-28 PROCEDURE — 99153 MOD SED SAME PHYS/QHP EA: CPT

## 2024-10-28 PROCEDURE — 88342 IMHCHEM/IMCYTCHM 1ST ANTB: CPT | Performed by: PATHOLOGY

## 2024-10-28 PROCEDURE — 80048 BASIC METABOLIC PNL TOTAL CA: CPT | Performed by: RADIOLOGY

## 2024-10-28 RX ORDER — FENTANYL CITRATE 50 UG/ML
INJECTION, SOLUTION INTRAMUSCULAR; INTRAVENOUS AS NEEDED
Status: COMPLETED | OUTPATIENT
Start: 2024-10-28 | End: 2024-10-28

## 2024-10-28 RX ORDER — OXYCODONE HYDROCHLORIDE 5 MG/1
5 TABLET ORAL ONCE AS NEEDED
Status: DISCONTINUED | OUTPATIENT
Start: 2024-10-28 | End: 2024-10-29 | Stop reason: HOSPADM

## 2024-10-28 RX ORDER — LIDOCAINE WITH 8.4% SOD BICARB 0.9%(10ML)
SYRINGE (ML) INJECTION AS NEEDED
Status: COMPLETED | OUTPATIENT
Start: 2024-10-28 | End: 2024-10-28

## 2024-10-28 RX ORDER — MIDAZOLAM HYDROCHLORIDE 2 MG/2ML
INJECTION, SOLUTION INTRAMUSCULAR; INTRAVENOUS AS NEEDED
Status: COMPLETED | OUTPATIENT
Start: 2024-10-28 | End: 2024-10-28

## 2024-10-28 RX ORDER — SODIUM CHLORIDE 9 MG/ML
75 INJECTION, SOLUTION INTRAVENOUS CONTINUOUS
Status: DISCONTINUED | OUTPATIENT
Start: 2024-10-28 | End: 2024-10-29 | Stop reason: HOSPADM

## 2024-10-28 RX ORDER — ACETAMINOPHEN 325 MG/1
650 TABLET ORAL ONCE
Status: COMPLETED | OUTPATIENT
Start: 2024-10-28 | End: 2024-10-28

## 2024-10-28 RX ORDER — ACETAMINOPHEN 500 MG
500 TABLET ORAL EVERY 6 HOURS PRN
COMMUNITY

## 2024-10-28 RX ADMIN — MIDAZOLAM 1 MG: 1 INJECTION INTRAMUSCULAR; INTRAVENOUS at 08:49

## 2024-10-28 RX ADMIN — SODIUM CHLORIDE 75 ML/HR: 0.9 INJECTION, SOLUTION INTRAVENOUS at 07:20

## 2024-10-28 RX ADMIN — Medication 10 ML: at 08:54

## 2024-10-28 RX ADMIN — FENTANYL CITRATE 50 MCG: 50 INJECTION INTRAMUSCULAR; INTRAVENOUS at 08:50

## 2024-10-28 RX ADMIN — ACETAMINOPHEN 650 MG: 325 TABLET, FILM COATED ORAL at 10:14

## 2024-10-28 NOTE — DISCHARGE INSTRUCTIONS
Moderate Sedation   WHAT YOU NEED TO KNOW:   Moderate sedation, or conscious sedation, is medicine used during procedures to help you feel relaxed and calm. You will be awake and able to follow directions without anxiety or pain. You will remember little to none of the procedure. You may feel tired, weak, or unsteady on your feet after you get sedation. You may also have trouble concentrating or short-term memory loss. These symptoms should go away in 24 hours or less.   DISCHARGE INSTRUCTIONS:   Call 911 or have someone else call for any of the following:   You have sudden trouble breathing.     You cannot be woken.  Seek care immediately if:   You have a severe headache or dizziness.     Your heart is beating faster than usual.  Contact your healthcare provider if:   You have a fever.     You have nausea or are vomiting for more than 8 hours after the procedure.      Your skin is itchy, swollen, or you have a rash.     You have questions or concerns about your condition or care.  Self-care:   Have someone stay with you for 24 hours. This person can drive you to errands and help you do things around the house. This person can also watch for problems.      Rest and do quiet activities for 24 hours. Do not exercise, ride a bike, or play sports. Stand up slowly to prevent dizziness and falls. Take short walks around the house with another person. Slowly return to your usual activities the next day.      Do not drive or use dangerous machines or tools for 24 hours. You may injure yourself or others. Examples include a lawnmower, saw, or drill. Do not return to work for 24 hours if you use dangerous machines or tools for work.      Do not make important decisions for 24 hours. For example, do not sign important papers or invest money.      Drink liquids as directed. Liquids help flush the sedation medicine out of your body. Ask how much liquid to drink each day and which liquids are best for you.      Eat small,  frequent meals to prevent nausea and vomiting. Start with clear liquids such as juice or broth. If you do not vomit after clear liquids, you can eat your usual foods.      Do not drink alcohol or take medicines that make you drowsy. This includes medicines that help you sleep and anxiety medicines. Ask your healthcare provider if it is safe for you to take pain medicine.  Follow up with your healthcare provider as directed: Write down your questions so you remember to ask them during your visits.   © 2017 [employer] Information is for End User's use only and may not be sold, redistributed or otherwise used for commercial purposes. All illustrations and images included in CareNotes® are the copyrighted property of [employer]. or [employer].  The above information is an  only. It is not intended as medical advice for individual conditions or treatments. Talk to your doctor, nurse or pharmacist before following any medical regimen to see if it is safe and effective for you.    Percutaneous Liver Biopsy   WHAT YOU NEED TO KNOW:   A PLB is a procedure to remove a sample of tissue from your liver. The sample can be sent to a lab and tested for liver disease, cancer, or infection. After the procedure you may have pain and bruising at the biopsy site. You may also have pain in your right shoulder. These symptoms should get better in 48 to 72 hours.      DISCHARGE INSTRUCTIONS:     Contact Interventional Radiology at [phone] if:    Fever greater than 101 or chills  You have severe pain in your abdomen.    Your abdomen is larger than usual and feels hard.    Your neck is more swollen and you have trouble swallowing.    You feel weak or dizzy.    Your heart is beating faster than usual.   Your pain does not get better after you take pain medicine.    Your wound is red, swollen, or draining pus.   You have nausea or are vomiting.   Your skin is itchy, swollen, or you have a  rash.   You have questions or concerns about your condition or care.    Medicines:   Acetaminophen decreases pain and fever. It is available without a doctor's order. Acetaminophen can cause liver damage if not taken correctly.   Take your home medicine as directed.  Resume your normal diet. Small sips of flat soda will help with mild nausea.    Self-care:   Rest as directed. Do not play sports, exercise, or lift anything heavier than 5 pounds for up to 1 week.     Apply firm, steady pressure if bleeding occurs. A small amount of bleeding from your wound is possible. Apply pressure with a clean gauze or towel for 5 to 10 minutes. Call 911 if bleeding becomes heavy or does not stop.    Ask your healthcare provider when to take your blood thinner or antiplatelet medicine. You may need to wait 24 to 72 hours to take your medicine. This will prevent bleeding.    Follow up with your healthcare provider as directed: Write down your questions so you remember to ask them during your visits.

## 2024-10-28 NOTE — H&P
Interventional Radiology  History and Physical 10/28/2024     Leigh Ann Sarabia   1960   [id number]    H&P reviewed. There have been no interval changes since the time the H&P was written.    BP (!) 84/52   Pulse 79   Temp 97.5 °F (36.4 °C)   Resp (!) 25   Wt 66 kg (145 lb 8.1 oz)   SpO2 100%   BMI 27.05 kg/m²     Prior imaging was reviewed.  Presents today for nontargeted liver core biopsy to evaluate persistent elevation of LFTs in the setting of ulcerative colitis.  She has not had liver biopsy in the past.  Procedure discussed and all questions answered.    Informed written consent was obtained.    Pavan Blanchard MD

## 2024-10-28 NOTE — BRIEF OP NOTE (RAD/CATH)
INTERVENTIONAL RADIOLOGY PROCEDURE NOTE    Date: 10/28/2024    Procedure:   Procedure Summary       Date: 10/28/24 Room / Location: [facility] Interventional Radiology    Anesthesia Start:  Anesthesia Stop:     Procedure: IR BIOPSY LIVER RANDOM Diagnosis:       Ulcerative colitis with rectal bleeding, unspecified location (HCC)      (history of ulcerative pan-colitis)    Scheduled Providers:  Responsible Provider:     Anesthesia Type: Not recorded ASA Status: Not recorded            Preoperative diagnosis:   1. Ulcerative colitis with rectal bleeding, unspecified location (HCC)         Postoperative diagnosis: Same.    Surgeon: Pavan Blanchard MD     Assistant: None. No qualified resident was available.    Blood loss: Less than 3 mL    Specimens: 2 samples 18-gauge core in formalin    Findings: Random right lobe liver biopsy with ultrasound guidance    Complications: None immediate.    Anesthesia: conscious sedation

## 2024-10-30 ENCOUNTER — APPOINTMENT (OUTPATIENT)
Dept: LAB | Facility: MEDICAL CENTER | Age: 64
End: 2024-10-30

## 2024-10-31 PROCEDURE — 88313 SPECIAL STAINS GROUP 2: CPT | Performed by: PATHOLOGY

## 2024-10-31 PROCEDURE — 88307 TISSUE EXAM BY PATHOLOGIST: CPT | Performed by: PATHOLOGY

## 2024-10-31 PROCEDURE — 88341 IMHCHEM/IMCYTCHM EA ADD ANTB: CPT | Performed by: PATHOLOGY

## 2024-10-31 PROCEDURE — 88342 IMHCHEM/IMCYTCHM 1ST ANTB: CPT | Performed by: PATHOLOGY

## 2024-11-01 ENCOUNTER — APPOINTMENT (OUTPATIENT)
Dept: LAB | Facility: CLINIC | Age: 64
End: 2024-11-01
Payer: COMMERCIAL

## 2024-11-01 ENCOUNTER — OFFICE VISIT (OUTPATIENT)
Dept: FAMILY MEDICINE CLINIC | Facility: CLINIC | Age: 64
End: 2024-11-01
Payer: COMMERCIAL

## 2024-11-01 VITALS
TEMPERATURE: 97.3 F | HEIGHT: 62 IN | BODY MASS INDEX: 26.68 KG/M2 | WEIGHT: 145 LBS | DIASTOLIC BLOOD PRESSURE: 70 MMHG | HEART RATE: 83 BPM | SYSTOLIC BLOOD PRESSURE: 110 MMHG | OXYGEN SATURATION: 98 %

## 2024-11-01 DIAGNOSIS — M70.61 TROCHANTERIC BURSITIS OF RIGHT HIP: ICD-10-CM

## 2024-11-01 DIAGNOSIS — K51.911 ULCERATIVE COLITIS WITH RECTAL BLEEDING, UNSPECIFIED LOCATION (HCC): ICD-10-CM

## 2024-11-01 DIAGNOSIS — M76.31 ILIOTIBIAL BAND SYNDROME OF RIGHT SIDE: ICD-10-CM

## 2024-11-01 DIAGNOSIS — E06.3 THYROIDITIS, AUTOIMMUNE: ICD-10-CM

## 2024-11-01 DIAGNOSIS — R23.8 PAPULE OF SKIN: ICD-10-CM

## 2024-11-01 DIAGNOSIS — Z00.00 ANNUAL PHYSICAL EXAM: Primary | ICD-10-CM

## 2024-11-01 DIAGNOSIS — Z13.820 SCREENING FOR OSTEOPOROSIS: ICD-10-CM

## 2024-11-01 PROCEDURE — 99396 PREV VISIT EST AGE 40-64: CPT | Performed by: FAMILY MEDICINE

## 2024-11-01 PROCEDURE — 83993 ASSAY FOR CALPROTECTIN FECAL: CPT

## 2024-11-01 RX ORDER — TRETINOIN 0.25 MG/G
CREAM TOPICAL
Qty: 20 G | Refills: 0 | Status: SHIPPED | OUTPATIENT
Start: 2024-11-01

## 2024-11-01 NOTE — ASSESSMENT & PLAN NOTE
Following with GI/liver specialist with recent interventional radiologist liver biopsy.  Results to be reviewed with specialist with regards to strategy and continuation of Remicade.

## 2024-11-01 NOTE — PROGRESS NOTES
Adult Annual Physical  Name: Leigh Ann Sarabia      : 1960      MRN: [id number]  Encounter Provider: Nazia Valentino DO  Encounter Date: 2024   Encounter department: [facility] PRIMARY CARE    Assessment & Plan  Annual physical exam         Papule of skin    Orders:    tretinoin (RETIN-A) 0.025 % cream; Apply topically daily at bedtime    Screening for osteoporosis    Orders:    DXA bone density spine hip and pelvis; Future    Thyroiditis, autoimmune  History of-needs updated labs  Orders:    TSH, 3rd generation; Future    Trochanteric bursitis of right hip  Patient given stretching/exercises for symptoms.  Follow-up if not improved       Iliotibial band syndrome of right side  Patient given stretching/exercises for symptoms.  Follow-up if not improved       Ulcerative colitis with rectal bleeding, unspecified location (HCC)  Following with GI/liver specialist with recent interventional radiologist liver biopsy.  Results to be reviewed with specialist with regards to strategy and continuation of Remicade.       Immunizations and preventive care screenings were discussed with patient today. Appropriate education was printed on patient's after visit summary.    Counseling:  Alcohol/drug use: discussed moderation in alcohol intake, the recommendations for healthy alcohol use  Dental Health: discussed importance of regular tooth brushing, flossing, and dental visits.  Injury prevention: discussed safety/seat belts, safety helmets, smoke detectors, carbon monoxide detectors,  Sexual health:   OB History          5    Para   3    Term                AB   2    Living             SAB   2    IAB        Ectopic        Multiple        Live Births               Obstetric Comments   34.31, 24              Exercise: the importance of regular exercise/physical activity was discussed. Recommend exercise 3-5 times per week for at least 30 minutes.          History of Present Illness   Chief  "Complaint   Patient presents with    Well Check   Patient is following regularly with her gastroenterologist and liver specialist.  Just had interventional radiology liver biopsy and we discussed results.  Reviewed the additional \"commentary \".  Does have follow-up November 7 with GI to discuss further.  Final Diagnosis A. Liver, core needle biopsy: - Patchy portal chronic inflammation with bile duct injury and mild lobular inflammation. - No significant steatosis and fibrosis.  Adult Annual Physical:  Patient presents for annual physical.     Diet and Physical Activity:  - Diet/Nutrition: poor diet.  - Exercise: walking.    General Health:  - Sleep:. variable  - Hearing: normal hearing bilateral ears.  - Vision: goes for regular eye exams.  - Dental: regular dental visits.    /GYN Health:    - Menopause: postmenopausal.   Does follow with OB/GYN Dr. Maurilio Bill last appointment May 2024  Review of Systems   Musculoskeletal:  Positive for arthralgias (right hip/trochanteric bursal area and IT band).   Skin:         \"I am getting little bumps on my face mostly chin area \"they started out as pimple-like and then remain lightly hard, sometimes I pick at them         Objective     /70   Pulse 83   Temp (!) 97.3 °F (36.3 °C) (Temporal)   Ht 5' 2\" (1.575 m)   Wt 65.8 kg (145 lb)   SpO2 98%   BMI 26.52 kg/m²     Physical Exam  Constitutional:       General: She is not in acute distress.     Appearance: Normal appearance. She is well-developed.   HENT:      Head: Normocephalic.      Right Ear: Tympanic membrane normal.      Left Ear: Tympanic membrane normal.      Nose: Nose normal.      Mouth/Throat:      Mouth: Mucous membranes are moist.   Eyes:      Conjunctiva/sclera: Conjunctivae normal.      Pupils: Pupils are equal, round, and reactive to light.   Neck:      Vascular: No carotid bruit.   Cardiovascular:      Rate and Rhythm: Normal rate and regular rhythm.      Pulses: Normal pulses.      Heart sounds: " No murmur heard.  Pulmonary:      Effort: Pulmonary effort is normal.      Breath sounds: Normal breath sounds.   Abdominal:      General: Bowel sounds are normal.      Palpations: Abdomen is soft. There is no mass.      Tenderness: There is no abdominal tenderness.   Musculoskeletal:         General: Tenderness (Right trochanteric bursa) present.      Comments: Leg length discrepancy with right being shorter than left   Skin:     Findings: No rash.      Comments: Prominent papules noted mostly chin area and some on lower cheek   Neurological:      Mental Status: She is alert and oriented to person, place, and time.      Deep Tendon Reflexes: Reflexes are normal and symmetric.   Psychiatric:         Mood and Affect: Mood normal.         Behavior: Behavior normal.         Thought Content: Thought content normal.         Judgment: Judgment normal.

## 2024-11-01 NOTE — PATIENT INSTRUCTIONS
"Patient Education     Routine physical for adults   The Basics   Written by the doctors and editors at [facility]   What is a physical? -- A physical is a routine visit, or \"check-up,\" with your doctor. You might also hear it called a \"wellness visit\" or \"preventive visit.\"  During each visit, the doctor will:   Ask about your physical and mental health   Ask about your habits, behaviors, and lifestyle   Do an exam   Give you vaccines if needed   Talk to you about any medicines you take   Give advice about your health   Answer your questions  Getting regular check-ups is an important part of taking care of your health. It can help your doctor find and treat any problems you have. But it's also important for preventing health problems.  A routine physical is different from a \"sick visit.\" A sick visit is when you see a doctor because of a health concern or problem. Since physicals are scheduled ahead of time, you can think about what you want to ask the doctor.  How often should I get a physical? -- It depends on your age and health. In general, for people age 21 years and older:   If you are younger than 50 years, you might be able to get a physical every 3 years.   If you are 50 years or older, your doctor might recommend a physical every year.  If you have an ongoing health condition, like diabetes or high blood pressure, your doctor will probably want to see you more often.  What happens during a physical? -- In general, each visit will include:   Physical exam - The doctor or nurse will check your height, weight, heart rate, and blood pressure. They will also look at your eyes and ears. They will ask about how you are feeling and whether you have any symptoms that bother you.   Medicines - It's a good idea to bring a list of all the medicines you take to each doctor visit. Your doctor will talk to you about your medicines and answer any questions. Tell them if you are having any side effects that bother you. You " "should also tell them if you are having trouble paying for any of your medicines.   Habits and behaviors - This includes:   Your diet   Your exercise habits   Whether you smoke, drink alcohol, or use drugs   Whether you are sexually active   Whether you feel safe at home  Your doctor will talk to you about things you can do to improve your health and lower your risk of health problems. They will also offer help and support. For example, if you want to quit smoking, they can give you advice and might prescribe medicines. If you want to improve your diet or get more physical activity, they can help you with this, too.   Lab tests, if needed - The tests you get will depend on your age and situation. For example, your doctor might want to check your:   Cholesterol   Blood sugar   Iron level   Vaccines - The recommended vaccines will depend on your age, health, and what vaccines you already had. Vaccines are very important because they can prevent certain serious or deadly infections.   Discussion of screening - \"Screening\" means checking for diseases or other health problems before they cause symptoms. Your doctor can recommend screening based on your age, risk, and preferences. This might include tests to check for:   Cancer, such as breast, prostate, cervical, ovarian, colorectal, prostate, lung, or skin cancer   Sexually transmitted infections, such as chlamydia and gonorrhea   Mental health conditions like depression and anxiety  Your doctor will talk to you about the different types of screening tests. They can help you decide which screenings to have. They can also explain what the results might mean.   Answering questions - The physical is a good time to ask the doctor or nurse questions about your health. If needed, they can refer you to other doctors or specialists, too.  Adults older than 65 years often need other care, too. As you get older, your doctor will talk to you about:   How to prevent falling at " home   Hearing or vision tests   Memory testing   How to take your medicines safely   Making sure that you have the help and support you need at home  All topics are updated as new evidence becomes available and our peer review process is complete.  This topic retrieved from [employer] on: May 02, 2024.  Topic 454619 Version 1.0  Release: 32.4.3 - C32.122  © 2024 UpToDate, Inc. and/or its affiliates. All rights reserved.  Consumer Information Use and Disclaimer   Disclaimer: This generalized information is a limited summary of diagnosis, treatment, and/or medication information. It is not meant to be comprehensive and should be used as a tool to help the user understand and/or assess potential diagnostic and treatment options. It does NOT include all information about conditions, treatments, medications, side effects, or risks that may apply to a specific patient. It is not intended to be medical advice or a substitute for the medical advice, diagnosis, or treatment of a health care provider based on the health care provider's examination and assessment of a patient's specific and unique circumstances. Patients must speak with a health care provider for complete information about their health, medical questions, and treatment options, including any risks or benefits regarding use of medications. This information does not endorse any treatments or medications as safe, effective, or approved for treating a specific patient. UpToDate, Inc. and its affiliates disclaim any warranty or liability relating to this information or the use thereof.The use of this information is governed by the Terms of Use, available at https://www.[facility].com/en/know/clinical-effectiveness-terms. 2024© UpToDate, Inc. and its affiliates and/or licensors. All rights reserved.  Copyright   © 2024 UpToDate, Inc. and/or its affiliates. All rights reserved.

## 2024-11-03 LAB — CALPROTECTIN STL-MCNC: 1310 ΜG/G

## 2024-11-04 ENCOUNTER — TELEPHONE (OUTPATIENT)
Age: 64
End: 2024-11-04

## 2024-11-04 DIAGNOSIS — R79.89 ABNORMAL LIVER FUNCTION TESTS: Primary | ICD-10-CM

## 2024-11-04 DIAGNOSIS — K51.911 ULCERATIVE COLITIS WITH RECTAL BLEEDING, UNSPECIFIED LOCATION (HCC): Primary | ICD-10-CM

## 2024-11-04 NOTE — TELEPHONE ENCOUNTER
Spoke with Leigh Ann she was at her infusion appointment checking her mychart and saw the message from Dr. Martinez telling her to postpone her infusion. Since she had already started the infusion and received 20cc out of a bag of 250cc her nurse Damaris wanted to confirm that  Dr. Martinez wanted to stop the infusion. I sent a epic message Dr. Martinez and he agreed that the infusion should be stopped at this time. Dr. Martinez will reach to the pt later today.

## 2024-11-04 NOTE — TELEPHONE ENCOUNTER
Patients GI provider:  Dr. Mratinez    Number to return call: [phone]    Reason for call: Pt called regarding her infusion and wanted to speak with clinical as DR Martinez told her to skip it, but she didn't see the message and is having her infusion. I transferred to Afsaneh and Verna took the call     Scheduled procedure/appointment date if applicable: Apt/procedure 11/04/24

## 2024-11-04 NOTE — TELEPHONE ENCOUNTER
I spoke with pt regarding the infusion. Pt said she has questions if she stop the infusion or not . Give the number to Nereida our RN for IBD dept.

## 2024-11-04 NOTE — TELEPHONE ENCOUNTER
Reviewed liver biopsy results with the patient and Dr. Martinez. 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 She is stopping infliximab given concerns for possible hepatotoxicity and poor efficacy. Will monitor her liver tests monthly for 3 months. If she has persistent elevations despite discontinuing therapy, would recommend starting UDCA. Pt will reschedule her follow up this week for 3 months from now. All questions were answered to her satisfaction.

## 2024-11-06 ENCOUNTER — APPOINTMENT (OUTPATIENT)
Dept: LAB | Facility: MEDICAL CENTER | Age: 64
End: 2024-11-06
Payer: COMMERCIAL

## 2024-11-06 DIAGNOSIS — E06.3 THYROIDITIS, AUTOIMMUNE: ICD-10-CM

## 2024-11-06 DIAGNOSIS — K51.911 ULCERATIVE COLITIS WITH RECTAL BLEEDING, UNSPECIFIED LOCATION (HCC): ICD-10-CM

## 2024-11-06 LAB
25(OH)D3 SERPL-MCNC: 25 NG/ML (ref 30–100)
IRON SATN MFR SERPL: 14 % (ref 15–50)
IRON SERPL-MCNC: 47 UG/DL (ref 50–212)
TIBC SERPL-MCNC: 325 UG/DL (ref 250–450)
TSH SERPL DL<=0.05 MIU/L-ACNC: 0.83 UIU/ML (ref 0.45–4.5)
UIBC SERPL-MCNC: 278 UG/DL (ref 155–355)

## 2024-11-06 PROCEDURE — 86381 MITOCHONDRIAL ANTIBODY EACH: CPT

## 2024-11-06 PROCEDURE — 36415 COLL VENOUS BLD VENIPUNCTURE: CPT

## 2024-11-06 PROCEDURE — 82306 VITAMIN D 25 HYDROXY: CPT

## 2024-11-06 PROCEDURE — 83540 ASSAY OF IRON: CPT

## 2024-11-06 PROCEDURE — 83550 IRON BINDING TEST: CPT

## 2024-11-06 PROCEDURE — 84443 ASSAY THYROID STIM HORMONE: CPT

## 2024-11-06 NOTE — TELEPHONE ENCOUNTER
I spoke with her.  She will bakari Shingrix at her PCP office.  We discussed RInvoq, including possible increased risk of MACE and thromboembolic events.      IBD team, can we please get approval for Rinvoq?  She has already failed 3 biologics.    Thanks

## 2024-11-06 NOTE — TELEPHONE ENCOUNTER
Pt calling in regards to Dr. Martinez's elevated fecal calprotectin results message to stop Remicade and start Rinvoq. Discussed Rinvoq with the patient. Pt has further questions on Rinvoq, Shingrix abd if she should start budesonide while waiting for authorization. Message sent to Dr. Martinez to further discuss questions on the phone.

## 2024-11-07 ENCOUNTER — APPOINTMENT (OUTPATIENT)
Dept: LAB | Facility: MEDICAL CENTER | Age: 64
End: 2024-11-07
Payer: COMMERCIAL

## 2024-11-07 ENCOUNTER — TELEPHONE (OUTPATIENT)
Age: 64
End: 2024-11-07

## 2024-11-07 DIAGNOSIS — Z23 ENCOUNTER FOR IMMUNIZATION: Primary | ICD-10-CM

## 2024-11-07 DIAGNOSIS — K51.911 ULCERATIVE COLITIS WITH RECTAL BLEEDING, UNSPECIFIED LOCATION (HCC): ICD-10-CM

## 2024-11-07 LAB — HBV SURFACE AG SER QL: NORMAL

## 2024-11-07 PROCEDURE — 36415 COLL VENOUS BLD VENIPUNCTURE: CPT

## 2024-11-07 PROCEDURE — 87340 HEPATITIS B SURFACE AG IA: CPT

## 2024-11-07 PROCEDURE — 86480 TB TEST CELL IMMUN MEASURE: CPT

## 2024-11-07 NOTE — TELEPHONE ENCOUNTER
Pt called, rescheduled appointment with provider from today 11/7 to tomorrow 11/8 - r/s with nurse, is for vaccination only  KAUSHIK  Unable to locate visible order in chart at this time.

## 2024-11-08 LAB
GAMMA INTERFERON BACKGROUND BLD IA-ACNC: 0.02 IU/ML
M TB IFN-G BLD-IMP: NEGATIVE
M TB IFN-G CD4+ BCKGRND COR BLD-ACNC: -0.01 IU/ML
M TB IFN-G CD4+ BCKGRND COR BLD-ACNC: -0.01 IU/ML
MITOCHONDRIA M2 IGG SER-ACNC: 26.2 UNITS (ref 0–20)
MITOGEN IGNF BCKGRD COR BLD-ACNC: 9.98 IU/ML

## 2024-11-11 DIAGNOSIS — D84.9 IMMUNOSUPPRESSION (HCC): ICD-10-CM

## 2024-11-11 DIAGNOSIS — K51.911 ULCERATIVE COLITIS WITH RECTAL BLEEDING, UNSPECIFIED LOCATION (HCC): Primary | ICD-10-CM

## 2024-11-11 RX ORDER — UPADACITINIB 45 MG/1
45 TABLET, EXTENDED RELEASE ORAL DAILY
Qty: 90 TABLET | Refills: 0 | Status: SHIPPED | OUTPATIENT
Start: 2024-11-11

## 2024-11-11 NOTE — TELEPHONE ENCOUNTER
Medication: Rinvoq 45mg tablets  Directions: take 1 tablet by mouth daily  Quantity: 30  Day Supply: 30  Insurance: [employer]  How Prior Auth was submitted: Mayra  Authorization Date range: 9/11/2024 - 11/10/2025  Authorization Number: #[id number]  Pharmacy that fills med: [facility]      Letter in Media for approval and pharmacy assigned as well

## 2024-11-11 NOTE — TELEPHONE ENCOUNTER
Can script for Rinvoq please be sent over to [facility] pharmacy ( I added this to chart)?    Thank you

## 2024-11-12 NOTE — TELEPHONE ENCOUNTER
Didi from [facility] calling for correct diagnosis. Didi states script for Ulcerative colitis is 45 mil for 8 weeks. Didi would like a call back to confirm the diagnosis. Call back number is [phone].

## 2024-11-14 ENCOUNTER — NURSE TRIAGE (OUTPATIENT)
Age: 64
End: 2024-11-14

## 2024-11-14 NOTE — TELEPHONE ENCOUNTER
Hilda from pharmacy calling to clarify script for Rinvoq.  Needs diagnoses.  If crohn's order should be 45 mg for 84 days, if ulcerative colitis should be 45 mg for 56 days.  Please clarify.    CB:[phone]

## 2024-11-14 NOTE — TELEPHONE ENCOUNTER
"Patient call:  Pt stated provider: JOEL Khan and Dr. Gonzalez    Actionable item: Appointment scheduled    Chief complaint: Reports progressively worsening localized/hard/red bumps that have been occurring for several months, last several weeks, then self resolve on her face and eyelids.   More appear and seem to worsen every time. Leigh Ann feels she is going to scar from them.  Non-painful.  Non-itching    \"Every morning is another new one.\"  Has not noticed for anything to better or worsen them. \"I feel this is autoimmune.\"      Stopped Remicade and waiting to start Rinvoq for her ulcerative colitis. Would like to know Rheumatology perspective on receiving shingles vaccine for Rinvoq prep. Also mentioned being diagnosed with a liver disease recently. Inquiring if this could be related.      Dispo: Scheduled for soonest appointment available. Routing to Dr. Gonzalez and JOEL Khan for review and recommendation.   [facility]  Agrees with plan.   All questions answered.     Reason for Disposition   Patient wants to be seen    Answer Assessment - Initial Assessment Questions  1. APPEARANCE of RASH: \"Describe the rash.\"       Red, hard, raised bump  2. LOCATION: \"Where is the rash located?\"       face  3. NUMBER: \"How many spots are there?\"       several  4. SIZE: \"How big are the spots?\" (Inches, centimeters or compare to size of a coin)       -  5. ONSET: \"When did the rash start?\"       months  6. ITCHING: \"Does the rash itch?\" If Yes, ask: \"How bad is the itch?\"  (Scale 0-10; or none, mild, moderate, severe)      denies  7. PAIN: \"Does the rash hurt?\" If Yes, ask: \"How bad is the pain?\"  (Scale 0-10; or none, mild, moderate, severe)      denies  8. OTHER SYMPTOMS: \"Do you have any other symptoms?\" (e.g., fever)      Please see note above    Protocols used: Rash or Redness - Localized-Adult-[place]    "

## 2024-11-15 ENCOUNTER — TELEPHONE (OUTPATIENT)
Age: 64
End: 2024-11-15

## 2024-11-15 ENCOUNTER — CLINICAL SUPPORT (OUTPATIENT)
Dept: FAMILY MEDICINE CLINIC | Facility: CLINIC | Age: 64
End: 2024-11-15
Payer: COMMERCIAL

## 2024-11-15 DIAGNOSIS — Z23 ENCOUNTER FOR IMMUNIZATION: Primary | ICD-10-CM

## 2024-11-15 PROCEDURE — 90750 HZV VACC RECOMBINANT IM: CPT

## 2024-11-15 PROCEDURE — 90471 IMMUNIZATION ADMIN: CPT

## 2024-11-15 NOTE — TELEPHONE ENCOUNTER
Spoke with Dr. Martinez this AM regarding dosage for Rinvoq- provider recommending Loading Dose of Rinvoq 45 mg for a Quantity of 84 days. I spoke with Kevon- pharmacist from [facility]. Confirmed prescription for Rinvoq 45 mg Quantity 84. Kevon was able to process prescription and their pharmacy to reach out to the patient to schedule delivery.

## 2024-11-15 NOTE — TELEPHONE ENCOUNTER
Patient calling, she spoke with Dale yesterday about getting the Shingrix vaccine, which she is scheduled for  today.  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    Patient asking if it is safe for her to get the Shingrix vaccine today, and also Dale scheduled a rheum appointment for her today and is asking if she needs to keep that appointment

## 2024-11-17 ENCOUNTER — RESULTS FOLLOW-UP (OUTPATIENT)
Dept: FAMILY MEDICINE CLINIC | Facility: CLINIC | Age: 64
End: 2024-11-17

## 2024-12-03 ENCOUNTER — OFFICE VISIT (OUTPATIENT)
Age: 64
End: 2024-12-03
Payer: COMMERCIAL

## 2024-12-03 VITALS
HEART RATE: 85 BPM | BODY MASS INDEX: 26.95 KG/M2 | HEIGHT: 62 IN | DIASTOLIC BLOOD PRESSURE: 78 MMHG | OXYGEN SATURATION: 99 % | SYSTOLIC BLOOD PRESSURE: 128 MMHG | TEMPERATURE: 97.9 F

## 2024-12-03 DIAGNOSIS — K51.911 ULCERATIVE COLITIS WITH RECTAL BLEEDING, UNSPECIFIED LOCATION (HCC): Primary | ICD-10-CM

## 2024-12-03 DIAGNOSIS — F41.9 ANXIETY: ICD-10-CM

## 2024-12-03 DIAGNOSIS — D84.9 IMMUNOSUPPRESSION (HCC): ICD-10-CM

## 2024-12-03 PROCEDURE — 99214 OFFICE O/P EST MOD 30 MIN: CPT | Performed by: DIETITIAN, REGISTERED

## 2024-12-03 RX ORDER — CALCIUM CARBONATE 500 MG/1
1 TABLET, CHEWABLE ORAL AS NEEDED
COMMUNITY

## 2024-12-03 RX ORDER — IBUPROFEN 200 MG
200 TABLET ORAL AS NEEDED
COMMUNITY

## 2024-12-03 NOTE — Clinical Note
Hi! Dr. Martinez, once patient completes induction dosing for upadacitinib, would you recommend 30 mg dosing for maintenance? IBD team, does the patient need to do anything to get her second 28-day script for the 45 mg induction dosing?  Thank you! Mckayla

## 2024-12-03 NOTE — PROGRESS NOTES
Gastroenterology Outpatient Follow-up - Ulcerative Colitis  Leigh Ann Sarabia 64 y.o. female MRN: [id number]  Encounter: [id number]    Leigh Ann Sarabia is a 64 y.o. female with Ulcerative colitis.    Symptom onset:  2017  Diagnosis:  ulcerative colitis  Year of diagnosis:  2017    IBD Summary: Leigh Ann Sarabia has has ulcerative pancolitis diagnosed in 2017.  She lost response to mesalamine.  She has been treated with vedolizumab but lost response again.  She then switched to ustekinumab but was a primary nonresponder.  Previously on infliximab with partial response despite dose escalation.  She has now been switched to upadacitinib.  She also has chronically elevated alkaline phosphatase and had positive AMA in 2017.  MRCP with suggestion of early PSC.  Her daughter has UC and PSC.    Ulcerative Colitis Summary  Macroscopic extent of diseases: pancolitis  Microscopic extent of diseases:  pancolitis  Has the patient ever been hospitalized for severe disease: No        Surgical History  Number of IBD surgeries: 0      First IBD surgery:    Most Recent IBD surgery:    Esophageal:  0    Gastroduodenal:  0    Small bowel resection(s):  0    Ileocolonic resection(s): unknown      Colonic resection(s):  0    Ileostomy or colostomy:  no previous ostomy    Complete colectomy:  No         Medications     Year last used Reason for discontinuation   Corticosteroids prior   2023       Thiopurines   never         Methotrexate   never         Infliximab   never         Adalimumab   never         Certolizumab   never         Golimumab   never         Natalizumab   never         Mesalamine prior     CARLOS     Sulfasalzine   never         Vedolizumab prior   2022 CARLOS     Ustekinumab   never         Tofacitinib   never         Other biologic   never             Extraintestinal Manifestations    IBD-associated arthropathy Yes    Uveitis Yes  iritis x 2    Oral aphthous ulcers No   Erythema nodosum No    Pyoderma gangrenosum No    Primary  sclerosing cholangitis Yes  MRCP 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 Daughter with PSC.    Thrombotic complications No          Cancer / Dysplasia History  History of IBD-associated dysplasia: none    Date of diagnosis (Year):     History of colorectal cancer: No   History of cervical dysplasia: No   History of skin cancer: squamous cell carcinoma         Laboratory Data   Most recent (date) Result   PPD   unknown   Quanitferon gold 9/12/2022 negative   TPMT 1/17/2023 normal   Hepatitis A       HBsAb 9/12/2022 negative   HBcAb 9/12/2022 negative   HBsAg 9/12/2022 negative   HCV Ab   unknown       Imaging / Diagnostic Procedures   Most recent (date) Findings   Colonoscopy or sigmoidoscopy #1 1/12/2023            Ulcers/Erosions  small           Strictures  none           Stricture Severity  N/A           Endoscopic Score Fregoso Score:  2 Rutgeert's:  N/A            Findings  (1) Normal TI.  (2) Moderate edematous, erythematous, friable and hemorrhagic mucosa with erosion and loss of vascular pattern in the cecum, ascending colon, hepatic flexure, transverse colon, splenic flexure, descending colon, sigmoid colon, rectosigmoid and rectum, consistent with ulcerative colitis.           Pathology   A. Colon, Cecum, Biopsy:  - Mildly active chronic colitis.  - Negative for granulomata, viral changes, and dysplasia.  B. Small Intestine, Terminal Ileum, Biopsy:  - Small intestinal mucosa with no specific pathologic change.  C. Colon, Ascending, Biopsy:  - Mildly active chronic colitis.  - Negative for granulomata, viral changes, and dysplasia.  D. Colon, Transverse, Biopsy:  - Mildly active chronic colitis.  - Negative for granulomata, viral changes, and dysplasia.  E. Colon, Descending, Biopsy:  - Mildly active chronic colitis.  - Negative for granulomata, viral changes, and dysplasia.  F. Colon, Sigmoid, Biopsy:  - Superficial fragments of colonic mucosa with mild acute colitis.  G. Colon, Rectum, Biopsy:  -  Mildly active chronic colitis.  - Negative for granulomata, viral changes, and dysplasia.   Colonoscopy or sigmoidoscopy #2              Ulcers/Erosions                 Strictures                 Stricture Severity              Endoscopic Score Fregoso Score:    Rugeert's:              Findings              Pathology      EGD       Small bowel follow-through       CT enterography       CT without enterography       MRI enterography       Capsule study       DEXA scan   Lowest Z-score:      CXR (for TB)           HPI:   Interval History:  12/3/35511 Follow up with Jason Coronel  Patient previously had ongoing symptoms on infliximab with fecal calprotectin elevation to 1300.  She was therefore switched to upadacitinib.  Patient has now been on upadacitinib 45 mg daily for the last week or so and is already feeling significantly better.  She previously had 5-7 BMs/day, typically Blaine #5-6.  Currently, she is having 3-4 BMs/day, mostly Blaine #4.  She is seeing much less blood in the stool.  Fecal urgency is also improving.  She got her first Shingrix vaccine and is aware to have the next dose in about 2 months.  She continues to feel fatigued/lower energy than she would like.  She does deal with some depression/anxiety and currently manages this through her social support system and staying active (she enjoys pickleball).        9/3/2024 Follow up  Leigh Ann remains on infliximab 15 mg/kg every 4 weeks.  The dose has been increased based on therapeutic drug monitoring and response to therapy.  Her trough was 18 mcg/mL on 5/6/2024.  We did repeat a colonoscopy in April, and I felt that there was improvement compared to the procedure area earlier.  She feels that the medication is working partially.  Calprotectin on 8/27/2024 was 643, down from 958 two months earlier.  She continues to have frequent loose stools with urgency, although her symptoms are not as bad as they used to be.  She also stopped taking ibuprofen and  noticed significant decrease in bleeding, although she was only taking 200 mcg daily for joint pain.  She is not on steroids currently.  She also followed up with Dr. Alvarado regarding elevated LFTs and borderline AMA.  Dr. Alvarado felt that she likely has PBC which was being partially treated with budesonide.  She has since tapered off budesonide.  MRCP did not show evidence of PSC.  She gets annual dermatology visits and Pap smears.  She takes calcium and occasional vitamin D.  She has osteopenia based on DEXA from 2022.  We have discussed switching to upadacitinib, but she is nervous to stop infliximab, which she feels is working partially. LFTs from 8/22/2024: AMA 20.6, AST 73, ALT 73, AP 91, Tb 0.64.    4/17/2024 Follow up  Since last visit, we increased infliximab to 15 mg/kg every 4 weeks based on trough of 6.7 ug/ml with 10 mg/kg every 4 week dosing.  She has tapered off budesonide.  Despite the higher dose, she continues to have 6 loose BMs per day.  Feels that symptoms have worsened since coming off budesonide.  Feels that she is not in clinical remission and her disease remains active.  No abdominal pain.  Has gained weight.  Feels very tired.  Her last infliximab infusion was 1 week ago.    Calprotectin in March was 973.  Infectious stool tests were negative.  CBC in January was normal with Hgb 12.8, MCV 92.  Vitamin D was normal.  Ferritin was 10.  She is taking iron supplements.      1/9/2024 Follow up  Since last visit, she has remained on infliximab 10 mg every 4 weeks.  Trough level was only 6.7 mcg/mL, no antibodies.  She had COVID and had to delay her infusion.  She is also on Uceris 9 mg every other day.  Feels ok.  Still has 4-5 BMs per day, sometimes sees blood, urgency has improved, energy is ok.  However, still does not feel like she is 100%.   Her calprotectin was 2360 before we started Uceris; we have not rechecked.  Still feels fatigued.  Has gained weight. Overdue for pap smear; did see  dermatologist.  Labs from 11/4/2023 show infliximab trough of 6.7 ug/ml without antibodies.  CRP 2.8, ESR 30, CBC with normal hemoglobin 12.9, MCV 93, ferritin low at 11, CMP with normal LFTs and kidney function.  RF negative, cyclic citrullinatd peptide antibody normal, TIFFANIE positive.    10/13/2020 Follow up  She is not doing well since her last visit.  She remains on infliximab (Avsola) 10 mg every 4 weeks.  Last infusion was on 10/9/2023. However, she reports starting to feel fatigued approximately 90 days ago.  This coincided with seeing more blood in the stool, having increased urgency, lightheadedness, low energy.  No abdominal pain.  Calprotectin on 10/6/2023 was 2360, which was 3 days prior to her infusion.  She says that her stool frequency has slightly improved since the last infliximab dose.  C. diff was negative.  She had 1 visit to the ER because of lightheadedness and hypotension, but Hgb was 12.5 and BP there was normal.  This delayed infliximab by 4 days.  She saw Rheumatology for seronegative arthropathy and started an anti-inflammatory diet.  Joint pain is much improved.  Also avoiding gluten for the past 3 years.   Thinks she has yeast infection. Having bad GERD currently.  She has history of SCC.  She had iron infusions in June.      8/22/2023 Follow up  Since last visit, we increased infliximab based on subtherapeutic drug level and she is now on 10 mg/kg every 4 weeks.  She has had 3 monthly doses of high dose infliximab.  Feels like this has helped and she is slowly improving.  Calprotectin improved from 11 100-543.  Still has 3-4 BMs per day with occasional blood, but much improved from before.  No longer on steroids.  No pain.  Occasional incontinence when she has anxiety.   Labs from August showed mild ALT and AST elevation.  Has brain fog and fatigue after infusions; no fevers.  Still needs Prevnar.  Will get flu shot.  Completed 4 IV iron infusions and felt much improved.  Needs GYN  appointment.  Saw dermatology.      4/19/2023 Return  Since last visit, started infliximab (Avsola) and has had 3 loading doses.  3rd dose was on 4/10.  Feels like BMs have improved, but are still inconsistent.  No abdominal pain. Occasional urgency.  Calprotectin 372 but trending down.  No bleeding for the past 2 weeks.  Energy is poor.  She cannot tolerate PO iron.  Gets SOB with exertion.  Poor exercise tolerance.  She feels dizzy on occasion since startign avsola. She is still on prednisone but down to 5 mg daily.  Has been on it since July.  Seeing Derm in June for h/o SCC.  Overdue for Pap.  She may have PSC based on chronic Ap elevation and recent MRCP, although last AP was normal.     1/26/2023 Return visit  Leigh Ann Sarabia is establishing care with me for UC.  Diagnosed with UC in 2017 - diarrhea and bleeding.  She was treated with mesalamine (Lialda) and did well for a couple of years. In 2020, flared and started vedolizumab but she lost response after a few months.  Ustekinumab was increased to every 6 weeks, but did not work. She then started ustekinumab 10/2022. However, has been on anf off prednisone for 5 months, currently on 10 mg.  Getting Stelara every 8 weeks.  Some days are good with formed stools, other days with loose BMs and bleeding. Colonoscopy this month with Fregoso 2 disease.  She has had COVID vaccines + booster and flu vaccine. No pneumococcal vaccine.  Last pap smear 1.5 years.  SCC removed ~2018 from chest.  Last derm ~ 6/2022.    Has osteopenia.  H/o iritis.  Of note, her daughter his UC and PSC.  Patient has chronic alkaline phosphatase elevation and had positive AMA in 2017.  Recent MRCP with mild intrahepatic biliary dilatation but no other signs of PSC.    12/19/22  First treated with lialda for about 2 years.    First biologic was entyvio which started in October of 2021 but in February of 2022 symptoms worsened, and was treated with prednisone with symptoms abating but as she was  titrating down her prednisone symptoms worsened  She is currently on the autoimmune protocol diet since July of 2022  In the past week and a half- reporting normal BM's; formed, 1-2 times daily, without bleeding.  Gluten free and antiinflammatory diet - good for joints.  Had iritis x 2 in 2022.  Using steroid eye drops.      Leigh Ann reports the following symptoms over the last 7 days:    Stool Frequency 1-2 stools/day more than normal   Average stools per day: 4   Average liquid stools per day: 1   Consistency of bowel: formed   Awakening from sleep to move bowels? No   Urgency moderate fecal urgency   Visible blood in stool? visible blood in stool less than half the time   Abdominal pain? none   General Wellbeing? slightly under par     Leigh Ann also reports the following symptoms in the last month:    Leakage of stool while sleeping? No   Leakage of stool while awake? No       REVIEW OF SYSTEMS:  During the last 7 days, Leigh Ann experienced the following symptoms:  Unintentional Weight Loss (in the last month) No   Fever No   Eye irritation No   Mouth sores No   Sore throat No   Chest pain No   Shortness of breath No   Numbness or tingling in hands or feet No   Skin rash No   Pain or swelling in joints No   Bruising or bleeding Yes  Comment:easy bruising her whole life   Felt depressed or blue Yes   Fatigue Yes   Dysuria No   Please see HPI for additional pertinent review of systems; otherwise remainder of ROS was unremarkable    MEDICATIONS:    Current Outpatient Medications:     acetaminophen (TYLENOL) 500 mg tablet    ALPRAZolam (XANAX) 0.25 mg tablet    Calcium Carb-Cholecalciferol (calcium carbonate-vitamin D) 500 mg-5 mcg tablet    calcium carbonate (TUMS) 500 mg chewable tablet    Cholecalciferol (VITAMIN D PO)    fluticasone (FLONASE) 50 mcg/act nasal spray    ibuprofen (MOTRIN) 200 mg tablet    Timolol Maleate, Once-Daily, 0.5 % SOLN    tretinoin (RETIN-A) 0.025 % cream    Upadacitinib ER (Rinvoq) 45 MG  "TB24    ALLERGIES:  Allergies   Allergen Reactions    Amoxicillin GI Intolerance       OBJECTIVE:  /78 (BP Location: Left arm, Patient Position: Sitting, Cuff Size: Adult)   Pulse 85   Temp 97.9 °F (36.6 °C) (Tympanic)   Ht 5' 1.5\" (1.562 m)   SpO2 99%   BMI 26.95 kg/m²      PHYSICAL EXAM:    General Appearance:   Alert, cooperative, no distress   HEENT:   Normocephalic, atraumatic, anicteric.     Neck:  Supple, symmetrical, trachea midline   Lungs:   Clear to auscultation bilaterally; no rales, rhonchi or wheezing; respirations unlabored    Heart::   Regular rate and rhythm; no murmur, rub, or gallop.   Abdomen:   Soft, non-distended; normal bowel sounds    Abdominal exam was notable for no tenderness with no mass.     Genitalia:   Deferred    Rectal:   Perirectal assessment was not performed.                     Extremities:  No cyanosis, clubbing or edema    Pulses:  2+ and symmetric    Skin:  No jaundice, rashes, or lesions    Lymph nodes:  No palpable cervical lymphadenopathy        Lab Results   Component Value Date    WBC 6.22 10/28/2024    HGB 13.9 10/28/2024    HCT 41.9 10/28/2024    MCV 92 10/28/2024     10/28/2024     Lab Results   Component Value Date     06/10/2014    SODIUM 139 10/28/2024    K 3.8 10/28/2024     10/28/2024    CO2 29 10/28/2024    ANIONGAP 8 06/10/2014    AGAP 4 10/28/2024    BUN 16 10/28/2024    CREATININE 0.68 10/28/2024    GLUC 98 10/28/2024    GLUF 98 10/28/2024    CALCIUM 9.3 10/28/2024    AST 73 (H) 08/22/2024    ALT 73 (H) 08/22/2024    ALKPHOS 91 08/22/2024    PROT 7.0 06/10/2014    TP 7.2 08/22/2024    BILITOT 0.4 06/10/2014    TBILI 0.64 08/22/2024    EGFR 94 10/28/2024     Lab Results   Component Value Date    CRP 3.3 (H) 04/19/2024     Lab Results   Component Value Date    FXRHWMPV46 516 11/04/2023     Lab Results   Component Value Date    FERRITIN 9 (L) 04/19/2024       ASSESSMENT AND PLAN:    Leigh Ann has a history of macroscopic pancolitis and " microscopic pancolitis  ulcerative colitis diagnosed in 2017. Current medical therapy is with upadacitinib. My global assessment is that the clinical disease is currently mildy active.     The 6-point Fregoso score was 2 and the 9-point Fregoso score was 3.  The short CDAI was 107.      Leigh Ann has ulcerative pancolitis diagnosed in 2017. She has already been on mesalamine, vedolizumab, ustekinumab, and infliximab with either PNR or CARLOS.  Most recently, while on high dose infliximab, she had persistent symptoms and elevated fecal calprotectin 1300.  She is now on upadacitinib, currently on induction dosing of 45 mg daily with good clinical response.    1. Continue upadacitinib 45 mg daily, induction dosing x 8 weeks total.  2. We will also check another AMA level now that she is off steroids.  Also check CBC, CMP, CRP.  3. Repeat fecal calprotectin in 8 weeks, after patient completes induction dosing of upadacitinib.  4. Continue daily vitamin D with calcium supplements.  5. Recommend Shingrix dose #2 in about 2 months.  6. Recommend flu shot for 2024/2025 flu season.  7. We discussed the importance of managing mental health, and I encouraged patient to continue to utilize her social support system and do the things she enjoys.  We also discussed talk therapy, which patient will consider. A referral was placed.  8. Follow up with Dr. Alvarado as scheduled.    Return in 3 months.      Health Maintenance Recommendations:  Vaccines & Infections  COVID-19 vaccination and boosters are recommended. There is no evidence that the COVID-19 vaccine would cause an IBD flare.  Avoid live vaccines if on immunosuppressive therapy.  Yearly influenza vaccine (flu shot).  Pneumonia vaccines for patients on immunosuppression. These include Prevnar 20, followed by Pneumovax 23 at least 8 weeks later.  Shingrix vaccine (series of 2 injections) for al patients 65 and older. Patients on tofacitinib or upadacitinib should be vaccinated regardless of  age.  If not immune to measles mumps or rubella, MMR vaccine is recommended. However, this is a live vaccine and should be given prior to immunosuppressive therapy.  HPV vaccination as per national guidelines.  Hepatitis A and B vaccinations if not previously vaccinated.  Testing for tuberculosis with QuantiFERON Gold blood test and/or chest xray prior to starting immunosuppressive medications, and then annually    Cancer screening  Dysplasia surveillance for colorectal cancer. Colonoscopy in all patients with extensive colitis (more than 1/3 of the colon involved) who had disease for at least 8 or more years.  Repeat colonoscopy approximately every 12-24 months.  In patients with concurrent primary sclerosing cholangitis, history of dysplasia, or family history of colon cancer, repeat colonoscopy annually.    Females: Pap smear annually for woman on immunosuppression.  Annual dermatologic/skin exam in all patients with IBD, especially those on immunosuppression with thiopurines or CEDRICK inhibitors.    Miscellaneous  DEXA scan, once off steroids for 3 months  Depression screening recommended annually  Routine dental and ophthalmology examinations    Problem List Items Addressed This Visit          Digestive    Ulcerative colitis (HCC) - Primary    Relevant Medications    calcium carbonate (TUMS) 500 mg chewable tablet    ibuprofen (MOTRIN) 200 mg tablet       Behavioral Health    Anxiety    Relevant Orders    Ambulatory referral to Psych Services     Other Visit Diagnoses         Immunosuppression (HCC)                Jason Coronel PA-C

## 2024-12-05 ENCOUNTER — PATIENT MESSAGE (OUTPATIENT)
Age: 64
End: 2024-12-05

## 2024-12-06 ENCOUNTER — APPOINTMENT (OUTPATIENT)
Dept: LAB | Facility: CLINIC | Age: 64
End: 2024-12-06
Payer: COMMERCIAL

## 2024-12-06 ENCOUNTER — RESULTS FOLLOW-UP (OUTPATIENT)
Age: 64
End: 2024-12-06

## 2024-12-06 DIAGNOSIS — K51.911 ULCERATIVE COLITIS WITH RECTAL BLEEDING, UNSPECIFIED LOCATION (HCC): ICD-10-CM

## 2024-12-06 DIAGNOSIS — D84.9 IMMUNOSUPPRESSION (HCC): ICD-10-CM

## 2024-12-06 DIAGNOSIS — R79.89 ABNORMAL LIVER FUNCTION TESTS: ICD-10-CM

## 2024-12-06 LAB
ALBUMIN SERPL BCG-MCNC: 3.7 G/DL (ref 3.5–5)
ALP SERPL-CCNC: 93 U/L (ref 34–104)
ALT SERPL W P-5'-P-CCNC: 31 U/L (ref 7–52)
ANION GAP SERPL CALCULATED.3IONS-SCNC: 4 MMOL/L (ref 4–13)
AST SERPL W P-5'-P-CCNC: 35 U/L (ref 13–39)
BASOPHILS # BLD AUTO: 0.04 THOUSANDS/ÂΜL (ref 0–0.1)
BASOPHILS NFR BLD AUTO: 1 % (ref 0–1)
BILIRUB SERPL-MCNC: 0.46 MG/DL (ref 0.2–1)
BUN SERPL-MCNC: 17 MG/DL (ref 5–25)
CALCIUM SERPL-MCNC: 8.7 MG/DL (ref 8.4–10.2)
CHLORIDE SERPL-SCNC: 105 MMOL/L (ref 96–108)
CHOLEST SERPL-MCNC: 211 MG/DL (ref ?–200)
CO2 SERPL-SCNC: 30 MMOL/L (ref 21–32)
CREAT SERPL-MCNC: 0.6 MG/DL (ref 0.6–1.3)
CRP SERPL QL: <1 MG/L
EOSINOPHIL # BLD AUTO: 0.1 THOUSAND/ÂΜL (ref 0–0.61)
EOSINOPHIL NFR BLD AUTO: 2 % (ref 0–6)
ERYTHROCYTE [DISTWIDTH] IN BLOOD BY AUTOMATED COUNT: 12.7 % (ref 11.6–15.1)
GFR SERPL CREATININE-BSD FRML MDRD: 98 ML/MIN/1.73SQ M
GLUCOSE P FAST SERPL-MCNC: 85 MG/DL (ref 65–99)
HCT VFR BLD AUTO: 40.1 % (ref 34.8–46.1)
HDLC SERPL-MCNC: 70 MG/DL
HGB BLD-MCNC: 13 G/DL (ref 11.5–15.4)
IMM GRANULOCYTES # BLD AUTO: 0.03 THOUSAND/UL (ref 0–0.2)
IMM GRANULOCYTES NFR BLD AUTO: 1 % (ref 0–2)
INR PPP: 0.9 (ref 0.85–1.19)
LDLC SERPL CALC-MCNC: 128 MG/DL (ref 0–100)
LYMPHOCYTES # BLD AUTO: 2.56 THOUSANDS/ÂΜL (ref 0.6–4.47)
LYMPHOCYTES NFR BLD AUTO: 54 % (ref 14–44)
MCH RBC QN AUTO: 30.8 PG (ref 26.8–34.3)
MCHC RBC AUTO-ENTMCNC: 32.4 G/DL (ref 31.4–37.4)
MCV RBC AUTO: 95 FL (ref 82–98)
MONOCYTES # BLD AUTO: 0.49 THOUSAND/ÂΜL (ref 0.17–1.22)
MONOCYTES NFR BLD AUTO: 10 % (ref 4–12)
NEUTROPHILS # BLD AUTO: 1.54 THOUSANDS/ÂΜL (ref 1.85–7.62)
NEUTS SEG NFR BLD AUTO: 32 % (ref 43–75)
NONHDLC SERPL-MCNC: 141 MG/DL
NRBC BLD AUTO-RTO: 0 /100 WBCS
PLATELET # BLD AUTO: 250 THOUSANDS/UL (ref 149–390)
PMV BLD AUTO: 10 FL (ref 8.9–12.7)
POTASSIUM SERPL-SCNC: 3.9 MMOL/L (ref 3.5–5.3)
PROT SERPL-MCNC: 7.2 G/DL (ref 6.4–8.4)
PROTHROMBIN TIME: 12.5 SECONDS (ref 12.3–15)
RBC # BLD AUTO: 4.22 MILLION/UL (ref 3.81–5.12)
SODIUM SERPL-SCNC: 139 MMOL/L (ref 135–147)
TRIGL SERPL-MCNC: 64 MG/DL (ref ?–150)
WBC # BLD AUTO: 4.76 THOUSAND/UL (ref 4.31–10.16)

## 2024-12-06 PROCEDURE — 36415 COLL VENOUS BLD VENIPUNCTURE: CPT

## 2024-12-06 PROCEDURE — 80061 LIPID PANEL: CPT

## 2024-12-06 PROCEDURE — 85025 COMPLETE CBC W/AUTO DIFF WBC: CPT

## 2024-12-06 PROCEDURE — 85610 PROTHROMBIN TIME: CPT

## 2024-12-07 ENCOUNTER — RESULTS FOLLOW-UP (OUTPATIENT)
Age: 64
End: 2024-12-07

## 2024-12-10 ENCOUNTER — IMMUNIZATIONS (OUTPATIENT)
Dept: FAMILY MEDICINE CLINIC | Facility: CLINIC | Age: 64
End: 2024-12-10
Payer: COMMERCIAL

## 2024-12-10 DIAGNOSIS — Z23 ENCOUNTER FOR IMMUNIZATION: Primary | ICD-10-CM

## 2024-12-10 PROCEDURE — 90471 IMMUNIZATION ADMIN: CPT

## 2024-12-10 PROCEDURE — 90673 RIV3 VACCINE NO PRESERV IM: CPT

## 2024-12-13 ENCOUNTER — TELEPHONE (OUTPATIENT)
Dept: RHEUMATOLOGY | Facility: CLINIC | Age: 64
End: 2024-12-13

## 2025-01-07 ENCOUNTER — APPOINTMENT (OUTPATIENT)
Dept: LAB | Facility: CLINIC | Age: 65
End: 2025-01-07
Payer: COMMERCIAL

## 2025-01-07 DIAGNOSIS — R79.89 ABNORMAL LIVER FUNCTION TESTS: ICD-10-CM

## 2025-01-07 LAB
ALBUMIN SERPL BCG-MCNC: 4 G/DL (ref 3.5–5)
ALP SERPL-CCNC: 100 U/L (ref 34–104)
ALT SERPL W P-5'-P-CCNC: 25 U/L (ref 7–52)
ANION GAP SERPL CALCULATED.3IONS-SCNC: 4 MMOL/L (ref 4–13)
AST SERPL W P-5'-P-CCNC: 29 U/L (ref 13–39)
BILIRUB SERPL-MCNC: 0.52 MG/DL (ref 0.2–1)
BUN SERPL-MCNC: 15 MG/DL (ref 5–25)
CALCIUM SERPL-MCNC: 9 MG/DL (ref 8.4–10.2)
CHLORIDE SERPL-SCNC: 104 MMOL/L (ref 96–108)
CO2 SERPL-SCNC: 31 MMOL/L (ref 21–32)
CREAT SERPL-MCNC: 0.62 MG/DL (ref 0.6–1.3)
ERYTHROCYTE [DISTWIDTH] IN BLOOD BY AUTOMATED COUNT: 13.2 % (ref 11.6–15.1)
GFR SERPL CREATININE-BSD FRML MDRD: 96 ML/MIN/1.73SQ M
GLUCOSE P FAST SERPL-MCNC: 90 MG/DL (ref 65–99)
HCT VFR BLD AUTO: 42.5 % (ref 34.8–46.1)
HGB BLD-MCNC: 13.6 G/DL (ref 11.5–15.4)
INR PPP: 0.88 (ref 0.85–1.19)
MCH RBC QN AUTO: 30.4 PG (ref 26.8–34.3)
MCHC RBC AUTO-ENTMCNC: 32 G/DL (ref 31.4–37.4)
MCV RBC AUTO: 95 FL (ref 82–98)
PLATELET # BLD AUTO: 270 THOUSANDS/UL (ref 149–390)
PMV BLD AUTO: 8.8 FL (ref 8.9–12.7)
POTASSIUM SERPL-SCNC: 4 MMOL/L (ref 3.5–5.3)
PROT SERPL-MCNC: 7.3 G/DL (ref 6.4–8.4)
PROTHROMBIN TIME: 12.2 SECONDS (ref 12.3–15)
RBC # BLD AUTO: 4.48 MILLION/UL (ref 3.81–5.12)
SODIUM SERPL-SCNC: 139 MMOL/L (ref 135–147)
WBC # BLD AUTO: 5.33 THOUSAND/UL (ref 4.31–10.16)

## 2025-01-07 PROCEDURE — 36415 COLL VENOUS BLD VENIPUNCTURE: CPT

## 2025-01-07 PROCEDURE — 80053 COMPREHEN METABOLIC PANEL: CPT

## 2025-01-07 PROCEDURE — 85027 COMPLETE CBC AUTOMATED: CPT

## 2025-01-07 PROCEDURE — 85610 PROTHROMBIN TIME: CPT

## 2025-01-08 DIAGNOSIS — K51.911 ULCERATIVE COLITIS WITH RECTAL BLEEDING, UNSPECIFIED LOCATION (HCC): Primary | ICD-10-CM

## 2025-01-08 NOTE — TELEPHONE ENCOUNTER
Naif from [facility] calling asking for Script for Rinvoq 30 mg maintenance dose to be faxed to pharmacy so they can fill for patient, fax order to [phone]

## 2025-01-09 RX ORDER — UPADACITINIB 30 MG/1
30 TABLET, EXTENDED RELEASE ORAL DAILY
Qty: 30 TABLET | Refills: 11 | Status: SHIPPED | OUTPATIENT
Start: 2025-01-09

## 2025-01-10 ENCOUNTER — TRANSCRIBE ORDERS (OUTPATIENT)
Dept: GASTROENTEROLOGY | Facility: CLINIC | Age: 65
End: 2025-01-10

## 2025-01-10 ENCOUNTER — RESULTS FOLLOW-UP (OUTPATIENT)
Age: 65
End: 2025-01-10

## 2025-01-14 NOTE — TELEPHONE ENCOUNTER
I called and spoke with the patient.  Patient notes the pharmacy will be sending her third bottle of Rinvoq to complete 12 weeks of induction dosing.  Patient aware new prescription for 30 mg has been sent to her pharmacy after she completes the loading doses of 45 mg.

## 2025-02-05 ENCOUNTER — TELEPHONE (OUTPATIENT)
Age: 65
End: 2025-02-05

## 2025-02-12 ENCOUNTER — PATIENT MESSAGE (OUTPATIENT)
Dept: GASTROENTEROLOGY | Facility: CLINIC | Age: 65
End: 2025-02-12

## 2025-02-12 DIAGNOSIS — R74.8 ELEVATED ALKALINE PHOSPHATASE LEVEL: ICD-10-CM

## 2025-02-12 DIAGNOSIS — R79.89 ABNORMAL LIVER FUNCTION TESTS: ICD-10-CM

## 2025-02-12 DIAGNOSIS — K51.911 ULCERATIVE COLITIS WITH RECTAL BLEEDING, UNSPECIFIED LOCATION (HCC): Primary | ICD-10-CM

## 2025-02-12 DIAGNOSIS — D84.9 IMMUNOSUPPRESSION (HCC): ICD-10-CM

## 2025-02-27 ENCOUNTER — APPOINTMENT (OUTPATIENT)
Dept: LAB | Facility: CLINIC | Age: 65
End: 2025-02-27

## 2025-02-28 ENCOUNTER — APPOINTMENT (OUTPATIENT)
Dept: LAB | Facility: CLINIC | Age: 65
End: 2025-02-28
Payer: COMMERCIAL

## 2025-02-28 DIAGNOSIS — D84.9 IMMUNOSUPPRESSION (HCC): ICD-10-CM

## 2025-02-28 DIAGNOSIS — K51.911 ULCERATIVE COLITIS WITH RECTAL BLEEDING, UNSPECIFIED LOCATION (HCC): ICD-10-CM

## 2025-02-28 PROCEDURE — 83993 ASSAY FOR CALPROTECTIN FECAL: CPT

## 2025-03-03 LAB — CALPROTECTIN STL-MCNC: 15.2 ÂΜG/G

## 2025-03-14 ENCOUNTER — OFFICE VISIT (OUTPATIENT)
Age: 65
End: 2025-03-14
Payer: COMMERCIAL

## 2025-03-14 VITALS
HEART RATE: 83 BPM | OXYGEN SATURATION: 99 % | TEMPERATURE: 97.6 F | BODY MASS INDEX: 26.68 KG/M2 | SYSTOLIC BLOOD PRESSURE: 122 MMHG | WEIGHT: 145 LBS | DIASTOLIC BLOOD PRESSURE: 72 MMHG | HEIGHT: 62 IN

## 2025-03-14 DIAGNOSIS — E55.9 VITAMIN D DEFICIENCY: ICD-10-CM

## 2025-03-14 DIAGNOSIS — E53.8 VITAMIN B12 DEFICIENCY: ICD-10-CM

## 2025-03-14 DIAGNOSIS — R74.8 ELEVATED ALKALINE PHOSPHATASE LEVEL: ICD-10-CM

## 2025-03-14 DIAGNOSIS — K51.911 ULCERATIVE COLITIS WITH RECTAL BLEEDING, UNSPECIFIED LOCATION (HCC): Primary | ICD-10-CM

## 2025-03-14 DIAGNOSIS — E61.1 IRON DEFICIENCY: ICD-10-CM

## 2025-03-14 PROCEDURE — 99214 OFFICE O/P EST MOD 30 MIN: CPT | Performed by: INTERNAL MEDICINE

## 2025-03-14 NOTE — PROGRESS NOTES
Gastroenterology Outpatient Follow-up - Ulcerative Colitis  Leigh Ann Sarabia 64 y.o. female MRN: [id number]  Encounter: [id number]    Leigh Ann Sarabia is a 64 y.o. female with Ulcerative colitis.    Symptom onset:  2017  Diagnosis:  ulcerative colitis  Year of diagnosis:  2017    IBD Summary: Leigh Ann Sarabia has has ulcerative pancolitis diagnosed in 2017.  She lost response to mesalamine.  She has been treated with vedolizumab but lost response again.  She then switched to ustekinumab but was a primary nonresponder.  Previously on infliximab with partial response despite dose escalation.  She has now been switched to upadacitinib.  She also has chronically elevated alkaline phosphatase and had positive AMA in 2017.  MRCP with suggestion of early PSC.  Her daughter has UC and PSC.    Ulcerative Colitis Summary  Macroscopic extent of diseases: pancolitis  Microscopic extent of diseases:  pancolitis  Has the patient ever been hospitalized for severe disease: No        Surgical History  Number of IBD surgeries: 0      First IBD surgery:    Most Recent IBD surgery:    Esophageal:  0    Gastroduodenal:  0    Small bowel resection(s):  0    Ileocolonic resection(s): unknown      Colonic resection(s):  0    Ileostomy or colostomy:  no previous ostomy    Complete colectomy:  No         Medications     Year last used Reason for discontinuation   Corticosteroids prior   2023       Thiopurines   never         Methotrexate   never         Infliximab   never         Adalimumab   never         Certolizumab   never         Golimumab   never         Natalizumab   never         Mesalamine prior     CARLOS     Sulfasalzine   never         Vedolizumab prior   2022 CARLOS     Ustekinumab   never         Tofacitinib   never         Other biologic   never             Extraintestinal Manifestations    IBD-associated arthropathy Yes    Uveitis Yes  iritis x 2    Oral aphthous ulcers No   Erythema nodosum No    Pyoderma gangrenosum No    Primary  sclerosing cholangitis Yes  MRCP 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 Daughter with PSC.    Thrombotic complications No          Cancer / Dysplasia History  History of IBD-associated dysplasia: none    Date of diagnosis (Year):     History of colorectal cancer: No   History of cervical dysplasia: No   History of skin cancer: squamous cell carcinoma         Laboratory Data   Most recent (date) Result   PPD   unknown   Quanitferon gold 11/7/2024 negative   TPMT 1/17/2023 normal   Hepatitis A       HBsAb 9/12/2022 negative   HBcAb 9/12/2022 negative   HBsAg 11/7/2024 negative   HCV Ab   unknown       Imaging / Diagnostic Procedures   Most recent (date) Findings   Colonoscopy or sigmoidoscopy #1 1/12/2023            Ulcers/Erosions  small           Strictures  none           Stricture Severity  N/A           Endoscopic Score Fregoso Score:  2 Rutgeert's:  N/A            Findings  (1) Normal TI.  (2) Moderate edematous, erythematous, friable and hemorrhagic mucosa with erosion and loss of vascular pattern in the cecum, ascending colon, hepatic flexure, transverse colon, splenic flexure, descending colon, sigmoid colon, rectosigmoid and rectum, consistent with ulcerative colitis.           Pathology   A. Colon, Cecum, Biopsy:  - Mildly active chronic colitis.  - Negative for granulomata, viral changes, and dysplasia.  B. Small Intestine, Terminal Ileum, Biopsy:  - Small intestinal mucosa with no specific pathologic change.  C. Colon, Ascending, Biopsy:  - Mildly active chronic colitis.  - Negative for granulomata, viral changes, and dysplasia.  D. Colon, Transverse, Biopsy:  - Mildly active chronic colitis.  - Negative for granulomata, viral changes, and dysplasia.  E. Colon, Descending, Biopsy:  - Mildly active chronic colitis.  - Negative for granulomata, viral changes, and dysplasia.  F. Colon, Sigmoid, Biopsy:  - Superficial fragments of colonic mucosa with mild acute colitis.  G. Colon, Rectum, Biopsy:  -  Mildly active chronic colitis.  - Negative for granulomata, viral changes, and dysplasia.   Colonoscopy or sigmoidoscopy #2              Ulcers/Erosions                 Strictures                 Stricture Severity              Endoscopic Score Fregoso Score:    Rugeert's:              Findings              Pathology      EGD       Small bowel follow-through       CT enterography       CT without enterography       MRI enterography       Capsule study       DEXA scan   Lowest Z-score:      CXR (for TB)           HPI:   Interval History:  3/14/2025 Follow up  Leigh Ann continues to feel very well on upadacitinib. She completed 12 weeks of 45 mg and switched to 30 mg 2 weeks ago. Has gained weight. Feels 90% better. Still has urgency 1st thing in the morning but not during the day.  Still has 4 BMs per day.  Had had URI and GI infection and held Rinvoq for 2 days.  Got Shingrix x1, needs 2nd dose.  Also needs Prevnar.  Sees Derm annually: had squamous cell.  Last pap smear was in 2024.  Taking vitamin D.     Calprotectin on 2/28/2025 had normalized.  LFTs from 1/7/2025 also normal with ALT 25, AST 29, bilirubin 0.52, alk phos 100.  Creatinine also normal at 0.62.    12/3/27461 Follow up with Jason Coronel  Patient previously had ongoing symptoms on infliximab with fecal calprotectin elevation to 1300.  She was therefore switched to upadacitinib.  Patient has now been on upadacitinib 45 mg daily for the last week or so and is already feeling significantly better.  She previously had 5-7 BMs/day, typically [place] #5-6.  Currently, she is having 3-4 BMs/day, mostly [place] #4.  She is seeing much less blood in the stool.  Fecal urgency is also improving.  She got her first Shingrix vaccine and is aware to have the next dose in about 2 months.  She continues to feel fatigued/lower energy than she would like.  She does deal with some depression/anxiety and currently manages this through her social support system and staying  active (she enjoys pickleball).        9/3/2024 Follow up  Leigh Ann remains on infliximab 15 mg/kg every 4 weeks.  The dose has been increased based on therapeutic drug monitoring and response to therapy.  Her trough was 18 mcg/mL on 5/6/2024.  We did repeat a colonoscopy in April, and I felt that there was improvement compared to the procedure area earlier.  She feels that the medication is working partially.  Calprotectin on 8/27/2024 was 643, down from 958 two months earlier.  She continues to have frequent loose stools with urgency, although her symptoms are not as bad as they used to be.  She also stopped taking ibuprofen and noticed significant decrease in bleeding, although she was only taking 200 mcg daily for joint pain.  She is not on steroids currently.  She also followed up with Dr. Alvarado regarding elevated LFTs and borderline AMA.  Dr. Alvarado felt that she likely has PBC which was being partially treated with budesonide.  She has since tapered off budesonide.  MRCP did not show evidence of PSC.  She gets annual dermatology visits and Pap smears.  She takes calcium and occasional vitamin D.  She has osteopenia based on DEXA from 2022.  We have discussed switching to upadacitinib, but she is nervous to stop infliximab, which she feels is working partially. LFTs from 8/22/2024: AMA 20.6, AST 73, ALT 73, AP 91, Tb 0.64.    4/17/2024 Follow up  Since last visit, we increased infliximab to 15 mg/kg every 4 weeks based on trough of 6.7 ug/ml with 10 mg/kg every 4 week dosing.  She has tapered off budesonide.  Despite the higher dose, she continues to have 6 loose BMs per day.  Feels that symptoms have worsened since coming off budesonide.  Feels that she is not in clinical remission and her disease remains active.  No abdominal pain.  Has gained weight.  Feels very tired.  Her last infliximab infusion was 1 week ago.    Calprotectin in March was 973.  Infectious stool tests were negative.  CBC in January was normal  with Hgb 12.8, MCV 92.  Vitamin D was normal.  Ferritin was 10.  She is taking iron supplements.      1/9/2024 Follow up  Since last visit, she has remained on infliximab 10 mg every 4 weeks.  Trough level was only 6.7 mcg/mL, no antibodies.  She had COVID and had to delay her infusion.  She is also on Uceris 9 mg every other day.  Feels ok.  Still has 4-5 BMs per day, sometimes sees blood, urgency has improved, energy is ok.  However, still does not feel like she is 100%.   Her calprotectin was 2360 before we started Uceris; we have not rechecked.  Still feels fatigued.  Has gained weight. Overdue for pap smear; did see dermatologist.  Labs from 11/4/2023 show infliximab trough of 6.7 ug/ml without antibodies.  CRP 2.8, ESR 30, CBC with normal hemoglobin 12.9, MCV 93, ferritin low at 11, CMP with normal LFTs and kidney function.  RF negative, cyclic citrullinatd peptide antibody normal, TIFFANIE positive.    10/13/2020 Follow up  She is not doing well since her last visit.  She remains on infliximab (Avsola) 10 mg every 4 weeks.  Last infusion was on 10/9/2023. However, she reports starting to feel fatigued approximately 90 days ago.  This coincided with seeing more blood in the stool, having increased urgency, lightheadedness, low energy.  No abdominal pain.  Calprotectin on 10/6/2023 was 2360, which was 3 days prior to her infusion.  She says that her stool frequency has slightly improved since the last infliximab dose.  C. diff was negative.  She had 1 visit to the ER because of lightheadedness and hypotension, but Hgb was 12.5 and BP there was normal.  This delayed infliximab by 4 days.  She saw Rheumatology for seronegative arthropathy and started an anti-inflammatory diet.  Joint pain is much improved.  Also avoiding gluten for the past 3 years.   Thinks she has yeast infection. Having bad GERD currently.  She has history of SCC.  She had iron infusions in June.      8/22/2023 Follow up  Since last visit, we  increased infliximab based on subtherapeutic drug level and she is now on 10 mg/kg every 4 weeks.  She has had 3 monthly doses of high dose infliximab.  Feels like this has helped and she is slowly improving.  Calprotectin improved from 11 100-543.  Still has 3-4 BMs per day with occasional blood, but much improved from before.  No longer on steroids.  No pain.  Occasional incontinence when she has anxiety.   Labs from August showed mild ALT and AST elevation.  Has brain fog and fatigue after infusions; no fevers.  Still needs Prevnar.  Will get flu shot.  Completed 4 IV iron infusions and felt much improved.  Needs GYN appointment.  Saw dermatology.      4/19/2023 Return  Since last visit, started infliximab (Avsola) and has had 3 loading doses.  3rd dose was on 4/10.  Feels like BMs have improved, but are still inconsistent.  No abdominal pain. Occasional urgency.  Calprotectin 372 but trending down.  No bleeding for the past 2 weeks.  Energy is poor.  She cannot tolerate PO iron.  Gets SOB with exertion.  Poor exercise tolerance.  She feels dizzy on occasion since startign avsola. She is still on prednisone but down to 5 mg daily.  Has been on it since July.  Seeing Derm in June for h/o SCC.  Overdue for Pap.  She may have PSC based on chronic Ap elevation and recent MRCP, although last AP was normal.     1/26/2023 Return visit  Leigh Ann Sarabia is establishing care with me for UC.  Diagnosed with UC in 2017 - diarrhea and bleeding.  She was treated with mesalamine (Lialda) and did well for a couple of years. In 2020, flared and started vedolizumab but she lost response after a few months.  Ustekinumab was increased to every 6 weeks, but did not work. She then started ustekinumab 10/2022. However, has been on anf off prednisone for 5 months, currently on 10 mg.  Getting Stelara every 8 weeks.  Some days are good with formed stools, other days with loose BMs and bleeding. Colonoscopy this month with Fregoso 2 disease.   She has had COVID vaccines + booster and flu vaccine. No pneumococcal vaccine.  Last pap smear 1.5 years.  SCC removed ~2018 from chest.  Last derm ~ 6/2022.    Has osteopenia.  H/o iritis.  Of note, her daughter his UC and PSC.  Patient has chronic alkaline phosphatase elevation and had positive AMA in 2017.  Recent MRCP with mild intrahepatic biliary dilatation but no other signs of PSC.    12/19/22  First treated with lialda for about 2 years.    First biologic was entyvio which started in October of 2021 but in February of 2022 symptoms worsened, and was treated with prednisone with symptoms abating but as she was titrating down her prednisone symptoms worsened  She is currently on the autoimmune protocol diet since July of 2022  In the past week and a half- reporting normal BM's; formed, 1-2 times daily, without bleeding.  Gluten free and antiinflammatory diet - good for joints.  Had iritis x 2 in 2022.  Using steroid eye drops.      Leigh Ann reports the following symptoms over the last 7 days:    Stool Frequency 1-2 stools/day more than normal   Average stools per day: 4   Average liquid stools per day: 0   Consistency of bowel: formed   Awakening from sleep to move bowels? No   Urgency mild fecal urgency   Visible blood in stool? none   Abdominal pain? none   General Wellbeing? generally well     Leigh Ann also reports the following symptoms in the last month:    Leakage of stool while sleeping? No   Leakage of stool while awake? No       REVIEW OF SYSTEMS:  During the last 7 days, Leigh Ann experienced the following symptoms:  Unintentional Weight Loss (in the last month) No   Fever No   Eye irritation No   Mouth sores No   Sore throat No   Chest pain No   Shortness of breath No   Numbness or tingling in hands or feet No   Skin rash No   Pain or swelling in joints No   Bruising or bleeding No   Felt depressed or blue No   Fatigue Yes   Dysuria No   Please see HPI for additional pertinent review of systems; otherwise  "remainder of ROS was unremarkable    MEDICATIONS:    Current Outpatient Medications:     acetaminophen (TYLENOL) 500 mg tablet    ALPRAZolam (XANAX) 0.25 mg tablet    Calcium Carb-Cholecalciferol (calcium carbonate-vitamin D) 500 mg-5 mcg tablet    calcium carbonate (TUMS) 500 mg chewable tablet    Cholecalciferol (VITAMIN D PO)    fluticasone (FLONASE) 50 mcg/act nasal spray    ibuprofen (MOTRIN) 200 mg tablet    Timolol Maleate, Once-Daily, 0.5 % SOLN    tretinoin (RETIN-A) 0.025 % cream    Upadacitinib ER (Rinvoq) 30 MG TB24    Upadacitinib ER (Rinvoq) 45 MG TB24    ALLERGIES:  Allergies   Allergen Reactions    Amoxicillin GI Intolerance    Neosporin [Bacitracin-Polymyxin B] Itching     Red, swalling.       OBJECTIVE:  /72 (BP Location: Left arm, Patient Position: Sitting, Cuff Size: Standard)   Pulse 83   Temp 97.6 °F (36.4 °C) (Tympanic)   Ht 5' 1.5\" (1.562 m)   Wt 65.8 kg (145 lb) Comment: pt provider the weight -used the wieght at the chart  SpO2 99%   BMI 26.95 kg/m²      PHYSICAL EXAM:    General Appearance:   Alert, cooperative, no distress   HEENT:   Normocephalic, atraumatic, anicteric.     Neck:  Supple, symmetrical, trachea midline   Lungs:   Clear to auscultation bilaterally; no rales, rhonchi or wheezing; respirations unlabored    Heart::   Regular rate and rhythm; no murmur, rub, or gallop.   Abdomen:   Soft, non-distended; normal bowel sounds    Abdominal exam was notable for no tenderness with no mass.     Genitalia:   Deferred    Rectal:   Perirectal assessment was not performed.                     Extremities:  No cyanosis, clubbing or edema    Pulses:  2+ and symmetric    Skin:  No jaundice, rashes, or lesions    Lymph nodes:  No palpable cervical lymphadenopathy        Lab Results   Component Value Date    WBC 5.33 01/07/2025    HGB 13.6 01/07/2025    HCT 42.5 01/07/2025    MCV 95 01/07/2025     01/07/2025     Lab Results   Component Value Date     06/10/2014    " SODIUM 139 01/07/2025    K 4.0 01/07/2025     01/07/2025    CO2 31 01/07/2025    ANIONGAP 8 06/10/2014    AGAP 4 01/07/2025    BUN 15 01/07/2025    CREATININE 0.62 01/07/2025    GLUC 98 10/28/2024    GLUF 90 01/07/2025    CALCIUM 9.0 01/07/2025    AST 29 01/07/2025    ALT 25 01/07/2025    ALKPHOS 100 01/07/2025    PROT 7.0 06/10/2014    TP 7.3 01/07/2025    BILITOT 0.4 06/10/2014    TBILI 0.52 01/07/2025    EGFR 96 01/07/2025     Lab Results   Component Value Date    CRP <1.0 12/06/2024     Lab Results   Component Value Date    AIVLTEUY34 516 11/04/2023     Lab Results   Component Value Date    FERRITIN 9 (L) 04/19/2024       ASSESSMENT AND PLAN:    Leigh Ann has a history of macroscopic pancolitis and microscopic pancolitis  ulcerative colitis diagnosed in 2017. Current medical therapy is with upadacitinib 30 mg daily. My global assessment is that the clinical disease is currently quiescent.     The 6-point Fregoso score was 1 and the 9-point Fregoso score was 1.  The short CDAI was 44.      Leigh Ann has ulcerative pancolitis diagnosed in 2017. She was treated with mesalamine, vedolizumab, ustekinumab, and infliximab with either PNR or CARLOS.  I felt that she was responding to infliximab and we escalated the dose all the way up to 15 mg/kg every 4 weeks based on therapeutic drug monitoring.  However, she never fully improved and calprotectin remained very elevated.  She also had ongoing active disease on her last colonoscopy from 4/2024.  She is now on upadacitinib and had an excellent response within 24 hours.  She is very pleased and her bowel function is nearly normal.  She is currently on 30 mg.    She also has history of elevated LFTs, specifically alkaline phosphatase, but this has also normalized.  AMA has been mildly elevated in the past, possibly suggesting partially treated PBC when she was on budesonide.  Last AMA level from November 6, 2024 was 26.2, mildly elevated.  Alkaline phosphatase on 12/6/2024 and  1/7/2025 was normal.  I suspect that her transaminases may have been elevated from infliximab.    1. Continue upadacitinib 30 mg daily.  We can discuss decreasing the dose further to 15 mg in a few months. I have previously discussed the possibility of MACE and thromboembolic events in patients on CEDRICK inhibitors. These events were seen in RA patients with cardiovascular risk factors taking high doses of tofacitinib.  2. Get the second Shingrix vaccine.  Also get Prevnar 20.  3. Repeat calprotectin prior to the next visit which will help us decide whether to decrease upadacitinib dose further.  4. Check vitamin D and B12 levels which were low in the past.  5. Repeat AMA and LFTs.  She has follow-up in hepatology clinic this month.  6. Annual flu and COVID vaccines.  7. Annual dermatology exam.  8. Annual Pap smear.    Return in 4 months.      Health Maintenance Recommendations:  Vaccines & Infections  COVID-19 vaccination and boosters are recommended. There is no evidence that the COVID-19 vaccine would cause an IBD flare.  Avoid live vaccines if on immunosuppressive therapy.  Yearly influenza vaccine (flu shot).  Pneumonia vaccines for patients on immunosuppression. These include Prevnar 20, followed by Pneumovax 23 at least 8 weeks later.  Shingrix vaccine (series of 2 injections) for al patients 65 and older. Patients on tofacitinib or upadacitinib should be vaccinated regardless of age.  If not immune to measles mumps or rubella, MMR vaccine is recommended. However, this is a live vaccine and should be given prior to immunosuppressive therapy.  HPV vaccination as per national guidelines.  Hepatitis A and B vaccinations if not previously vaccinated.  Testing for tuberculosis with QuantiFERON Gold blood test and/or chest xray prior to starting immunosuppressive medications, and then annually    Cancer screening  Dysplasia surveillance for colorectal cancer. Colonoscopy in all patients with extensive colitis (more  than 1/3 of the colon involved) who had disease for at least 8 or more years.  Repeat colonoscopy approximately every 12-24 months.  In patients with concurrent primary sclerosing cholangitis, history of dysplasia, or family history of colon cancer, repeat colonoscopy annually.    Females: Pap smear annually for woman on immunosuppression.  Annual dermatologic/skin exam in all patients with IBD, especially those on immunosuppression with thiopurines or CEDRICK inhibitors.    Miscellaneous  DEXA scan, once off steroids for 3 months  Depression screening recommended annually  Routine dental and ophthalmology examinations    Problem List Items Addressed This Visit       Vitamin B12 deficiency    Relevant Orders    Vitamin B12    Vitamin D deficiency    Relevant Orders    Vitamin D 25 hydroxy    Ulcerative colitis (HCC) - Primary    Relevant Orders    Calprotectin,Fecal    Elevated alkaline phosphatase level    Relevant Orders    Antimitochondrial antibody    Hepatic function panel     Other Visit Diagnoses         Iron deficiency                Filemon Martinez MD

## 2025-03-24 ENCOUNTER — APPOINTMENT (OUTPATIENT)
Dept: LAB | Facility: CLINIC | Age: 65
End: 2025-03-24
Payer: COMMERCIAL

## 2025-03-24 ENCOUNTER — RESULTS FOLLOW-UP (OUTPATIENT)
Dept: GASTROENTEROLOGY | Facility: CLINIC | Age: 65
End: 2025-03-24

## 2025-03-24 DIAGNOSIS — R74.8 ELEVATED ALKALINE PHOSPHATASE LEVEL: ICD-10-CM

## 2025-03-24 DIAGNOSIS — E53.8 VITAMIN B12 DEFICIENCY: ICD-10-CM

## 2025-03-24 DIAGNOSIS — E55.9 VITAMIN D DEFICIENCY: ICD-10-CM

## 2025-03-24 DIAGNOSIS — E55.9 VITAMIN D DEFICIENCY: Primary | ICD-10-CM

## 2025-03-24 LAB
25(OH)D3 SERPL-MCNC: 34 NG/ML (ref 30–100)
ALBUMIN SERPL BCG-MCNC: 4.2 G/DL (ref 3.5–5)
ALP SERPL-CCNC: 92 U/L (ref 34–104)
ALT SERPL W P-5'-P-CCNC: 19 U/L (ref 7–52)
AST SERPL W P-5'-P-CCNC: 24 U/L (ref 13–39)
BILIRUB DIRECT SERPL-MCNC: 0.12 MG/DL (ref 0–0.2)
BILIRUB SERPL-MCNC: 0.67 MG/DL (ref 0.2–1)
PROT SERPL-MCNC: 7.1 G/DL (ref 6.4–8.4)
VIT B12 SERPL-MCNC: 243 PG/ML (ref 180–914)

## 2025-03-24 PROCEDURE — 82607 VITAMIN B-12: CPT

## 2025-03-24 PROCEDURE — 82306 VITAMIN D 25 HYDROXY: CPT

## 2025-03-24 PROCEDURE — 36415 COLL VENOUS BLD VENIPUNCTURE: CPT

## 2025-03-24 PROCEDURE — 80076 HEPATIC FUNCTION PANEL: CPT

## 2025-03-24 PROCEDURE — 86381 MITOCHONDRIAL ANTIBODY EACH: CPT

## 2025-03-24 RX ORDER — GINGER ROOT/GINGER ROOT EXT 262.5 MG
1 CAPSULE ORAL DAILY
Qty: 90 TABLET | Refills: 1 | Status: SHIPPED | OUTPATIENT
Start: 2025-03-24

## 2025-03-24 RX ORDER — LANOLIN ALCOHOL/MO/W.PET/CERES
1000 CREAM (GRAM) TOPICAL DAILY
Qty: 90 TABLET | Refills: 3 | Status: SHIPPED | OUTPATIENT
Start: 2025-03-24

## 2025-03-25 LAB — MITOCHONDRIA M2 IGG SER-ACNC: <20 UNITS (ref 0–20)

## 2025-03-27 ENCOUNTER — OFFICE VISIT (OUTPATIENT)
Dept: FAMILY MEDICINE CLINIC | Facility: CLINIC | Age: 65
End: 2025-03-27
Payer: COMMERCIAL

## 2025-03-27 VITALS
HEART RATE: 59 BPM | TEMPERATURE: 97.5 F | RESPIRATION RATE: 16 BRPM | OXYGEN SATURATION: 99 % | SYSTOLIC BLOOD PRESSURE: 118 MMHG | DIASTOLIC BLOOD PRESSURE: 70 MMHG

## 2025-03-27 DIAGNOSIS — D84.9 IMMUNOSUPPRESSION (HCC): ICD-10-CM

## 2025-03-27 DIAGNOSIS — S61.011A LACERATION OF RIGHT THUMB WITHOUT FOREIGN BODY WITHOUT DAMAGE TO NAIL, INITIAL ENCOUNTER: Primary | ICD-10-CM

## 2025-03-27 DIAGNOSIS — M06.09 SERONEGATIVE ARTHROPATHY OF MULTIPLE SITES (HCC): ICD-10-CM

## 2025-03-27 PROCEDURE — 99213 OFFICE O/P EST LOW 20 MIN: CPT | Performed by: FAMILY MEDICINE

## 2025-03-27 NOTE — PROGRESS NOTES
Name: Leigh Ann Sarabia      : 1960      MRN: [id number]  Encounter Provider: Marium Alvarado MD  Encounter Date: 3/28/2025   Encounter department: [facility]OLOG[facility] [address] [address]  :  Assessment & Plan  Drug induced liver disease  Pleasant 63 y/o F with UC on Rinvoq who presents for follow up on elevated LFTs.     Patient has had transient elevations in ALP, dating back to  with peak 227 in . FH includes daughter with UC and PSC. Patient has h/o UC. Previously elevated AMA which was negative on most recent testing. LFTs now wnl. ASMA negative. MRI in  with common bile duct at ULN at 6mm, smooth contours without beading or stricturing. Focal narrowing of L intrahepatic duct thought to be related to MRCP artifact rather than stricture. 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     Plan:   Would continue intermittent monitoring of LFTs q3-6 months to ensure no further elevations. Does not need UDCA at this time. If LFTs remain stable, does not need continued follow up with hepatology. However, if elevate again, should be referred back to hepatology for repeat workup.        Elevated alkaline phosphatase level  As above; now resolved        Ulcerative colitis with rectal bleeding, unspecified location (HCC)  Having good response to Rinvoq, continue follow up with IBD clinic. She is concerned about weight gain. Offered referral to dietician, she would like to work on this herself as she can identify areas of her diet that need improvement and has lost weight in the past on her own        RTC prn if LFTs elevate     History of Present Illness   HPI  Leigh Ann Sarabia is a 64 y.o. female with panUC dx in  currently on Rinvoq, chronic elevations in ALP, +TIFFANIE and AMA but negative ASMA, MRI/MRCP without stricture , occasional etoh use, FH UC and PSC in daughter who presents for follow up to liver clinic. She underwent liver  "biopsy on 10/28/24 with results suggesting DILI, likely secondary to infliximab. LFTs have since normalized and last AMA was negative.     She reports feeling well. Only concern is that she has gained weight since starting on Rinvoq. Denies exacerbations of UC. Feels like it is 90+% controlled. No abd pain, jaundice, pruritus, swelling.     History obtained from: patient    Review of Systems  Medical History Reviewed by provider this encounter:     .     Objective   /70   Pulse 62   Ht 5' 1.5\" (1.562 m)   Wt 71.1 kg (156 lb 12.8 oz)   BMI 29.15 kg/m²      Physical Exam  Vitals reviewed.   HENT:      Mouth/Throat:      Mouth: Mucous membranes are moist.   Pulmonary:      Effort: Pulmonary effort is normal.   Abdominal:      General: There is no distension.      Palpations: Abdomen is soft.      Tenderness: There is no abdominal tenderness.   Skin:     General: Skin is warm and dry.   Neurological:      General: No focal deficit present.      Mental Status: She is alert.   Psychiatric:         Mood and Affect: Mood normal.         "

## 2025-03-27 NOTE — ASSESSMENT & PLAN NOTE
There is no room for steri strip I explained I cannot suture this either Patient will use the liquid bandaid as discussed

## 2025-03-27 NOTE — PROGRESS NOTES
"Name: Leigh Ann Sarabia      : 1960      MRN: [id number]  Encounter Provider: Monisha Be DO  Encounter Date: 3/27/2025   Encounter department: [facility] PRIMARY CARE  :  Assessment & Plan  Laceration of right thumb without foreign body without damage to nail, initial encounter  There is no room for steri strip I explained I cannot suture this either Patient will use the liquid bandaid as discussed        Seronegative arthropathy of multiple sites (HCC)  Continue rheumatology followup and the renvoq       Immunosuppression ([facility])  Continue renvoq Discussed that this is contributing to the non healing also               Chief Complaint   Patient presents with    Laceration     Pt is here for cut on right thumb times a couple of months. Keeps splitting open        History of Present Illness   Patient has a laceration of the right thumb Patient notes initial event occurred over one month ago Patient notes it just will not heal Patient was using a bandaid However it does not stick well patient is in rinvoq and so her colitis Patient notes that she \"cracks\" this open with activity Patient plays pickle ball Patient has no pain and no drainage from this lesion     Laceration   Incident onset: more than one month ago. The laceration is located on the Right hand (right thumb). The laceration is 1 cm in size. The laceration mechanism was a blunt object. The pain is at a severity of 0/10. The patient is experiencing no pain. She reports no foreign bodies present. Her tetanus status is UTD.     Review of Systems   Constitutional:  Negative for chills and fever.       Objective   /70   Pulse 59   Temp 97.5 °F (36.4 °C) (Temporal)   Resp 16   SpO2 99%      Physical Exam  Vitals and nursing note reviewed.   Constitutional:       Appearance: Normal appearance.   Musculoskeletal:      Comments: Right thumb with vertical laceration <1 cm in length that is adjacent to the fingernail There is no " redness no drainage and no pain to palpation    Neurological:      Mental Status: She is alert.

## 2025-03-28 ENCOUNTER — OFFICE VISIT (OUTPATIENT)
Age: 65
End: 2025-03-28
Payer: COMMERCIAL

## 2025-03-28 VITALS
SYSTOLIC BLOOD PRESSURE: 110 MMHG | HEIGHT: 62 IN | HEART RATE: 62 BPM | BODY MASS INDEX: 28.85 KG/M2 | WEIGHT: 156.8 LBS | DIASTOLIC BLOOD PRESSURE: 70 MMHG

## 2025-03-28 DIAGNOSIS — K71.9 DRUG INDUCED LIVER DISEASE: Primary | ICD-10-CM

## 2025-03-28 DIAGNOSIS — K51.911 ULCERATIVE COLITIS WITH RECTAL BLEEDING, UNSPECIFIED LOCATION (HCC): ICD-10-CM

## 2025-03-28 DIAGNOSIS — R74.8 ELEVATED ALKALINE PHOSPHATASE LEVEL: ICD-10-CM

## 2025-03-28 PROCEDURE — 99214 OFFICE O/P EST MOD 30 MIN: CPT | Performed by: STUDENT IN AN ORGANIZED HEALTH CARE EDUCATION/TRAINING PROGRAM

## 2025-03-28 NOTE — ASSESSMENT & PLAN NOTE
Having good response to Rinvoq, continue follow up with IBD clinic. She is concerned about weight gain. Offered referral to dietician, she would like to work on this herself as she can identify areas of her diet that need improvement and has lost weight in the past on her own

## 2025-04-10 ENCOUNTER — TELEPHONE (OUTPATIENT)
Age: 65
End: 2025-04-10

## 2025-04-10 NOTE — TELEPHONE ENCOUNTER
Patient called and would like yo get a ET coronary calcium score done.  She would like to see if Maddy or Imani could put in an order?  Pls call her at

## 2025-04-21 ENCOUNTER — RESULTS FOLLOW-UP (OUTPATIENT)
Dept: FAMILY MEDICINE CLINIC | Facility: CLINIC | Age: 65
End: 2025-04-21

## 2025-04-21 ENCOUNTER — APPOINTMENT (OUTPATIENT)
Dept: LAB | Facility: CLINIC | Age: 65
End: 2025-04-21
Payer: COMMERCIAL

## 2025-04-21 ENCOUNTER — OFFICE VISIT (OUTPATIENT)
Dept: FAMILY MEDICINE CLINIC | Facility: CLINIC | Age: 65
End: 2025-04-21
Payer: COMMERCIAL

## 2025-04-21 VITALS
HEART RATE: 76 BPM | HEIGHT: 62 IN | TEMPERATURE: 95.8 F | BODY MASS INDEX: 29.15 KG/M2 | SYSTOLIC BLOOD PRESSURE: 120 MMHG | OXYGEN SATURATION: 96 % | DIASTOLIC BLOOD PRESSURE: 80 MMHG

## 2025-04-21 DIAGNOSIS — D84.821 IMMUNOSUPPRESSION DUE TO DRUG THERAPY  (HCC): Primary | ICD-10-CM

## 2025-04-21 DIAGNOSIS — R79.0 LOW IRON STORES: ICD-10-CM

## 2025-04-21 DIAGNOSIS — E53.8 VITAMIN B12 DEFICIENCY: ICD-10-CM

## 2025-04-21 DIAGNOSIS — Z82.49 FAMILY HISTORY OF CORONARY ARTERY DISEASE: ICD-10-CM

## 2025-04-21 DIAGNOSIS — M85.80 OSTEOPENIA, UNSPECIFIED LOCATION: ICD-10-CM

## 2025-04-21 DIAGNOSIS — R79.0 LOW IRON STORES: Primary | ICD-10-CM

## 2025-04-21 DIAGNOSIS — R53.83 OTHER FATIGUE: ICD-10-CM

## 2025-04-21 DIAGNOSIS — Z79.899 IMMUNOSUPPRESSION DUE TO DRUG THERAPY  (HCC): Primary | ICD-10-CM

## 2025-04-21 DIAGNOSIS — E55.9 VITAMIN D DEFICIENCY: ICD-10-CM

## 2025-04-21 DIAGNOSIS — K51.911 ULCERATIVE COLITIS WITH RECTAL BLEEDING, UNSPECIFIED LOCATION (HCC): ICD-10-CM

## 2025-04-21 LAB
FERRITIN SERPL-MCNC: 26 NG/ML (ref 30–307)
IRON SATN MFR SERPL: 35 % (ref 15–50)
IRON SERPL-MCNC: 145 UG/DL (ref 50–212)
TIBC SERPL-MCNC: 408.8 UG/DL (ref 250–450)
TRANSFERRIN SERPL-MCNC: 292 MG/DL (ref 203–362)
UIBC SERPL-MCNC: 264 UG/DL (ref 155–355)

## 2025-04-21 PROCEDURE — 82728 ASSAY OF FERRITIN: CPT

## 2025-04-21 PROCEDURE — 36415 COLL VENOUS BLD VENIPUNCTURE: CPT

## 2025-04-21 PROCEDURE — 83540 ASSAY OF IRON: CPT

## 2025-04-21 PROCEDURE — 83550 IRON BINDING TEST: CPT

## 2025-04-21 PROCEDURE — 99214 OFFICE O/P EST MOD 30 MIN: CPT | Performed by: NURSE PRACTITIONER

## 2025-04-21 NOTE — PROGRESS NOTES
Name: Leigh Ann Sarabia      : 1960      MRN: [id number]  Encounter Provider: RODNEY Olmedo  Encounter Date: 2025   Encounter department: [facility] PRIMARY CARE  :  Assessment & Plan  Immunosuppression due to drug therapy  (HCC)  On Rinvoq- follows with GI        Vitamin B12 deficiency  243 on labs 3/2025  Re-started B12 1,000 mcg daily   Continue with same       Family history of coronary artery disease  Complete coronary CT  Orders:  •  CT coronary calcium score; Future    Other fatigue  Update iron panel  If normal consider echo  History of low iron in the past  Orders:  •  Iron Panel (Includes Ferritin, Iron Sat%, Iron, and TIBC); Future    Low iron stores  Update iron panel        Osteopenia, unspecified location  Complete dexa scan  Done in - osteopenia               History of Present Illness   Here to discuss coronary calcium score testing and fatigue.     In the past year she lost 25 lbs due to UC. Then recently has had increase over past year.   Wt Readings from Last 3 Encounters:  25 : 71.1 kg (156 lb 12.8 oz)  25 : 65.8 kg (145 lb)  24 : 65.8 kg (145 lb)  Very active.     Follows with GI.     Would like to complete coronary CT. Family history of CAD    Fatigue  Associated symptoms include fatigue. Pertinent negatives include no chest pain, chills, fever, headaches, joint swelling or rash.     Review of Systems   Constitutional:  Positive for fatigue. Negative for chills and fever.   Eyes:  Negative for discharge.   Respiratory:  Negative for shortness of breath.    Cardiovascular:  Negative for chest pain.   Gastrointestinal:  Negative for constipation and diarrhea.   Genitourinary:  Negative for difficulty urinating.   Musculoskeletal:  Negative for joint swelling.   Skin:  Negative for rash.   Neurological:  Negative for headaches.   Hematological:  Negative for adenopathy.   Psychiatric/Behavioral:  The patient is not nervous/anxious.   "      Objective   /80   Pulse 76   Temp (!) 95.8 °F (35.4 °C)   Ht 5' 1.5\" (1.562 m)   SpO2 96%   BMI 29.15 kg/m²      Physical Exam  Vitals and nursing note reviewed.   Constitutional:       General: She is not in acute distress.     Appearance: Normal appearance. She is well-developed. She is not diaphoretic.   HENT:      Head: Normocephalic and atraumatic.      Right Ear: External ear normal.      Left Ear: External ear normal.   Eyes:      General: Lids are normal.         Right eye: No discharge.         Left eye: No discharge.      Conjunctiva/sclera: Conjunctivae normal.   Cardiovascular:      Rate and Rhythm: Normal rate and regular rhythm.      Heart sounds: No murmur heard.  Pulmonary:      Effort: Pulmonary effort is normal. No respiratory distress.      Breath sounds: Normal breath sounds. No wheezing.   Musculoskeletal:         General: No deformity.   Skin:     General: Skin is warm and dry.   Neurological:      General: No focal deficit present.      Mental Status: She is alert and oriented to person, place, and time.   Psychiatric:         Speech: Speech normal.         Behavior: Behavior normal.         Thought Content: Thought content normal.         Judgment: Judgment normal.         "

## 2025-04-21 NOTE — PATIENT INSTRUCTIONS
Complete coronary calcium score.     Complete labs     Please call the office if you are experiencing any worsening of symptoms or no symptom improvement.

## 2025-04-26 PROBLEM — S61.011A LACERATION OF RIGHT THUMB: Status: RESOLVED | Noted: 2025-03-27 | Resolved: 2025-04-26

## 2025-04-28 DIAGNOSIS — F40.240 CLAUSTROPHOBIA: ICD-10-CM

## 2025-04-28 NOTE — TELEPHONE ENCOUNTER
Medication: alprazolam 0.25mg     Dose/Frequency: 1 tablet 2 times a day as needed    Quantity: 90    Pharmacy: [facility]    Office:   [x] PCP/Provider -   [] Speciality/Provider -     Does the patient have enough for 3 days?   [x] Yes   [] No - Send as HP to POD

## 2025-04-29 ENCOUNTER — HOSPITAL ENCOUNTER (OUTPATIENT)
Dept: CT IMAGING | Facility: HOSPITAL | Age: 65
Discharge: HOME | End: 2025-04-29
Attending: NURSE PRACTITIONER
Payer: COMMERCIAL

## 2025-04-29 DIAGNOSIS — Z82.49 FAMILY HISTORY OF CORONARY ARTERY DISEASE: ICD-10-CM

## 2025-04-29 PROCEDURE — 75571 CT HRT W/O DYE W/CA TEST: CPT

## 2025-04-29 RX ORDER — ALPRAZOLAM 0.25 MG
0.25 TABLET ORAL 2 TIMES DAILY PRN
Qty: 90 TABLET | Refills: 0 | Status: SHIPPED | OUTPATIENT
Start: 2025-04-29

## 2025-04-29 NOTE — TELEPHONE ENCOUNTER
Requested medication(s) are due for refill today: Yes  **If antibiotic or given during sick visit, contact patient to discuss current symptoms.   **Confirm prescribing provider    LOV:  4-21-25  **If longer then 1 year, contact patient to schedule annual PRIOR to refilling. Once scheduled, adjust refill for 30 days, no refills.  **Update CareEverywhere to confirm not being seen elsewhere    NOV:  none    Is patient due for annual visit: No  **If future appointment, adjust to annual/follow up.  ** No appointment call to schedule annual/follow up.    Route to PCP, unless PCP no longer here, then physician they are seeing next.

## 2025-05-02 ENCOUNTER — TELEPHONE (OUTPATIENT)
Age: 65
End: 2025-05-02

## 2025-05-02 NOTE — TELEPHONE ENCOUNTER
Patient called to schedule appointment for 2nd SHINGLES vaccine    Patient had 1st Shingles on 11/15/2024    Prior to scheduling, patient asked if it is too late to have 2nd dose.  Does she need to start series over again?    Clinical/ not available at this time.    Please call patient to discuss scheduling Shingles vaccination.  Patient call back# [phone]

## 2025-05-02 NOTE — TELEPHONE ENCOUNTER
Patient is aware. She was informed that you has to schedule the 2nd shingles ASAP and it cn be no later that 6mos. Patient will call back next week and schedule, most likey Monday.   negative...

## 2025-05-05 ENCOUNTER — RESULTS FOLLOW-UP (OUTPATIENT)
Dept: FAMILY MEDICINE CLINIC | Facility: CLINIC | Age: 65
End: 2025-05-05

## 2025-05-05 NOTE — TELEPHONE ENCOUNTER
----- Message from RODNEY Bee sent at 5/5/2025 12:41 PM EDT -----  TOTAL coronary calcium score: 0  Which is awesome.

## 2025-05-07 ENCOUNTER — TELEPHONE (OUTPATIENT)
Age: 65
End: 2025-05-07

## 2025-05-07 DIAGNOSIS — Z23 ENCOUNTER FOR IMMUNIZATION: Primary | ICD-10-CM

## 2025-05-07 NOTE — TELEPHONE ENCOUNTER
Patient called requesting shingles vaccine(s) for the following reason(s): immunization.     No order in system. Please advise and/or schedule accordingly.  Patient would like to schedule a nurse visit for today at noon for the 2nd shingles vaccine.    It must not be a live virus as she is immunocompromised. She said it must be the same one as last time.

## 2025-05-07 NOTE — TELEPHONE ENCOUNTER
Patient called requesting a status on this request for immunization (Shingrix).     Patient reporting that she called in this morning and hasn't heard anything back.    Patient added that if she does not have the 2nd Shringrix dose by Friday she will need to start the whole series over again.    But she really wanted to get it today or tomorrow at the latest.    Warm transferred patient to [facility] in clinical to further assist patient.

## 2025-05-08 ENCOUNTER — CLINICAL SUPPORT (OUTPATIENT)
Dept: FAMILY MEDICINE CLINIC | Facility: CLINIC | Age: 65
End: 2025-05-08
Payer: COMMERCIAL

## 2025-05-08 DIAGNOSIS — Z23 ENCOUNTER FOR IMMUNIZATION: Primary | ICD-10-CM

## 2025-05-08 PROCEDURE — 90750 HZV VACC RECOMBINANT IM: CPT

## 2025-05-08 PROCEDURE — 90471 IMMUNIZATION ADMIN: CPT

## 2025-05-29 ENCOUNTER — OFFICE VISIT (OUTPATIENT)
Dept: FAMILY MEDICINE CLINIC | Facility: CLINIC | Age: 65
End: 2025-05-29
Payer: COMMERCIAL

## 2025-05-29 VITALS
BODY MASS INDEX: 29.48 KG/M2 | TEMPERATURE: 97.2 F | HEIGHT: 61 IN | SYSTOLIC BLOOD PRESSURE: 110 MMHG | HEART RATE: 74 BPM | DIASTOLIC BLOOD PRESSURE: 80 MMHG | OXYGEN SATURATION: 97 %

## 2025-05-29 DIAGNOSIS — M54.41 ACUTE RIGHT-SIDED LOW BACK PAIN WITH RIGHT-SIDED SCIATICA: Primary | ICD-10-CM

## 2025-05-29 PROCEDURE — 99214 OFFICE O/P EST MOD 30 MIN: CPT | Performed by: NURSE PRACTITIONER

## 2025-05-29 RX ORDER — PREDNISONE 10 MG/1
TABLET ORAL
Qty: 21 TABLET | Refills: 0 | Status: SHIPPED | OUTPATIENT
Start: 2025-05-29

## 2025-05-29 RX ORDER — CYCLOBENZAPRINE HCL 5 MG
5-10 TABLET ORAL 3 TIMES DAILY PRN
Qty: 30 TABLET | Refills: 0 | Status: SHIPPED | OUTPATIENT
Start: 2025-05-29

## 2025-05-29 NOTE — PROGRESS NOTES
Name: Leigh Ann Sarabia      : 1960      MRN: [id number]  Encounter Provider: RODNEY Olmedo  Encounter Date: 2025   Encounter department: [facility] PRIMARY CARE  :  Assessment & Plan  Acute right-sided low back pain with right-sided sciatica  Start prednisone, this is the steroid. It will be 6 pills today and decrease by 1 pill each day for the following 6 days. So it will be 6-5-4-3-2-1. Take this with food as it may upset your stomach. It's best to take it earlier in the day otherwise it may keep you up at night. Do not take this with NSAIDs such as advil/ibuprofen/advil/aleve/motrin.     Muscle relaxant as needed.   This medication may cause drowsiness. Due to this possible side effect, please do not drive on this medication. Please also do not drink alcohol while taking this medication and do not mix with other medications that may cause drowsiness.     Stretching as tolerated. If no improvement would recommend PT.     Could add in pain medication if needed. Will call.     Please call the office if you are experiencing any worsening of symptoms or no symptom improvement.     Orders:  •  predniSONE 10 mg tablet; Day 1: 6 tabs  Day 2: 5 tabs Day 3: 4 tabs  Day 4: 3 tabs  Day 5: 2 tabs  Day 6: 1 tab  •  cyclobenzaprine (FLEXERIL) 5 mg tablet; Take 1-2 tablets (5-10 mg total) by mouth 3 (three) times a day as needed for muscle spasms      Assessment & Plan           History of Present Illness   Patient presents with:  Back and sciatic pain: Started Tuesday morning, lower back right leg  Hasn't happened before. Did have a 24 hour virus on Monday. Feels better now. Was coughing a lot. She was in bed a lot. When she got up the next morning she had pain in her lower back right SI joint into right leg. Laying down is very bothersome. Achy during the day. Tried ice and stretching. Using ibuprofen sparingly.       Review of Systems   Constitutional:  Negative for chills and fever.  "  Eyes:  Negative for discharge.   Respiratory:  Negative for shortness of breath.    Cardiovascular:  Negative for chest pain.   Gastrointestinal:  Negative for constipation and diarrhea.   Genitourinary:  Negative for difficulty urinating.   Musculoskeletal:  Positive for back pain. Negative for joint swelling.   Skin:  Negative for rash.   Neurological:  Negative for headaches.   Hematological:  Negative for adenopathy.   Psychiatric/Behavioral:  The patient is not nervous/anxious.        Objective   /80   Pulse 74   Temp (!) 97.2 °F (36.2 °C)   Ht 5' 1\" (1.549 m)   SpO2 97%   BMI 29.48 kg/m²      Physical Exam  Vitals and nursing note reviewed.   Constitutional:       General: She is not in acute distress.     Appearance: Normal appearance. She is well-developed. She is not diaphoretic.   HENT:      Head: Normocephalic and atraumatic.      Right Ear: External ear normal.      Left Ear: External ear normal.     Eyes:      General: Lids are normal.         Right eye: No discharge.         Left eye: No discharge.      Conjunctiva/sclera: Conjunctivae normal.     Pulmonary:      Effort: Pulmonary effort is normal. No respiratory distress.     Musculoskeletal:         General: No deformity.      Cervical back: Normal range of motion.      Thoracic back: No tenderness. Normal range of motion.      Lumbar back: No tenderness. Normal range of motion. Negative right straight leg raise test and negative left straight leg raise test.     Skin:     General: Skin is warm and dry.     Neurological:      General: No focal deficit present.      Mental Status: She is alert and oriented to person, place, and time.     Psychiatric:         Speech: Speech normal.         Behavior: Behavior normal.         Thought Content: Thought content normal.         Judgment: Judgment normal.         "

## 2025-06-05 ENCOUNTER — TELEMEDICINE (OUTPATIENT)
Dept: FAMILY MEDICINE CLINIC | Facility: CLINIC | Age: 65
End: 2025-06-05
Payer: COMMERCIAL

## 2025-06-05 ENCOUNTER — TELEPHONE (OUTPATIENT)
Dept: FAMILY MEDICINE CLINIC | Facility: CLINIC | Age: 65
End: 2025-06-05

## 2025-06-05 DIAGNOSIS — M54.41 ACUTE RIGHT-SIDED LOW BACK PAIN WITH RIGHT-SIDED SCIATICA: Primary | ICD-10-CM

## 2025-06-05 PROCEDURE — 99214 OFFICE O/P EST MOD 30 MIN: CPT | Performed by: NURSE PRACTITIONER

## 2025-06-05 RX ORDER — TRAMADOL HYDROCHLORIDE 50 MG/1
50 TABLET ORAL 2 TIMES DAILY PRN
Qty: 10 TABLET | Refills: 0 | Status: SHIPPED | OUTPATIENT
Start: 2025-06-05

## 2025-06-05 NOTE — PROGRESS NOTES
Name: Leigh Ann Sarabia      : 1960      MRN: [id number]  Encounter Provider: RODNEY Olmedo  Encounter Date: 2025   Encounter department: [facility] PRIMARY CARE    :  Assessment & Plan  Acute right-sided low back pain with right-sided sciatica  Prefers to only use as needed med. Gabapentin declined at this time.   Tramadol to use PRN only if needed for high amounts of pain.   [place] prescription drug monitoring program was checked and verified for refill.   Do not take within 8 hours of xanax.   At this time pain is improved which is good- but yesterday was worse. Recommend updating imaging. Previous x ray  did show degenerative changes.   PT to be started as long as no acute findings on x ray. Was given option to see spine specialist now versus starting PT first- agreeable to start PT first. If any acute imaging findings or doesn't tolerate PT then refer.   Red flag symptoms reviewed.   Please call the office if you are experiencing any worsening of symptoms or no symptom improvement.     Orders:  •  XR spine lumbar minimum 4 views non injury; Future  •  Ambulatory Referral to Physical Therapy; Future  •  traMADol (Ultram) 50 mg tablet; Take 1 tablet (50 mg total) by mouth 2 (two) times a day as needed for severe pain      Assessment & Plan             History of Present Illness     Patient presents for virtual visit today to discuss back pain.  Patient was initially seen in the office May 29 for this this was right-sided low back pain with right-sided sciatica. prednisone and Flexeril were ordered for the patient did discuss that if no improvement would consider starting physical therapy she messaged me on  at that time stating she was still dealing with sciatic pain down her right side things improved with the steroids but laying down for a couple hours really aggravates the pain it wakes her up when she has to get out of bed and adjust.  She has not been  sleeping well due to this she wanted to discuss another medication that she could take work she should try PT I did let patient know that physical therapy could be started could also consider starting gabapentin or seeing a spine specialist she would like to start physical therapy.  She is not very eager to start gabapentin.  Yesterday was bad but today it's better.   No bladder/bowel changes. No change in sensation/ movement.       History of Present Illness       Review of Systems   Constitutional:  Negative for chills and fever.   Musculoskeletal:  Positive for back pain.     Objective   There were no vitals taken for this visit.    Physical Exam    Physical Exam  Constitutional:       General: She is not in acute distress.     Appearance: Normal appearance. She is not ill-appearing, toxic-appearing or diaphoretic.   HENT:      Head: Normocephalic and atraumatic.      Nose: Nose normal.   Pulmonary:      Effort: Pulmonary effort is normal. No respiratory distress.     Skin:     Coloration: Skin is not pale.     Neurological:      General: No focal deficit present.      Mental Status: She is alert and oriented to person, place, and time.     Psychiatric:         Mood and Affect: Mood normal.           Administrative Statements   Encounter provider RODNEY Olmedo    The Patient is located at Home and in the following state in which I hold an active license [place].    The patient was identified by name and date of birth. Leigh Ann AIDE Sarabia was informed that this is a telemedicine visit and that the visit is being conducted through the Epic Embedded platform. She agrees to proceed..  My office door was closed. No one else was in the room.  She acknowledged consent and understanding of privacy and security of the video platform. The patient has agreed to participate and understands they can discontinue the visit at any time.    I have spent a total time of 20 minutes in caring for this patient on the day of the  visit/encounter including Risks and benefits of tx options, Instructions for management, Patient and family education, Impressions, Counseling / Coordination of care, Documenting in the medical record, Reviewing/placing orders in the medical record (including tests, medications, and/or procedures), and Obtaining or reviewing history  , not including the time spent for establishing the audio/video connection.

## 2025-06-05 NOTE — TELEPHONE ENCOUNTER
Left detail message for patient making her aware that Maddy is doing virtual appointments only today and made her aware to log into her [employer] a few minutes before apt and she will get notify to start the visit.

## 2025-06-05 NOTE — PATIENT INSTRUCTIONS
Physical Therapy at [place] [address], [place] [address]  [phone]

## 2025-06-09 ENCOUNTER — EVALUATION (OUTPATIENT)
Dept: PHYSICAL THERAPY | Facility: CLINIC | Age: 65
End: 2025-06-09
Attending: NURSE PRACTITIONER
Payer: COMMERCIAL

## 2025-06-09 DIAGNOSIS — M54.41 ACUTE RIGHT-SIDED LOW BACK PAIN WITH RIGHT-SIDED SCIATICA: Primary | ICD-10-CM

## 2025-06-09 PROCEDURE — 97161 PT EVAL LOW COMPLEX 20 MIN: CPT

## 2025-06-09 NOTE — PROGRESS NOTES
PT Evaluation     Today's date: 2025  Patient name: Leigh Ann Sarabia  : 1960  MRN: [id number]  Referring provider: Maddy Suárez C*  Dx: No diagnosis found.               Assessment  Impairments: abnormal muscle firing, abnormal or restricted ROM, abnormal movement, activity intolerance, impaired physical strength, lacks appropriate home exercise program, pain with function and poor body mechanics  Symptom irritability: moderate    Assessment details: Patient is a 65 y/o female presenting to PT with complaints of R sided low back pain beginning ***. Upon clinical exam, patient presents with ***. These impairments limit the patient with ***. Exam findings and clinical signs and symptoms are suggestive of ***. Patient will benefit from physical therapy to address the above impairments and maximize functional mobility.     Understanding of Dx/Px/POC: good     Prognosis: good    Goals  STG - to be met by week 4  1. Patient will be independent with HEP throughout therapy to prepare for eventual transition to maintenance program.  2. Patient will improve subjective pain to <=*** at worst to improve QOL.  3. Patient will improve *** AROM to *** to perform ADLs with less difficulty.    LTG - to be met by discharge   1. Patient will improve *** AROM to *** to perform ADLs with less difficulty.  2. Patient will improve *** strength to *** to improve functional mobility.  3. Patient will improve FOTO score to age matched prediction to demonstrate functional improvements.  4. Patient will be able to *** without difficulty to return to PLOF.  5. Patient will be able to *** without difficulty to return to PLOF.        Plan  Patient would benefit from: skilled physical therapy  Referral necessary: No  Planned modality interventions: cryotherapy and thermotherapy: hydrocollator packs    Planned therapy interventions: abdominal trunk stabilization, activity modification, balance/weight bearing training, body  mechanics training, flexibility, functional ROM exercises, graded activity, graded exercise, home exercise program, manual therapy, neuromuscular re-education, patient/caregiver education, postural training, strengthening, stretching, therapeutic activities and therapeutic exercise    Frequency: 2x week  Duration in weeks: 12  Treatment plan discussed with: patient        Subjective Evaluation    History of Present Illness  Date of onset: 5/26/2025  Mechanism of injury: Patient reports R sided low back pain beginning approximately 2 weeks ago. Reports the pain radiates down her RLE to her ***. Patient reports ***ROBERT. Notes she was sick the day before her pain began, was coughing a lot, then woke up the next morning with pain. Patient reports difficulty with sleeping, *** due to her pain. Reports she took a Steroid pack and muscle relaxer which helped but ***. Denies recent imaging but has an x-ray ordered. *** N/T. Reports *** helps with her pain.     Patient Goals  Patient goals for therapy: decreased pain, increased motion and increased strength    Pain  Location: R low back and RLE      Diagnostic Tests  No diagnostic tests performed  Treatments  Current treatment: physical therapy      Objective     Concurrent Complaints      Additional Special Questions  ***    Palpation     Additional Palpation Details  TTP ***    Neurological Testing     Additional Neurological Details  ***    Active Range of Motion     Additional Active Range of Motion Details  ***    Repeated Movement Testing:   Repeated lumbar FIS:   Repeated lumbar EIS:   Repeated lumbar EIL:       Strength/Myotome Testing     Additional Strength Details  ***    Tests     Additional Tests Details  ***    *** tightness noted             Precautions: ***      Manuals 6/9                                                                Neuro Re-Ed             PPU             Sciatic nerve glides                                                                               Ther Ex             Bike / NS             Supine piriformis stretch                                                                                           Ther Activity                                       Gait Training                                       Modalities

## 2025-06-09 NOTE — PROGRESS NOTES
PT Evaluation     Today's date: 2025  Patient name: Leigh Ann Sarabia  : 1960  MRN: [id number]  Referring provider: Maddy Suárez C*  Dx:   Encounter Diagnosis     ICD-10-CM    1. Acute right-sided low back pain with right-sided sciatica  M54.41           Start Time: 1530  Stop Time: 1615  Total time in clinic (min): 45 minutes    Assessment  Impairments: abnormal coordination, abnormal muscle firing, abnormal muscle tone, abnormal or restricted ROM, abnormal movement, activity intolerance, impaired physical strength, pain with function, poor posture , participation limitations, activity limitations and endurance  Symptom irritability: moderate    Assessment details: Pt is a 64 y.o. female presenting to PT with c/o R sided low back pain that radiates into her anterior thigh and shin that is impacting her ability to lay flat and sleep. Pt's familiar sx's were reproduced in session with palpation to R SIJ, R piriformis, slump test, poor sitting posture, and repeated lumbar flexion testing. Upon examination, the pt demonstrated primary impairments including decreased hip extensor and abductor strength bilaterally, positive slump test, and tight quad on RLE which is consistent with lumbar radiculopathy. Pt will benefit from skilled PT to address above impairments and allow full return to PLOF. Pt was given a handout of HEP at end of session and was educated on HEP, exam findings, prognosis, and POC.     Understanding of Dx/Px/POC: good     Prognosis: good    Goals  STG  Pt will be independent with HEP in 2 weeks.  2. Pt will be able to sleep through the night only waking up <3 times because of back pain in 2 weeks.  3. Pt will be able to lay in supine with back pain </=2/10 in 3 weeks to improve sleep quality.    LTG  1. Pt will report at least 75% reduction in symptoms in 4 weeks to return to PLOF.  2. Pt will be able to return to pickleball at full capacity without back pain in 4 weeks.  3. Pt will be  able to lay on R side without low back or radiating pain in 5 weeks.  4. Pt will demonstrate negative slump test on RLE in 6 weeks.  5. Pt will demonstrate hip MMT strength >/= 4+/5 bilaterally without pain in 8 weeks.    Plan  Patient would benefit from: skilled physical therapy  Planned modality interventions: cryotherapy, electrical stimulation/Russian stimulation, TENS, neuromuscular electric stimulation, thermotherapy: hydrocollator packs, biofeedback, traction and unattended electrical stimulation    Planned therapy interventions: activity modification, ADL training, flexibility, functional ROM exercises, home exercise program, graded activity, graded exercise, joint mobilization, manual therapy, motor coordination training, neuromuscular re-education, patient/caregiver education, strengthening, stretching, therapeutic activities, therapeutic exercise, coordination, IASTM, nerve gliding, abdominal trunk stabilization, balance, postural training and gait training    Frequency: 1-2x week  Duration in weeks: 8  Treatment plan discussed with: patient  Plan details: Continue with POC as indicated above and progress as tolerated.        Subjective Evaluation    History of Present Illness  Mechanism of injury: Pt reports R sided LBP with symptoms radiating down into the leg. Her symptoms go from her SIJ down the front of her thigh to anterior shin. She denies n/t. Also notes previous tendinitis in top of her foot but saw podiatrist and it is better now. She had a virus on Memorial Day and was coughing a lot, then noticed the next day when getting out of bed pain in back and legs. She has been having less pain during the day, more at night. She puts ice on thigh and back which helps but sleep is disrupted. She typically sleeps on her side but cannot sleep on R side due to pain. It wakes her up at night. She has hx of LBP, last bout was a few months ago but this feels different. She tried icing and stretching which  "helps some. She typically plays pickleball about 2-3 days a week but has not been doing this since she is afraid of hurting her back more. She notes gaining 20-25 lbs in the past year due to changing meds for ulcerative colitis diagnosis. She is being seen by GI and PCP. She is getting an xray of lumbar spine tmrw. Previous xray is negative for fracture, shows degenerative changes (). She was prescribed Tramadol which she only uses if she has severe pain. Previously prednisone helped to reduce pain from 8/10 to 5/10 at worst. Aggravated by laying down, crossing R leg over L. She notes she has not been able to lay on her back for years due to chronic LBP. Sitting, standing, walking is okay. She also has hx of trochanteric bursitis but says the pain feels different. Denies changes in bowel and bladder.  Patient Goals  Patient goal: To reduce back pain, to sleep through the night, to return to playing pickleball without aggravating symptoms, sleep on R side without pain, lay on back without pain  Pain  Current pain ratin  At best pain ratin  At worst pain ratin  Quality: pulling and dull ache  Relieving factors: ice, medications and change in position    Treatments  Current treatment: medication        Objective     General Comments:      Lumbar Comments  Lumbar ROM-  Flexion: WNL \"feels like a good stretch\"  Extension: WNL  Rotation: WNL  Sidebending: WNL    Repeated motions-  Flex: causes pain in anterior shin; NWB sustained: no change  Ext: no change; NWB sustained: causes pain in low back    +TTP: R SIJ, R piriformis, R L3-5 UPAs    LQS: normal    Hip Strength MMTs-  Flex: 4+/5 B/L  Ext: 4-/5 B/L  Abd: 4-/5 B/L pain on R  ER: 5/5 B/L  IR: 5/5 B/L  Knee flex: 5/5 B/L  Knee ext: 5/5 B/L    PROM Hip ext and abd on R recreates pain into anterior thigh and groin  PROM Hip ER in supine recreates pain into anterior shin      Special tests-  SLR: negative B/L  Slump: positive on R, negative on L (feels pain " "in to anterior shin with slump position, centralizes with correction of posture  Femoral: negative B/L \"just tightness in quad\"  Quad tightness noted on RLE      Pt education: Pt educated on HEP, POC, exam results, prognosis.       Precautions: skin cancer, hx of LBP, long term corticosteroid use, osteopenia    *= indicates added to HEP  Manuals 6/9            R piriformis STM             R Lumbar UPAs             Manual R quad stretch             Assess 5xSTS, prone quad test                          Neuro Re-Ed             Slump nerve glides* r            Supine nerve glides             Clamshells                                                                 Ther Ex             Bike             Standing piriformis stretch* r            1/2 kneel quad stretch* r                         PPU             Leg press                                       Ther Activity                                       Gait Training                                       Modalities             Ice PRN                               "

## 2025-06-10 ENCOUNTER — APPOINTMENT (OUTPATIENT)
Dept: RADIOLOGY | Facility: MEDICAL CENTER | Age: 65
End: 2025-06-10
Payer: COMMERCIAL

## 2025-06-10 DIAGNOSIS — M54.41 ACUTE RIGHT-SIDED LOW BACK PAIN WITH RIGHT-SIDED SCIATICA: ICD-10-CM

## 2025-06-10 PROCEDURE — 72110 X-RAY EXAM L-2 SPINE 4/>VWS: CPT

## 2025-06-11 ENCOUNTER — RESULTS FOLLOW-UP (OUTPATIENT)
Dept: FAMILY MEDICINE CLINIC | Facility: CLINIC | Age: 65
End: 2025-06-11

## 2025-06-17 ENCOUNTER — APPOINTMENT (OUTPATIENT)
Dept: PHYSICAL THERAPY | Facility: CLINIC | Age: 65
End: 2025-06-17
Attending: NURSE PRACTITIONER
Payer: COMMERCIAL

## 2025-07-01 ENCOUNTER — OFFICE VISIT (OUTPATIENT)
Age: 65
End: 2025-07-01
Payer: COMMERCIAL

## 2025-07-01 VITALS
TEMPERATURE: 97 F | HEART RATE: 86 BPM | SYSTOLIC BLOOD PRESSURE: 104 MMHG | BODY MASS INDEX: 29.48 KG/M2 | HEIGHT: 61 IN | DIASTOLIC BLOOD PRESSURE: 70 MMHG | OXYGEN SATURATION: 97 %

## 2025-07-01 DIAGNOSIS — E55.9 VITAMIN D DEFICIENCY: ICD-10-CM

## 2025-07-01 DIAGNOSIS — D84.9 IMMUNOSUPPRESSION (HCC): ICD-10-CM

## 2025-07-01 DIAGNOSIS — E61.1 IRON DEFICIENCY: ICD-10-CM

## 2025-07-01 DIAGNOSIS — K51.911 ULCERATIVE COLITIS WITH RECTAL BLEEDING, UNSPECIFIED LOCATION (HCC): Primary | ICD-10-CM

## 2025-07-01 DIAGNOSIS — E53.8 VITAMIN B12 DEFICIENCY: ICD-10-CM

## 2025-07-01 PROCEDURE — 99214 OFFICE O/P EST MOD 30 MIN: CPT | Performed by: DIETITIAN, REGISTERED

## 2025-07-01 NOTE — PROGRESS NOTES
Gastroenterology Outpatient Follow-up - Ulcerative Colitis  Leigh Ann Sarabia 65 y.o. female MRN: [id number]  Encounter: [id number]    Leigh Ann Sarabia is a 65 y.o. female with Ulcerative colitis.    Symptom onset:  2017  Diagnosis:  ulcerative colitis  Year of diagnosis:  2017    IBD Summary: Leigh Ann Sarabia has has ulcerative pancolitis diagnosed in 2017.  She lost response to mesalamine.  She has been treated with vedolizumab but lost response again.  She then switched to ustekinumab but was a primary nonresponder.  Previously on infliximab with partial response despite dose escalation.  She has now been switched to upadacitinib.  She also has chronically elevated alkaline phosphatase and had positive AMA in 2017.  MRCP with suggestion of early PSC.  Her daughter has UC and PSC.    Ulcerative Colitis Summary  Macroscopic extent of diseases: pancolitis  Microscopic extent of diseases:  pancolitis  Has the patient ever been hospitalized for severe disease: No        Surgical History  Number of IBD surgeries: 0      First IBD surgery:    Most Recent IBD surgery:    Esophageal:  0    Gastroduodenal:  0    Small bowel resection(s):  0    Ileocolonic resection(s): unknown      Colonic resection(s):  0    Ileostomy or colostomy:  no previous ostomy    Complete colectomy:  No         Medications     Year last used Reason for discontinuation   Corticosteroids prior   2023       Thiopurines   never         Methotrexate   never         Infliximab   never         Adalimumab   never         Certolizumab   never         Golimumab   never         Natalizumab   never         Mesalamine prior     CARLOS     Sulfasalzine   never         Vedolizumab prior   2022 CARLOS     Ustekinumab   never         Tofacitinib   never         Other biologic   never             Extraintestinal Manifestations    IBD-associated arthropathy Yes    Uveitis Yes  iritis x 2    Oral aphthous ulcers No   Erythema nodosum No    Pyoderma gangrenosum No    Primary  sclerosing cholangitis Yes  MRCP 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 Daughter with PSC.    Thrombotic complications No          Cancer / Dysplasia History  History of IBD-associated dysplasia: none    Date of diagnosis (Year):     History of colorectal cancer: No   History of cervical dysplasia: No   History of skin cancer: squamous cell carcinoma         Laboratory Data   Most recent (date) Result   PPD   unknown   Quanitferon gold 11/7/2024 negative   TPMT 1/17/2023 normal   Hepatitis A       HBsAb 9/12/2022 negative   HBcAb 9/12/2022 negative   HBsAg 11/7/2024 negative   HCV Ab   unknown       Imaging / Diagnostic Procedures   Most recent (date) Findings   Colonoscopy or sigmoidoscopy #1 1/12/2023            Ulcers/Erosions  small           Strictures  none           Stricture Severity  N/A           Endoscopic Score Fregoso Score:  2 Rutgeert's:  N/A            Findings  (1) Normal TI.  (2) Moderate edematous, erythematous, friable and hemorrhagic mucosa with erosion and loss of vascular pattern in the cecum, ascending colon, hepatic flexure, transverse colon, splenic flexure, descending colon, sigmoid colon, rectosigmoid and rectum, consistent with ulcerative colitis.           Pathology   A. Colon, Cecum, Biopsy:  - Mildly active chronic colitis.  - Negative for granulomata, viral changes, and dysplasia.  B. Small Intestine, Terminal Ileum, Biopsy:  - Small intestinal mucosa with no specific pathologic change.  C. Colon, Ascending, Biopsy:  - Mildly active chronic colitis.  - Negative for granulomata, viral changes, and dysplasia.  D. Colon, Transverse, Biopsy:  - Mildly active chronic colitis.  - Negative for granulomata, viral changes, and dysplasia.  E. Colon, Descending, Biopsy:  - Mildly active chronic colitis.  - Negative for granulomata, viral changes, and dysplasia.  F. Colon, Sigmoid, Biopsy:  - Superficial fragments of colonic mucosa with mild acute colitis.  G. Colon, Rectum, Biopsy:  -  Mildly active chronic colitis.  - Negative for granulomata, viral changes, and dysplasia.   Colonoscopy or sigmoidoscopy #2              Ulcers/Erosions                 Strictures                 Stricture Severity              Endoscopic Score Fregoso Score:    Rugeert's:              Findings              Pathology      EGD       Small bowel follow-through       CT enterography       CT without enterography       MRI enterography       Capsule study       DEXA scan   Lowest Z-score:      CXR (for TB)           HPI:   Interval History:  7/1/2025 Follow up  Leigh Ann is now on maintenance dosing of upadacitinib, 30 mg daily.  She has 5-6 loose BMs daily.  She has some urgency in the morning, but then this resolves for the rest of the day.  She denies any blood in the stool or abdominal pain.  If she takes ibuprofen for joint pains, she will have recurrence of rectal bleeding, but this will then resolve.  She has noticed weight gain in the last year of about 30 pounds.  She also reports significant fatigue that has been bothersome.  However, she is still able to do things she likes to do, such as pickleball.  She is taking iron and vitamin B12 daily.    Labs March and April 2025 reviewed.  Hepatic function panel normal.  Iron panel normal other than ferritin 26.  Vitamin B12 low-normal, 243.  Vitamin D normal, 34.  AMA negative.    3/14/2025 Follow up  Leigh Ann continues to feel very well on upadacitinib. She completed 12 weeks of 45 mg and switched to 30 mg 2 weeks ago. Has gained weight. Feels 90% better. Still has urgency 1st thing in the morning but not during the day.  Still has 4 BMs per day.  Had had URI and GI infection and held Rinvoq for 2 days.  Got Shingrix x1, needs 2nd dose.  Also needs Prevnar.  Sees Derm annually: had squamous cell.  Last pap smear was in 2024.  Taking vitamin D.     Calprotectin on 2/28/2025 had normalized.  LFTs from 1/7/2025 also normal with ALT 25, AST 29, bilirubin 0.52, alk phos 100.   Creatinine also normal at 0.62.    12/3/03360 Follow up with Jason Coronel  Patient previously had ongoing symptoms on infliximab with fecal calprotectin elevation to 1300.  She was therefore switched to upadacitinib.  Patient has now been on upadacitinib 45 mg daily for the last week or so and is already feeling significantly better.  She previously had 5-7 BMs/day, typically [place] #5-6.  Currently, she is having 3-4 BMs/day, mostly [place] #4.  She is seeing much less blood in the stool.  Fecal urgency is also improving.  She got her first Shingrix vaccine and is aware to have the next dose in about 2 months.  She continues to feel fatigued/lower energy than she would like.  She does deal with some depression/anxiety and currently manages this through her social support system and staying active (she enjoys pickleball).        9/3/2024 Follow up  Leigh Ann remains on infliximab 15 mg/kg every 4 weeks.  The dose has been increased based on therapeutic drug monitoring and response to therapy.  Her trough was 18 mcg/mL on 5/6/2024.  We did repeat a colonoscopy in April, and I felt that there was improvement compared to the procedure area earlier.  She feels that the medication is working partially.  Calprotectin on 8/27/2024 was 643, down from 958 two months earlier.  She continues to have frequent loose stools with urgency, although her symptoms are not as bad as they used to be.  She also stopped taking ibuprofen and noticed significant decrease in bleeding, although she was only taking 200 mcg daily for joint pain.  She is not on steroids currently.  She also followed up with Dr. Alvarado regarding elevated LFTs and borderline AMA.  Dr. Alvarado felt that she likely has PBC which was being partially treated with budesonide.  She has since tapered off budesonide.  MRCP did not show evidence of PSC.  She gets annual dermatology visits and Pap smears.  She takes calcium and occasional vitamin D.  She has osteopenia based on  DEXA from 2022.  We have discussed switching to upadacitinib, but she is nervous to stop infliximab, which she feels is working partially. LFTs from 8/22/2024: AMA 20.6, AST 73, ALT 73, AP 91, Tb 0.64.    4/17/2024 Follow up  Since last visit, we increased infliximab to 15 mg/kg every 4 weeks based on trough of 6.7 ug/ml with 10 mg/kg every 4 week dosing.  She has tapered off budesonide.  Despite the higher dose, she continues to have 6 loose BMs per day.  Feels that symptoms have worsened since coming off budesonide.  Feels that she is not in clinical remission and her disease remains active.  No abdominal pain.  Has gained weight.  Feels very tired.  Her last infliximab infusion was 1 week ago.    Calprotectin in March was 973.  Infectious stool tests were negative.  CBC in January was normal with Hgb 12.8, MCV 92.  Vitamin D was normal.  Ferritin was 10.  She is taking iron supplements.      1/9/2024 Follow up  Since last visit, she has remained on infliximab 10 mg every 4 weeks.  Trough level was only 6.7 mcg/mL, no antibodies.  She had COVID and had to delay her infusion.  She is also on Uceris 9 mg every other day.  Feels ok.  Still has 4-5 BMs per day, sometimes sees blood, urgency has improved, energy is ok.  However, still does not feel like she is 100%.   Her calprotectin was 2360 before we started Uceris; we have not rechecked.  Still feels fatigued.  Has gained weight. Overdue for pap smear; did see dermatologist.  Labs from 11/4/2023 show infliximab trough of 6.7 ug/ml without antibodies.  CRP 2.8, ESR 30, CBC with normal hemoglobin 12.9, MCV 93, ferritin low at 11, CMP with normal LFTs and kidney function.  RF negative, cyclic citrullinatd peptide antibody normal, TIFFANIE positive.    10/13/2020 Follow up  She is not doing well since her last visit.  She remains on infliximab (Avsola) 10 mg every 4 weeks.  Last infusion was on 10/9/2023. However, she reports starting to feel fatigued approximately 90 days  ago.  This coincided with seeing more blood in the stool, having increased urgency, lightheadedness, low energy.  No abdominal pain.  Calprotectin on 10/6/2023 was 2360, which was 3 days prior to her infusion.  She says that her stool frequency has slightly improved since the last infliximab dose.  C. diff was negative.  She had 1 visit to the ER because of lightheadedness and hypotension, but Hgb was 12.5 and BP there was normal.  This delayed infliximab by 4 days.  She saw Rheumatology for seronegative arthropathy and started an anti-inflammatory diet.  Joint pain is much improved.  Also avoiding gluten for the past 3 years.   Thinks she has yeast infection. Having bad GERD currently.  She has history of SCC.  She had iron infusions in June.      8/22/2023 Follow up  Since last visit, we increased infliximab based on subtherapeutic drug level and she is now on 10 mg/kg every 4 weeks.  She has had 3 monthly doses of high dose infliximab.  Feels like this has helped and she is slowly improving.  Calprotectin improved from 11 100-543.  Still has 3-4 BMs per day with occasional blood, but much improved from before.  No longer on steroids.  No pain.  Occasional incontinence when she has anxiety.   Labs from August showed mild ALT and AST elevation.  Has brain fog and fatigue after infusions; no fevers.  Still needs Prevnar.  Will get flu shot.  Completed 4 IV iron infusions and felt much improved.  Needs GYN appointment.  Saw dermatology.      4/19/2023 Return  Since last visit, started infliximab (Avsola) and has had 3 loading doses.  3rd dose was on 4/10.  Feels like BMs have improved, but are still inconsistent.  No abdominal pain. Occasional urgency.  Calprotectin 372 but trending down.  No bleeding for the past 2 weeks.  Energy is poor.  She cannot tolerate PO iron.  Gets SOB with exertion.  Poor exercise tolerance.  She feels dizzy on occasion since startign avsola. She is still on prednisone but down to 5 mg  daily.  Has been on it since July.  Seeing Derm in June for h/o SCC.  Overdue for Pap.  She may have PSC based on chronic Ap elevation and recent MRCP, although last AP was normal.     1/26/2023 Return visit  Leigh Ann Sarabia is establishing care with me for UC.  Diagnosed with UC in 2017 - diarrhea and bleeding.  She was treated with mesalamine (Lialda) and did well for a couple of years. In 2020, flared and started vedolizumab but she lost response after a few months.  Ustekinumab was increased to every 6 weeks, but did not work. She then started ustekinumab 10/2022. However, has been on anf off prednisone for 5 months, currently on 10 mg.  Getting Stelara every 8 weeks.  Some days are good with formed stools, other days with loose BMs and bleeding. Colonoscopy this month with Fregoso 2 disease.  She has had COVID vaccines + booster and flu vaccine. No pneumococcal vaccine.  Last pap smear 1.5 years.  SCC removed ~2018 from chest.  Last derm ~ 6/2022.    Has osteopenia.  H/o iritis.  Of note, her daughter his UC and PSC.  Patient has chronic alkaline phosphatase elevation and had positive AMA in 2017.  Recent MRCP with mild intrahepatic biliary dilatation but no other signs of PSC.    12/19/22  First treated with lialda for about 2 years.    First biologic was entyvio which started in October of 2021 but in February of 2022 symptoms worsened, and was treated with prednisone with symptoms abating but as she was titrating down her prednisone symptoms worsened  She is currently on the autoimmune protocol diet since July of 2022  In the past week and a half- reporting normal BM's; formed, 1-2 times daily, without bleeding.  Gluten free and antiinflammatory diet - good for joints.  Had iritis x 2 in 2022.  Using steroid eye drops.      Leigh Ann reports the following symptoms over the last 7 days:    Stool Frequency 3-4 stools/day more than normal   Average stools per day: 5   Average liquid stools per day: 5   Consistency of  "bowel: soft or semi-formed   Awakening from sleep to move bowels? No   Urgency no fecal urgency   Visible blood in stool? none   Abdominal pain? none   General Wellbeing? generally well     Leigh Ann also reports the following symptoms in the last month:    Leakage of stool while sleeping? No   Leakage of stool while awake? No       REVIEW OF SYSTEMS:  During the last 7 days, Leigh Ann experienced the following symptoms:  Unintentional Weight Loss (in the last month) No   Fever No   Eye irritation No   Mouth sores No   Sore throat No   Chest pain No   Shortness of breath Yes   Numbness or tingling in hands or feet No   Skin rash No   Pain or swelling in joints No   Bruising or bleeding No   Felt depressed or blue No   Fatigue Yes   Dysuria No   Please see HPI for additional pertinent review of systems; otherwise remainder of ROS was unremarkable    MEDICATIONS:  Current Medications[1]    ALLERGIES:  Allergies[2]    OBJECTIVE:  /70 (BP Location: Right arm, Patient Position: Sitting, Cuff Size: Adult)   Pulse 86   Temp (!) 97 °F (36.1 °C) (Tympanic)   Ht 5' 1\" (1.549 m)   SpO2 97%   BMI 29.48 kg/m²      PHYSICAL EXAM:    General Appearance:   Alert, cooperative, no distress   HEENT:   Normocephalic, atraumatic, anicteric.     Neck:  Supple, symmetrical, trachea midline   Lungs:   Respirations unlabored    Extremities:  No cyanosis, clubbing or edema    Pulses:  2+ and symmetric    Skin:  No jaundice, rashes, or lesions      Lab Results   Component Value Date    WBC 5.33 01/07/2025    HGB 13.6 01/07/2025    HCT 42.5 01/07/2025    MCV 95 01/07/2025     01/07/2025     Lab Results   Component Value Date     06/10/2014    SODIUM 139 01/07/2025    K 4.0 01/07/2025     01/07/2025    CO2 31 01/07/2025    ANIONGAP 8 06/10/2014    AGAP 4 01/07/2025    BUN 15 01/07/2025    CREATININE 0.62 01/07/2025    GLUC 98 10/28/2024    GLUF 90 01/07/2025    CALCIUM 9.0 01/07/2025    AST 24 03/24/2025    ALT 19 " 03/24/2025    ALKPHOS 92 03/24/2025    PROT 7.0 06/10/2014    TP 7.1 03/24/2025    BILITOT 0.4 06/10/2014    TBILI 0.67 03/24/2025    EGFR 96 01/07/2025     Lab Results   Component Value Date    CRP <1.0 12/06/2024     Lab Results   Component Value Date    SGVBPSIQ66 243 03/24/2025     Lab Results   Component Value Date    FERRITIN 26 (L) 04/21/2025       ASSESSMENT AND PLAN:    Leigh Ann has a history of macroscopic pancolitis and microscopic pancolitis ulcerative colitis diagnosed in 2017. Current medical therapy is with upadacitinib. My global assessment is that the clinical disease is currently mildy active.     The 6-point Fregoso score was 2 and the 9-point Fregoso score was 3.  The short CDAI was 114.      Leigh Ann has ulcerative pancolitis diagnosed in 2017. She was treated with mesalamine, vedolizumab, ustekinumab, and infliximab with either PNR or CARLOS.  I felt that she was responding to infliximab and we escalated the dose all the way up to 15 mg/kg every 4 weeks based on therapeutic drug monitoring.  However, she never fully improved and calprotectin remained very elevated.  She also had ongoing active disease on her last colonoscopy from 4/2024.  She is now on upadacitinib and had an excellent response within 24 hours.  She is currently on 30 mg and feeling well overall.  She has ongoing frequency with bowel habits, 5-6 loose BMs daily, and some urgency first thing in the morning.  She struggles with chronic fatigue.  Has also noticed weight gain of about 30 lb in the last year.    She also has history of elevated LFTs, specifically alkaline phosphatase, but this has normalized.  AST and ALT were previously elevated as well, but this has also normalized.  Recent AMA normal as well.    1.  Continue upadacitinib 30 mg daily.  We discussed that recent weight gain is likely in the setting of healing of previous colitis and absorption of nutrients she consumes.  I recommend following a healthy diet and exercising  regularly for weight management.  2.  Repeat fecal calprotectin.  3.  Continue vitamin B12 and iron supplements.  We discussed getting patient set up for IV iron infusions due to ongoing fatigue, but she would like to continue with oral iron supplements at this time.  4.  Follow-up repeat labs including CBC, CMP, vitamin B12, vitamin D, and iron as ordered by her PCP.  6. Annual flu and COVID vaccines.  Also recommend Prevnar 20.  7. Annual dermatology exam.  8. Annual Pap smear.    Return in 4 months.      Health Maintenance Recommendations:  Vaccines & Infections  COVID-19 vaccination and boosters are recommended. There is no evidence that the COVID-19 vaccine would cause an IBD flare.  Avoid live vaccines if on immunosuppressive therapy.  Yearly influenza vaccine (flu shot).  Pneumonia vaccines for patients on immunosuppression. These include Prevnar 20, followed by Pneumovax 23 at least 8 weeks later.  Shingrix vaccine (series of 2 injections) for al patients 65 and older. Patients on tofacitinib or upadacitinib should be vaccinated regardless of age.  If not immune to measles mumps or rubella, MMR vaccine is recommended. However, this is a live vaccine and should be given prior to immunosuppressive therapy.  HPV vaccination as per national guidelines.  Hepatitis A and B vaccinations if not previously vaccinated.  Testing for tuberculosis with QuantiFERON Gold blood test and/or chest xray prior to starting immunosuppressive medications, and then annually    Cancer screening  Dysplasia surveillance for colorectal cancer. Colonoscopy in all patients with extensive colitis (more than 1/3 of the colon involved) who had disease for at least 8 or more years.  Repeat colonoscopy approximately every 12-24 months.  In patients with concurrent primary sclerosing cholangitis, history of dysplasia, or family history of colon cancer, repeat colonoscopy annually.    Females: Pap smear annually for woman on  immunosuppression.  Annual dermatologic/skin exam in all patients with IBD, especially those on immunosuppression with thiopurines or CEDRICK inhibitors.    Miscellaneous  DEXA scan, once off steroids for 3 months  Depression screening recommended annually  Routine dental and ophthalmology examinations    Problem List Items Addressed This Visit        Digestive    Ulcerative colitis (HCC) - Primary       Other    Vitamin B12 deficiency    Vitamin D deficiency   Other Visit Diagnoses       Immunosuppression (HCC)          Iron deficiency              Jason Coronel PA-C          [1]    Current Outpatient Medications:   •  acetaminophen (TYLENOL) 500 mg tablet  •  ALPRAZolam (XANAX) 0.25 mg tablet  •  calcium carbonate (TUMS) 500 mg chewable tablet  •  Cholecalciferol (VITAMIN D PO)  •  fluticasone (FLONASE) 50 mcg/act nasal spray  •  ibuprofen (MOTRIN) 200 mg tablet  •  Timolol Maleate, Once-Daily, 0.5 % SOLN  •  traMADol (Ultram) 50 mg tablet  •  Upadacitinib ER (Rinvoq) 30 MG TB24  •  vitamin B-12 (VITAMIN B-12) 1,000 mcg tablet  •  Calcium Carb-Cholecalciferol 600-20 MG-MCG TABS  •  cyclobenzaprine (FLEXERIL) 5 mg tablet  •  predniSONE 10 mg tablet  •  tretinoin (RETIN-A) 0.025 % cream[2]  Allergies  Allergen Reactions   • Amoxicillin GI Intolerance   • Neosporin [Bacitracin-Polymyxin B] Itching     Red, swalling.

## 2025-07-01 NOTE — PATIENT INSTRUCTIONS
1. Complete stool testing for fecal calprotectin now.  2. In August or September, complete blood work.

## 2025-07-16 ENCOUNTER — APPOINTMENT (OUTPATIENT)
Dept: LAB | Facility: CLINIC | Age: 65
End: 2025-07-16
Payer: COMMERCIAL

## 2025-07-16 DIAGNOSIS — K51.911 ULCERATIVE COLITIS WITH RECTAL BLEEDING, UNSPECIFIED LOCATION (HCC): ICD-10-CM

## 2025-07-16 DIAGNOSIS — K51.911 ULCERATIVE COLITIS WITH RECTAL BLEEDING, UNSPECIFIED LOCATION (HCC): Primary | ICD-10-CM

## 2025-07-16 DIAGNOSIS — E61.1 IRON DEFICIENCY: ICD-10-CM

## 2025-07-16 PROCEDURE — 83993 ASSAY FOR CALPROTECTIN FECAL: CPT

## 2025-07-18 LAB — CALPROTECTIN STL-MCNC: 49.7 ÂΜG/G

## 2025-07-19 ENCOUNTER — APPOINTMENT (OUTPATIENT)
Dept: LAB | Facility: MEDICAL CENTER | Age: 65
End: 2025-07-19
Payer: COMMERCIAL

## 2025-07-19 DIAGNOSIS — K51.911 ULCERATIVE COLITIS WITH RECTAL BLEEDING, UNSPECIFIED LOCATION (HCC): ICD-10-CM

## 2025-07-19 DIAGNOSIS — E61.1 IRON DEFICIENCY: ICD-10-CM

## 2025-07-19 LAB
25(OH)D3 SERPL-MCNC: 32.9 NG/ML (ref 30–100)
ALBUMIN SERPL BCG-MCNC: 4 G/DL (ref 3.5–5)
ALP SERPL-CCNC: 73 U/L (ref 34–104)
ALT SERPL W P-5'-P-CCNC: 19 U/L (ref 7–52)
ANION GAP SERPL CALCULATED.3IONS-SCNC: 8 MMOL/L (ref 4–13)
AST SERPL W P-5'-P-CCNC: 25 U/L (ref 13–39)
BILIRUB SERPL-MCNC: 0.53 MG/DL (ref 0.2–1)
BUN SERPL-MCNC: 16 MG/DL (ref 5–25)
CALCIUM SERPL-MCNC: 9.1 MG/DL (ref 8.4–10.2)
CHLORIDE SERPL-SCNC: 105 MMOL/L (ref 96–108)
CO2 SERPL-SCNC: 27 MMOL/L (ref 21–32)
CREAT SERPL-MCNC: 0.68 MG/DL (ref 0.6–1.3)
CRP SERPL QL: <1 MG/L
ERYTHROCYTE [DISTWIDTH] IN BLOOD BY AUTOMATED COUNT: 13.7 % (ref 11.6–15.1)
FERRITIN SERPL-MCNC: 18 NG/ML (ref 30–307)
GFR SERPL CREATININE-BSD FRML MDRD: 93 ML/MIN/1.73SQ M
GLUCOSE P FAST SERPL-MCNC: 99 MG/DL (ref 65–99)
HCT VFR BLD AUTO: 30.2 % (ref 34.8–46.1)
HGB BLD-MCNC: 10 G/DL (ref 11.5–15.4)
IRON SATN MFR SERPL: 15 % (ref 15–50)
IRON SERPL-MCNC: 68 UG/DL (ref 50–212)
MCH RBC QN AUTO: 32.6 PG (ref 26.8–34.3)
MCHC RBC AUTO-ENTMCNC: 33.1 G/DL (ref 31.4–37.4)
MCV RBC AUTO: 98 FL (ref 82–98)
PLATELET # BLD AUTO: 265 THOUSANDS/UL (ref 149–390)
PMV BLD AUTO: 8.8 FL (ref 8.9–12.7)
POTASSIUM SERPL-SCNC: 4.3 MMOL/L (ref 3.5–5.3)
PROT SERPL-MCNC: 6.8 G/DL (ref 6.4–8.4)
RBC # BLD AUTO: 3.07 MILLION/UL (ref 3.81–5.12)
SODIUM SERPL-SCNC: 140 MMOL/L (ref 135–147)
TIBC SERPL-MCNC: 439.6 UG/DL (ref 250–450)
TRANSFERRIN SERPL-MCNC: 314 MG/DL (ref 203–362)
UIBC SERPL-MCNC: 372 UG/DL (ref 155–355)
VIT B12 SERPL-MCNC: 459 PG/ML (ref 180–914)
WBC # BLD AUTO: 5.01 THOUSAND/UL (ref 4.31–10.16)

## 2025-07-19 PROCEDURE — 83550 IRON BINDING TEST: CPT

## 2025-07-19 PROCEDURE — 85027 COMPLETE CBC AUTOMATED: CPT

## 2025-07-19 PROCEDURE — 80053 COMPREHEN METABOLIC PANEL: CPT

## 2025-07-19 PROCEDURE — 82607 VITAMIN B-12: CPT

## 2025-07-19 PROCEDURE — 36415 COLL VENOUS BLD VENIPUNCTURE: CPT

## 2025-07-19 PROCEDURE — 82306 VITAMIN D 25 HYDROXY: CPT

## 2025-07-19 PROCEDURE — 83540 ASSAY OF IRON: CPT

## 2025-07-19 PROCEDURE — 82728 ASSAY OF FERRITIN: CPT

## 2025-07-19 PROCEDURE — 86140 C-REACTIVE PROTEIN: CPT

## 2025-07-21 ENCOUNTER — RESULTS FOLLOW-UP (OUTPATIENT)
Age: 65
End: 2025-07-21

## 2025-07-21 NOTE — TELEPHONE ENCOUNTER
Pt calling to speak w/Keysha. Pt has been having very frequent Bms, would like to know why since the the calprotectin came back negative. Red blood call count has dropped, thinks it may possibly be due to Rinvoq is open to iron fusions suggested by Jason. Pt not available between 1-2PM has an apt, please call anytime outside of that.

## 2025-07-21 NOTE — TELEPHONE ENCOUNTER
"Connected with the patient via phone. Patient is onboard with IV Iron. Patient has been on Rinvoq. She once had a bm 3-4x daily but they are now at 7-9x daily semi-soft \"blobby\" stools. She has occasional abdominal cramping with bms. She describes taking oral Iron 18mg for sometime then developing black stool later in her treatment she has since stopped oral iron and stool returned to normal color in 2 days. Denies: blood MO and pain.    Patient is requesting Dr. Martinez's advice: She is asking why her Calpro isn't elevated although she has increased stools. She is also inquiring about always having normal RBC but now that changing and if that could be a side effect of Rinvoq (along with Anemia).  "

## 2025-07-22 ENCOUNTER — PREP FOR PROCEDURE (OUTPATIENT)
Age: 65
End: 2025-07-22

## 2025-07-22 ENCOUNTER — TELEPHONE (OUTPATIENT)
Dept: GASTROENTEROLOGY | Facility: CLINIC | Age: 65
End: 2025-07-22

## 2025-07-22 ENCOUNTER — TELEPHONE (OUTPATIENT)
Age: 65
End: 2025-07-22

## 2025-07-22 DIAGNOSIS — E61.1 IRON DEFICIENCY: ICD-10-CM

## 2025-07-22 DIAGNOSIS — K92.1 MELENA: ICD-10-CM

## 2025-07-22 DIAGNOSIS — R53.83 OTHER FATIGUE: ICD-10-CM

## 2025-07-22 DIAGNOSIS — K51.911 ULCERATIVE COLITIS WITH RECTAL BLEEDING, UNSPECIFIED LOCATION (HCC): Primary | ICD-10-CM

## 2025-07-22 RX ORDER — SODIUM CHLORIDE, SODIUM LACTATE, POTASSIUM CHLORIDE, CALCIUM CHLORIDE 600; 310; 30; 20 MG/100ML; MG/100ML; MG/100ML; MG/100ML
125 INJECTION, SOLUTION INTRAVENOUS CONTINUOUS
Status: CANCELLED | OUTPATIENT
Start: 2025-07-22

## 2025-07-22 NOTE — TELEPHONE ENCOUNTER
inge Coronel PA-C to Leigh Ann Sarabia        7/21/25 11:54 AM  Naveed Beltrán,     Your blood work shows that unfortunately iron levels haven't improved with iron supplements, and you also have anemia now with low blood count (hemoglobin 10).  I know you wanted to try to avoid IV iron infusions, but with these results, would you be OK with getting set up for a few appointments?  This would be 4 infusions, to be done once weekly.     Sincerely,  Jason    Last read by Leigh Ann Sarabia at 1:26PM on 7/21/2025.

## 2025-07-22 NOTE — TELEPHONE ENCOUNTER
Patient calling, very anxious. Says Dr. Martinez sent her a message (which I Don't see) But did not answer her questions.  She is very worried about taking Rinvoq due to the side affects she is experiencing.  Please return her call asap to discuss/Advise. Told her someone would call even before providers input.  [phone]

## 2025-07-23 NOTE — TELEPHONE ENCOUNTER
Spoke with patient she agree to do her procedure this coming Friday with dr reynolds [facility]. Josue approved through secure chat send a email to financial team. And send prep instructions in MyChart to patient

## 2025-07-23 NOTE — TELEPHONE ENCOUNTER
I spoke with Leigh Ann. She feels fatigued similar to prior episodes of iron deficiency. Very worried about anemia and asking if it is due to upadacitinib.     Having 4-5 formed BMs per day with normal calprotectin.    Explained that bone marrow suppression is possible with upadacitinib, but suspect iron deficiency anemia in her case.  She also reports several days of black stools, now back to normal.      No leg swelling.    Schedule IV iron infusions.  Schedule EGD and colonoscopy.  Stay on upadacitinib 30 mg for now, but we may decrease the dose to 15 mg.  Check TSH given fatigue.

## 2025-07-25 ENCOUNTER — ANESTHESIA EVENT (OUTPATIENT)
Dept: GASTROENTEROLOGY | Facility: HOSPITAL | Age: 65
End: 2025-07-25
Payer: COMMERCIAL

## 2025-07-25 ENCOUNTER — ANESTHESIA (OUTPATIENT)
Dept: GASTROENTEROLOGY | Facility: HOSPITAL | Age: 65
End: 2025-07-25
Payer: COMMERCIAL

## 2025-07-25 ENCOUNTER — HOSPITAL ENCOUNTER (OUTPATIENT)
Dept: GASTROENTEROLOGY | Facility: HOSPITAL | Age: 65
Setting detail: OUTPATIENT SURGERY
Discharge: HOME | End: 2025-07-25
Attending: INTERNAL MEDICINE
Payer: COMMERCIAL

## 2025-07-25 VITALS
OXYGEN SATURATION: 99 % | TEMPERATURE: 97 F | HEART RATE: 74 BPM | RESPIRATION RATE: 16 BRPM | SYSTOLIC BLOOD PRESSURE: 106 MMHG | DIASTOLIC BLOOD PRESSURE: 58 MMHG

## 2025-07-25 DIAGNOSIS — K92.1 MELENA: ICD-10-CM

## 2025-07-25 DIAGNOSIS — K51.911 ULCERATIVE COLITIS WITH RECTAL BLEEDING, UNSPECIFIED LOCATION (HCC): ICD-10-CM

## 2025-07-25 DIAGNOSIS — E61.1 IRON DEFICIENCY: ICD-10-CM

## 2025-07-25 PROCEDURE — 43255 EGD CONTROL BLEEDING ANY: CPT | Performed by: INTERNAL MEDICINE

## 2025-07-25 PROCEDURE — 45380 COLONOSCOPY AND BIOPSY: CPT | Performed by: INTERNAL MEDICINE

## 2025-07-25 PROCEDURE — C1886 CATHETER, ABLATION: HCPCS

## 2025-07-25 PROCEDURE — 45385 COLONOSCOPY W/LESION REMOVAL: CPT | Performed by: INTERNAL MEDICINE

## 2025-07-25 PROCEDURE — 43239 EGD BIOPSY SINGLE/MULTIPLE: CPT | Performed by: INTERNAL MEDICINE

## 2025-07-25 PROCEDURE — 88305 TISSUE EXAM BY PATHOLOGIST: CPT | Performed by: PATHOLOGY

## 2025-07-25 RX ORDER — SODIUM CHLORIDE, SODIUM LACTATE, POTASSIUM CHLORIDE, CALCIUM CHLORIDE 600; 310; 30; 20 MG/100ML; MG/100ML; MG/100ML; MG/100ML
125 INJECTION, SOLUTION INTRAVENOUS CONTINUOUS
Status: DISCONTINUED | OUTPATIENT
Start: 2025-07-25 | End: 2025-07-29 | Stop reason: HOSPADM

## 2025-07-25 RX ORDER — PROPOFOL 10 MG/ML
INJECTION, EMULSION INTRAVENOUS AS NEEDED
Status: DISCONTINUED | OUTPATIENT
Start: 2025-07-25 | End: 2025-07-25

## 2025-07-25 RX ORDER — SODIUM CHLORIDE, SODIUM LACTATE, POTASSIUM CHLORIDE, CALCIUM CHLORIDE 600; 310; 30; 20 MG/100ML; MG/100ML; MG/100ML; MG/100ML
INJECTION, SOLUTION INTRAVENOUS CONTINUOUS PRN
Status: DISCONTINUED | OUTPATIENT
Start: 2025-07-25 | End: 2025-07-25

## 2025-07-25 RX ORDER — LIDOCAINE HYDROCHLORIDE 20 MG/ML
INJECTION, SOLUTION EPIDURAL; INFILTRATION; INTRACAUDAL; PERINEURAL AS NEEDED
Status: DISCONTINUED | OUTPATIENT
Start: 2025-07-25 | End: 2025-07-25

## 2025-07-25 RX ADMIN — PROPOFOL 50 MG: 10 INJECTION, EMULSION INTRAVENOUS at 15:02

## 2025-07-25 RX ADMIN — PROPOFOL 40 MG: 10 INJECTION, EMULSION INTRAVENOUS at 15:08

## 2025-07-25 RX ADMIN — PROPOFOL 20 MG: 10 INJECTION, EMULSION INTRAVENOUS at 14:59

## 2025-07-25 RX ADMIN — PROPOFOL 50 MG: 10 INJECTION, EMULSION INTRAVENOUS at 15:20

## 2025-07-25 RX ADMIN — PROPOFOL 30 MG: 10 INJECTION, EMULSION INTRAVENOUS at 15:24

## 2025-07-25 RX ADMIN — SODIUM CHLORIDE, SODIUM LACTATE, POTASSIUM CHLORIDE, AND CALCIUM CHLORIDE 125 ML/HR: .6; .31; .03; .02 INJECTION, SOLUTION INTRAVENOUS at 13:23

## 2025-07-25 RX ADMIN — PROPOFOL 100 MG: 10 INJECTION, EMULSION INTRAVENOUS at 14:56

## 2025-07-25 RX ADMIN — PROPOFOL 50 MG: 10 INJECTION, EMULSION INTRAVENOUS at 15:13

## 2025-07-25 RX ADMIN — SODIUM CHLORIDE, SODIUM LACTATE, POTASSIUM CHLORIDE, AND CALCIUM CHLORIDE: .6; .31; .03; .02 INJECTION, SOLUTION INTRAVENOUS at 14:47

## 2025-07-25 RX ADMIN — PROPOFOL 40 MG: 10 INJECTION, EMULSION INTRAVENOUS at 15:11

## 2025-07-25 RX ADMIN — PROPOFOL 20 MG: 10 INJECTION, EMULSION INTRAVENOUS at 15:05

## 2025-07-25 RX ADMIN — LIDOCAINE HYDROCHLORIDE 100 MG: 20 INJECTION, SOLUTION EPIDURAL; INFILTRATION; INTRACAUDAL at 14:56

## 2025-07-25 RX ADMIN — PROPOFOL 50 MG: 10 INJECTION, EMULSION INTRAVENOUS at 15:28

## 2025-07-25 RX ADMIN — PROPOFOL 30 MG: 10 INJECTION, EMULSION INTRAVENOUS at 15:07

## 2025-07-25 RX ADMIN — PROPOFOL 20 MG: 10 INJECTION, EMULSION INTRAVENOUS at 14:58

## 2025-07-25 RX ADMIN — PROPOFOL 30 MG: 10 INJECTION, EMULSION INTRAVENOUS at 15:03

## 2025-07-25 RX ADMIN — PROPOFOL 20 MG: 10 INJECTION, EMULSION INTRAVENOUS at 15:31

## 2025-07-25 NOTE — ANESTHESIA PREPROCEDURE EVALUATION
Procedure:  COLONOSCOPY  EGD    Relevant Problems   GI/HEPATIC   (+) Gastroesophageal reflux disease      MUSCULOSKELETAL   (+) Primary generalized (osteo)arthritis      NEURO/PSYCH   (+) Anxiety      FEN/Gastrointestinal   (+) Ulcerative colitis (HCC)      Confirmed NPO appropriate  (-) URI    Physical Exam    Airway     Mallampati score: I  TM Distance: >3 FB  Neck ROM: full  Mouth opening: >= 4 cm      Cardiovascular  Rhythm: regular, Rate: normal    Dental   No notable dental hx     Pulmonary   Breath sounds clear to auscultation    Neurological    She appears awake, alert and oriented x3.      Other Findings  post-pubertal.      Anesthesia Plan  ASA Score- 2     Anesthesia Type- IV sedation with anesthesia with ASA Monitors.         Additional Monitors:     Airway Plan: natural airway.           Plan Factors-    Chart reviewed.    Patient summary reviewed.                  Induction- intravenous.    Postoperative Plan- .   Monitoring Plan - Monitoring plan - standard ASA monitoring      Perioperative Resuscitation Plan - Level 1 - Full Code.       Informed Consent- Anesthetic plan and risks discussed with patient.  I personally reviewed this patient with the CRNA. Discussed and agreed on the Anesthesia Plan with the CRNA..      NPO Status:  Vitals Value Taken Time   Date of last liquid 07/25/25 07/25/25 13:04   Time of last liquid 0800 07/25/25 13:04   Date of last solid 07/23/25 07/25/25 13:04   Time of last solid 2000 07/25/25 13:04

## 2025-07-25 NOTE — ANESTHESIA POSTPROCEDURE EVALUATION
Post-Op Assessment Note    CV Status:  Stable    Pain management: adequate       Mental Status:  Alert and awake   Hydration Status:  Euvolemic   PONV Controlled:  Controlled   Airway Patency:  Patent  Two or more mitigation strategies used for obstructive sleep apnea   Post Op Vitals Reviewed: Yes    No anethesia notable event occurred.    Staff: Anesthesiologist, CRNA           Last Filed PACU Vitals:  Vitals Value Taken Time   Temp     Pulse 84 07/25/25 15:41   BP     Resp     SpO2 99 % 07/25/25 15:41   Vitals shown include unfiled device data.

## 2025-08-01 NOTE — TELEPHONE ENCOUNTER
8/1 Medication: VENOFER  Directions: Q 7 DAYS  Pt weight: N/A  Quantity: 1  Day Supply:7  Insurance: [employer]  How Prior Auth was submitted: N/A   Authorization Date range:N/A  Authorization Number:N/A  Pharmacy that fills med: [facility]  Infusion Center: [facility]  Facility NPI:  Patient aware of approval:

## 2025-08-13 ENCOUNTER — HOSPITAL ENCOUNTER (OUTPATIENT)
Dept: INFUSION CENTER | Facility: CLINIC | Age: 65
Discharge: HOME | End: 2025-08-13
Attending: INTERNAL MEDICINE
Payer: COMMERCIAL

## 2025-08-13 ENCOUNTER — TELEPHONE (OUTPATIENT)
Age: 65
End: 2025-08-13

## (undated) DEVICE — SYRINGE 10ML LL CONTROL TOP

## (undated) DEVICE — SUT PDS II 4-0 PS-2 18 IN Z496G

## (undated) DEVICE — INTENDED FOR TISSUE SEPARATION, AND OTHER PROCEDURES THAT REQUIRE A SHARP SURGICAL BLADE TO PUNCTURE OR CUT.: Brand: BARD-PARKER ® SAFETYLOCK CARBON RIB-BACK BLADES

## (undated) DEVICE — BASIC PACK: Brand: CONVERTORS

## (undated) DEVICE — STERILE POLYISOPRENE POWDER-FREE SURGICAL GLOVES: Brand: PROTEXIS

## (undated) DEVICE — STERILE POLYISOPRENE POWDER-FREE SURGICAL GLOVES WITH EMOLLIENT COATING: Brand: PROTEXIS

## (undated) DEVICE — PENCIL ELECTROSURG E-Z CLEAN -0035H

## (undated) DEVICE — NEEDLE 25G X 1 1/2

## (undated) DEVICE — ELECTRODE NEEDLE MOD E-Z CLEAN 2.75IN 7CM -0013M

## (undated) DEVICE — SUT SILK 4-0 G-3 789G

## (undated) DEVICE — MINOR PROCEDURE DRAPE: Brand: CONVERTORS

## (undated) DEVICE — NEEDLE BLUNT 18 G X 1 1/2IN

## (undated) DEVICE — ADHESIVE SKIN HIGH VISCOSITY EXOFIN 1ML

## (undated) DEVICE — X-RAY DETECTABLE SPONGES,16 PLY: Brand: VISTEC

## (undated) DEVICE — SUT STRATAFIX SPIRAL 3-0 PGA/PCL 30 X 30 CM SXMD2B408

## (undated) DEVICE — SKIN MARKER DUAL TIP WITH RULER CAP, FLEXIBLE RULER AND LABELS: Brand: DEVON

## (undated) DEVICE — CHLORAPREP HI-LITE 26ML ORANGE

## (undated) DEVICE — 1820 FOAM BLOCK NEEDLE COUNTER: Brand: DEVON